# Patient Record
Sex: MALE | Race: WHITE | NOT HISPANIC OR LATINO | Employment: OTHER | URBAN - METROPOLITAN AREA
[De-identification: names, ages, dates, MRNs, and addresses within clinical notes are randomized per-mention and may not be internally consistent; named-entity substitution may affect disease eponyms.]

---

## 2018-10-03 ENCOUNTER — APPOINTMENT (OUTPATIENT)
Dept: GENERAL RADIOLOGY | Facility: HOSPITAL | Age: 73
End: 2018-10-03

## 2018-10-03 ENCOUNTER — HOSPITAL ENCOUNTER (OUTPATIENT)
Facility: HOSPITAL | Age: 73
Discharge: HOME | End: 2018-10-05
Attending: INTERNAL MEDICINE | Admitting: INTERNAL MEDICINE

## 2018-10-03 DIAGNOSIS — I35.1 AORTIC VALVE INSUFFICIENCY, ETIOLOGY OF CARDIAC VALVE DISEASE UNSPECIFIED: Primary | ICD-10-CM

## 2018-10-03 LAB
ANION GAP SERPL CALCULATED.3IONS-SCNC: 8 MMOL/L (ref 3–11)
ARTICHOKE IGE QN: 120 MG/DL (ref 0–130)
BUN BLD-MCNC: 12 MG/DL (ref 9–23)
BUN/CREAT SERPL: 14.3 (ref 7–25)
CALCIUM SPEC-SCNC: 8.9 MG/DL (ref 8.7–10.4)
CHLORIDE SERPL-SCNC: 105 MMOL/L (ref 99–109)
CHOLEST SERPL-MCNC: 172 MG/DL (ref 0–200)
CO2 SERPL-SCNC: 26 MMOL/L (ref 20–31)
CREAT BLD-MCNC: 0.84 MG/DL (ref 0.6–1.3)
DEPRECATED RDW RBC AUTO: 43.5 FL (ref 37–54)
ERYTHROCYTE [DISTWIDTH] IN BLOOD BY AUTOMATED COUNT: 13.1 % (ref 11.3–14.5)
GFR SERPL CREATININE-BSD FRML MDRD: 89 ML/MIN/1.73
GLUCOSE BLD-MCNC: 100 MG/DL (ref 70–100)
HBA1C MFR BLD: 5.9 % (ref 4.8–5.6)
HCT VFR BLD AUTO: 43.5 % (ref 38.9–50.9)
HDLC SERPL-MCNC: 47 MG/DL (ref 40–60)
HGB BLD-MCNC: 15.3 G/DL (ref 13.1–17.5)
MCH RBC QN AUTO: 32.2 PG (ref 27–31)
MCHC RBC AUTO-ENTMCNC: 35.2 G/DL (ref 32–36)
MCV RBC AUTO: 91.6 FL (ref 80–99)
PLATELET # BLD AUTO: 169 10*3/MM3 (ref 150–450)
PMV BLD AUTO: 10.5 FL (ref 6–12)
POTASSIUM BLD-SCNC: 3.5 MMOL/L (ref 3.5–5.5)
RBC # BLD AUTO: 4.75 10*6/MM3 (ref 4.2–5.76)
SODIUM BLD-SCNC: 139 MMOL/L (ref 132–146)
TRIGL SERPL-MCNC: 99 MG/DL (ref 0–150)
WBC NRBC COR # BLD: 8.24 10*3/MM3 (ref 3.5–10.8)

## 2018-10-03 PROCEDURE — G0378 HOSPITAL OBSERVATION PER HR: HCPCS

## 2018-10-03 PROCEDURE — 85027 COMPLETE CBC AUTOMATED: CPT | Performed by: INTERNAL MEDICINE

## 2018-10-03 PROCEDURE — C1769 GUIDE WIRE: HCPCS | Performed by: INTERNAL MEDICINE

## 2018-10-03 PROCEDURE — 63710000001 ASPIRIN 325 MG TABLET: Performed by: PHYSICIAN ASSISTANT

## 2018-10-03 PROCEDURE — 63710000001 TRAZODONE 50 MG TABLET: Performed by: PHYSICIAN ASSISTANT

## 2018-10-03 PROCEDURE — 0 IOPAMIDOL PER 1 ML: Performed by: INTERNAL MEDICINE

## 2018-10-03 PROCEDURE — 63710000001 ATORVASTATIN 20 MG TABLET: Performed by: PHYSICIAN ASSISTANT

## 2018-10-03 PROCEDURE — 63710000001 CLOPIDOGREL 75 MG TABLET: Performed by: PHYSICIAN ASSISTANT

## 2018-10-03 PROCEDURE — A9270 NON-COVERED ITEM OR SERVICE: HCPCS | Performed by: PHYSICIAN ASSISTANT

## 2018-10-03 PROCEDURE — 93567 NJX CAR CTH SPRVLV AORTGRPHY: CPT | Performed by: INTERNAL MEDICINE

## 2018-10-03 PROCEDURE — 71046 X-RAY EXAM CHEST 2 VIEWS: CPT

## 2018-10-03 PROCEDURE — 25010000002 HEPARIN (PORCINE) PER 1000 UNITS: Performed by: INTERNAL MEDICINE

## 2018-10-03 PROCEDURE — C1894 INTRO/SHEATH, NON-LASER: HCPCS | Performed by: INTERNAL MEDICINE

## 2018-10-03 PROCEDURE — 93010 ELECTROCARDIOGRAM REPORT: CPT | Performed by: INTERNAL MEDICINE

## 2018-10-03 PROCEDURE — 83036 HEMOGLOBIN GLYCOSYLATED A1C: CPT | Performed by: INTERNAL MEDICINE

## 2018-10-03 PROCEDURE — 36415 COLL VENOUS BLD VENIPUNCTURE: CPT

## 2018-10-03 PROCEDURE — 25010000002 FENTANYL CITRATE (PF) 100 MCG/2ML SOLUTION: Performed by: INTERNAL MEDICINE

## 2018-10-03 PROCEDURE — 93458 L HRT ARTERY/VENTRICLE ANGIO: CPT | Performed by: INTERNAL MEDICINE

## 2018-10-03 PROCEDURE — 25010000002 MIDAZOLAM PER 1 MG: Performed by: INTERNAL MEDICINE

## 2018-10-03 PROCEDURE — 80061 LIPID PANEL: CPT | Performed by: INTERNAL MEDICINE

## 2018-10-03 PROCEDURE — 93005 ELECTROCARDIOGRAM TRACING: CPT | Performed by: PHYSICIAN ASSISTANT

## 2018-10-03 PROCEDURE — S0260 H&P FOR SURGERY: HCPCS | Performed by: INTERNAL MEDICINE

## 2018-10-03 PROCEDURE — 63710000001 BUDESONIDE-FORMOTEROL 160-4.5 MCG/ACT AEROSOL 6 G INHALER: Performed by: PHYSICIAN ASSISTANT

## 2018-10-03 PROCEDURE — 80048 BASIC METABOLIC PNL TOTAL CA: CPT | Performed by: INTERNAL MEDICINE

## 2018-10-03 RX ORDER — ALLOPURINOL 300 MG/1
300 TABLET ORAL DAILY
Status: DISCONTINUED | OUTPATIENT
Start: 2018-10-04 | End: 2018-10-05 | Stop reason: HOSPADM

## 2018-10-03 RX ORDER — TRAZODONE HYDROCHLORIDE 50 MG/1
50 TABLET ORAL NIGHTLY PRN
COMMUNITY
End: 2019-06-28 | Stop reason: HOSPADM

## 2018-10-03 RX ORDER — TRAZODONE HYDROCHLORIDE 50 MG/1
50 TABLET ORAL NIGHTLY
Status: DISCONTINUED | OUTPATIENT
Start: 2018-10-03 | End: 2018-10-05 | Stop reason: HOSPADM

## 2018-10-03 RX ORDER — AMLODIPINE BESYLATE 10 MG/1
10 TABLET ORAL
Status: DISCONTINUED | OUTPATIENT
Start: 2018-10-04 | End: 2018-10-05 | Stop reason: HOSPADM

## 2018-10-03 RX ORDER — AMLODIPINE BESYLATE 10 MG/1
10 TABLET ORAL DAILY
COMMUNITY
End: 2019-06-19

## 2018-10-03 RX ORDER — ATORVASTATIN CALCIUM 20 MG/1
20 TABLET, FILM COATED ORAL NIGHTLY
Status: DISCONTINUED | OUTPATIENT
Start: 2018-10-03 | End: 2018-10-05 | Stop reason: HOSPADM

## 2018-10-03 RX ORDER — LIDOCAINE HYDROCHLORIDE 10 MG/ML
INJECTION, SOLUTION EPIDURAL; INFILTRATION; INTRACAUDAL; PERINEURAL AS NEEDED
Status: DISCONTINUED | OUTPATIENT
Start: 2018-10-03 | End: 2018-10-03 | Stop reason: HOSPADM

## 2018-10-03 RX ORDER — CLOPIDOGREL BISULFATE 75 MG/1
600 TABLET ORAL ONCE
Status: COMPLETED | OUTPATIENT
Start: 2018-10-03 | End: 2018-10-03

## 2018-10-03 RX ORDER — FENTANYL CITRATE 50 UG/ML
INJECTION, SOLUTION INTRAMUSCULAR; INTRAVENOUS AS NEEDED
Status: DISCONTINUED | OUTPATIENT
Start: 2018-10-03 | End: 2018-10-03 | Stop reason: HOSPADM

## 2018-10-03 RX ORDER — ALLOPURINOL 300 MG/1
300 TABLET ORAL DAILY
COMMUNITY

## 2018-10-03 RX ORDER — BUDESONIDE AND FORMOTEROL FUMARATE DIHYDRATE 160; 4.5 UG/1; UG/1
2 AEROSOL RESPIRATORY (INHALATION)
Status: DISCONTINUED | OUTPATIENT
Start: 2018-10-03 | End: 2018-10-05 | Stop reason: HOSPADM

## 2018-10-03 RX ORDER — ALBUTEROL SULFATE 90 UG/1
2 AEROSOL, METERED RESPIRATORY (INHALATION) EVERY 4 HOURS PRN
COMMUNITY
End: 2021-03-30

## 2018-10-03 RX ORDER — ASPIRIN 325 MG
325 TABLET ORAL ONCE
Status: COMPLETED | OUTPATIENT
Start: 2018-10-03 | End: 2018-10-03

## 2018-10-03 RX ORDER — MIDAZOLAM HYDROCHLORIDE 1 MG/ML
INJECTION INTRAMUSCULAR; INTRAVENOUS AS NEEDED
Status: DISCONTINUED | OUTPATIENT
Start: 2018-10-03 | End: 2018-10-03 | Stop reason: HOSPADM

## 2018-10-03 RX ORDER — FAMOTIDINE 20 MG/1
20 TABLET, FILM COATED ORAL DAILY
Status: DISCONTINUED | OUTPATIENT
Start: 2018-10-04 | End: 2018-10-05 | Stop reason: HOSPADM

## 2018-10-03 RX ADMIN — ATORVASTATIN CALCIUM 20 MG: 20 TABLET, FILM COATED ORAL at 21:34

## 2018-10-03 RX ADMIN — ASPIRIN 325 MG ORAL TABLET 325 MG: 325 PILL ORAL at 15:36

## 2018-10-03 RX ADMIN — TRAZODONE HYDROCHLORIDE 50 MG: 50 TABLET ORAL at 21:35

## 2018-10-03 RX ADMIN — BUDESONIDE AND FORMOTEROL FUMARATE DIHYDRATE 2 PUFF: 160; 4.5 AEROSOL RESPIRATORY (INHALATION) at 21:37

## 2018-10-03 RX ADMIN — CLOPIDOGREL BISULFATE 600 MG: 75 TABLET ORAL at 15:36

## 2018-10-03 NOTE — H&P
"  Pre-cardiac Catheterization History and Physical  [facility] at [facility]      Patient:  Alok Tobias  :  1945  MRN: [id number]    PCP:  Ivet Chua MD  PHONE:  [phone]    DATE: 10/3/2018  ID: Alok Tobias is a 73 y.o. male resident of [place], [place]     CC: Arm pain     PROBLEM LIST:   1. Arm pain and chest pain  A. History of abnormal stress test 2016, Dr. Shaver: images revealed moderate fixed defect in the inferior territory consistent with attenuation artifact, with EF 60% per Dr. Shaver (records were lost with transition to EPIC)  B. Single episode of severe bilateral arm pain at rest 2018  2. Hypertension  3. COPD  4. Asthma  5. Morbid obesity, BMI 46    BRIEF HPI: Mr. Tobias is a pleasant 74 y/o obese WM with history of HTN, asthma and COPD who is referred in consultation from Dr. Chau's office. He reports he was asleep last night when he awoke with severe bilateral arm tightness and squeezing sensation that lasted about 1 minute. He denies associated symptoms. He denies chest pain suggesting angina. He does report a \"vision\" telling him to see his doctor soon after this occurred and presented to Dr. Chau's office today for further evaluation. He was felt to have symptoms concerning for [place] and was sent directly from Dr. Chau's office for catheterization. He denies prior history of cardiovascular, cerebrovascular or peripheral vascular disease. He did undergo a stress test a few years ago that was interpreted by Dr. Shaver as abnormal due to attenuation artifact. He had normal EF at the time. These records were lost per Dr. Shaver's note with transition to [facility]. He denies current tobacco use, however does drink 4-5 beers per day.     Cardiac Risk Factors: advanced age (older than 55 for men, 65 for women), hypertension, male gender, obesity (BMI >= 30 kg/m2) and sedentary lifestyle    Allergies:      Allergies   Allergen Reactions   • " "Dye Fdc Red [Red Dye] Nausea And Vomiting     All dyes, IV dye, hair dyes   • Penicillins Other (See Comments)       MEDICATIONS:  · Albuterol inhaler   · Allopurinol 300mg daily  · Amlodipine 10mg daily  · Symbicort inhaler  · Famotidine 20mg as needed  · Theophylline 100mg daily    Past medical & surgical history, social and family history reviewed in the electronic medical record.    ROS: Pertinent positives listed in the HPI and problem list above. All others reviewed and negative.     Physical Exam:   /85 (BP Location: Right arm, Patient Position: Lying) Comment: 135/94 L ARM  Pulse 80   Temp 98 °F (36.7 °C) (Temporal Artery )   Resp 18   Ht 170.2 cm (67\")   Wt 136 kg (298 lb 15.1 oz)   SpO2 96%   BMI 46.82 kg/m²     Constitutional:    Obese, cooperative, in no acute distress   Neck:     No Jugular venous distention, obese neck     Heart:    Regular rhythm and normal rate, normal S1 and S2, no murmurs,gallops, rubs, or clicks. No distinct PMI noted.    Lungs:     Clear to auscultation bilaterally, respirations regular, even     and unlabored    Abdomen:     Obese, non-tender, non-distended, normal bowel sounds, no masses or organomegaly   Extremities:   No gross deformities, trace edema, clubbing, or cyanosis.    Pulses:   Peripheral pulses palpable and equal bilaterally.     Barbaeu Test:  Left: Normal  (oxymetric Allens) Right: Not Assessed     Labs and Diagnostic Data:    Results from last 7 days  Lab Units 10/03/18  1358   SODIUM mmol/L 139   POTASSIUM mmol/L 3.5   CHLORIDE mmol/L 105   CO2 mmol/L 26.0   BUN mg/dL 12   CREATININE mg/dL 0.84   GLUCOSE mg/dL 100   CALCIUM mg/dL 8.9       Results from last 7 days  Lab Units 10/03/18  1358   WBC 10*3/mm3 8.24   HEMOGLOBIN g/dL 15.3   HEMATOCRIT % 43.5   PLATELETS 10*3/mm3 169     Lab Results   Component Value Date    CHOL 172 10/03/2018    TRIG 99 10/03/2018    HDL 47 10/03/2018     10/03/2018     EKG: pending     IMPRESSION:  · 72 y/o WM " with history of HTN and obesity, with recent episode of severe bilateral arm squeezing/tightness sensation at rest concerning for unstable angina. He was referred for catheterization studies from Dr. Chau's office.     PLAN:  · Procedure to perform: LHC +/- CBI. Risks, benefits and alternatives to the procedure explained to the patient and he understands and wishes to proceed.  I, Twan Harrison MD, personally performed the services described as documented by the above named individual. I have made any necessary edits and it is both accurate and complete 10/3/2018  5:36 PM     Scribed for Twan Harrison MD by Argentina Lepe PA-C. 10/3/2018  3:41 PM

## 2018-10-04 ENCOUNTER — APPOINTMENT (OUTPATIENT)
Dept: CT IMAGING | Facility: HOSPITAL | Age: 73
End: 2018-10-04

## 2018-10-04 ENCOUNTER — APPOINTMENT (OUTPATIENT)
Dept: CARDIOLOGY | Facility: HOSPITAL | Age: 73
End: 2018-10-04

## 2018-10-04 LAB
ANION GAP SERPL CALCULATED.3IONS-SCNC: 7 MMOL/L (ref 3–11)
BH CV ECHO MEAS - AI DEC SLOPE: 171.3 CM/SEC^2
BH CV ECHO MEAS - AI MAX PG: 35.7 MMHG
BH CV ECHO MEAS - AI MAX VEL: 298.6 CM/SEC
BH CV ECHO MEAS - AI P1/2T: 510.5 MSEC
BH CV ECHO MEAS - AO ROOT AREA (BSA CORRECTED): 1.6
BH CV ECHO MEAS - AO ROOT AREA: 11.5 CM^2
BH CV ECHO MEAS - AO ROOT DIAM: 3.8 CM
BH CV ECHO MEAS - BSA(HAYCOCK): 2.6 M^2
BH CV ECHO MEAS - BSA: 2.4 M^2
BH CV ECHO MEAS - BZI_BMI: 46.7 KILOGRAMS/M^2
BH CV ECHO MEAS - BZI_METRIC_HEIGHT: 170.2 CM
BH CV ECHO MEAS - BZI_METRIC_WEIGHT: 135.2 KG
BH CV ECHO MEAS - EDV(CUBED): 177.3 ML
BH CV ECHO MEAS - EDV(MOD-SP2): 111 ML
BH CV ECHO MEAS - EDV(MOD-SP4): 123 ML
BH CV ECHO MEAS - EDV(TEICH): 154.8 ML
BH CV ECHO MEAS - EF(CUBED): 63.1 %
BH CV ECHO MEAS - EF(MOD-SP2): 40.5 %
BH CV ECHO MEAS - EF(MOD-SP4): 50.4 %
BH CV ECHO MEAS - EF(TEICH): 54 %
BH CV ECHO MEAS - ESV(CUBED): 65.5 ML
BH CV ECHO MEAS - ESV(MOD-SP2): 66 ML
BH CV ECHO MEAS - ESV(MOD-SP4): 61 ML
BH CV ECHO MEAS - ESV(TEICH): 71.3 ML
BH CV ECHO MEAS - FS: 28.3 %
BH CV ECHO MEAS - IVS/LVPW: 0.94
BH CV ECHO MEAS - IVSD: 1.2 CM
BH CV ECHO MEAS - LA DIMENSION: 3.8 CM
BH CV ECHO MEAS - LA/AO: 0.98
BH CV ECHO MEAS - LAD MAJOR: 5.9 CM
BH CV ECHO MEAS - LAT PEAK E' VEL: 8.6 CM/SEC
BH CV ECHO MEAS - LATERAL E/E' RATIO: 6
BH CV ECHO MEAS - LV DIASTOLIC VOL/BSA (35-75): 51.3 ML/M^2
BH CV ECHO MEAS - LV MASS(C)D: 278.6 GRAMS
BH CV ECHO MEAS - LV MASS(C)DI: 116.3 GRAMS/M^2
BH CV ECHO MEAS - LV MAX PG: 4.3 MMHG
BH CV ECHO MEAS - LV MEAN PG: 2.3 MMHG
BH CV ECHO MEAS - LV SYSTOLIC VOL/BSA (12-30): 25.5 ML/M^2
BH CV ECHO MEAS - LV V1 MAX: 103.7 CM/SEC
BH CV ECHO MEAS - LV V1 MEAN: 69.3 CM/SEC
BH CV ECHO MEAS - LV V1 VTI: 21.8 CM
BH CV ECHO MEAS - LVIDD: 5.6 CM
BH CV ECHO MEAS - LVIDS: 4 CM
BH CV ECHO MEAS - LVLD AP2: 7.9 CM
BH CV ECHO MEAS - LVLD AP4: 8.6 CM
BH CV ECHO MEAS - LVLS AP2: 6.9 CM
BH CV ECHO MEAS - LVLS AP4: 7 CM
BH CV ECHO MEAS - LVOT AREA (M): 4.5 CM^2
BH CV ECHO MEAS - LVOT AREA: 4.6 CM^2
BH CV ECHO MEAS - LVOT DIAM: 2.4 CM
BH CV ECHO MEAS - LVPWD: 1.2 CM
BH CV ECHO MEAS - MED PEAK E' VEL: 5.6 CM/SEC
BH CV ECHO MEAS - MEDIAL E/E' RATIO: 9.2
BH CV ECHO MEAS - MV A MAX VEL: 75 CM/SEC
BH CV ECHO MEAS - MV E MAX VEL: 52.8 CM/SEC
BH CV ECHO MEAS - MV E/A: 0.7
BH CV ECHO MEAS - PA ACC SLOPE: 567.8 CM/SEC^2
BH CV ECHO MEAS - PA ACC TIME: 0.14 SEC
BH CV ECHO MEAS - PA PR(ACCEL): 16 MMHG
BH CV ECHO MEAS - RVDD: 2.9 CM
BH CV ECHO MEAS - SI(CUBED): 46.7 ML/M^2
BH CV ECHO MEAS - SI(LVOT): 41.5 ML/M^2
BH CV ECHO MEAS - SI(MOD-SP2): 18.8 ML/M^2
BH CV ECHO MEAS - SI(MOD-SP4): 25.9 ML/M^2
BH CV ECHO MEAS - SI(TEICH): 34.9 ML/M^2
BH CV ECHO MEAS - SV(CUBED): 111.8 ML
BH CV ECHO MEAS - SV(LVOT): 99.3 ML
BH CV ECHO MEAS - SV(MOD-SP2): 45 ML
BH CV ECHO MEAS - SV(MOD-SP4): 62 ML
BH CV ECHO MEAS - SV(TEICH): 83.5 ML
BH CV ECHO MEAS - TAPSE (>1.6): 1.7 CM2
BH CV ECHO MEASUREMENTS AVERAGE E/E' RATIO: 7.44
BH CV VAS BP LEFT ARM: NORMAL MMHG
BH CV XLRA - RV BASE: 4.3 CM
BH CV XLRA - RV LENGTH: 7.1 CM
BH CV XLRA - RV MID: 3.5 CM
BH CV XLRA - TDI S': 10.9 CM/SEC
BUN BLD-MCNC: 13 MG/DL (ref 9–23)
BUN/CREAT SERPL: 13.4 (ref 7–25)
CALCIUM SPEC-SCNC: 8.6 MG/DL (ref 8.7–10.4)
CHLORIDE SERPL-SCNC: 103 MMOL/L (ref 99–109)
CO2 SERPL-SCNC: 25 MMOL/L (ref 20–31)
CREAT BLD-MCNC: 0.97 MG/DL (ref 0.6–1.3)
GFR SERPL CREATININE-BSD FRML MDRD: 75 ML/MIN/1.73
GLUCOSE BLD-MCNC: 164 MG/DL (ref 70–100)
LEFT ATRIUM VOLUME INDEX: 20.5 ML/M^2
MAXIMAL PREDICTED HEART RATE: 145 BPM
POTASSIUM BLD-SCNC: 3.3 MMOL/L (ref 3.5–5.5)
SODIUM BLD-SCNC: 135 MMOL/L (ref 132–146)
STRESS TARGET HR: 123 BPM

## 2018-10-04 PROCEDURE — 63710000001 ATORVASTATIN 20 MG TABLET: Performed by: PHYSICIAN ASSISTANT

## 2018-10-04 PROCEDURE — 63710000001 POTASSIUM CHLORIDE 10 MEQ CAPSULE CONTROLLED-RELEASE: Performed by: INTERNAL MEDICINE

## 2018-10-04 PROCEDURE — 63710000001 FAMOTIDINE 20 MG TABLET: Performed by: PHYSICIAN ASSISTANT

## 2018-10-04 PROCEDURE — 99225 PR SBSQ OBSERVATION CARE/DAY 25 MINUTES: CPT | Performed by: INTERNAL MEDICINE

## 2018-10-04 PROCEDURE — A9270 NON-COVERED ITEM OR SERVICE: HCPCS | Performed by: PHYSICIAN ASSISTANT

## 2018-10-04 PROCEDURE — 93306 TTE W/DOPPLER COMPLETE: CPT | Performed by: INTERNAL MEDICINE

## 2018-10-04 PROCEDURE — 0 IOPAMIDOL PER 1 ML: Performed by: INTERNAL MEDICINE

## 2018-10-04 PROCEDURE — 71275 CT ANGIOGRAPHY CHEST: CPT

## 2018-10-04 PROCEDURE — G0378 HOSPITAL OBSERVATION PER HR: HCPCS

## 2018-10-04 PROCEDURE — 63710000001 AMLODIPINE 5 MG TABLET: Performed by: PHYSICIAN ASSISTANT

## 2018-10-04 PROCEDURE — 63710000001 ALLOPURINOL 300 MG TABLET: Performed by: PHYSICIAN ASSISTANT

## 2018-10-04 PROCEDURE — 93306 TTE W/DOPPLER COMPLETE: CPT

## 2018-10-04 PROCEDURE — 94799 UNLISTED PULMONARY SVC/PX: CPT

## 2018-10-04 PROCEDURE — 63710000001 GUAIFENESIN 600 MG TABLET SUSTAINED-RELEASE 12 HOUR: Performed by: INTERNAL MEDICINE

## 2018-10-04 PROCEDURE — 25010000002 SULFUR HEXAFLUORIDE MICROSPH 60.7-25 MG RECONSTITUTED SUSPENSION: Performed by: INTERNAL MEDICINE

## 2018-10-04 PROCEDURE — 80048 BASIC METABOLIC PNL TOTAL CA: CPT | Performed by: PHYSICIAN ASSISTANT

## 2018-10-04 PROCEDURE — A9270 NON-COVERED ITEM OR SERVICE: HCPCS | Performed by: INTERNAL MEDICINE

## 2018-10-04 RX ORDER — THEOPHYLLINE ANHYDROUS 200 MG
200 TABLET, EXTENDED RELEASE 12 HR ORAL EVERY 12 HOURS SCHEDULED
Status: DISCONTINUED | OUTPATIENT
Start: 2018-10-04 | End: 2018-10-05 | Stop reason: HOSPADM

## 2018-10-04 RX ORDER — POTASSIUM CHLORIDE 750 MG/1
40 CAPSULE, EXTENDED RELEASE ORAL AS NEEDED
Status: DISCONTINUED | OUTPATIENT
Start: 2018-10-04 | End: 2018-10-05 | Stop reason: HOSPADM

## 2018-10-04 RX ORDER — GUAIFENESIN 600 MG/1
600 TABLET, EXTENDED RELEASE ORAL EVERY 12 HOURS SCHEDULED
Status: DISCONTINUED | OUTPATIENT
Start: 2018-10-04 | End: 2018-10-05 | Stop reason: HOSPADM

## 2018-10-04 RX ORDER — SODIUM CHLORIDE 9 MG/ML
200 INJECTION, SOLUTION INTRAVENOUS CONTINUOUS
Status: DISCONTINUED | OUTPATIENT
Start: 2018-10-04 | End: 2018-10-05 | Stop reason: HOSPADM

## 2018-10-04 RX ORDER — ASPIRIN 81 MG/1
81 TABLET ORAL DAILY
Status: DISCONTINUED | OUTPATIENT
Start: 2018-10-04 | End: 2018-10-05 | Stop reason: HOSPADM

## 2018-10-04 RX ADMIN — AMLODIPINE BESYLATE 10 MG: 10 TABLET ORAL at 08:50

## 2018-10-04 RX ADMIN — BUDESONIDE AND FORMOTEROL FUMARATE DIHYDRATE 2 PUFF: 160; 4.5 AEROSOL RESPIRATORY (INHALATION) at 21:01

## 2018-10-04 RX ADMIN — SODIUM CHLORIDE 200 ML/HR: 9 INJECTION, SOLUTION INTRAVENOUS at 10:49

## 2018-10-04 RX ADMIN — BUDESONIDE AND FORMOTEROL FUMARATE DIHYDRATE 2 PUFF: 160; 4.5 AEROSOL RESPIRATORY (INHALATION) at 08:50

## 2018-10-04 RX ADMIN — GUAIFENESIN 600 MG: 600 TABLET, EXTENDED RELEASE ORAL at 19:52

## 2018-10-04 RX ADMIN — POTASSIUM CHLORIDE 40 MEQ: 750 CAPSULE, EXTENDED RELEASE ORAL at 19:52

## 2018-10-04 RX ADMIN — SULFUR HEXAFLUORIDE 3 ML: KIT at 11:40

## 2018-10-04 RX ADMIN — IOPAMIDOL 75 ML: 755 INJECTION, SOLUTION INTRAVENOUS at 14:40

## 2018-10-04 RX ADMIN — POTASSIUM CHLORIDE 40 MEQ: 750 CAPSULE, EXTENDED RELEASE ORAL at 15:14

## 2018-10-04 RX ADMIN — FAMOTIDINE 20 MG: 20 TABLET, FILM COATED ORAL at 08:50

## 2018-10-04 RX ADMIN — ATORVASTATIN CALCIUM 20 MG: 20 TABLET, FILM COATED ORAL at 19:52

## 2018-10-04 RX ADMIN — ALLOPURINOL 300 MG: 300 TABLET ORAL at 08:50

## 2018-10-04 NOTE — PLAN OF CARE
Problem: Patient Care Overview  Goal: Plan of Care Review  Outcome: Ongoing (interventions implemented as appropriate)   10/03/18 2253   Coping/Psychosocial   Plan of Care Reviewed With patient;daughter   Plan of Care Review   Progress no change   OTHER   Outcome Summary V/S stable. Procedural site intact with gauze & tegaderm covering. Site is soft & pt has no c/o discomfort. CXR done this evening. ECHO to be done 10/04/18. Teaching done with pt r/o purpose of ECHO.       10/03/18 2253   Coping/Psychosocial   Plan of Care Reviewed With patient;daughter   Plan of Care Review   Progress no change   OTHER   Outcome Summary V/S stable. Procedural site intact with gauze & tegaderm covering. Site is soft & pt has no c/o discomfort. CXR done this evening. ECHO to be done 10/04/18. Teaching done with pt r/o purpose of ECHO.        Problem: Cardiac Catheterization (Diagnostic/Interventional) (Adult)  Goal: Signs and Symptoms of Listed Potential Problems Will be Absent, Minimized or Managed (Cardiac Catheterization)  Outcome: Ongoing (interventions implemented as appropriate)   10/03/18 2253   Goal/Outcome Evaluation   Problems Present (Cardiac Cath) none     Goal: Anesthesia/Sedation Recovery  Outcome: Outcome(s) achieved Date Met: 10/03/18   10/03/18 2253   Goal/Outcome Evaluation   Anesthesia/Sedation Recovery recovered to baseline

## 2018-10-04 NOTE — PROGRESS NOTES
"  [facility] at [facility]  PROGRESS NOTE    Date of Admission: 10/3/2018  Length of Stay: 0  Primary Care Physician: Ivet Chau MD    Chief Complaint: f/u arm pain, aortic insufficiency   Problem List:   1. Arm pain and chest pain  A. History of abnormal stress test 07/2016, Dr. Shaver: images revealed moderate fixed defect in the inferior territory consistent with attenuation artifact, with EF 60% per Dr. Shaver (records were lost with transition to EPIC)  B. Single episode of severe bilateral arm pain at rest October 2018  2. Hypertension  3. COPD  4. Asthma  5. Morbid obesity, BMI 46    Subjective      Patient has not had any recurrent arm or chest pain. Some wheezing this AM    Objective   Vitals: /67   Pulse 66   Temp 98 °F (36.7 °C)   Resp 18   Ht 170.2 cm (67\")   Wt 136 kg (298 lb 15.1 oz)   SpO2 90%   BMI 46.82 kg/m²     Physical Exam:  GENERAL: Alert, cooperative, in no acute distress.   HEENT: Normocephalic, no jugular venous distention  HEART: No discrete PMI is noted. Regular rhythm, normal rate, and no murmurs, gallops, or rubs.   LUNGS: Mild expiratory wheezing. No rales or rhonchi.  ABDOMEN: Soft, bowel sounds present, non-tender   NEUROLOGIC: No focal abnormalities involving strength or sensation are noted.   EXTREMITIES: No clubbing, cyanosis, or edema noted. Radial site stable with no bleeding, bruising or hematoma     Results:    Results from last 7 days  Lab Units 10/03/18  1358   WBC 10*3/mm3 8.24   HEMOGLOBIN g/dL 15.3   HEMATOCRIT % 43.5   PLATELETS 10*3/mm3 169       Results from last 7 days  Lab Units 10/04/18  0330 10/03/18  1358   SODIUM mmol/L 135 139   POTASSIUM mmol/L 3.3* 3.5   CHLORIDE mmol/L 103 105   CO2 mmol/L 25.0 26.0   BUN mg/dL 13 12   CREATININE mg/dL 0.97 0.84   GLUCOSE mg/dL 164* 100      Lab Results   Component Value Date    CHOL 172 10/03/2018    TRIG 99 10/03/2018    HDL 47 10/03/2018     10/03/2018       Results from " last 7 days  Lab Units 10/03/18  1358   HEMOGLOBIN A1C % 5.90*       Results from last 7 days  Lab Units 10/03/18  1358   CHOLESTEROL mg/dL 172   TRIGLYCERIDES mg/dL 99   HDL CHOL mg/dL 47   LDL CHOL mg/dL 120     No intake or output data in the 24 hours ending 10/04/18 0834    I personally reviewed the patient's EKG/Telemetry data    Radiology Data:   CXR images reviewed, official report pending     Current Medications:    allopurinol 300 mg Oral Daily   amLODIPine 10 mg Oral Q24H   atorvastatin 20 mg Oral Nightly   budesonide-formoterol 2 puff Inhalation BID - RT   famotidine 20 mg Oral Daily   traZODone 50 mg Oral Nightly          Assessment and Plan:   1. Arm pain  - LHC demonstrated nonobstructive CAD and some AI  - echo to be done today as well as CTA to rule out aortic dissection  - stable overnight without symptoms     2. HTN  - controlled    I, Twan Harrison MD, personally performed the services described as documented by the above named individual. I have made any necessary edits and it is both accurate and complete 10/6/2018  1:30 PM        Scribed for Twan Harrison MD by Argentina Lepe PA-C.

## 2018-10-05 VITALS
HEIGHT: 67 IN | DIASTOLIC BLOOD PRESSURE: 74 MMHG | HEART RATE: 76 BPM | WEIGHT: 298 LBS | TEMPERATURE: 98.2 F | OXYGEN SATURATION: 96 % | RESPIRATION RATE: 18 BRPM | BODY MASS INDEX: 46.77 KG/M2 | SYSTOLIC BLOOD PRESSURE: 126 MMHG

## 2018-10-05 PROCEDURE — A9270 NON-COVERED ITEM OR SERVICE: HCPCS | Performed by: PHYSICIAN ASSISTANT

## 2018-10-05 PROCEDURE — 63710000001 GUAIFENESIN 600 MG TABLET SUSTAINED-RELEASE 12 HOUR: Performed by: INTERNAL MEDICINE

## 2018-10-05 PROCEDURE — A9270 NON-COVERED ITEM OR SERVICE: HCPCS | Performed by: INTERNAL MEDICINE

## 2018-10-05 PROCEDURE — 63710000001 AMLODIPINE 10 MG TABLET: Performed by: PHYSICIAN ASSISTANT

## 2018-10-05 PROCEDURE — 63710000001 FAMOTIDINE 20 MG TABLET: Performed by: PHYSICIAN ASSISTANT

## 2018-10-05 PROCEDURE — 63710000001 ASPIRIN 81 MG TABLET DELAYED-RELEASE: Performed by: PHYSICIAN ASSISTANT

## 2018-10-05 PROCEDURE — 63710000001 ALLOPURINOL 300 MG TABLET: Performed by: PHYSICIAN ASSISTANT

## 2018-10-05 PROCEDURE — G0378 HOSPITAL OBSERVATION PER HR: HCPCS

## 2018-10-05 PROCEDURE — 99217 PR OBSERVATION CARE DISCHARGE MANAGEMENT: CPT | Performed by: INTERNAL MEDICINE

## 2018-10-05 RX ORDER — ASPIRIN 81 MG/1
81 TABLET ORAL DAILY
Qty: 100 TABLET | Refills: 4 | Status: SHIPPED | OUTPATIENT
Start: 2018-10-06 | End: 2019-06-19

## 2018-10-05 RX ORDER — ATORVASTATIN CALCIUM 20 MG/1
20 TABLET, FILM COATED ORAL NIGHTLY
Qty: 30 TABLET | Refills: 11 | Status: ON HOLD | OUTPATIENT
Start: 2018-10-05 | End: 2019-05-28

## 2018-10-05 RX ADMIN — AMLODIPINE BESYLATE 10 MG: 10 TABLET ORAL at 08:59

## 2018-10-05 RX ADMIN — GUAIFENESIN 600 MG: 600 TABLET, EXTENDED RELEASE ORAL at 08:59

## 2018-10-05 RX ADMIN — BUDESONIDE AND FORMOTEROL FUMARATE DIHYDRATE 2 PUFF: 160; 4.5 AEROSOL RESPIRATORY (INHALATION) at 09:29

## 2018-10-05 RX ADMIN — FAMOTIDINE 20 MG: 20 TABLET, FILM COATED ORAL at 08:59

## 2018-10-05 RX ADMIN — ASPIRIN 81 MG: 81 TABLET, COATED ORAL at 08:59

## 2018-10-05 RX ADMIN — ALLOPURINOL 300 MG: 300 TABLET ORAL at 08:59

## 2018-10-05 NOTE — PLAN OF CARE
Problem: Patient Care Overview  Goal: Plan of Care Review  Outcome: Ongoing (interventions implemented as appropriate)   10/05/18 0601   Coping/Psychosocial   Plan of Care Reviewed With patient   Plan of Care Review   Progress improving   OTHER   Outcome Summary sinus sinus annmarie with 1st degree block room lair while awake 2L while sleeping for sats 86-89% no complaints hoping to go home after seeing dr patterson cath site dsg off per pt request site clean and dry

## 2018-10-05 NOTE — PROGRESS NOTES
Discharge Planning Assessment  [facility]     Patient Name: Alok Tobias  MRN: [id number]  Today's Date: 10/5/2018    Admit Date: 10/3/2018          Discharge Needs Assessment     Row Name 10/05/18 1108       Living Environment    Lives With alone    Current Living Arrangements home/apartment/condo    Primary Care Provided by self    Provides Primary Care For no one    Family Caregiver if Needed child(yani), adult    Quality of Family Relationships unable to assess    Able to Return to Prior Arrangements yes       Resource/Environmental Concerns    Resource/Environmental Concerns none    Transportation Concerns car, none       Transition Planning    Patient/Family Anticipates Transition to home    Patient/Family Anticipated Services at Transition none    Transportation Anticipated car, drives self       Discharge Needs Assessment    Readmission Within the Last 30 Days no previous admission in last 30 days    Concerns to be Addressed no discharge needs identified;denies needs/concerns at this time    Equipment Currently Used at Home none    Anticipated Changes Related to Illness none    Equipment Needed After Discharge none    Current Discharge Risk lives alone            Discharge Plan     Row Name 10/05/18 1109       Plan    Plan Home    Patient/Family in Agreement with Plan yes    Plan Comments Met with patient at bedside to initiate discharge planning. He lives alone in a home in [facility]. He is independent with all ADLs and does not use any DME. He has never had home health or been to an inpatient rehab facility. He plans to return home at discharge and will drive himself home as he has his car here. Denies discharge planning needs or concerns. Hopeful for discharge home today. Martha Campbell RN [phone]    Final Discharge Disposition Code 01 - home or self-care        Destination     No service coordination in this encounter.      Durable Medical Equipment     No service coordination in this encounter.       Dialysis/Infusion     No service coordination in this encounter.      Home Medical Care     No service coordination in this encounter.      Social Care     No service coordination in this encounter.        Expected Discharge Date and Time     Expected Discharge Date Expected Discharge Time    Oct 5, 2018               Demographic Summary     Row Name 10/05/18 1107       General Information    Arrived From physician office    Referral Source admission list    Reason for Consult discharge planning    Preferred Language English            Functional Status     Row Name 10/05/18 1107       Functional Status    Usual Activity Tolerance moderate    Current Activity Tolerance moderate       Functional Status, IADL    Medications independent    Meal Preparation independent    Housekeeping independent    Laundry independent    Shopping independent       Employment/    Employment/ Comments Medicare [employer] and [employer] supplement with Rx medication coverage through [employer] (card scanned in EPIC)            Psychosocial    No documentation.           Abuse/Neglect    No documentation.           Legal    No documentation.           Substance Abuse    No documentation.           Patient Forms    No documentation.         Martha Campbell RN

## 2018-10-05 NOTE — PROGRESS NOTES
"  [facility] at [facility]  PROGRESS NOTE    Date of Admission: 10/3/2018  Length of Stay: 0  Primary Care Physician: Ivet Chau MD    Chief Complaint: f/u arm pain, AI  Problem List:   1. Aortic insufficiency   A. History of abnormal stress test 07/2016, Dr. Shaver: images revealed moderate fixed defect in the inferior territory consistent with attenuation artifact, with EF 60% per Dr. Shaver (records were lost with transition to EPIC)  B. Single episode of severe bilateral arm pain at rest October 2018  C. [facility] with nonobstructive CAD, AI noted  D. Echo 10/4/2018: LVSF is normal, mild aortic root enlargement, mild AI  E. CTA Chest 10/4/18 negative for aortic dissection, ascending aorta is mildly enlarged   2. Hypertension  3. COPD  4. Asthma  5. Morbid obesity, BMI 46  6. ANDREA intolerant to CPAP    Subjective      Patient remains asymptomatic without chest, arm pain or dyspnea.     Objective   Vitals: /74 (BP Location: Left arm, Patient Position: Lying)   Pulse 72   Temp 98.5 °F (36.9 °C) (Oral)   Resp 18   Ht 170.2 cm (67\")   Wt 135 kg (298 lb)   SpO2 92%   BMI 46.67 kg/m²     Physical Exam:  GENERAL: Alert, cooperative, in no acute distress.   HEENT: Normocephalic, no jugular venous distention  HEART: No discrete PMI is noted. Regular rhythm, normal rate, and no murmurs, gallops, or rubs.   LUNGS: Clear to auscultation bilaterally. No wheezing, rales or rhonchi.  ABDOMEN: Soft, bowel sounds present, non-tender   NEUROLOGIC: No focal abnormalities involving strength or sensation are noted.   EXTREMITIES: No clubbing, cyanosis, or edema noted.     Results:    Results from last 7 days  Lab Units 10/03/18  1358   WBC 10*3/mm3 8.24   HEMOGLOBIN g/dL 15.3   HEMATOCRIT % 43.5   PLATELETS 10*3/mm3 169       Results from last 7 days  Lab Units 10/04/18  0330 10/03/18  1358   SODIUM mmol/L 135 139   POTASSIUM mmol/L 3.3* 3.5   CHLORIDE mmol/L 103 105   CO2 mmol/L 25.0 26.0   BUN " mg/dL 13 12   CREATININE mg/dL 0.97 0.84   GLUCOSE mg/dL 164* 100      Lab Results   Component Value Date    CHOL 172 10/03/2018    TRIG 99 10/03/2018    HDL 47 10/03/2018     10/03/2018       Results from last 7 days  Lab Units 10/03/18  1358   HEMOGLOBIN A1C % 5.90*       Results from last 7 days  Lab Units 10/03/18  1358   CHOLESTEROL mg/dL 172   TRIGLYCERIDES mg/dL 99   HDL CHOL mg/dL 47   LDL CHOL mg/dL 120       Intake/Output Summary (Last 24 hours) at 10/05/18 0821  Last data filed at 10/05/18 0451   Gross per 24 hour   Intake                0 ml   Output             1125 ml   Net            -1125 ml     I personally reviewed the patient's EKG/Telemetry data    Radiology Data:   CTA Chest 10/4/2018:  FINDINGS:       Vascular:  Thoracic aorta is normal in caliber without evidence of  dissection. Classic arch branching pattern. Minimal atherosclerosis.  Heart size is within normal limits.     Nonvascular:  In the lungs, there is scattered evidence of healed  granulomatous disease. No pleural effusion or pneumothorax. No  concerning noncalcified lung nodule or mass. Incidental right pectoralis  minor intramuscular lipoma.     IMPRESSION:  No evidence of aortic dissection.    Echo 10/4/2018:  Interpretation Summary     · Chamber sizes and wall thicknesses are normal.  · Global and segmental LV wall motion is normal.  · Mild concentric LVH is seen.  · There is mild aortic root enlargement.  · There is mild aortic insufficiency. The aortic valve is not well seen.  · PA hypertension is not suggested.     Current Medications:    allopurinol 300 mg Oral Daily   amLODIPine 10 mg Oral Q24H   aspirin 81 mg Oral Daily   atorvastatin 20 mg Oral Nightly   budesonide-formoterol 2 puff Inhalation BID - RT   famotidine 20 mg Oral Daily   guaiFENesin 600 mg Oral Q12H   theophylline 200 mg Oral Q12H   traZODone 50 mg Oral Nightly       sodium chloride 200 mL/hr Last Rate: Stopped (10/04/18 1600)       Assessment and Plan:      1. Arm pain  - [facility] demonstrated nonobstructive CAD, normal LVEF  - echo with mild AI, mild aortic root insufficiency  - CTA reviewed personally by Dr. Harrison with Dr. Pascal and negative for aortic dissection, mild enlargement of ascending aorta    2. Mild AI  - he will return in 1 year with echo at that time      3. HTN  -well  controlled    Disposition: Patient stable and ready for discharge home on current medicines. He will return for follow up in 1 year with echo at that time to re-look aortic insufficiency. He knows should he have any symptoms in the interim to call our office.     I, Twan Harrison MD, personally performed the services described as documented by the above named individual. I have made any necessary edits and it is both accurate and complete 10/6/2018  1:31 PM            Scribed for Twan Harrison MD by Argentina Lepe PA-C.

## 2019-05-26 ENCOUNTER — APPOINTMENT (OUTPATIENT)
Dept: GENERAL RADIOLOGY | Facility: HOSPITAL | Age: 74
End: 2019-05-26

## 2019-05-26 ENCOUNTER — HOSPITAL ENCOUNTER (INPATIENT)
Facility: HOSPITAL | Age: 74
LOS: 3 days | Discharge: HOME | End: 2019-05-29
Attending: EMERGENCY MEDICINE | Admitting: INTERNAL MEDICINE

## 2019-05-26 DIAGNOSIS — R09.02 HYPOXIA: ICD-10-CM

## 2019-05-26 DIAGNOSIS — J96.01 ACUTE RESPIRATORY FAILURE WITH HYPOXIA (HCC): ICD-10-CM

## 2019-05-26 DIAGNOSIS — R06.02 SHORTNESS OF BREATH: ICD-10-CM

## 2019-05-26 DIAGNOSIS — J44.1 CHRONIC OBSTRUCTIVE PULMONARY DISEASE WITH ACUTE EXACERBATION (HCC): Chronic | ICD-10-CM

## 2019-05-26 DIAGNOSIS — J18.9 PNEUMONIA OF BOTH LOWER LOBES DUE TO INFECTIOUS ORGANISM: Primary | ICD-10-CM

## 2019-05-26 PROBLEM — G47.33 OSA (OBSTRUCTIVE SLEEP APNEA): Chronic | Status: ACTIVE | Noted: 2019-05-26

## 2019-05-26 PROBLEM — J44.9 COPD (CHRONIC OBSTRUCTIVE PULMONARY DISEASE): Chronic | Status: ACTIVE | Noted: 2019-05-26

## 2019-05-26 LAB
ALBUMIN SERPL-MCNC: 3.6 G/DL (ref 3.5–5.2)
ALBUMIN/GLOB SERPL: 1 G/DL
ALP SERPL-CCNC: 77 U/L (ref 39–117)
ALT SERPL W P-5'-P-CCNC: 17 U/L (ref 1–41)
ANION GAP SERPL CALCULATED.3IONS-SCNC: 12 MMOL/L
AST SERPL-CCNC: 22 U/L (ref 1–40)
BASOPHILS # BLD AUTO: 0.01 10*3/MM3 (ref 0–0.2)
BASOPHILS NFR BLD AUTO: 0.1 % (ref 0–1.5)
BILIRUB SERPL-MCNC: 1.2 MG/DL (ref 0.2–1.2)
BUN BLD-MCNC: 12 MG/DL (ref 8–23)
BUN/CREAT SERPL: 11.7 (ref 7–25)
CALCIUM SPEC-SCNC: 8.7 MG/DL (ref 8.6–10.5)
CHLORIDE SERPL-SCNC: 104 MMOL/L (ref 98–107)
CO2 SERPL-SCNC: 22 MMOL/L (ref 22–29)
CREAT BLD-MCNC: 1.03 MG/DL (ref 0.76–1.27)
D-LACTATE SERPL-SCNC: 1.7 MMOL/L (ref 0.5–2)
DEPRECATED RDW RBC AUTO: 51 FL (ref 37–54)
EOSINOPHIL # BLD AUTO: 0 10*3/MM3 (ref 0–0.4)
EOSINOPHIL NFR BLD AUTO: 0 % (ref 0.3–6.2)
ERYTHROCYTE [DISTWIDTH] IN BLOOD BY AUTOMATED COUNT: 14.5 % (ref 12.3–15.4)
GFR SERPL CREATININE-BSD FRML MDRD: 70 ML/MIN/1.73
GLOBULIN UR ELPH-MCNC: 3.5 GM/DL
GLUCOSE BLD-MCNC: 132 MG/DL (ref 65–99)
HCT VFR BLD AUTO: 44.2 % (ref 37.5–51)
HGB BLD-MCNC: 14.7 G/DL (ref 13–17.7)
HOLD SPECIMEN: NORMAL
HOLD SPECIMEN: NORMAL
IMM GRANULOCYTES # BLD AUTO: 0.05 10*3/MM3 (ref 0–0.05)
IMM GRANULOCYTES NFR BLD AUTO: 0.5 % (ref 0–0.5)
LYMPHOCYTES # BLD AUTO: 0.81 10*3/MM3 (ref 0.7–3.1)
LYMPHOCYTES NFR BLD AUTO: 7.4 % (ref 19.6–45.3)
MCH RBC QN AUTO: 32.1 PG (ref 26.6–33)
MCHC RBC AUTO-ENTMCNC: 33.3 G/DL (ref 31.5–35.7)
MCV RBC AUTO: 96.5 FL (ref 79–97)
MONOCYTES # BLD AUTO: 1.11 10*3/MM3 (ref 0.1–0.9)
MONOCYTES NFR BLD AUTO: 10.1 % (ref 5–12)
NEUTROPHILS # BLD AUTO: 9.02 10*3/MM3 (ref 1.7–7)
NEUTROPHILS NFR BLD AUTO: 81.9 % (ref 42.7–76)
NT-PROBNP SERPL-MCNC: 2320 PG/ML (ref 5–1800)
PLATELET # BLD AUTO: 172 10*3/MM3 (ref 140–450)
PMV BLD AUTO: 11.2 FL (ref 6–12)
POTASSIUM BLD-SCNC: 4.3 MMOL/L (ref 3.5–5.2)
PROT SERPL-MCNC: 7.1 G/DL (ref 6–8.5)
RBC # BLD AUTO: 4.58 10*6/MM3 (ref 4.14–5.8)
SODIUM BLD-SCNC: 138 MMOL/L (ref 136–145)
TROPONIN T SERPL-MCNC: <0.01 NG/ML (ref 0–0.03)
WBC NRBC COR # BLD: 11 10*3/MM3 (ref 3.4–10.8)
WHOLE BLOOD HOLD SPECIMEN: NORMAL
WHOLE BLOOD HOLD SPECIMEN: NORMAL

## 2019-05-26 PROCEDURE — 94799 UNLISTED PULMONARY SVC/PX: CPT

## 2019-05-26 PROCEDURE — 83605 ASSAY OF LACTIC ACID: CPT | Performed by: EMERGENCY MEDICINE

## 2019-05-26 PROCEDURE — 87040 BLOOD CULTURE FOR BACTERIA: CPT | Performed by: EMERGENCY MEDICINE

## 2019-05-26 PROCEDURE — 93005 ELECTROCARDIOGRAM TRACING: CPT | Performed by: EMERGENCY MEDICINE

## 2019-05-26 PROCEDURE — 83880 ASSAY OF NATRIURETIC PEPTIDE: CPT | Performed by: EMERGENCY MEDICINE

## 2019-05-26 PROCEDURE — 71045 X-RAY EXAM CHEST 1 VIEW: CPT

## 2019-05-26 PROCEDURE — 99222 1ST HOSP IP/OBS MODERATE 55: CPT | Performed by: FAMILY MEDICINE

## 2019-05-26 PROCEDURE — 84484 ASSAY OF TROPONIN QUANT: CPT | Performed by: EMERGENCY MEDICINE

## 2019-05-26 PROCEDURE — 85025 COMPLETE CBC W/AUTO DIFF WBC: CPT | Performed by: EMERGENCY MEDICINE

## 2019-05-26 PROCEDURE — 25010000002 METHYLPREDNISOLONE PER 40 MG: Performed by: FAMILY MEDICINE

## 2019-05-26 PROCEDURE — 25010000002 LEVOFLOXACIN PER 250 MG: Performed by: EMERGENCY MEDICINE

## 2019-05-26 PROCEDURE — 99285 EMERGENCY DEPT VISIT HI MDM: CPT

## 2019-05-26 PROCEDURE — 80053 COMPREHEN METABOLIC PANEL: CPT | Performed by: EMERGENCY MEDICINE

## 2019-05-26 RX ORDER — LEVOFLOXACIN 5 MG/ML
750 INJECTION, SOLUTION INTRAVENOUS DAILY
Status: DISCONTINUED | OUTPATIENT
Start: 2019-05-27 | End: 2019-05-28

## 2019-05-26 RX ORDER — BISACODYL 10 MG
10 SUPPOSITORY, RECTAL RECTAL DAILY PRN
Status: DISCONTINUED | OUTPATIENT
Start: 2019-05-26 | End: 2019-05-29 | Stop reason: HOSPADM

## 2019-05-26 RX ORDER — POTASSIUM CHLORIDE 1.5 G/1.77G
40 POWDER, FOR SOLUTION ORAL AS NEEDED
Status: DISCONTINUED | OUTPATIENT
Start: 2019-05-26 | End: 2019-05-29 | Stop reason: HOSPADM

## 2019-05-26 RX ORDER — IPRATROPIUM BROMIDE AND ALBUTEROL SULFATE 2.5; .5 MG/3ML; MG/3ML
3 SOLUTION RESPIRATORY (INHALATION) EVERY 4 HOURS PRN
Status: DISCONTINUED | OUTPATIENT
Start: 2019-05-26 | End: 2019-05-29 | Stop reason: HOSPADM

## 2019-05-26 RX ORDER — SODIUM CHLORIDE 0.9 % (FLUSH) 0.9 %
3 SYRINGE (ML) INJECTION EVERY 12 HOURS SCHEDULED
Status: DISCONTINUED | OUTPATIENT
Start: 2019-05-26 | End: 2019-05-29 | Stop reason: HOSPADM

## 2019-05-26 RX ORDER — ONDANSETRON 4 MG/1
4 TABLET, FILM COATED ORAL EVERY 6 HOURS PRN
Status: DISCONTINUED | OUTPATIENT
Start: 2019-05-26 | End: 2019-05-29 | Stop reason: HOSPADM

## 2019-05-26 RX ORDER — IPRATROPIUM BROMIDE AND ALBUTEROL SULFATE 2.5; .5 MG/3ML; MG/3ML
3 SOLUTION RESPIRATORY (INHALATION)
Status: DISCONTINUED | OUTPATIENT
Start: 2019-05-26 | End: 2019-05-29 | Stop reason: HOSPADM

## 2019-05-26 RX ORDER — ALLOPURINOL 300 MG/1
300 TABLET ORAL DAILY
Status: DISCONTINUED | OUTPATIENT
Start: 2019-05-27 | End: 2019-05-29 | Stop reason: HOSPADM

## 2019-05-26 RX ORDER — IPRATROPIUM BROMIDE AND ALBUTEROL SULFATE 2.5; .5 MG/3ML; MG/3ML
3 SOLUTION RESPIRATORY (INHALATION) ONCE
Status: COMPLETED | OUTPATIENT
Start: 2019-05-26 | End: 2019-05-26

## 2019-05-26 RX ORDER — L.ACID,PARA/B.BIFIDUM/S.THERM 8B CELL
1 CAPSULE ORAL DAILY
Status: DISCONTINUED | OUTPATIENT
Start: 2019-05-27 | End: 2019-05-29 | Stop reason: HOSPADM

## 2019-05-26 RX ORDER — CHOLECALCIFEROL (VITAMIN D3) 125 MCG
5 CAPSULE ORAL NIGHTLY PRN
Status: DISCONTINUED | OUTPATIENT
Start: 2019-05-26 | End: 2019-05-29 | Stop reason: HOSPADM

## 2019-05-26 RX ORDER — METHYLPREDNISOLONE SODIUM SUCCINATE 40 MG/ML
30 INJECTION, POWDER, LYOPHILIZED, FOR SOLUTION INTRAMUSCULAR; INTRAVENOUS EVERY 12 HOURS SCHEDULED
Status: DISCONTINUED | OUTPATIENT
Start: 2019-05-26 | End: 2019-05-28

## 2019-05-26 RX ORDER — ACETAMINOPHEN 325 MG/1
650 TABLET ORAL EVERY 4 HOURS PRN
Status: DISCONTINUED | OUTPATIENT
Start: 2019-05-26 | End: 2019-05-29 | Stop reason: HOSPADM

## 2019-05-26 RX ORDER — SODIUM CHLORIDE 0.9 % (FLUSH) 0.9 %
3-10 SYRINGE (ML) INJECTION AS NEEDED
Status: DISCONTINUED | OUTPATIENT
Start: 2019-05-26 | End: 2019-05-29 | Stop reason: HOSPADM

## 2019-05-26 RX ORDER — LEVOFLOXACIN 5 MG/ML
750 INJECTION, SOLUTION INTRAVENOUS ONCE
Status: COMPLETED | OUTPATIENT
Start: 2019-05-26 | End: 2019-05-26

## 2019-05-26 RX ORDER — POTASSIUM CHLORIDE 750 MG/1
40 CAPSULE, EXTENDED RELEASE ORAL AS NEEDED
Status: DISCONTINUED | OUTPATIENT
Start: 2019-05-26 | End: 2019-05-29 | Stop reason: HOSPADM

## 2019-05-26 RX ORDER — GUAIFENESIN 600 MG/1
1200 TABLET, EXTENDED RELEASE ORAL EVERY 12 HOURS SCHEDULED
Status: DISCONTINUED | OUTPATIENT
Start: 2019-05-26 | End: 2019-05-29 | Stop reason: HOSPADM

## 2019-05-26 RX ORDER — LISINOPRIL 10 MG/1
10 TABLET ORAL DAILY
Status: ON HOLD | COMMUNITY
End: 2019-05-28

## 2019-05-26 RX ORDER — BISACODYL 5 MG/1
5 TABLET, DELAYED RELEASE ORAL DAILY PRN
Status: DISCONTINUED | OUTPATIENT
Start: 2019-05-26 | End: 2019-05-29 | Stop reason: HOSPADM

## 2019-05-26 RX ORDER — GUAIFENESIN 1200 MG/1
1200 TABLET, EXTENDED RELEASE ORAL EVERY MORNING
COMMUNITY
End: 2019-08-15

## 2019-05-26 RX ORDER — POTASSIUM CHLORIDE 7.45 MG/ML
10 INJECTION INTRAVENOUS
Status: DISCONTINUED | OUTPATIENT
Start: 2019-05-26 | End: 2019-05-29 | Stop reason: HOSPADM

## 2019-05-26 RX ORDER — ONDANSETRON 2 MG/ML
4 INJECTION INTRAMUSCULAR; INTRAVENOUS EVERY 6 HOURS PRN
Status: DISCONTINUED | OUTPATIENT
Start: 2019-05-26 | End: 2019-05-29 | Stop reason: HOSPADM

## 2019-05-26 RX ORDER — DOCUSATE SODIUM 100 MG/1
100 CAPSULE, LIQUID FILLED ORAL 2 TIMES DAILY PRN
Status: DISCONTINUED | OUTPATIENT
Start: 2019-05-26 | End: 2019-05-29 | Stop reason: HOSPADM

## 2019-05-26 RX ORDER — ALBUTEROL SULFATE 2.5 MG/3ML
2.5 SOLUTION RESPIRATORY (INHALATION) ONCE AS NEEDED
Status: DISCONTINUED | OUTPATIENT
Start: 2019-05-26 | End: 2019-05-29 | Stop reason: HOSPADM

## 2019-05-26 RX ORDER — LISINOPRIL 10 MG/1
10 TABLET ORAL DAILY
Status: DISCONTINUED | OUTPATIENT
Start: 2019-05-27 | End: 2019-05-27

## 2019-05-26 RX ORDER — NAPROXEN SODIUM 220 MG
220 TABLET ORAL EVERY MORNING
COMMUNITY
End: 2019-06-28 | Stop reason: HOSPADM

## 2019-05-26 RX ORDER — BUDESONIDE AND FORMOTEROL FUMARATE DIHYDRATE 160; 4.5 UG/1; UG/1
2 AEROSOL RESPIRATORY (INHALATION)
Status: DISCONTINUED | OUTPATIENT
Start: 2019-05-26 | End: 2019-05-29 | Stop reason: HOSPADM

## 2019-05-26 RX ORDER — THEOPHYLLINE 300 MG/1
300 TABLET, EXTENDED RELEASE ORAL EVERY 12 HOURS
Status: DISCONTINUED | OUTPATIENT
Start: 2019-05-27 | End: 2019-05-29 | Stop reason: HOSPADM

## 2019-05-26 RX ORDER — SODIUM CHLORIDE 0.9 % (FLUSH) 0.9 %
10 SYRINGE (ML) INJECTION AS NEEDED
Status: DISCONTINUED | OUTPATIENT
Start: 2019-05-26 | End: 2019-05-29 | Stop reason: HOSPADM

## 2019-05-26 RX ADMIN — MELATONIN TAB 5 MG 5 MG: 5 TAB at 22:13

## 2019-05-26 RX ADMIN — IPRATROPIUM BROMIDE AND ALBUTEROL SULFATE 3 ML: 2.5; .5 SOLUTION RESPIRATORY (INHALATION) at 19:39

## 2019-05-26 RX ADMIN — METHYLPREDNISOLONE SODIUM SUCCINATE 30 MG: 40 INJECTION, POWDER, FOR SOLUTION INTRAMUSCULAR; INTRAVENOUS at 22:13

## 2019-05-26 RX ADMIN — IPRATROPIUM BROMIDE AND ALBUTEROL SULFATE 3 ML: 2.5; .5 SOLUTION RESPIRATORY (INHALATION) at 22:48

## 2019-05-26 RX ADMIN — BUDESONIDE AND FORMOTEROL FUMARATE DIHYDRATE 2 PUFF: 160; 4.5 AEROSOL RESPIRATORY (INHALATION) at 22:48

## 2019-05-26 RX ADMIN — GUAIFENESIN 1200 MG: 600 TABLET, EXTENDED RELEASE ORAL at 22:38

## 2019-05-26 RX ADMIN — LEVOFLOXACIN 750 MG: 5 INJECTION, SOLUTION INTRAVENOUS at 20:04

## 2019-05-26 RX ADMIN — SODIUM CHLORIDE, PRESERVATIVE FREE 3 ML: 5 INJECTION INTRAVENOUS at 22:15

## 2019-05-27 PROBLEM — N28.9 RENAL INSUFFICIENCY: Status: ACTIVE | Noted: 2019-05-27

## 2019-05-27 PROBLEM — E87.5 HYPERKALEMIA: Status: ACTIVE | Noted: 2019-05-27

## 2019-05-27 LAB
ANION GAP SERPL CALCULATED.3IONS-SCNC: 11 MMOL/L
BUN BLD-MCNC: 16 MG/DL (ref 8–23)
BUN/CREAT SERPL: 13.9 (ref 7–25)
CALCIUM SPEC-SCNC: 8.3 MG/DL (ref 8.6–10.5)
CHLORIDE SERPL-SCNC: 103 MMOL/L (ref 98–107)
CO2 SERPL-SCNC: 20 MMOL/L (ref 22–29)
CREAT BLD-MCNC: 1.15 MG/DL (ref 0.76–1.27)
DEPRECATED RDW RBC AUTO: 50.7 FL (ref 37–54)
ERYTHROCYTE [DISTWIDTH] IN BLOOD BY AUTOMATED COUNT: 14.2 % (ref 12.3–15.4)
GFR SERPL CREATININE-BSD FRML MDRD: 62 ML/MIN/1.73
GLUCOSE BLD-MCNC: 170 MG/DL (ref 65–99)
GLUCOSE BLDC GLUCOMTR-MCNC: 157 MG/DL (ref 70–130)
GLUCOSE BLDC GLUCOMTR-MCNC: 157 MG/DL (ref 70–130)
HCT VFR BLD AUTO: 39.8 % (ref 37.5–51)
HGB BLD-MCNC: 13.1 G/DL (ref 13–17.7)
MCH RBC QN AUTO: 31.9 PG (ref 26.6–33)
MCHC RBC AUTO-ENTMCNC: 32.9 G/DL (ref 31.5–35.7)
MCV RBC AUTO: 96.8 FL (ref 79–97)
PLATELET # BLD AUTO: 132 10*3/MM3 (ref 140–450)
PMV BLD AUTO: 11 FL (ref 6–12)
POTASSIUM BLD-SCNC: 5 MMOL/L (ref 3.5–5.2)
RBC # BLD AUTO: 4.11 10*6/MM3 (ref 4.14–5.8)
SODIUM BLD-SCNC: 134 MMOL/L (ref 136–145)
THEOPHYLLINE SERPL-MCNC: 7.6 MCG/ML (ref 10–20)
WBC NRBC COR # BLD: 6.73 10*3/MM3 (ref 3.4–10.8)

## 2019-05-27 PROCEDURE — 82962 GLUCOSE BLOOD TEST: CPT

## 2019-05-27 PROCEDURE — 87070 CULTURE OTHR SPECIMN AEROBIC: CPT | Performed by: FAMILY MEDICINE

## 2019-05-27 PROCEDURE — 94799 UNLISTED PULMONARY SVC/PX: CPT

## 2019-05-27 PROCEDURE — 99233 SBSQ HOSP IP/OBS HIGH 50: CPT | Performed by: INTERNAL MEDICINE

## 2019-05-27 PROCEDURE — 80198 ASSAY OF THEOPHYLLINE: CPT | Performed by: FAMILY MEDICINE

## 2019-05-27 PROCEDURE — 25010000002 LEVOFLOXACIN PER 250 MG: Performed by: FAMILY MEDICINE

## 2019-05-27 PROCEDURE — 25010000002 METHYLPREDNISOLONE PER 40 MG: Performed by: FAMILY MEDICINE

## 2019-05-27 PROCEDURE — 87205 SMEAR GRAM STAIN: CPT | Performed by: FAMILY MEDICINE

## 2019-05-27 PROCEDURE — 80048 BASIC METABOLIC PNL TOTAL CA: CPT | Performed by: FAMILY MEDICINE

## 2019-05-27 PROCEDURE — 85027 COMPLETE CBC AUTOMATED: CPT | Performed by: FAMILY MEDICINE

## 2019-05-27 PROCEDURE — 25010000002 ENOXAPARIN PER 10 MG: Performed by: FAMILY MEDICINE

## 2019-05-27 RX ORDER — MONTELUKAST SODIUM 10 MG/1
10 TABLET ORAL NIGHTLY
Status: DISCONTINUED | OUTPATIENT
Start: 2019-05-27 | End: 2019-05-29 | Stop reason: HOSPADM

## 2019-05-27 RX ORDER — FAMOTIDINE 20 MG/1
20 TABLET, FILM COATED ORAL 2 TIMES DAILY
Status: DISCONTINUED | OUTPATIENT
Start: 2019-05-28 | End: 2019-05-29 | Stop reason: HOSPADM

## 2019-05-27 RX ORDER — SODIUM CHLORIDE 9 MG/ML
100 INJECTION, SOLUTION INTRAVENOUS CONTINUOUS
Status: ACTIVE | OUTPATIENT
Start: 2019-05-27 | End: 2019-05-28

## 2019-05-27 RX ADMIN — SODIUM CHLORIDE, PRESERVATIVE FREE 3 ML: 5 INJECTION INTRAVENOUS at 20:55

## 2019-05-27 RX ADMIN — GUAIFENESIN 1200 MG: 600 TABLET, EXTENDED RELEASE ORAL at 20:55

## 2019-05-27 RX ADMIN — IPRATROPIUM BROMIDE AND ALBUTEROL SULFATE 3 ML: 2.5; .5 SOLUTION RESPIRATORY (INHALATION) at 16:12

## 2019-05-27 RX ADMIN — IPRATROPIUM BROMIDE AND ALBUTEROL SULFATE 3 ML: 2.5; .5 SOLUTION RESPIRATORY (INHALATION) at 19:41

## 2019-05-27 RX ADMIN — ALLOPURINOL 300 MG: 300 TABLET ORAL at 09:11

## 2019-05-27 RX ADMIN — SODIUM CHLORIDE 100 ML/HR: 9 INJECTION, SOLUTION INTRAVENOUS at 12:52

## 2019-05-27 RX ADMIN — MELATONIN TAB 5 MG 5 MG: 5 TAB at 20:55

## 2019-05-27 RX ADMIN — Medication 1 CAPSULE: at 09:11

## 2019-05-27 RX ADMIN — BUDESONIDE AND FORMOTEROL FUMARATE DIHYDRATE 2 PUFF: 160; 4.5 AEROSOL RESPIRATORY (INHALATION) at 08:14

## 2019-05-27 RX ADMIN — IPRATROPIUM BROMIDE AND ALBUTEROL SULFATE 3 ML: 2.5; .5 SOLUTION RESPIRATORY (INHALATION) at 12:23

## 2019-05-27 RX ADMIN — BUDESONIDE AND FORMOTEROL FUMARATE DIHYDRATE 2 PUFF: 160; 4.5 AEROSOL RESPIRATORY (INHALATION) at 19:42

## 2019-05-27 RX ADMIN — GUAIFENESIN 1200 MG: 600 TABLET, EXTENDED RELEASE ORAL at 09:11

## 2019-05-27 RX ADMIN — METHYLPREDNISOLONE SODIUM SUCCINATE 30 MG: 40 INJECTION, POWDER, FOR SOLUTION INTRAMUSCULAR; INTRAVENOUS at 09:11

## 2019-05-27 RX ADMIN — THEOPHYLLINE 300 MG: 300 TABLET, EXTENDED RELEASE ORAL at 09:11

## 2019-05-27 RX ADMIN — MONTELUKAST SODIUM 10 MG: 10 TABLET, COATED ORAL at 20:55

## 2019-05-27 RX ADMIN — SODIUM CHLORIDE, PRESERVATIVE FREE 3 ML: 5 INJECTION INTRAVENOUS at 09:12

## 2019-05-27 RX ADMIN — LEVOFLOXACIN 750 MG: 5 INJECTION, SOLUTION INTRAVENOUS at 20:54

## 2019-05-27 RX ADMIN — METHYLPREDNISOLONE SODIUM SUCCINATE 30 MG: 40 INJECTION, POWDER, FOR SOLUTION INTRAMUSCULAR; INTRAVENOUS at 20:54

## 2019-05-27 RX ADMIN — THEOPHYLLINE 300 MG: 300 TABLET, EXTENDED RELEASE ORAL at 20:54

## 2019-05-27 RX ADMIN — IPRATROPIUM BROMIDE AND ALBUTEROL SULFATE 3 ML: 2.5; .5 SOLUTION RESPIRATORY (INHALATION) at 08:14

## 2019-05-27 RX ADMIN — ENOXAPARIN SODIUM 40 MG: 40 INJECTION SUBCUTANEOUS at 06:28

## 2019-05-28 LAB
ANION GAP SERPL CALCULATED.3IONS-SCNC: 10 MMOL/L
BUN BLD-MCNC: 27 MG/DL (ref 8–23)
BUN/CREAT SERPL: 22.5 (ref 7–25)
CALCIUM SPEC-SCNC: 8.5 MG/DL (ref 8.6–10.5)
CHLORIDE SERPL-SCNC: 104 MMOL/L (ref 98–107)
CO2 SERPL-SCNC: 20 MMOL/L (ref 22–29)
CREAT BLD-MCNC: 1.2 MG/DL (ref 0.76–1.27)
GFR SERPL CREATININE-BSD FRML MDRD: 59 ML/MIN/1.73
GLUCOSE BLD-MCNC: 159 MG/DL (ref 65–99)
POTASSIUM BLD-SCNC: 4.9 MMOL/L (ref 3.5–5.2)
SODIUM BLD-SCNC: 134 MMOL/L (ref 136–145)

## 2019-05-28 PROCEDURE — 94799 UNLISTED PULMONARY SVC/PX: CPT

## 2019-05-28 PROCEDURE — 80048 BASIC METABOLIC PNL TOTAL CA: CPT | Performed by: INTERNAL MEDICINE

## 2019-05-28 PROCEDURE — 25010000002 ENOXAPARIN PER 10 MG: Performed by: FAMILY MEDICINE

## 2019-05-28 PROCEDURE — 25010000002 METHYLPREDNISOLONE PER 40 MG: Performed by: FAMILY MEDICINE

## 2019-05-28 PROCEDURE — 25010000002 ONDANSETRON PER 1 MG: Performed by: FAMILY MEDICINE

## 2019-05-28 PROCEDURE — 99232 SBSQ HOSP IP/OBS MODERATE 35: CPT | Performed by: INTERNAL MEDICINE

## 2019-05-28 RX ORDER — CALCIUM CARBONATE 200(500)MG
2 TABLET,CHEWABLE ORAL 3 TIMES DAILY PRN
Status: DISCONTINUED | OUTPATIENT
Start: 2019-05-28 | End: 2019-05-29 | Stop reason: HOSPADM

## 2019-05-28 RX ORDER — LEVOFLOXACIN 500 MG/1
750 TABLET, FILM COATED ORAL EVERY 24 HOURS
Status: DISCONTINUED | OUTPATIENT
Start: 2019-05-28 | End: 2019-05-29 | Stop reason: HOSPADM

## 2019-05-28 RX ORDER — PREDNISONE 20 MG/1
40 TABLET ORAL
Status: DISCONTINUED | OUTPATIENT
Start: 2019-05-29 | End: 2019-05-29 | Stop reason: HOSPADM

## 2019-05-28 RX ORDER — THEOPHYLLINE 300 MG/1
300 TABLET, EXTENDED RELEASE ORAL 2 TIMES DAILY
COMMUNITY
End: 2021-03-30

## 2019-05-28 RX ORDER — TRAZODONE HYDROCHLORIDE 50 MG/1
50 TABLET ORAL NIGHTLY
Status: DISCONTINUED | OUTPATIENT
Start: 2019-05-28 | End: 2019-05-29 | Stop reason: HOSPADM

## 2019-05-28 RX ADMIN — IPRATROPIUM BROMIDE AND ALBUTEROL SULFATE 3 ML: 2.5; .5 SOLUTION RESPIRATORY (INHALATION) at 12:28

## 2019-05-28 RX ADMIN — GUAIFENESIN 1200 MG: 600 TABLET, EXTENDED RELEASE ORAL at 20:32

## 2019-05-28 RX ADMIN — FAMOTIDINE 20 MG: 20 TABLET ORAL at 00:13

## 2019-05-28 RX ADMIN — IPRATROPIUM BROMIDE AND ALBUTEROL SULFATE 3 ML: 2.5; .5 SOLUTION RESPIRATORY (INHALATION) at 16:09

## 2019-05-28 RX ADMIN — MELATONIN TAB 5 MG 5 MG: 5 TAB at 20:32

## 2019-05-28 RX ADMIN — ALLOPURINOL 300 MG: 300 TABLET ORAL at 08:12

## 2019-05-28 RX ADMIN — BUDESONIDE AND FORMOTEROL FUMARATE DIHYDRATE 2 PUFF: 160; 4.5 AEROSOL RESPIRATORY (INHALATION) at 19:34

## 2019-05-28 RX ADMIN — LEVOFLOXACIN 750 MG: 500 TABLET, FILM COATED ORAL at 20:32

## 2019-05-28 RX ADMIN — BUDESONIDE AND FORMOTEROL FUMARATE DIHYDRATE 2 PUFF: 160; 4.5 AEROSOL RESPIRATORY (INHALATION) at 07:22

## 2019-05-28 RX ADMIN — Medication 1 CAPSULE: at 08:11

## 2019-05-28 RX ADMIN — MONTELUKAST SODIUM 10 MG: 10 TABLET, COATED ORAL at 20:32

## 2019-05-28 RX ADMIN — ONDANSETRON HYDROCHLORIDE 4 MG: 4 TABLET, FILM COATED ORAL at 20:32

## 2019-05-28 RX ADMIN — IPRATROPIUM BROMIDE AND ALBUTEROL SULFATE 3 ML: 2.5; .5 SOLUTION RESPIRATORY (INHALATION) at 07:21

## 2019-05-28 RX ADMIN — ENOXAPARIN SODIUM 40 MG: 40 INJECTION SUBCUTANEOUS at 06:03

## 2019-05-28 RX ADMIN — FAMOTIDINE 20 MG: 20 TABLET ORAL at 20:33

## 2019-05-28 RX ADMIN — FAMOTIDINE 20 MG: 20 TABLET ORAL at 08:12

## 2019-05-28 RX ADMIN — GUAIFENESIN 1200 MG: 600 TABLET, EXTENDED RELEASE ORAL at 08:11

## 2019-05-28 RX ADMIN — TRAZODONE HYDROCHLORIDE 50 MG: 50 TABLET ORAL at 20:33

## 2019-05-28 RX ADMIN — SODIUM CHLORIDE, PRESERVATIVE FREE 3 ML: 5 INJECTION INTRAVENOUS at 20:33

## 2019-05-28 RX ADMIN — ENOXAPARIN SODIUM 40 MG: 40 INJECTION SUBCUTANEOUS at 20:33

## 2019-05-28 RX ADMIN — IPRATROPIUM BROMIDE AND ALBUTEROL SULFATE 3 ML: 2.5; .5 SOLUTION RESPIRATORY (INHALATION) at 19:34

## 2019-05-28 RX ADMIN — THEOPHYLLINE 300 MG: 300 TABLET, EXTENDED RELEASE ORAL at 20:32

## 2019-05-28 RX ADMIN — ONDANSETRON 4 MG: 2 INJECTION INTRAMUSCULAR; INTRAVENOUS at 00:41

## 2019-05-28 RX ADMIN — THEOPHYLLINE 300 MG: 300 TABLET, EXTENDED RELEASE ORAL at 08:11

## 2019-05-28 RX ADMIN — METHYLPREDNISOLONE SODIUM SUCCINATE 30 MG: 40 INJECTION, POWDER, FOR SOLUTION INTRAMUSCULAR; INTRAVENOUS at 08:17

## 2019-05-29 VITALS
SYSTOLIC BLOOD PRESSURE: 105 MMHG | TEMPERATURE: 98 F | HEART RATE: 93 BPM | BODY MASS INDEX: 30.29 KG/M2 | WEIGHT: 193 LBS | RESPIRATION RATE: 20 BRPM | DIASTOLIC BLOOD PRESSURE: 61 MMHG | OXYGEN SATURATION: 89 % | HEIGHT: 67 IN

## 2019-05-29 PROBLEM — E87.5 HYPERKALEMIA: Status: RESOLVED | Noted: 2019-05-27 | Resolved: 2019-05-29

## 2019-05-29 PROBLEM — J96.01 ACUTE RESPIRATORY FAILURE WITH HYPOXIA (HCC): Status: RESOLVED | Noted: 2019-05-26 | Resolved: 2019-05-29

## 2019-05-29 LAB
ANION GAP SERPL CALCULATED.3IONS-SCNC: 11 MMOL/L
BACTERIA SPEC RESP CULT: NORMAL
BUN BLD-MCNC: 26 MG/DL (ref 8–23)
BUN/CREAT SERPL: 22.8 (ref 7–25)
CALCIUM SPEC-SCNC: 8.5 MG/DL (ref 8.6–10.5)
CHLORIDE SERPL-SCNC: 104 MMOL/L (ref 98–107)
CO2 SERPL-SCNC: 23 MMOL/L (ref 22–29)
CREAT BLD-MCNC: 1.14 MG/DL (ref 0.76–1.27)
GFR SERPL CREATININE-BSD FRML MDRD: 63 ML/MIN/1.73
GLUCOSE BLD-MCNC: 116 MG/DL (ref 65–99)
GRAM STN SPEC: NORMAL
POTASSIUM BLD-SCNC: 4.6 MMOL/L (ref 3.5–5.2)
SODIUM BLD-SCNC: 138 MMOL/L (ref 136–145)

## 2019-05-29 PROCEDURE — 25010000002 ENOXAPARIN PER 10 MG: Performed by: FAMILY MEDICINE

## 2019-05-29 PROCEDURE — 63710000001 PREDNISONE PER 1 MG: Performed by: INTERNAL MEDICINE

## 2019-05-29 PROCEDURE — 80048 BASIC METABOLIC PNL TOTAL CA: CPT | Performed by: INTERNAL MEDICINE

## 2019-05-29 PROCEDURE — 99239 HOSP IP/OBS DSCHRG MGMT >30: CPT | Performed by: INTERNAL MEDICINE

## 2019-05-29 PROCEDURE — 94799 UNLISTED PULMONARY SVC/PX: CPT

## 2019-05-29 RX ORDER — PREDNISONE 20 MG/1
40 TABLET ORAL
Qty: 10 TABLET | Refills: 0 | Status: SHIPPED | OUTPATIENT
Start: 2019-05-29 | End: 2019-06-03

## 2019-05-29 RX ORDER — L.ACID,PARA/B.BIFIDUM/S.THERM 8B CELL
1 CAPSULE ORAL DAILY
Qty: 4 CAPSULE | Refills: 0 | Status: SHIPPED | OUTPATIENT
Start: 2019-05-29 | End: 2019-06-02

## 2019-05-29 RX ORDER — LEVOFLOXACIN 750 MG/1
750 TABLET ORAL EVERY 24 HOURS
Qty: 4 TABLET | Refills: 0 | Status: SHIPPED | OUTPATIENT
Start: 2019-05-29 | End: 2019-06-02

## 2019-05-29 RX ADMIN — GUAIFENESIN 1200 MG: 600 TABLET, EXTENDED RELEASE ORAL at 10:20

## 2019-05-29 RX ADMIN — BUDESONIDE AND FORMOTEROL FUMARATE DIHYDRATE 2 PUFF: 160; 4.5 AEROSOL RESPIRATORY (INHALATION) at 07:08

## 2019-05-29 RX ADMIN — ALLOPURINOL 300 MG: 300 TABLET ORAL at 10:21

## 2019-05-29 RX ADMIN — Medication 1 CAPSULE: at 10:20

## 2019-05-29 RX ADMIN — THEOPHYLLINE 300 MG: 300 TABLET, EXTENDED RELEASE ORAL at 10:20

## 2019-05-29 RX ADMIN — IPRATROPIUM BROMIDE AND ALBUTEROL SULFATE 3 ML: 2.5; .5 SOLUTION RESPIRATORY (INHALATION) at 07:08

## 2019-05-29 RX ADMIN — PREDNISONE 40 MG: 20 TABLET ORAL at 10:19

## 2019-05-29 RX ADMIN — IPRATROPIUM BROMIDE AND ALBUTEROL SULFATE 3 ML: 2.5; .5 SOLUTION RESPIRATORY (INHALATION) at 12:10

## 2019-05-29 RX ADMIN — ENOXAPARIN SODIUM 40 MG: 40 INJECTION SUBCUTANEOUS at 05:39

## 2019-05-29 RX ADMIN — Medication 1 CAPSULE: at 10:18

## 2019-05-29 RX ADMIN — FAMOTIDINE 20 MG: 20 TABLET ORAL at 10:20

## 2019-05-29 RX ADMIN — SODIUM CHLORIDE, PRESERVATIVE FREE 3 ML: 5 INJECTION INTRAVENOUS at 10:21

## 2019-05-30 ENCOUNTER — READMISSION MANAGEMENT (OUTPATIENT)
Dept: CALL CENTER | Facility: HOSPITAL | Age: 74
End: 2019-05-30

## 2019-05-30 NOTE — OUTREACH NOTE
Prep Survey      Responses   Facility patient discharged from?  [place]   Is patient eligible?  Yes   Discharge diagnosis  Acute respiratory failure with hypoxia, Hyperkalemia, COPD   Does the patient have one of the following disease processes/diagnoses(primary or secondary)?  COPD/Pneumonia   Does the patient have Home health ordered?  No   Is there a DME ordered?  Yes   What DME was ordered?  [facility]    Prep survey completed?  Yes          Santa Siddiqi RN

## 2019-05-31 LAB
BACTERIA SPEC AEROBE CULT: NORMAL
BACTERIA SPEC AEROBE CULT: NORMAL

## 2019-06-02 ENCOUNTER — READMISSION MANAGEMENT (OUTPATIENT)
Dept: CALL CENTER | Facility: HOSPITAL | Age: 74
End: 2019-06-02

## 2019-06-02 NOTE — OUTREACH NOTE
COPD/PN Week 1 Survey      Responses   Facility patient discharged from?  [facility]   Does the patient have one of the following disease processes/diagnoses(primary or secondary)?  COPD/Pneumonia   Is there a successful TCM telephone encounter documented?  No   Was the primary reason for admission:  COPD exacerbation   Week 1 attempt successful?  Yes   Call start time  1226   Call end time  1234   Discharge diagnosis  Acute respiratory failure with hypoxia, Hyperkalemia, COPD   Is patient permission given to speak with other caregiver?  Yes   List who call center can speak with  Maria Dolores Bonilla reviewed with patient/caregiver?  Yes   Is the patient having any side effects they believe may be caused by any medication additions or changes?  No   Does the patient have all medications ordered at discharge?  Yes   Is the patient taking all medications as directed (includes completed medication regime)?  Yes   Does the patient have a primary care provider?   Yes   Does the patient have an appointment with their PCP or pulmonologist within 7 days of discharge?  Yes   Has the patient kept scheduled appointments due by today?  N/A   Has home health visited the patient within 72 hours of discharge?  N/A   What DME was ordered?  [facility]    Has all DME been delivered?  Yes   Psychosocial issues?  No   Did the patient receive a copy of their discharge instructions?  Yes   Nursing interventions  Reviewed instructions with patient   What is the patient's perception of their health status since discharge?  Improving   Are the patient's immunizations up to date?   Yes   Is the patient/caregiver able to teach back the hierarchy of who to call/visit for symptoms/problems? PCP, Specialist, Home health nurse, Urgent Care, ED, 911  Yes   Is the patient able to teach back COPD zones?  Yes   Nursing interventions  Education provided on various zones   Patient reports what zone on this call?  Green Zone   Green Zone   Reports doing well, Breathing without shortness of breath, Usual activity and exercise level, Usual amount of phlegm/mucus without difficulty coughing up, Sleeping well, Appetite is good   Green Zone interventions:  Take daily medications, Continue regular exercise/diet plan, Use oxygen as prescribed, Avoid indoor/outdoor triggers   Week 1 call completed?  Yes   Wrap up additional comments  BP 95/56 today. Daughter in APRN, he is going to call her. Is not dizzy. Reports he is in the green zone today.           Martha Grady RN

## 2019-06-11 ENCOUNTER — READMISSION MANAGEMENT (OUTPATIENT)
Dept: CALL CENTER | Facility: HOSPITAL | Age: 74
End: 2019-06-11

## 2019-06-11 NOTE — OUTREACH NOTE
COPD/PN Week 2 Survey      Responses   Facility patient discharged from?  [facility]   Does the patient have one of the following disease processes/diagnoses(primary or secondary)?  COPD/Pneumonia   Was the primary reason for admission:  Pneumonia   Week 2 attempt successful?  Yes   Call start time  1128   Call end time  1135   Discharge diagnosis  pneumonia, Acute respiratory failure with hypoxia, Hyperkalemia, COPD   Meds reviewed with patient/caregiver?  Yes   Is the patient taking all medications as directed (includes completed medication regime)?  Yes   Does the patient have a primary care provider?   Yes   Comments regarding PCP  saw PCP last Wednesday   Has the patient kept scheduled appointments due by today?  Yes   What is the patient's perception of their health status since discharge?  Same   Nursing Interventions  Nurse provided patient education   Is the patient/caregiver able to teach back the hierarchy of who to call/visit for symptoms/problems? PCP, Specialist, Home health nurse, Urgent Care, ED, 911  Yes   Additional teach back comments  Pt says he does not think he feels as good as he should, encouraged pt to call MD if it does not improve over the next couple of days.   Is the patient/caregiver able to teach back signs and symptoms of worsening condition:  Shortness of breath, Fever/chills, Chest pain   Is the patient/caregiver able to teach back importance of completing antibiotic course of treatment?  Yes   Week 2 call completed?  Yes          Sonia Mei RN

## 2019-06-18 ENCOUNTER — HOSPITAL ENCOUNTER (INPATIENT)
Facility: HOSPITAL | Age: 74
LOS: 9 days | Discharge: HOME | End: 2019-06-28
Attending: EMERGENCY MEDICINE | Admitting: HOSPITALIST

## 2019-06-18 ENCOUNTER — APPOINTMENT (OUTPATIENT)
Dept: GENERAL RADIOLOGY | Facility: HOSPITAL | Age: 74
End: 2019-06-18

## 2019-06-18 DIAGNOSIS — I48.92 ATRIAL FLUTTER, UNSPECIFIED TYPE (HCC): ICD-10-CM

## 2019-06-18 DIAGNOSIS — R06.09 DYSPNEA ON EXERTION: ICD-10-CM

## 2019-06-18 DIAGNOSIS — R91.8 MULTIPLE LUNG NODULES ON CT: ICD-10-CM

## 2019-06-18 DIAGNOSIS — Z74.09 IMPAIRED FUNCTIONAL MOBILITY, BALANCE, GAIT, AND ENDURANCE: ICD-10-CM

## 2019-06-18 DIAGNOSIS — J44.1 COPD EXACERBATION (HCC): ICD-10-CM

## 2019-06-18 DIAGNOSIS — R09.02 HYPOXIA: ICD-10-CM

## 2019-06-18 DIAGNOSIS — J18.9 HEALTHCARE-ASSOCIATED PNEUMONIA: Primary | ICD-10-CM

## 2019-06-18 LAB
ALBUMIN SERPL-MCNC: 3.3 G/DL (ref 3.5–5.2)
ALBUMIN/GLOB SERPL: 1.2 G/DL
ALP SERPL-CCNC: 68 U/L (ref 39–117)
ALT SERPL W P-5'-P-CCNC: 17 U/L (ref 1–41)
ANION GAP SERPL CALCULATED.3IONS-SCNC: 13 MMOL/L
AST SERPL-CCNC: 15 U/L (ref 1–40)
BASOPHILS # BLD AUTO: 0.01 10*3/MM3 (ref 0–0.2)
BASOPHILS NFR BLD AUTO: 0.1 % (ref 0–1.5)
BILIRUB SERPL-MCNC: 1.6 MG/DL (ref 0.2–1.2)
BUN BLD-MCNC: 10 MG/DL (ref 8–23)
BUN/CREAT SERPL: 10 (ref 7–25)
CALCIUM SPEC-SCNC: 8.7 MG/DL (ref 8.6–10.5)
CHLORIDE SERPL-SCNC: 100 MMOL/L (ref 98–107)
CO2 SERPL-SCNC: 25 MMOL/L (ref 22–29)
CREAT BLD-MCNC: 1 MG/DL (ref 0.76–1.27)
DEPRECATED RDW RBC AUTO: 47.7 FL (ref 37–54)
EOSINOPHIL # BLD AUTO: 0.07 10*3/MM3 (ref 0–0.4)
EOSINOPHIL NFR BLD AUTO: 0.7 % (ref 0.3–6.2)
ERYTHROCYTE [DISTWIDTH] IN BLOOD BY AUTOMATED COUNT: 13.5 % (ref 12.3–15.4)
GFR SERPL CREATININE-BSD FRML MDRD: 73 ML/MIN/1.73
GLOBULIN UR ELPH-MCNC: 2.7 GM/DL
GLUCOSE BLD-MCNC: 104 MG/DL (ref 65–99)
HCT VFR BLD AUTO: 43.4 % (ref 37.5–51)
HGB BLD-MCNC: 14.2 G/DL (ref 13–17.7)
IMM GRANULOCYTES # BLD AUTO: 0.05 10*3/MM3 (ref 0–0.05)
IMM GRANULOCYTES NFR BLD AUTO: 0.5 % (ref 0–0.5)
LYMPHOCYTES # BLD AUTO: 0.76 10*3/MM3 (ref 0.7–3.1)
LYMPHOCYTES NFR BLD AUTO: 8.1 % (ref 19.6–45.3)
MCH RBC QN AUTO: 31.3 PG (ref 26.6–33)
MCHC RBC AUTO-ENTMCNC: 32.7 G/DL (ref 31.5–35.7)
MCV RBC AUTO: 95.6 FL (ref 79–97)
MONOCYTES # BLD AUTO: 0.97 10*3/MM3 (ref 0.1–0.9)
MONOCYTES NFR BLD AUTO: 10.4 % (ref 5–12)
NEUTROPHILS # BLD AUTO: 7.51 10*3/MM3 (ref 1.7–7)
NEUTROPHILS NFR BLD AUTO: 80.2 % (ref 42.7–76)
NRBC BLD AUTO-RTO: 0 /100 WBC (ref 0–0.2)
NT-PROBNP SERPL-MCNC: 1672 PG/ML (ref 5–1800)
PLATELET # BLD AUTO: 145 10*3/MM3 (ref 140–450)
PMV BLD AUTO: 11.7 FL (ref 6–12)
POTASSIUM BLD-SCNC: 4.2 MMOL/L (ref 3.5–5.2)
PROT SERPL-MCNC: 6 G/DL (ref 6–8.5)
RBC # BLD AUTO: 4.54 10*6/MM3 (ref 4.14–5.8)
SODIUM BLD-SCNC: 138 MMOL/L (ref 136–145)
TROPONIN T SERPL-MCNC: <0.01 NG/ML (ref 0–0.03)
WBC NRBC COR # BLD: 9.37 10*3/MM3 (ref 3.4–10.8)

## 2019-06-18 PROCEDURE — 93005 ELECTROCARDIOGRAM TRACING: CPT | Performed by: EMERGENCY MEDICINE

## 2019-06-18 PROCEDURE — 84484 ASSAY OF TROPONIN QUANT: CPT | Performed by: EMERGENCY MEDICINE

## 2019-06-18 PROCEDURE — 71045 X-RAY EXAM CHEST 1 VIEW: CPT

## 2019-06-18 PROCEDURE — 80053 COMPREHEN METABOLIC PANEL: CPT | Performed by: EMERGENCY MEDICINE

## 2019-06-18 PROCEDURE — 99285 EMERGENCY DEPT VISIT HI MDM: CPT

## 2019-06-18 PROCEDURE — 25010000002 METHYLPREDNISOLONE PER 125 MG: Performed by: EMERGENCY MEDICINE

## 2019-06-18 PROCEDURE — 94640 AIRWAY INHALATION TREATMENT: CPT

## 2019-06-18 PROCEDURE — 94799 UNLISTED PULMONARY SVC/PX: CPT

## 2019-06-18 PROCEDURE — 83735 ASSAY OF MAGNESIUM: CPT | Performed by: PHYSICIAN ASSISTANT

## 2019-06-18 PROCEDURE — 83880 ASSAY OF NATRIURETIC PEPTIDE: CPT | Performed by: EMERGENCY MEDICINE

## 2019-06-18 PROCEDURE — 84484 ASSAY OF TROPONIN QUANT: CPT | Performed by: PHYSICIAN ASSISTANT

## 2019-06-18 PROCEDURE — 85025 COMPLETE CBC W/AUTO DIFF WBC: CPT | Performed by: EMERGENCY MEDICINE

## 2019-06-18 RX ORDER — IPRATROPIUM BROMIDE AND ALBUTEROL SULFATE 2.5; .5 MG/3ML; MG/3ML
3 SOLUTION RESPIRATORY (INHALATION) ONCE
Status: COMPLETED | OUTPATIENT
Start: 2019-06-18 | End: 2019-06-18

## 2019-06-18 RX ORDER — METHYLPREDNISOLONE SODIUM SUCCINATE 125 MG/2ML
125 INJECTION, POWDER, LYOPHILIZED, FOR SOLUTION INTRAMUSCULAR; INTRAVENOUS ONCE
Status: COMPLETED | OUTPATIENT
Start: 2019-06-18 | End: 2019-06-18

## 2019-06-18 RX ORDER — SODIUM CHLORIDE 0.9 % (FLUSH) 0.9 %
10 SYRINGE (ML) INJECTION AS NEEDED
Status: DISCONTINUED | OUTPATIENT
Start: 2019-06-18 | End: 2019-06-28 | Stop reason: HOSPADM

## 2019-06-18 RX ORDER — DIPHENHYDRAMINE HYDROCHLORIDE 50 MG/ML
25 INJECTION INTRAMUSCULAR; INTRAVENOUS ONCE
Status: COMPLETED | OUTPATIENT
Start: 2019-06-18 | End: 2019-06-19

## 2019-06-18 RX ADMIN — IPRATROPIUM BROMIDE AND ALBUTEROL SULFATE 3 ML: 2.5; .5 SOLUTION RESPIRATORY (INHALATION) at 22:40

## 2019-06-18 RX ADMIN — METHYLPREDNISOLONE SODIUM SUCCINATE 125 MG: 125 INJECTION, POWDER, FOR SOLUTION INTRAMUSCULAR; INTRAVENOUS at 22:54

## 2019-06-19 ENCOUNTER — APPOINTMENT (OUTPATIENT)
Dept: CARDIOLOGY | Facility: HOSPITAL | Age: 74
End: 2019-06-19

## 2019-06-19 ENCOUNTER — APPOINTMENT (OUTPATIENT)
Dept: CT IMAGING | Facility: HOSPITAL | Age: 74
End: 2019-06-19

## 2019-06-19 PROBLEM — F10.20 ALCOHOLISM (HCC): Status: ACTIVE | Noted: 2019-06-19

## 2019-06-19 PROBLEM — J90 BILATERAL PLEURAL EFFUSION: Status: ACTIVE | Noted: 2019-06-19

## 2019-06-19 PROBLEM — J18.9 HCAP (HEALTHCARE-ASSOCIATED PNEUMONIA): Status: ACTIVE | Noted: 2019-06-19

## 2019-06-19 PROBLEM — I48.91 ATRIAL FIBRILLATION WITH RVR: Status: ACTIVE | Noted: 2019-06-19

## 2019-06-19 LAB
ANION GAP SERPL CALCULATED.3IONS-SCNC: 15 MMOL/L
BILIRUB UR QL STRIP: NEGATIVE
BUN BLD-MCNC: 13 MG/DL (ref 8–23)
BUN/CREAT SERPL: 13.4 (ref 7–25)
CALCIUM SPEC-SCNC: 8.7 MG/DL (ref 8.6–10.5)
CHLORIDE SERPL-SCNC: 101 MMOL/L (ref 98–107)
CLARITY UR: CLEAR
CO2 SERPL-SCNC: 23 MMOL/L (ref 22–29)
COLOR UR: YELLOW
CREAT BLD-MCNC: 0.97 MG/DL (ref 0.76–1.27)
DEPRECATED RDW RBC AUTO: 47.9 FL (ref 37–54)
ERYTHROCYTE [DISTWIDTH] IN BLOOD BY AUTOMATED COUNT: 13.4 % (ref 12.3–15.4)
GFR SERPL CREATININE-BSD FRML MDRD: 75 ML/MIN/1.73
GLUCOSE BLD-MCNC: 153 MG/DL (ref 65–99)
GLUCOSE UR STRIP-MCNC: NEGATIVE MG/DL
HCT VFR BLD AUTO: 42.2 % (ref 37.5–51)
HGB BLD-MCNC: 13.7 G/DL (ref 13–17.7)
HGB UR QL STRIP.AUTO: NEGATIVE
HOLD SPECIMEN: NORMAL
HOLD SPECIMEN: NORMAL
KETONES UR QL STRIP: NEGATIVE
LEUKOCYTE ESTERASE UR QL STRIP.AUTO: NEGATIVE
MAGNESIUM SERPL-MCNC: 2 MG/DL (ref 1.6–2.4)
MCH RBC QN AUTO: 31.1 PG (ref 26.6–33)
MCHC RBC AUTO-ENTMCNC: 32.5 G/DL (ref 31.5–35.7)
MCV RBC AUTO: 95.7 FL (ref 79–97)
MRSA DNA SPEC QL NAA+PROBE: NEGATIVE
NITRITE UR QL STRIP: NEGATIVE
PH UR STRIP.AUTO: <=5 [PH] (ref 5–8)
PLATELET # BLD AUTO: 135 10*3/MM3 (ref 140–450)
PMV BLD AUTO: 11.6 FL (ref 6–12)
POTASSIUM BLD-SCNC: 4.4 MMOL/L (ref 3.5–5.2)
PROT UR QL STRIP: NEGATIVE
RBC # BLD AUTO: 4.41 10*6/MM3 (ref 4.14–5.8)
SODIUM BLD-SCNC: 139 MMOL/L (ref 136–145)
SP GR UR STRIP: 1.01 (ref 1–1.03)
TROPONIN T SERPL-MCNC: <0.01 NG/ML (ref 0–0.03)
TSH SERPL DL<=0.05 MIU/L-ACNC: 0.67 MIU/ML (ref 0.27–4.2)
UROBILINOGEN UR QL STRIP: NORMAL
WBC NRBC COR # BLD: 7.19 10*3/MM3 (ref 3.4–10.8)
WHOLE BLOOD HOLD SPECIMEN: NORMAL
WHOLE BLOOD HOLD SPECIMEN: NORMAL

## 2019-06-19 PROCEDURE — 71275 CT ANGIOGRAPHY CHEST: CPT

## 2019-06-19 PROCEDURE — 25010000002 ENOXAPARIN PER 10 MG: Performed by: PHYSICIAN ASSISTANT

## 2019-06-19 PROCEDURE — 81003 URINALYSIS AUTO W/O SCOPE: CPT | Performed by: EMERGENCY MEDICINE

## 2019-06-19 PROCEDURE — 25010000002 SULFUR HEXAFLUORIDE MICROSPH 60.7-25 MG RECONSTITUTED SUSPENSION: Performed by: PHYSICIAN ASSISTANT

## 2019-06-19 PROCEDURE — 94799 UNLISTED PULMONARY SVC/PX: CPT

## 2019-06-19 PROCEDURE — 93306 TTE W/DOPPLER COMPLETE: CPT

## 2019-06-19 PROCEDURE — 87040 BLOOD CULTURE FOR BACTERIA: CPT | Performed by: EMERGENCY MEDICINE

## 2019-06-19 PROCEDURE — 84443 ASSAY THYROID STIM HORMONE: CPT | Performed by: PHYSICIAN ASSISTANT

## 2019-06-19 PROCEDURE — 99223 1ST HOSP IP/OBS HIGH 75: CPT | Performed by: INTERNAL MEDICINE

## 2019-06-19 PROCEDURE — 93306 TTE W/DOPPLER COMPLETE: CPT | Performed by: INTERNAL MEDICINE

## 2019-06-19 PROCEDURE — 99222 1ST HOSP IP/OBS MODERATE 55: CPT | Performed by: INTERNAL MEDICINE

## 2019-06-19 PROCEDURE — 0 IOPAMIDOL PER 1 ML: Performed by: EMERGENCY MEDICINE

## 2019-06-19 PROCEDURE — 25010000002 CEFEPIME 2 G/NS 100 ML SOLUTION: Performed by: PHYSICIAN ASSISTANT

## 2019-06-19 PROCEDURE — 25010000002 VANCOMYCIN 10 G RECONSTITUTED SOLUTION

## 2019-06-19 PROCEDURE — 80048 BASIC METABOLIC PNL TOTAL CA: CPT | Performed by: PHYSICIAN ASSISTANT

## 2019-06-19 PROCEDURE — 25010000002 DIPHENHYDRAMINE PER 50 MG: Performed by: EMERGENCY MEDICINE

## 2019-06-19 PROCEDURE — 87641 MR-STAPH DNA AMP PROBE: CPT

## 2019-06-19 PROCEDURE — 25010000002 FUROSEMIDE PER 20 MG: Performed by: PHYSICIAN ASSISTANT

## 2019-06-19 PROCEDURE — 25010000002 METHYLPREDNISOLONE PER 40 MG: Performed by: PHYSICIAN ASSISTANT

## 2019-06-19 PROCEDURE — 85027 COMPLETE CBC AUTOMATED: CPT | Performed by: PHYSICIAN ASSISTANT

## 2019-06-19 PROCEDURE — 25010000002 CEFEPIME 2 G/NS 100 ML SOLUTION: Performed by: EMERGENCY MEDICINE

## 2019-06-19 RX ORDER — THEOPHYLLINE ANHYDROUS 200 MG
200 TABLET, EXTENDED RELEASE 12 HR ORAL EVERY 12 HOURS SCHEDULED
Status: DISCONTINUED | OUTPATIENT
Start: 2019-06-19 | End: 2019-06-19

## 2019-06-19 RX ORDER — AMLODIPINE BESYLATE 5 MG/1
10 TABLET ORAL DAILY
Status: DISCONTINUED | OUTPATIENT
Start: 2019-06-19 | End: 2019-06-28 | Stop reason: HOSPADM

## 2019-06-19 RX ORDER — GUAIFENESIN 600 MG/1
1200 TABLET, EXTENDED RELEASE ORAL EVERY 12 HOURS SCHEDULED
Status: DISCONTINUED | OUTPATIENT
Start: 2019-06-19 | End: 2019-06-28 | Stop reason: HOSPADM

## 2019-06-19 RX ORDER — ONDANSETRON 2 MG/ML
4 INJECTION INTRAMUSCULAR; INTRAVENOUS EVERY 6 HOURS PRN
Status: DISCONTINUED | OUTPATIENT
Start: 2019-06-19 | End: 2019-06-28 | Stop reason: HOSPADM

## 2019-06-19 RX ORDER — ACETAMINOPHEN 325 MG/1
650 TABLET ORAL EVERY 4 HOURS PRN
Status: DISCONTINUED | OUTPATIENT
Start: 2019-06-19 | End: 2019-06-28 | Stop reason: HOSPADM

## 2019-06-19 RX ORDER — BUDESONIDE AND FORMOTEROL FUMARATE DIHYDRATE 160; 4.5 UG/1; UG/1
2 AEROSOL RESPIRATORY (INHALATION)
Status: DISCONTINUED | OUTPATIENT
Start: 2019-06-19 | End: 2019-06-28 | Stop reason: HOSPADM

## 2019-06-19 RX ORDER — PROMETHAZINE HCL/CODEINE 6.25-10/5
5 SYRUP ORAL EVERY 4 HOURS PRN
COMMUNITY
End: 2019-06-28 | Stop reason: HOSPADM

## 2019-06-19 RX ORDER — CHOLECALCIFEROL (VITAMIN D3) 125 MCG
5 CAPSULE ORAL NIGHTLY PRN
Status: DISCONTINUED | OUTPATIENT
Start: 2019-06-19 | End: 2019-06-28 | Stop reason: HOSPADM

## 2019-06-19 RX ORDER — THEOPHYLLINE 300 MG/1
300 TABLET, EXTENDED RELEASE ORAL EVERY 12 HOURS SCHEDULED
Status: DISCONTINUED | OUTPATIENT
Start: 2019-06-19 | End: 2019-06-28 | Stop reason: HOSPADM

## 2019-06-19 RX ORDER — FUROSEMIDE 10 MG/ML
40 INJECTION INTRAMUSCULAR; INTRAVENOUS ONCE
Status: COMPLETED | OUTPATIENT
Start: 2019-06-19 | End: 2019-06-19

## 2019-06-19 RX ORDER — ASPIRIN 81 MG/1
81 TABLET ORAL DAILY
Status: DISCONTINUED | OUTPATIENT
Start: 2019-06-19 | End: 2019-06-19

## 2019-06-19 RX ORDER — SODIUM CHLORIDE 0.9 % (FLUSH) 0.9 %
3-10 SYRINGE (ML) INJECTION AS NEEDED
Status: DISCONTINUED | OUTPATIENT
Start: 2019-06-19 | End: 2019-06-28 | Stop reason: HOSPADM

## 2019-06-19 RX ORDER — L.ACID,PARA/B.BIFIDUM/S.THERM 8B CELL
1 CAPSULE ORAL DAILY
Status: DISCONTINUED | OUTPATIENT
Start: 2019-06-19 | End: 2019-06-28 | Stop reason: HOSPADM

## 2019-06-19 RX ORDER — METHYLPREDNISOLONE SODIUM SUCCINATE 40 MG/ML
40 INJECTION, POWDER, LYOPHILIZED, FOR SOLUTION INTRAMUSCULAR; INTRAVENOUS EVERY 12 HOURS
Status: DISCONTINUED | OUTPATIENT
Start: 2019-06-19 | End: 2019-06-20

## 2019-06-19 RX ORDER — FUROSEMIDE 10 MG/ML
20 INJECTION INTRAMUSCULAR; INTRAVENOUS ONCE
Status: COMPLETED | OUTPATIENT
Start: 2019-06-19 | End: 2019-06-19

## 2019-06-19 RX ORDER — SODIUM CHLORIDE 0.9 % (FLUSH) 0.9 %
3 SYRINGE (ML) INJECTION EVERY 12 HOURS SCHEDULED
Status: DISCONTINUED | OUTPATIENT
Start: 2019-06-19 | End: 2019-06-28 | Stop reason: HOSPADM

## 2019-06-19 RX ORDER — TRAZODONE HYDROCHLORIDE 50 MG/1
50 TABLET ORAL NIGHTLY PRN
Status: DISCONTINUED | OUTPATIENT
Start: 2019-06-19 | End: 2019-06-27

## 2019-06-19 RX ORDER — ALBUTEROL SULFATE 2.5 MG/3ML
2.5 SOLUTION RESPIRATORY (INHALATION) EVERY 4 HOURS PRN
Status: DISCONTINUED | OUTPATIENT
Start: 2019-06-19 | End: 2019-06-28 | Stop reason: HOSPADM

## 2019-06-19 RX ORDER — LISINOPRIL 20 MG/1
20 TABLET ORAL DAILY
COMMUNITY
End: 2019-06-28 | Stop reason: HOSPADM

## 2019-06-19 RX ORDER — ALLOPURINOL 300 MG/1
300 TABLET ORAL DAILY
Status: DISCONTINUED | OUTPATIENT
Start: 2019-06-19 | End: 2019-06-28 | Stop reason: HOSPADM

## 2019-06-19 RX ORDER — FAMOTIDINE 20 MG/1
20 TABLET, FILM COATED ORAL DAILY PRN
Status: DISCONTINUED | OUTPATIENT
Start: 2019-06-19 | End: 2019-06-28 | Stop reason: HOSPADM

## 2019-06-19 RX ORDER — IPRATROPIUM BROMIDE AND ALBUTEROL SULFATE 2.5; .5 MG/3ML; MG/3ML
3 SOLUTION RESPIRATORY (INHALATION)
Status: DISCONTINUED | OUTPATIENT
Start: 2019-06-19 | End: 2019-06-28 | Stop reason: HOSPADM

## 2019-06-19 RX ADMIN — SODIUM CHLORIDE, PRESERVATIVE FREE 3 ML: 5 INJECTION INTRAVENOUS at 20:29

## 2019-06-19 RX ADMIN — IPRATROPIUM BROMIDE AND ALBUTEROL SULFATE 3 ML: 2.5; .5 SOLUTION RESPIRATORY (INHALATION) at 20:35

## 2019-06-19 RX ADMIN — IPRATROPIUM BROMIDE AND ALBUTEROL SULFATE 3 ML: 2.5; .5 SOLUTION RESPIRATORY (INHALATION) at 12:33

## 2019-06-19 RX ADMIN — IPRATROPIUM BROMIDE AND ALBUTEROL SULFATE 3 ML: 2.5; .5 SOLUTION RESPIRATORY (INHALATION) at 08:02

## 2019-06-19 RX ADMIN — DIPHENHYDRAMINE HYDROCHLORIDE 25 MG: 50 INJECTION INTRAMUSCULAR; INTRAVENOUS at 00:12

## 2019-06-19 RX ADMIN — VANCOMYCIN HYDROCHLORIDE 1500 MG: 10 INJECTION, POWDER, LYOPHILIZED, FOR SOLUTION INTRAVENOUS at 18:06

## 2019-06-19 RX ADMIN — THEOPHYLLINE 300 MG: 300 TABLET, EXTENDED RELEASE ORAL at 12:07

## 2019-06-19 RX ADMIN — GUAIFENESIN 1200 MG: 600 TABLET, EXTENDED RELEASE ORAL at 09:17

## 2019-06-19 RX ADMIN — ASPIRIN 81 MG: 81 TABLET, COATED ORAL at 09:17

## 2019-06-19 RX ADMIN — IPRATROPIUM BROMIDE AND ALBUTEROL SULFATE 3 ML: 2.5; .5 SOLUTION RESPIRATORY (INHALATION) at 23:48

## 2019-06-19 RX ADMIN — SULFUR HEXAFLUORIDE 3 ML: KIT at 16:00

## 2019-06-19 RX ADMIN — METHYLPREDNISOLONE SODIUM SUCCINATE 40 MG: 40 INJECTION, POWDER, FOR SOLUTION INTRAMUSCULAR; INTRAVENOUS at 20:29

## 2019-06-19 RX ADMIN — ENOXAPARIN SODIUM 130 MG: 80 INJECTION SUBCUTANEOUS at 01:46

## 2019-06-19 RX ADMIN — BUDESONIDE AND FORMOTEROL FUMARATE DIHYDRATE 2 PUFF: 160; 4.5 AEROSOL RESPIRATORY (INHALATION) at 20:36

## 2019-06-19 RX ADMIN — FUROSEMIDE 40 MG: 10 INJECTION, SOLUTION INTRAMUSCULAR; INTRAVENOUS at 12:10

## 2019-06-19 RX ADMIN — METHYLPREDNISOLONE SODIUM SUCCINATE 40 MG: 40 INJECTION, POWDER, FOR SOLUTION INTRAMUSCULAR; INTRAVENOUS at 09:19

## 2019-06-19 RX ADMIN — Medication 1 CAPSULE: at 09:17

## 2019-06-19 RX ADMIN — CEFEPIME 2 G: 2 INJECTION, POWDER, FOR SOLUTION INTRAVENOUS at 01:50

## 2019-06-19 RX ADMIN — IOPAMIDOL 100 ML: 755 INJECTION, SOLUTION INTRAVENOUS at 00:52

## 2019-06-19 RX ADMIN — GUAIFENESIN 1200 MG: 600 TABLET, EXTENDED RELEASE ORAL at 20:29

## 2019-06-19 RX ADMIN — APIXABAN 5 MG: 5 TABLET, FILM COATED ORAL at 17:56

## 2019-06-19 RX ADMIN — VANCOMYCIN HYDROCHLORIDE 2500 MG: 1 INJECTION, POWDER, LYOPHILIZED, FOR SOLUTION INTRAVENOUS at 02:25

## 2019-06-19 RX ADMIN — THEOPHYLLINE 300 MG: 300 TABLET, EXTENDED RELEASE ORAL at 20:29

## 2019-06-19 RX ADMIN — IPRATROPIUM BROMIDE AND ALBUTEROL SULFATE 3 ML: 2.5; .5 SOLUTION RESPIRATORY (INHALATION) at 16:10

## 2019-06-19 RX ADMIN — AMLODIPINE BESYLATE 10 MG: 10 TABLET ORAL at 09:17

## 2019-06-19 RX ADMIN — CEFEPIME 2 G: 2 INJECTION, POWDER, FOR SOLUTION INTRAVENOUS at 18:06

## 2019-06-19 RX ADMIN — SODIUM CHLORIDE, PRESERVATIVE FREE 3 ML: 5 INJECTION INTRAVENOUS at 09:19

## 2019-06-19 RX ADMIN — BUDESONIDE AND FORMOTEROL FUMARATE DIHYDRATE 2 PUFF: 160; 4.5 AEROSOL RESPIRATORY (INHALATION) at 08:07

## 2019-06-19 RX ADMIN — ALLOPURINOL 300 MG: 300 TABLET ORAL at 09:16

## 2019-06-19 RX ADMIN — FUROSEMIDE 20 MG: 10 INJECTION, SOLUTION INTRAMUSCULAR; INTRAVENOUS at 06:28

## 2019-06-19 RX ADMIN — CEFEPIME 2 G: 2 INJECTION, POWDER, FOR SOLUTION INTRAVENOUS at 10:30

## 2019-06-20 ENCOUNTER — READMISSION MANAGEMENT (OUTPATIENT)
Dept: CALL CENTER | Facility: HOSPITAL | Age: 74
End: 2019-06-20

## 2019-06-20 LAB
ANION GAP SERPL CALCULATED.3IONS-SCNC: 11 MMOL/L
BH CV ECHO MEAS - AO ROOT AREA (BSA CORRECTED): 1.8
BH CV ECHO MEAS - AO ROOT AREA: 14.4 CM^2
BH CV ECHO MEAS - AO ROOT DIAM: 4.3 CM
BH CV ECHO MEAS - BSA(HAYCOCK): 2.5 M^2
BH CV ECHO MEAS - BSA: 2.4 M^2
BH CV ECHO MEAS - BZI_BMI: 45 KILOGRAMS/M^2
BH CV ECHO MEAS - BZI_METRIC_HEIGHT: 170.2 CM
BH CV ECHO MEAS - BZI_METRIC_WEIGHT: 130.2 KG
BH CV ECHO MEAS - EDV(CUBED): 113.8 ML
BH CV ECHO MEAS - EDV(MOD-SP2): 139 ML
BH CV ECHO MEAS - EDV(MOD-SP4): 149 ML
BH CV ECHO MEAS - EDV(TEICH): 110 ML
BH CV ECHO MEAS - EF(CUBED): 50.3 %
BH CV ECHO MEAS - EF(MOD-SP2): 73.4 %
BH CV ECHO MEAS - EF(MOD-SP4): 67.8 %
BH CV ECHO MEAS - EF(TEICH): 42.3 %
BH CV ECHO MEAS - ESV(CUBED): 56.6 ML
BH CV ECHO MEAS - ESV(MOD-SP2): 37 ML
BH CV ECHO MEAS - ESV(MOD-SP4): 48 ML
BH CV ECHO MEAS - ESV(TEICH): 63.5 ML
BH CV ECHO MEAS - FS: 20.8 %
BH CV ECHO MEAS - IVS/LVPW: 1
BH CV ECHO MEAS - IVSD: 1.4 CM
BH CV ECHO MEAS - LA DIMENSION: 4.2 CM
BH CV ECHO MEAS - LA/AO: 0.98
BH CV ECHO MEAS - LAT PEAK E' VEL: 11.4 CM/SEC
BH CV ECHO MEAS - LATERAL E/E' RATIO: 14.1
BH CV ECHO MEAS - LV DIASTOLIC VOL/BSA (35-75): 63.2 ML/M^2
BH CV ECHO MEAS - LV MASS(C)D: 260.7 GRAMS
BH CV ECHO MEAS - LV MASS(C)DI: 110.6 GRAMS/M^2
BH CV ECHO MEAS - LV MAX PG: 5.5 MMHG
BH CV ECHO MEAS - LV MEAN PG: 2.4 MMHG
BH CV ECHO MEAS - LV SYSTOLIC VOL/BSA (12-30): 20.4 ML/M^2
BH CV ECHO MEAS - LV V1 MAX: 117.2 CM/SEC
BH CV ECHO MEAS - LV V1 MEAN: 69.8 CM/SEC
BH CV ECHO MEAS - LV V1 VTI: 21 CM
BH CV ECHO MEAS - LVIDD: 4.8 CM
BH CV ECHO MEAS - LVIDS: 3.8 CM
BH CV ECHO MEAS - LVLD AP2: 9.4 CM
BH CV ECHO MEAS - LVLD AP4: 7.4 CM
BH CV ECHO MEAS - LVLS AP2: 6.9 CM
BH CV ECHO MEAS - LVLS AP4: 5.9 CM
BH CV ECHO MEAS - LVOT AREA (M): 5.3 CM^2
BH CV ECHO MEAS - LVOT AREA: 5.3 CM^2
BH CV ECHO MEAS - LVOT DIAM: 2.6 CM
BH CV ECHO MEAS - LVPWD: 1.3 CM
BH CV ECHO MEAS - MED PEAK E' VEL: 9.2 CM/SEC
BH CV ECHO MEAS - MEDIAL E/E' RATIO: 17.4
BH CV ECHO MEAS - MV DEC TIME: 0.25 SEC
BH CV ECHO MEAS - MV E MAX VEL: 162.9 CM/SEC
BH CV ECHO MEAS - RVDD: 3.4 CM
BH CV ECHO MEAS - SI(CUBED): 24.3 ML/M^2
BH CV ECHO MEAS - SI(LVOT): 47.2 ML/M^2
BH CV ECHO MEAS - SI(MOD-SP2): 43.3 ML/M^2
BH CV ECHO MEAS - SI(MOD-SP4): 42.8 ML/M^2
BH CV ECHO MEAS - SI(TEICH): 19.7 ML/M^2
BH CV ECHO MEAS - SV(CUBED): 57.2 ML
BH CV ECHO MEAS - SV(LVOT): 111.2 ML
BH CV ECHO MEAS - SV(MOD-SP2): 102 ML
BH CV ECHO MEAS - SV(MOD-SP4): 101 ML
BH CV ECHO MEAS - SV(TEICH): 46.5 ML
BH CV ECHO MEAS - TAPSE (>1.6): 2.8 CM2
BH CV ECHO MEASUREMENTS AVERAGE E/E' RATIO: 15.82
BH CV VAS BP LEFT ARM: NORMAL MMHG
BH CV XLRA - RV BASE: 4.8 CM
BH CV XLRA - RV LENGTH: 6.8 CM
BH CV XLRA - RV MID: 3.7 CM
BH CV XLRA - TDI S': 13.2 CM/SEC
BUN BLD-MCNC: 19 MG/DL (ref 8–23)
BUN/CREAT SERPL: 19.6 (ref 7–25)
CALCIUM SPEC-SCNC: 8.6 MG/DL (ref 8.6–10.5)
CHLORIDE SERPL-SCNC: 100 MMOL/L (ref 98–107)
CO2 SERPL-SCNC: 25 MMOL/L (ref 22–29)
CREAT BLD-MCNC: 0.97 MG/DL (ref 0.76–1.27)
GFR SERPL CREATININE-BSD FRML MDRD: 75 ML/MIN/1.73
GLUCOSE BLD-MCNC: 184 MG/DL (ref 65–99)
LEFT ATRIUM VOLUME INDEX: 29.2 ML/M2
MAXIMAL PREDICTED HEART RATE: 145 BPM
POTASSIUM BLD-SCNC: 3.6 MMOL/L (ref 3.5–5.2)
PROCALCITONIN SERPL-MCNC: 0.06 NG/ML (ref 0.1–0.25)
SODIUM BLD-SCNC: 136 MMOL/L (ref 136–145)
STRESS TARGET HR: 123 BPM

## 2019-06-20 PROCEDURE — 94799 UNLISTED PULMONARY SVC/PX: CPT

## 2019-06-20 PROCEDURE — 99232 SBSQ HOSP IP/OBS MODERATE 35: CPT | Performed by: INTERNAL MEDICINE

## 2019-06-20 PROCEDURE — 25010000002 METHYLPREDNISOLONE PER 40 MG: Performed by: PHYSICIAN ASSISTANT

## 2019-06-20 PROCEDURE — 25010000002 VANCOMYCIN 10 G RECONSTITUTED SOLUTION

## 2019-06-20 PROCEDURE — 99233 SBSQ HOSP IP/OBS HIGH 50: CPT | Performed by: INTERNAL MEDICINE

## 2019-06-20 PROCEDURE — 25010000002 FUROSEMIDE PER 20 MG: Performed by: INTERNAL MEDICINE

## 2019-06-20 PROCEDURE — 93010 ELECTROCARDIOGRAM REPORT: CPT | Performed by: INTERNAL MEDICINE

## 2019-06-20 PROCEDURE — 84145 PROCALCITONIN (PCT): CPT | Performed by: INTERNAL MEDICINE

## 2019-06-20 PROCEDURE — 80048 BASIC METABOLIC PNL TOTAL CA: CPT | Performed by: PHYSICIAN ASSISTANT

## 2019-06-20 PROCEDURE — 93005 ELECTROCARDIOGRAM TRACING: CPT | Performed by: PHYSICIAN ASSISTANT

## 2019-06-20 PROCEDURE — 25010000002 CEFEPIME 2 G/NS 100 ML SOLUTION: Performed by: PHYSICIAN ASSISTANT

## 2019-06-20 RX ORDER — PREDNISONE 20 MG/1
40 TABLET ORAL
Status: COMPLETED | OUTPATIENT
Start: 2019-06-21 | End: 2019-06-23

## 2019-06-20 RX ORDER — DIGOXIN 0.25 MG/ML
500 INJECTION INTRAMUSCULAR; INTRAVENOUS ONCE
Status: DISCONTINUED | OUTPATIENT
Start: 2019-06-20 | End: 2019-06-20

## 2019-06-20 RX ORDER — FUROSEMIDE 10 MG/ML
40 INJECTION INTRAMUSCULAR; INTRAVENOUS EVERY 12 HOURS
Status: DISCONTINUED | OUTPATIENT
Start: 2019-06-20 | End: 2019-06-23

## 2019-06-20 RX ORDER — DOXYCYCLINE 100 MG/1
100 CAPSULE ORAL EVERY 12 HOURS SCHEDULED
Status: COMPLETED | OUTPATIENT
Start: 2019-06-20 | End: 2019-06-26

## 2019-06-20 RX ORDER — DIGOXIN 0.25 MG/ML
250 INJECTION INTRAMUSCULAR; INTRAVENOUS ONCE
Status: DISCONTINUED | OUTPATIENT
Start: 2019-06-20 | End: 2019-06-20

## 2019-06-20 RX ORDER — POTASSIUM CHLORIDE 750 MG/1
20 CAPSULE, EXTENDED RELEASE ORAL DAILY
Status: DISCONTINUED | OUTPATIENT
Start: 2019-06-20 | End: 2019-06-28 | Stop reason: HOSPADM

## 2019-06-20 RX ORDER — FUROSEMIDE 40 MG/1
40 TABLET ORAL DAILY
Status: DISCONTINUED | OUTPATIENT
Start: 2019-06-20 | End: 2019-06-20

## 2019-06-20 RX ADMIN — IPRATROPIUM BROMIDE AND ALBUTEROL SULFATE 3 ML: 2.5; .5 SOLUTION RESPIRATORY (INHALATION) at 19:44

## 2019-06-20 RX ADMIN — THEOPHYLLINE 300 MG: 300 TABLET, EXTENDED RELEASE ORAL at 20:27

## 2019-06-20 RX ADMIN — BUDESONIDE AND FORMOTEROL FUMARATE DIHYDRATE 2 PUFF: 160; 4.5 AEROSOL RESPIRATORY (INHALATION) at 19:44

## 2019-06-20 RX ADMIN — VANCOMYCIN HYDROCHLORIDE 1500 MG: 10 INJECTION, POWDER, LYOPHILIZED, FOR SOLUTION INTRAVENOUS at 06:00

## 2019-06-20 RX ADMIN — BUDESONIDE AND FORMOTEROL FUMARATE DIHYDRATE 2 PUFF: 160; 4.5 AEROSOL RESPIRATORY (INHALATION) at 07:42

## 2019-06-20 RX ADMIN — POTASSIUM CHLORIDE 20 MEQ: 750 CAPSULE, EXTENDED RELEASE ORAL at 11:54

## 2019-06-20 RX ADMIN — FUROSEMIDE 40 MG: 10 INJECTION, SOLUTION INTRAMUSCULAR; INTRAVENOUS at 11:54

## 2019-06-20 RX ADMIN — IPRATROPIUM BROMIDE AND ALBUTEROL SULFATE 3 ML: 2.5; .5 SOLUTION RESPIRATORY (INHALATION) at 03:00

## 2019-06-20 RX ADMIN — GUAIFENESIN 1200 MG: 600 TABLET, EXTENDED RELEASE ORAL at 20:27

## 2019-06-20 RX ADMIN — APIXABAN 5 MG: 5 TABLET, FILM COATED ORAL at 20:27

## 2019-06-20 RX ADMIN — CEFEPIME 2 G: 2 INJECTION, POWDER, FOR SOLUTION INTRAVENOUS at 02:55

## 2019-06-20 RX ADMIN — Medication 1 CAPSULE: at 08:21

## 2019-06-20 RX ADMIN — AMLODIPINE BESYLATE 10 MG: 10 TABLET ORAL at 08:22

## 2019-06-20 RX ADMIN — THEOPHYLLINE 300 MG: 300 TABLET, EXTENDED RELEASE ORAL at 08:22

## 2019-06-20 RX ADMIN — SODIUM CHLORIDE, PRESERVATIVE FREE 3 ML: 5 INJECTION INTRAVENOUS at 20:27

## 2019-06-20 RX ADMIN — DOXYCYCLINE 100 MG: 100 CAPSULE ORAL at 20:27

## 2019-06-20 RX ADMIN — CEFEPIME 2 G: 2 INJECTION, POWDER, FOR SOLUTION INTRAVENOUS at 08:22

## 2019-06-20 RX ADMIN — METHYLPREDNISOLONE SODIUM SUCCINATE 40 MG: 40 INJECTION, POWDER, FOR SOLUTION INTRAMUSCULAR; INTRAVENOUS at 08:22

## 2019-06-20 RX ADMIN — DOXYCYCLINE 100 MG: 100 CAPSULE ORAL at 11:54

## 2019-06-20 RX ADMIN — IPRATROPIUM BROMIDE AND ALBUTEROL SULFATE 3 ML: 2.5; .5 SOLUTION RESPIRATORY (INHALATION) at 15:05

## 2019-06-20 RX ADMIN — APIXABAN 5 MG: 5 TABLET, FILM COATED ORAL at 08:22

## 2019-06-20 RX ADMIN — GUAIFENESIN 1200 MG: 600 TABLET, EXTENDED RELEASE ORAL at 08:21

## 2019-06-20 RX ADMIN — ALLOPURINOL 300 MG: 300 TABLET ORAL at 08:22

## 2019-06-20 RX ADMIN — IPRATROPIUM BROMIDE AND ALBUTEROL SULFATE 3 ML: 2.5; .5 SOLUTION RESPIRATORY (INHALATION) at 12:02

## 2019-06-20 RX ADMIN — IPRATROPIUM BROMIDE AND ALBUTEROL SULFATE 3 ML: 2.5; .5 SOLUTION RESPIRATORY (INHALATION) at 07:42

## 2019-06-20 NOTE — OUTREACH NOTE
COPD/PN Week 3 Survey      Responses   Facility patient discharged from?  [facility]   Does the patient have one of the following disease processes/diagnoses(primary or secondary)?  COPD/Pneumonia   Was the primary reason for admission:  Pneumonia   Week 3 attempt successful?  No   Revoke  Readmitted          Camila Chaidez RN

## 2019-06-21 ENCOUNTER — APPOINTMENT (OUTPATIENT)
Dept: CARDIOLOGY | Facility: HOSPITAL | Age: 74
End: 2019-06-21

## 2019-06-21 PROBLEM — I50.31 ACUTE DIASTOLIC HEART FAILURE: Chronic | Status: ACTIVE | Noted: 2019-06-21

## 2019-06-21 LAB
ANION GAP SERPL CALCULATED.3IONS-SCNC: 14 MMOL/L
BH CV VAS BP LEFT ARM: NORMAL MMHG
BUN BLD-MCNC: 18 MG/DL (ref 8–23)
BUN/CREAT SERPL: 19.8 (ref 7–25)
CALCIUM SPEC-SCNC: 9.1 MG/DL (ref 8.6–10.5)
CHLORIDE SERPL-SCNC: 96 MMOL/L (ref 98–107)
CO2 SERPL-SCNC: 28 MMOL/L (ref 22–29)
CREAT BLD-MCNC: 0.91 MG/DL (ref 0.76–1.27)
GFR SERPL CREATININE-BSD FRML MDRD: 81 ML/MIN/1.73
GLUCOSE BLD-MCNC: 126 MG/DL (ref 65–99)
POTASSIUM BLD-SCNC: 3.5 MMOL/L (ref 3.5–5.2)
SODIUM BLD-SCNC: 138 MMOL/L (ref 136–145)

## 2019-06-21 PROCEDURE — 99233 SBSQ HOSP IP/OBS HIGH 50: CPT | Performed by: FAMILY MEDICINE

## 2019-06-21 PROCEDURE — 94799 UNLISTED PULMONARY SVC/PX: CPT

## 2019-06-21 PROCEDURE — 80048 BASIC METABOLIC PNL TOTAL CA: CPT | Performed by: PHYSICIAN ASSISTANT

## 2019-06-21 PROCEDURE — 99152 MOD SED SAME PHYS/QHP 5/>YRS: CPT | Performed by: INTERNAL MEDICINE

## 2019-06-21 PROCEDURE — 93321 DOPPLER ECHO F-UP/LMTD STD: CPT

## 2019-06-21 PROCEDURE — 92960 CARDIOVERSION ELECTRIC EXT: CPT | Performed by: INTERNAL MEDICINE

## 2019-06-21 PROCEDURE — 99153 MOD SED SAME PHYS/QHP EA: CPT

## 2019-06-21 PROCEDURE — 63710000001 PREDNISONE PER 1 MG: Performed by: INTERNAL MEDICINE

## 2019-06-21 PROCEDURE — 25010000002 FUROSEMIDE PER 20 MG: Performed by: INTERNAL MEDICINE

## 2019-06-21 PROCEDURE — 99233 SBSQ HOSP IP/OBS HIGH 50: CPT | Performed by: INTERNAL MEDICINE

## 2019-06-21 PROCEDURE — 93325 DOPPLER ECHO COLOR FLOW MAPG: CPT | Performed by: INTERNAL MEDICINE

## 2019-06-21 PROCEDURE — 93312 ECHO TRANSESOPHAGEAL: CPT | Performed by: INTERNAL MEDICINE

## 2019-06-21 PROCEDURE — 93005 ELECTROCARDIOGRAM TRACING: CPT | Performed by: PHYSICIAN ASSISTANT

## 2019-06-21 PROCEDURE — 25010000002 MIDAZOLAM PER 1 MG: Performed by: INTERNAL MEDICINE

## 2019-06-21 PROCEDURE — 93325 DOPPLER ECHO COLOR FLOW MAPG: CPT

## 2019-06-21 PROCEDURE — 93320 DOPPLER ECHO COMPLETE: CPT | Performed by: INTERNAL MEDICINE

## 2019-06-21 PROCEDURE — 93005 ELECTROCARDIOGRAM TRACING: CPT | Performed by: INTERNAL MEDICINE

## 2019-06-21 PROCEDURE — 99152 MOD SED SAME PHYS/QHP 5/>YRS: CPT

## 2019-06-21 PROCEDURE — 92960 CARDIOVERSION ELECTRIC EXT: CPT

## 2019-06-21 PROCEDURE — 25010000002 FENTANYL CITRATE (PF) 100 MCG/2ML SOLUTION: Performed by: INTERNAL MEDICINE

## 2019-06-21 PROCEDURE — 93010 ELECTROCARDIOGRAM REPORT: CPT | Performed by: INTERNAL MEDICINE

## 2019-06-21 PROCEDURE — 97161 PT EVAL LOW COMPLEX 20 MIN: CPT

## 2019-06-21 PROCEDURE — 93312 ECHO TRANSESOPHAGEAL: CPT

## 2019-06-21 RX ORDER — FLUMAZENIL 0.1 MG/ML
INJECTION INTRAVENOUS
Status: DISCONTINUED
Start: 2019-06-21 | End: 2019-06-21 | Stop reason: WASHOUT

## 2019-06-21 RX ORDER — FENTANYL CITRATE 50 UG/ML
INJECTION, SOLUTION INTRAMUSCULAR; INTRAVENOUS
Status: DISCONTINUED
Start: 2019-06-21 | End: 2019-06-28 | Stop reason: HOSPADM

## 2019-06-21 RX ORDER — MIDAZOLAM HYDROCHLORIDE 1 MG/ML
INJECTION INTRAMUSCULAR; INTRAVENOUS
Status: DISCONTINUED
Start: 2019-06-21 | End: 2019-06-28 | Stop reason: HOSPADM

## 2019-06-21 RX ORDER — NALOXONE HYDROCHLORIDE 0.4 MG/ML
INJECTION, SOLUTION INTRAMUSCULAR; INTRAVENOUS; SUBCUTANEOUS
Status: DISCONTINUED
Start: 2019-06-21 | End: 2019-06-21 | Stop reason: WASHOUT

## 2019-06-21 RX ORDER — FENTANYL CITRATE 50 UG/ML
INJECTION, SOLUTION INTRAMUSCULAR; INTRAVENOUS
Status: COMPLETED | OUTPATIENT
Start: 2019-06-21 | End: 2019-06-21

## 2019-06-21 RX ORDER — MIDAZOLAM HYDROCHLORIDE 1 MG/ML
INJECTION INTRAMUSCULAR; INTRAVENOUS
Status: COMPLETED | OUTPATIENT
Start: 2019-06-21 | End: 2019-06-21

## 2019-06-21 RX ADMIN — APIXABAN 5 MG: 5 TABLET, FILM COATED ORAL at 21:22

## 2019-06-21 RX ADMIN — Medication 1 CAPSULE: at 11:57

## 2019-06-21 RX ADMIN — BUDESONIDE AND FORMOTEROL FUMARATE DIHYDRATE 2 PUFF: 160; 4.5 AEROSOL RESPIRATORY (INHALATION) at 07:06

## 2019-06-21 RX ADMIN — SODIUM CHLORIDE, PRESERVATIVE FREE 3 ML: 5 INJECTION INTRAVENOUS at 21:23

## 2019-06-21 RX ADMIN — FUROSEMIDE 40 MG: 10 INJECTION, SOLUTION INTRAMUSCULAR; INTRAVENOUS at 11:59

## 2019-06-21 RX ADMIN — MIDAZOLAM HYDROCHLORIDE 2 MG: 1 INJECTION, SOLUTION INTRAMUSCULAR; INTRAVENOUS at 09:34

## 2019-06-21 RX ADMIN — IPRATROPIUM BROMIDE AND ALBUTEROL SULFATE 3 ML: 2.5; .5 SOLUTION RESPIRATORY (INHALATION) at 23:17

## 2019-06-21 RX ADMIN — POTASSIUM CHLORIDE 20 MEQ: 750 CAPSULE, EXTENDED RELEASE ORAL at 11:58

## 2019-06-21 RX ADMIN — IPRATROPIUM BROMIDE AND ALBUTEROL SULFATE 3 ML: 2.5; .5 SOLUTION RESPIRATORY (INHALATION) at 11:42

## 2019-06-21 RX ADMIN — GUAIFENESIN 1200 MG: 600 TABLET, EXTENDED RELEASE ORAL at 11:58

## 2019-06-21 RX ADMIN — FENTANYL CITRATE 50 MCG: 50 INJECTION, SOLUTION INTRAMUSCULAR; INTRAVENOUS at 09:34

## 2019-06-21 RX ADMIN — IPRATROPIUM BROMIDE AND ALBUTEROL SULFATE 3 ML: 2.5; .5 SOLUTION RESPIRATORY (INHALATION) at 07:06

## 2019-06-21 RX ADMIN — SODIUM CHLORIDE, PRESERVATIVE FREE 3 ML: 5 INJECTION INTRAVENOUS at 11:59

## 2019-06-21 RX ADMIN — FENTANYL CITRATE 25 MCG: 50 INJECTION, SOLUTION INTRAMUSCULAR; INTRAVENOUS at 09:52

## 2019-06-21 RX ADMIN — APIXABAN 5 MG: 5 TABLET, FILM COATED ORAL at 11:59

## 2019-06-21 RX ADMIN — MIDAZOLAM HYDROCHLORIDE 2 MG: 1 INJECTION, SOLUTION INTRAMUSCULAR; INTRAVENOUS at 09:38

## 2019-06-21 RX ADMIN — DOXYCYCLINE 100 MG: 100 CAPSULE ORAL at 21:22

## 2019-06-21 RX ADMIN — GUAIFENESIN 1200 MG: 600 TABLET, EXTENDED RELEASE ORAL at 21:22

## 2019-06-21 RX ADMIN — THEOPHYLLINE 300 MG: 300 TABLET, EXTENDED RELEASE ORAL at 11:58

## 2019-06-21 RX ADMIN — PREDNISONE 40 MG: 20 TABLET ORAL at 11:59

## 2019-06-21 RX ADMIN — THEOPHYLLINE 300 MG: 300 TABLET, EXTENDED RELEASE ORAL at 21:22

## 2019-06-21 RX ADMIN — MIDAZOLAM HYDROCHLORIDE 2 MG: 1 INJECTION, SOLUTION INTRAMUSCULAR; INTRAVENOUS at 09:52

## 2019-06-21 RX ADMIN — BUDESONIDE AND FORMOTEROL FUMARATE DIHYDRATE 2 PUFF: 160; 4.5 AEROSOL RESPIRATORY (INHALATION) at 19:54

## 2019-06-21 RX ADMIN — IPRATROPIUM BROMIDE AND ALBUTEROL SULFATE 3 ML: 2.5; .5 SOLUTION RESPIRATORY (INHALATION) at 15:41

## 2019-06-21 RX ADMIN — FUROSEMIDE 40 MG: 10 INJECTION, SOLUTION INTRAMUSCULAR; INTRAVENOUS at 00:12

## 2019-06-21 RX ADMIN — ALLOPURINOL 300 MG: 300 TABLET ORAL at 11:58

## 2019-06-21 RX ADMIN — IPRATROPIUM BROMIDE AND ALBUTEROL SULFATE 3 ML: 2.5; .5 SOLUTION RESPIRATORY (INHALATION) at 19:54

## 2019-06-21 RX ADMIN — AMLODIPINE BESYLATE 10 MG: 10 TABLET ORAL at 11:58

## 2019-06-21 RX ADMIN — DOXYCYCLINE 100 MG: 100 CAPSULE ORAL at 12:36

## 2019-06-21 RX ADMIN — FENTANYL CITRATE 50 MCG: 50 INJECTION, SOLUTION INTRAMUSCULAR; INTRAVENOUS at 09:38

## 2019-06-21 RX ADMIN — IPRATROPIUM BROMIDE AND ALBUTEROL SULFATE 3 ML: 2.5; .5 SOLUTION RESPIRATORY (INHALATION) at 03:08

## 2019-06-22 ENCOUNTER — APPOINTMENT (OUTPATIENT)
Dept: GENERAL RADIOLOGY | Facility: HOSPITAL | Age: 74
End: 2019-06-22

## 2019-06-22 LAB
ALBUMIN SERPL-MCNC: 3.7 G/DL (ref 3.5–5.2)
ALBUMIN/GLOB SERPL: 1.4 G/DL
ALP SERPL-CCNC: 67 U/L (ref 39–117)
ALT SERPL W P-5'-P-CCNC: 17 U/L (ref 1–41)
ANION GAP SERPL CALCULATED.3IONS-SCNC: 15 MMOL/L
AST SERPL-CCNC: 16 U/L (ref 1–40)
BILIRUB SERPL-MCNC: 1.2 MG/DL (ref 0.2–1.2)
BUN BLD-MCNC: 18 MG/DL (ref 8–23)
BUN/CREAT SERPL: 19.4 (ref 7–25)
CALCIUM SPEC-SCNC: 9.2 MG/DL (ref 8.6–10.5)
CHLORIDE SERPL-SCNC: 91 MMOL/L (ref 98–107)
CO2 SERPL-SCNC: 30 MMOL/L (ref 22–29)
CREAT BLD-MCNC: 0.93 MG/DL (ref 0.76–1.27)
GFR SERPL CREATININE-BSD FRML MDRD: 79 ML/MIN/1.73
GLOBULIN UR ELPH-MCNC: 2.7 GM/DL
GLUCOSE BLD-MCNC: 175 MG/DL (ref 65–99)
POTASSIUM BLD-SCNC: 2.8 MMOL/L (ref 3.5–5.2)
PROT SERPL-MCNC: 6.4 G/DL (ref 6–8.5)
SODIUM BLD-SCNC: 136 MMOL/L (ref 136–145)

## 2019-06-22 PROCEDURE — 71046 X-RAY EXAM CHEST 2 VIEWS: CPT

## 2019-06-22 PROCEDURE — 63710000001 PREDNISONE PER 1 MG: Performed by: INTERNAL MEDICINE

## 2019-06-22 PROCEDURE — 25010000002 FUROSEMIDE PER 20 MG: Performed by: INTERNAL MEDICINE

## 2019-06-22 PROCEDURE — 99233 SBSQ HOSP IP/OBS HIGH 50: CPT | Performed by: FAMILY MEDICINE

## 2019-06-22 PROCEDURE — 87070 CULTURE OTHR SPECIMN AEROBIC: CPT | Performed by: FAMILY MEDICINE

## 2019-06-22 PROCEDURE — 80053 COMPREHEN METABOLIC PANEL: CPT | Performed by: INTERNAL MEDICINE

## 2019-06-22 PROCEDURE — 93010 ELECTROCARDIOGRAM REPORT: CPT | Performed by: INTERNAL MEDICINE

## 2019-06-22 PROCEDURE — 93005 ELECTROCARDIOGRAM TRACING: CPT | Performed by: INTERNAL MEDICINE

## 2019-06-22 PROCEDURE — 99232 SBSQ HOSP IP/OBS MODERATE 35: CPT | Performed by: INTERNAL MEDICINE

## 2019-06-22 PROCEDURE — 87205 SMEAR GRAM STAIN: CPT | Performed by: FAMILY MEDICINE

## 2019-06-22 PROCEDURE — 94799 UNLISTED PULMONARY SVC/PX: CPT

## 2019-06-22 RX ORDER — POTASSIUM CHLORIDE 750 MG/1
40 CAPSULE, EXTENDED RELEASE ORAL AS NEEDED
Status: DISCONTINUED | OUTPATIENT
Start: 2019-06-22 | End: 2019-06-28 | Stop reason: HOSPADM

## 2019-06-22 RX ORDER — METOPROLOL SUCCINATE 25 MG/1
25 TABLET, EXTENDED RELEASE ORAL
Status: DISCONTINUED | OUTPATIENT
Start: 2019-06-22 | End: 2019-06-28 | Stop reason: HOSPADM

## 2019-06-22 RX ORDER — POTASSIUM CHLORIDE 1.5 G/1.77G
40 POWDER, FOR SOLUTION ORAL AS NEEDED
Status: DISCONTINUED | OUTPATIENT
Start: 2019-06-22 | End: 2019-06-28 | Stop reason: HOSPADM

## 2019-06-22 RX ORDER — FLECAINIDE ACETATE 50 MG/1
50 TABLET ORAL EVERY 12 HOURS SCHEDULED
Status: DISCONTINUED | OUTPATIENT
Start: 2019-06-22 | End: 2019-06-24

## 2019-06-22 RX ORDER — POTASSIUM CHLORIDE 7.45 MG/ML
10 INJECTION INTRAVENOUS
Status: DISCONTINUED | OUTPATIENT
Start: 2019-06-22 | End: 2019-06-28 | Stop reason: HOSPADM

## 2019-06-22 RX ADMIN — FLECAINIDE ACETATE 50 MG: 50 TABLET ORAL at 21:15

## 2019-06-22 RX ADMIN — METOPROLOL SUCCINATE 25 MG: 25 TABLET, EXTENDED RELEASE ORAL at 14:35

## 2019-06-22 RX ADMIN — APIXABAN 5 MG: 5 TABLET, FILM COATED ORAL at 09:15

## 2019-06-22 RX ADMIN — POTASSIUM CHLORIDE 40 MEQ: 750 CAPSULE, EXTENDED RELEASE ORAL at 17:52

## 2019-06-22 RX ADMIN — BUDESONIDE AND FORMOTEROL FUMARATE DIHYDRATE 2 PUFF: 160; 4.5 AEROSOL RESPIRATORY (INHALATION) at 08:07

## 2019-06-22 RX ADMIN — FUROSEMIDE 40 MG: 10 INJECTION, SOLUTION INTRAMUSCULAR; INTRAVENOUS at 00:31

## 2019-06-22 RX ADMIN — IPRATROPIUM BROMIDE AND ALBUTEROL SULFATE 3 ML: 2.5; .5 SOLUTION RESPIRATORY (INHALATION) at 08:06

## 2019-06-22 RX ADMIN — BUDESONIDE AND FORMOTEROL FUMARATE DIHYDRATE 2 PUFF: 160; 4.5 AEROSOL RESPIRATORY (INHALATION) at 19:24

## 2019-06-22 RX ADMIN — DOXYCYCLINE 100 MG: 100 CAPSULE ORAL at 21:15

## 2019-06-22 RX ADMIN — APIXABAN 5 MG: 5 TABLET, FILM COATED ORAL at 21:15

## 2019-06-22 RX ADMIN — THEOPHYLLINE 300 MG: 300 TABLET, EXTENDED RELEASE ORAL at 09:15

## 2019-06-22 RX ADMIN — IPRATROPIUM BROMIDE AND ALBUTEROL SULFATE 3 ML: 2.5; .5 SOLUTION RESPIRATORY (INHALATION) at 15:23

## 2019-06-22 RX ADMIN — ALLOPURINOL 300 MG: 300 TABLET ORAL at 09:15

## 2019-06-22 RX ADMIN — IPRATROPIUM BROMIDE AND ALBUTEROL SULFATE 3 ML: 2.5; .5 SOLUTION RESPIRATORY (INHALATION) at 23:47

## 2019-06-22 RX ADMIN — IPRATROPIUM BROMIDE AND ALBUTEROL SULFATE 3 ML: 2.5; .5 SOLUTION RESPIRATORY (INHALATION) at 19:24

## 2019-06-22 RX ADMIN — DOXYCYCLINE 100 MG: 100 CAPSULE ORAL at 09:16

## 2019-06-22 RX ADMIN — SODIUM CHLORIDE, PRESERVATIVE FREE 3 ML: 5 INJECTION INTRAVENOUS at 09:16

## 2019-06-22 RX ADMIN — FLECAINIDE ACETATE 50 MG: 50 TABLET ORAL at 09:15

## 2019-06-22 RX ADMIN — GUAIFENESIN 1200 MG: 600 TABLET, EXTENDED RELEASE ORAL at 09:15

## 2019-06-22 RX ADMIN — IPRATROPIUM BROMIDE AND ALBUTEROL SULFATE 3 ML: 2.5; .5 SOLUTION RESPIRATORY (INHALATION) at 11:30

## 2019-06-22 RX ADMIN — FUROSEMIDE 40 MG: 10 INJECTION, SOLUTION INTRAMUSCULAR; INTRAVENOUS at 12:19

## 2019-06-22 RX ADMIN — POTASSIUM CHLORIDE 40 MEQ: 750 CAPSULE, EXTENDED RELEASE ORAL at 15:13

## 2019-06-22 RX ADMIN — GUAIFENESIN 1200 MG: 600 TABLET, EXTENDED RELEASE ORAL at 21:15

## 2019-06-22 RX ADMIN — THEOPHYLLINE 300 MG: 300 TABLET, EXTENDED RELEASE ORAL at 21:15

## 2019-06-22 RX ADMIN — AMLODIPINE BESYLATE 10 MG: 10 TABLET ORAL at 09:15

## 2019-06-22 RX ADMIN — PREDNISONE 40 MG: 20 TABLET ORAL at 09:15

## 2019-06-22 RX ADMIN — POTASSIUM CHLORIDE 20 MEQ: 750 CAPSULE, EXTENDED RELEASE ORAL at 09:15

## 2019-06-22 RX ADMIN — Medication 1 CAPSULE: at 09:15

## 2019-06-23 LAB
ANION GAP SERPL CALCULATED.3IONS-SCNC: 12 MMOL/L
BASOPHILS # BLD AUTO: 0.02 10*3/MM3 (ref 0–0.2)
BASOPHILS NFR BLD AUTO: 0.2 % (ref 0–1.5)
BUN BLD-MCNC: 16 MG/DL (ref 8–23)
BUN/CREAT SERPL: 17 (ref 7–25)
CALCIUM SPEC-SCNC: 8.9 MG/DL (ref 8.6–10.5)
CHLORIDE SERPL-SCNC: 95 MMOL/L (ref 98–107)
CO2 SERPL-SCNC: 31 MMOL/L (ref 22–29)
CREAT BLD-MCNC: 0.94 MG/DL (ref 0.76–1.27)
DEPRECATED RDW RBC AUTO: 46.1 FL (ref 37–54)
EOSINOPHIL # BLD AUTO: 0.02 10*3/MM3 (ref 0–0.4)
EOSINOPHIL NFR BLD AUTO: 0.2 % (ref 0.3–6.2)
ERYTHROCYTE [DISTWIDTH] IN BLOOD BY AUTOMATED COUNT: 13.1 % (ref 12.3–15.4)
GFR SERPL CREATININE-BSD FRML MDRD: 78 ML/MIN/1.73
GLUCOSE BLD-MCNC: 110 MG/DL (ref 65–99)
HCT VFR BLD AUTO: 41.7 % (ref 37.5–51)
HGB BLD-MCNC: 13.5 G/DL (ref 13–17.7)
IMM GRANULOCYTES # BLD AUTO: 0.12 10*3/MM3 (ref 0–0.05)
IMM GRANULOCYTES NFR BLD AUTO: 1.1 % (ref 0–0.5)
LYMPHOCYTES # BLD AUTO: 0.97 10*3/MM3 (ref 0.7–3.1)
LYMPHOCYTES NFR BLD AUTO: 9 % (ref 19.6–45.3)
MCH RBC QN AUTO: 30.8 PG (ref 26.6–33)
MCHC RBC AUTO-ENTMCNC: 32.4 G/DL (ref 31.5–35.7)
MCV RBC AUTO: 95.2 FL (ref 79–97)
MONOCYTES # BLD AUTO: 1.1 10*3/MM3 (ref 0.1–0.9)
MONOCYTES NFR BLD AUTO: 10.3 % (ref 5–12)
NEUTROPHILS # BLD AUTO: 8.49 10*3/MM3 (ref 1.7–7)
NEUTROPHILS NFR BLD AUTO: 79.2 % (ref 42.7–76)
NRBC BLD AUTO-RTO: 0 /100 WBC (ref 0–0.2)
PLATELET # BLD AUTO: 186 10*3/MM3 (ref 140–450)
PMV BLD AUTO: 11.5 FL (ref 6–12)
POTASSIUM BLD-SCNC: 3.7 MMOL/L (ref 3.5–5.2)
RBC # BLD AUTO: 4.38 10*6/MM3 (ref 4.14–5.8)
SODIUM BLD-SCNC: 138 MMOL/L (ref 136–145)
WBC NRBC COR # BLD: 10.72 10*3/MM3 (ref 3.4–10.8)

## 2019-06-23 PROCEDURE — 85025 COMPLETE CBC W/AUTO DIFF WBC: CPT | Performed by: FAMILY MEDICINE

## 2019-06-23 PROCEDURE — 99232 SBSQ HOSP IP/OBS MODERATE 35: CPT | Performed by: INTERNAL MEDICINE

## 2019-06-23 PROCEDURE — 99233 SBSQ HOSP IP/OBS HIGH 50: CPT | Performed by: FAMILY MEDICINE

## 2019-06-23 PROCEDURE — 94799 UNLISTED PULMONARY SVC/PX: CPT

## 2019-06-23 PROCEDURE — 80048 BASIC METABOLIC PNL TOTAL CA: CPT | Performed by: PHYSICIAN ASSISTANT

## 2019-06-23 PROCEDURE — 93005 ELECTROCARDIOGRAM TRACING: CPT | Performed by: FAMILY MEDICINE

## 2019-06-23 PROCEDURE — 25010000002 FUROSEMIDE PER 20 MG: Performed by: INTERNAL MEDICINE

## 2019-06-23 PROCEDURE — 93010 ELECTROCARDIOGRAM REPORT: CPT | Performed by: INTERNAL MEDICINE

## 2019-06-23 PROCEDURE — 63710000001 PREDNISONE PER 1 MG: Performed by: INTERNAL MEDICINE

## 2019-06-23 RX ORDER — BUMETANIDE 1 MG/1
1 TABLET ORAL DAILY
Status: DISCONTINUED | OUTPATIENT
Start: 2019-06-23 | End: 2019-06-24

## 2019-06-23 RX ADMIN — APIXABAN 5 MG: 5 TABLET, FILM COATED ORAL at 21:04

## 2019-06-23 RX ADMIN — SODIUM CHLORIDE, PRESERVATIVE FREE 3 ML: 5 INJECTION INTRAVENOUS at 21:04

## 2019-06-23 RX ADMIN — ALLOPURINOL 300 MG: 300 TABLET ORAL at 08:15

## 2019-06-23 RX ADMIN — IPRATROPIUM BROMIDE AND ALBUTEROL SULFATE 3 ML: 2.5; .5 SOLUTION RESPIRATORY (INHALATION) at 06:52

## 2019-06-23 RX ADMIN — AMLODIPINE BESYLATE 10 MG: 10 TABLET ORAL at 08:15

## 2019-06-23 RX ADMIN — DOXYCYCLINE 100 MG: 100 CAPSULE ORAL at 21:02

## 2019-06-23 RX ADMIN — GUAIFENESIN 1200 MG: 600 TABLET, EXTENDED RELEASE ORAL at 08:15

## 2019-06-23 RX ADMIN — GUAIFENESIN 1200 MG: 600 TABLET, EXTENDED RELEASE ORAL at 21:04

## 2019-06-23 RX ADMIN — BUDESONIDE AND FORMOTEROL FUMARATE DIHYDRATE 2 PUFF: 160; 4.5 AEROSOL RESPIRATORY (INHALATION) at 07:04

## 2019-06-23 RX ADMIN — FLECAINIDE ACETATE 50 MG: 50 TABLET ORAL at 21:02

## 2019-06-23 RX ADMIN — MELATONIN TAB 5 MG 5 MG: 5 TAB at 21:02

## 2019-06-23 RX ADMIN — BUDESONIDE AND FORMOTEROL FUMARATE DIHYDRATE 2 PUFF: 160; 4.5 AEROSOL RESPIRATORY (INHALATION) at 19:15

## 2019-06-23 RX ADMIN — IPRATROPIUM BROMIDE AND ALBUTEROL SULFATE 3 ML: 2.5; .5 SOLUTION RESPIRATORY (INHALATION) at 13:44

## 2019-06-23 RX ADMIN — IPRATROPIUM BROMIDE AND ALBUTEROL SULFATE 3 ML: 2.5; .5 SOLUTION RESPIRATORY (INHALATION) at 16:59

## 2019-06-23 RX ADMIN — IPRATROPIUM BROMIDE AND ALBUTEROL SULFATE 3 ML: 2.5; .5 SOLUTION RESPIRATORY (INHALATION) at 19:15

## 2019-06-23 RX ADMIN — TRAZODONE HYDROCHLORIDE 50 MG: 50 TABLET ORAL at 21:04

## 2019-06-23 RX ADMIN — APIXABAN 5 MG: 5 TABLET, FILM COATED ORAL at 08:15

## 2019-06-23 RX ADMIN — FUROSEMIDE 40 MG: 10 INJECTION, SOLUTION INTRAMUSCULAR; INTRAVENOUS at 08:15

## 2019-06-23 RX ADMIN — SODIUM CHLORIDE, PRESERVATIVE FREE 3 ML: 5 INJECTION INTRAVENOUS at 08:16

## 2019-06-23 RX ADMIN — THEOPHYLLINE 300 MG: 300 TABLET, EXTENDED RELEASE ORAL at 08:14

## 2019-06-23 RX ADMIN — FLECAINIDE ACETATE 50 MG: 50 TABLET ORAL at 08:14

## 2019-06-23 RX ADMIN — BUMETANIDE 1 MG: 1 TABLET ORAL at 14:19

## 2019-06-23 RX ADMIN — THEOPHYLLINE 300 MG: 300 TABLET, EXTENDED RELEASE ORAL at 21:04

## 2019-06-23 RX ADMIN — Medication 1 CAPSULE: at 08:15

## 2019-06-23 RX ADMIN — DOXYCYCLINE 100 MG: 100 CAPSULE ORAL at 08:15

## 2019-06-23 RX ADMIN — POTASSIUM CHLORIDE 20 MEQ: 750 CAPSULE, EXTENDED RELEASE ORAL at 08:14

## 2019-06-23 RX ADMIN — METOPROLOL SUCCINATE 25 MG: 25 TABLET, EXTENDED RELEASE ORAL at 08:14

## 2019-06-23 RX ADMIN — PREDNISONE 40 MG: 20 TABLET ORAL at 08:15

## 2019-06-24 ENCOUNTER — APPOINTMENT (OUTPATIENT)
Dept: GENERAL RADIOLOGY | Facility: HOSPITAL | Age: 74
End: 2019-06-24

## 2019-06-24 ENCOUNTER — APPOINTMENT (OUTPATIENT)
Dept: CARDIOLOGY | Facility: HOSPITAL | Age: 74
End: 2019-06-24

## 2019-06-24 ENCOUNTER — ANESTHESIA (OUTPATIENT)
Dept: CARDIOLOGY | Facility: HOSPITAL | Age: 74
End: 2019-06-24

## 2019-06-24 ENCOUNTER — ANESTHESIA EVENT (OUTPATIENT)
Dept: CARDIOLOGY | Facility: HOSPITAL | Age: 74
End: 2019-06-24

## 2019-06-24 LAB
ANION GAP SERPL CALCULATED.3IONS-SCNC: 11 MMOL/L
BACTERIA SPEC AEROBE CULT: NORMAL
BACTERIA SPEC AEROBE CULT: NORMAL
BACTERIA SPEC RESP CULT: NORMAL
BUN BLD-MCNC: 19 MG/DL (ref 8–23)
BUN/CREAT SERPL: 19.8 (ref 7–25)
CALCIUM SPEC-SCNC: 9 MG/DL (ref 8.6–10.5)
CHLORIDE SERPL-SCNC: 93 MMOL/L (ref 98–107)
CO2 SERPL-SCNC: 32 MMOL/L (ref 22–29)
CREAT BLD-MCNC: 0.96 MG/DL (ref 0.76–1.27)
GFR SERPL CREATININE-BSD FRML MDRD: 76 ML/MIN/1.73
GLUCOSE BLD-MCNC: 122 MG/DL (ref 65–99)
GRAM STN SPEC: NORMAL
POTASSIUM BLD-SCNC: 3.4 MMOL/L (ref 3.5–5.2)
POTASSIUM BLD-SCNC: 4.3 MMOL/L (ref 3.5–5.2)
SODIUM BLD-SCNC: 136 MMOL/L (ref 136–145)

## 2019-06-24 PROCEDURE — 80048 BASIC METABOLIC PNL TOTAL CA: CPT | Performed by: PHYSICIAN ASSISTANT

## 2019-06-24 PROCEDURE — 99232 SBSQ HOSP IP/OBS MODERATE 35: CPT | Performed by: PHYSICIAN ASSISTANT

## 2019-06-24 PROCEDURE — 92960 CARDIOVERSION ELECTRIC EXT: CPT | Performed by: INTERNAL MEDICINE

## 2019-06-24 PROCEDURE — 93010 ELECTROCARDIOGRAM REPORT: CPT | Performed by: INTERNAL MEDICINE

## 2019-06-24 PROCEDURE — 92960 CARDIOVERSION ELECTRIC EXT: CPT

## 2019-06-24 PROCEDURE — 71045 X-RAY EXAM CHEST 1 VIEW: CPT

## 2019-06-24 PROCEDURE — 94799 UNLISTED PULMONARY SVC/PX: CPT

## 2019-06-24 PROCEDURE — 93005 ELECTROCARDIOGRAM TRACING: CPT | Performed by: HOSPITALIST

## 2019-06-24 PROCEDURE — 97116 GAIT TRAINING THERAPY: CPT | Performed by: PHYSICAL THERAPIST

## 2019-06-24 PROCEDURE — 97530 THERAPEUTIC ACTIVITIES: CPT | Performed by: PHYSICAL THERAPIST

## 2019-06-24 PROCEDURE — 25010000002 FUROSEMIDE PER 20 MG: Performed by: INTERNAL MEDICINE

## 2019-06-24 PROCEDURE — 84132 ASSAY OF SERUM POTASSIUM: CPT | Performed by: HOSPITALIST

## 2019-06-24 RX ORDER — FLECAINIDE ACETATE 50 MG/1
50 TABLET ORAL ONCE
Status: COMPLETED | OUTPATIENT
Start: 2019-06-24 | End: 2019-06-24

## 2019-06-24 RX ORDER — FLECAINIDE ACETATE 50 MG/1
100 TABLET ORAL EVERY 12 HOURS SCHEDULED
Status: DISCONTINUED | OUTPATIENT
Start: 2019-06-24 | End: 2019-06-28 | Stop reason: HOSPADM

## 2019-06-24 RX ORDER — FUROSEMIDE 10 MG/ML
40 INJECTION INTRAMUSCULAR; INTRAVENOUS
Status: DISCONTINUED | OUTPATIENT
Start: 2019-06-24 | End: 2019-06-27

## 2019-06-24 RX ADMIN — ALLOPURINOL 300 MG: 300 TABLET ORAL at 10:00

## 2019-06-24 RX ADMIN — IPRATROPIUM BROMIDE AND ALBUTEROL SULFATE 3 ML: 2.5; .5 SOLUTION RESPIRATORY (INHALATION) at 07:32

## 2019-06-24 RX ADMIN — GUAIFENESIN 1200 MG: 600 TABLET, EXTENDED RELEASE ORAL at 21:08

## 2019-06-24 RX ADMIN — IPRATROPIUM BROMIDE AND ALBUTEROL SULFATE 3 ML: 2.5; .5 SOLUTION RESPIRATORY (INHALATION) at 19:22

## 2019-06-24 RX ADMIN — FUROSEMIDE 40 MG: 10 INJECTION, SOLUTION INTRAMUSCULAR; INTRAVENOUS at 21:10

## 2019-06-24 RX ADMIN — GUAIFENESIN 1200 MG: 600 TABLET, EXTENDED RELEASE ORAL at 10:00

## 2019-06-24 RX ADMIN — BUMETANIDE 1 MG: 1 TABLET ORAL at 10:00

## 2019-06-24 RX ADMIN — METOPROLOL SUCCINATE 25 MG: 25 TABLET, EXTENDED RELEASE ORAL at 10:00

## 2019-06-24 RX ADMIN — AMLODIPINE BESYLATE 10 MG: 10 TABLET ORAL at 10:00

## 2019-06-24 RX ADMIN — FLECAINIDE ACETATE 100 MG: 50 TABLET ORAL at 21:09

## 2019-06-24 RX ADMIN — POTASSIUM CHLORIDE 40 MEQ: 750 CAPSULE, EXTENDED RELEASE ORAL at 10:01

## 2019-06-24 RX ADMIN — APIXABAN 5 MG: 5 TABLET, FILM COATED ORAL at 21:08

## 2019-06-24 RX ADMIN — THEOPHYLLINE 300 MG: 300 TABLET, EXTENDED RELEASE ORAL at 10:00

## 2019-06-24 RX ADMIN — APIXABAN 5 MG: 5 TABLET, FILM COATED ORAL at 10:00

## 2019-06-24 RX ADMIN — MELATONIN TAB 5 MG 5 MG: 5 TAB at 21:08

## 2019-06-24 RX ADMIN — SODIUM CHLORIDE, PRESERVATIVE FREE 3 ML: 5 INJECTION INTRAVENOUS at 09:03

## 2019-06-24 RX ADMIN — TRAZODONE HYDROCHLORIDE 50 MG: 50 TABLET ORAL at 21:08

## 2019-06-24 RX ADMIN — FLECAINIDE ACETATE 50 MG: 50 TABLET ORAL at 10:00

## 2019-06-24 RX ADMIN — POTASSIUM CHLORIDE 20 MEQ: 750 CAPSULE, EXTENDED RELEASE ORAL at 10:01

## 2019-06-24 RX ADMIN — FLECAINIDE ACETATE 50 MG: 50 TABLET ORAL at 14:47

## 2019-06-24 RX ADMIN — BUDESONIDE AND FORMOTEROL FUMARATE DIHYDRATE 2 PUFF: 160; 4.5 AEROSOL RESPIRATORY (INHALATION) at 19:22

## 2019-06-24 RX ADMIN — BUDESONIDE AND FORMOTEROL FUMARATE DIHYDRATE 2 PUFF: 160; 4.5 AEROSOL RESPIRATORY (INHALATION) at 07:32

## 2019-06-24 RX ADMIN — IPRATROPIUM BROMIDE AND ALBUTEROL SULFATE 3 ML: 2.5; .5 SOLUTION RESPIRATORY (INHALATION) at 23:06

## 2019-06-24 RX ADMIN — DOXYCYCLINE 100 MG: 100 CAPSULE ORAL at 21:08

## 2019-06-24 RX ADMIN — IPRATROPIUM BROMIDE AND ALBUTEROL SULFATE 3 ML: 2.5; .5 SOLUTION RESPIRATORY (INHALATION) at 15:31

## 2019-06-24 RX ADMIN — Medication 1 CAPSULE: at 10:00

## 2019-06-24 RX ADMIN — THEOPHYLLINE 300 MG: 300 TABLET, EXTENDED RELEASE ORAL at 21:08

## 2019-06-24 RX ADMIN — DOXYCYCLINE 100 MG: 100 CAPSULE ORAL at 10:00

## 2019-06-24 RX ADMIN — SODIUM CHLORIDE, PRESERVATIVE FREE 3 ML: 5 INJECTION INTRAVENOUS at 21:10

## 2019-06-24 RX ADMIN — POTASSIUM CHLORIDE 40 MEQ: 750 CAPSULE, EXTENDED RELEASE ORAL at 16:55

## 2019-06-24 RX ADMIN — IPRATROPIUM BROMIDE AND ALBUTEROL SULFATE 3 ML: 2.5; .5 SOLUTION RESPIRATORY (INHALATION) at 00:09

## 2019-06-25 LAB
ANION GAP SERPL CALCULATED.3IONS-SCNC: 13 MMOL/L
BUN BLD-MCNC: 17 MG/DL (ref 8–23)
BUN/CREAT SERPL: 17.5 (ref 7–25)
CALCIUM SPEC-SCNC: 8.8 MG/DL (ref 8.6–10.5)
CHLORIDE SERPL-SCNC: 92 MMOL/L (ref 98–107)
CO2 SERPL-SCNC: 32 MMOL/L (ref 22–29)
CREAT BLD-MCNC: 0.97 MG/DL (ref 0.76–1.27)
GFR SERPL CREATININE-BSD FRML MDRD: 75 ML/MIN/1.73
GLUCOSE BLD-MCNC: 125 MG/DL (ref 65–99)
POTASSIUM BLD-SCNC: 3.6 MMOL/L (ref 3.5–5.2)
POTASSIUM BLD-SCNC: 3.9 MMOL/L (ref 3.5–5.2)
SODIUM BLD-SCNC: 137 MMOL/L (ref 136–145)

## 2019-06-25 PROCEDURE — 99222 1ST HOSP IP/OBS MODERATE 55: CPT | Performed by: INTERNAL MEDICINE

## 2019-06-25 PROCEDURE — 93010 ELECTROCARDIOGRAM REPORT: CPT | Performed by: INTERNAL MEDICINE

## 2019-06-25 PROCEDURE — 80048 BASIC METABOLIC PNL TOTAL CA: CPT | Performed by: INTERNAL MEDICINE

## 2019-06-25 PROCEDURE — 94799 UNLISTED PULMONARY SVC/PX: CPT

## 2019-06-25 PROCEDURE — 25010000002 FUROSEMIDE PER 20 MG: Performed by: INTERNAL MEDICINE

## 2019-06-25 PROCEDURE — 84132 ASSAY OF SERUM POTASSIUM: CPT | Performed by: HOSPITALIST

## 2019-06-25 PROCEDURE — 93005 ELECTROCARDIOGRAM TRACING: CPT | Performed by: INTERNAL MEDICINE

## 2019-06-25 PROCEDURE — 99232 SBSQ HOSP IP/OBS MODERATE 35: CPT | Performed by: PHYSICIAN ASSISTANT

## 2019-06-25 RX ORDER — FAMOTIDINE 20 MG/1
20 TABLET, FILM COATED ORAL
Status: DISCONTINUED | OUTPATIENT
Start: 2019-06-25 | End: 2019-06-28 | Stop reason: HOSPADM

## 2019-06-25 RX ORDER — FLUTICASONE PROPIONATE 50 MCG
2 SPRAY, SUSPENSION (ML) NASAL DAILY
Status: DISCONTINUED | OUTPATIENT
Start: 2019-06-25 | End: 2019-06-28 | Stop reason: HOSPADM

## 2019-06-25 RX ADMIN — IPRATROPIUM BROMIDE AND ALBUTEROL SULFATE 3 ML: 2.5; .5 SOLUTION RESPIRATORY (INHALATION) at 07:23

## 2019-06-25 RX ADMIN — THEOPHYLLINE 300 MG: 300 TABLET, EXTENDED RELEASE ORAL at 09:05

## 2019-06-25 RX ADMIN — BUDESONIDE AND FORMOTEROL FUMARATE DIHYDRATE 2 PUFF: 160; 4.5 AEROSOL RESPIRATORY (INHALATION) at 07:23

## 2019-06-25 RX ADMIN — APIXABAN 5 MG: 5 TABLET, FILM COATED ORAL at 20:35

## 2019-06-25 RX ADMIN — SODIUM CHLORIDE, PRESERVATIVE FREE 3 ML: 5 INJECTION INTRAVENOUS at 09:06

## 2019-06-25 RX ADMIN — GUAIFENESIN 1200 MG: 600 TABLET, EXTENDED RELEASE ORAL at 09:05

## 2019-06-25 RX ADMIN — METOPROLOL SUCCINATE 25 MG: 25 TABLET, EXTENDED RELEASE ORAL at 09:05

## 2019-06-25 RX ADMIN — FAMOTIDINE 20 MG: 20 TABLET, FILM COATED ORAL at 05:58

## 2019-06-25 RX ADMIN — POTASSIUM CHLORIDE 20 MEQ: 750 CAPSULE, EXTENDED RELEASE ORAL at 09:06

## 2019-06-25 RX ADMIN — DOXYCYCLINE 100 MG: 100 CAPSULE ORAL at 09:05

## 2019-06-25 RX ADMIN — FUROSEMIDE 40 MG: 10 INJECTION, SOLUTION INTRAMUSCULAR; INTRAVENOUS at 09:07

## 2019-06-25 RX ADMIN — TRAZODONE HYDROCHLORIDE 50 MG: 50 TABLET ORAL at 20:35

## 2019-06-25 RX ADMIN — THEOPHYLLINE 300 MG: 300 TABLET, EXTENDED RELEASE ORAL at 20:35

## 2019-06-25 RX ADMIN — FLECAINIDE ACETATE 100 MG: 50 TABLET ORAL at 20:35

## 2019-06-25 RX ADMIN — GUAIFENESIN 1200 MG: 600 TABLET, EXTENDED RELEASE ORAL at 20:35

## 2019-06-25 RX ADMIN — APIXABAN 5 MG: 5 TABLET, FILM COATED ORAL at 09:05

## 2019-06-25 RX ADMIN — MELATONIN TAB 5 MG 5 MG: 5 TAB at 20:35

## 2019-06-25 RX ADMIN — FUROSEMIDE 40 MG: 10 INJECTION, SOLUTION INTRAMUSCULAR; INTRAVENOUS at 18:12

## 2019-06-25 RX ADMIN — POTASSIUM CHLORIDE 40 MEQ: 750 CAPSULE, EXTENDED RELEASE ORAL at 05:58

## 2019-06-25 RX ADMIN — IPRATROPIUM BROMIDE AND ALBUTEROL SULFATE 3 ML: 2.5; .5 SOLUTION RESPIRATORY (INHALATION) at 16:11

## 2019-06-25 RX ADMIN — ALLOPURINOL 300 MG: 300 TABLET ORAL at 09:05

## 2019-06-25 RX ADMIN — POTASSIUM CHLORIDE 40 MEQ: 750 CAPSULE, EXTENDED RELEASE ORAL at 09:04

## 2019-06-25 RX ADMIN — AMLODIPINE BESYLATE 10 MG: 10 TABLET ORAL at 09:05

## 2019-06-25 RX ADMIN — DOXYCYCLINE 100 MG: 100 CAPSULE ORAL at 20:35

## 2019-06-25 RX ADMIN — IPRATROPIUM BROMIDE AND ALBUTEROL SULFATE 3 ML: 2.5; .5 SOLUTION RESPIRATORY (INHALATION) at 18:56

## 2019-06-25 RX ADMIN — SODIUM CHLORIDE, PRESERVATIVE FREE 3 ML: 5 INJECTION INTRAVENOUS at 20:36

## 2019-06-25 RX ADMIN — BUDESONIDE AND FORMOTEROL FUMARATE DIHYDRATE 2 PUFF: 160; 4.5 AEROSOL RESPIRATORY (INHALATION) at 18:56

## 2019-06-25 RX ADMIN — FLECAINIDE ACETATE 100 MG: 50 TABLET ORAL at 09:05

## 2019-06-25 RX ADMIN — IPRATROPIUM BROMIDE AND ALBUTEROL SULFATE 3 ML: 2.5; .5 SOLUTION RESPIRATORY (INHALATION) at 23:16

## 2019-06-25 RX ADMIN — IPRATROPIUM BROMIDE AND ALBUTEROL SULFATE 3 ML: 2.5; .5 SOLUTION RESPIRATORY (INHALATION) at 12:58

## 2019-06-25 RX ADMIN — Medication 1 CAPSULE: at 09:04

## 2019-06-26 ENCOUNTER — APPOINTMENT (OUTPATIENT)
Dept: PULMONOLOGY | Facility: HOSPITAL | Age: 74
End: 2019-06-26

## 2019-06-26 PROBLEM — R91.8 MULTIPLE LUNG NODULES ON CT: Status: ACTIVE | Noted: 2019-06-26

## 2019-06-26 LAB
ANION GAP SERPL CALCULATED.3IONS-SCNC: 14 MMOL/L (ref 5–15)
BUN BLD-MCNC: 16 MG/DL (ref 8–23)
BUN/CREAT SERPL: 15.2 (ref 7–25)
CALCIUM SPEC-SCNC: 9 MG/DL (ref 8.6–10.5)
CHLORIDE SERPL-SCNC: 88 MMOL/L (ref 98–107)
CO2 SERPL-SCNC: 31 MMOL/L (ref 22–29)
CREAT BLD-MCNC: 1.05 MG/DL (ref 0.76–1.27)
GFR SERPL CREATININE-BSD FRML MDRD: 69 ML/MIN/1.73
GLUCOSE BLD-MCNC: 222 MG/DL (ref 65–99)
POTASSIUM BLD-SCNC: 3.1 MMOL/L (ref 3.5–5.2)
SODIUM BLD-SCNC: 133 MMOL/L (ref 136–145)

## 2019-06-26 PROCEDURE — 94799 UNLISTED PULMONARY SVC/PX: CPT

## 2019-06-26 PROCEDURE — 99233 SBSQ HOSP IP/OBS HIGH 50: CPT | Performed by: HOSPITALIST

## 2019-06-26 PROCEDURE — 25010000002 FUROSEMIDE PER 20 MG: Performed by: INTERNAL MEDICINE

## 2019-06-26 PROCEDURE — 97116 GAIT TRAINING THERAPY: CPT | Performed by: PHYSICAL THERAPIST

## 2019-06-26 PROCEDURE — 94727 GAS DIL/WSHOT DETER LNG VOL: CPT

## 2019-06-26 PROCEDURE — 63710000001 PREDNISONE PER 1 MG: Performed by: INTERNAL MEDICINE

## 2019-06-26 PROCEDURE — 80048 BASIC METABOLIC PNL TOTAL CA: CPT | Performed by: PHYSICIAN ASSISTANT

## 2019-06-26 PROCEDURE — 99232 SBSQ HOSP IP/OBS MODERATE 35: CPT | Performed by: PHYSICIAN ASSISTANT

## 2019-06-26 PROCEDURE — 94729 DIFFUSING CAPACITY: CPT | Performed by: INTERNAL MEDICINE

## 2019-06-26 PROCEDURE — 99232 SBSQ HOSP IP/OBS MODERATE 35: CPT | Performed by: INTERNAL MEDICINE

## 2019-06-26 PROCEDURE — 94060 EVALUATION OF WHEEZING: CPT

## 2019-06-26 PROCEDURE — 94727 GAS DIL/WSHOT DETER LNG VOL: CPT | Performed by: INTERNAL MEDICINE

## 2019-06-26 PROCEDURE — 94060 EVALUATION OF WHEEZING: CPT | Performed by: INTERNAL MEDICINE

## 2019-06-26 PROCEDURE — 94729 DIFFUSING CAPACITY: CPT

## 2019-06-26 PROCEDURE — 93010 ELECTROCARDIOGRAM REPORT: CPT | Performed by: INTERNAL MEDICINE

## 2019-06-26 PROCEDURE — 93005 ELECTROCARDIOGRAM TRACING: CPT | Performed by: INTERNAL MEDICINE

## 2019-06-26 RX ORDER — PREDNISONE 20 MG/1
20 TABLET ORAL
Status: DISCONTINUED | OUTPATIENT
Start: 2019-06-26 | End: 2019-06-28 | Stop reason: HOSPADM

## 2019-06-26 RX ADMIN — FAMOTIDINE 20 MG: 20 TABLET, FILM COATED ORAL at 16:49

## 2019-06-26 RX ADMIN — BUDESONIDE AND FORMOTEROL FUMARATE DIHYDRATE 2 PUFF: 160; 4.5 AEROSOL RESPIRATORY (INHALATION) at 07:23

## 2019-06-26 RX ADMIN — Medication 1 CAPSULE: at 08:23

## 2019-06-26 RX ADMIN — FAMOTIDINE 20 MG: 20 TABLET, FILM COATED ORAL at 08:24

## 2019-06-26 RX ADMIN — FUROSEMIDE 40 MG: 10 INJECTION, SOLUTION INTRAMUSCULAR; INTRAVENOUS at 08:24

## 2019-06-26 RX ADMIN — POTASSIUM CHLORIDE 40 MEQ: 750 CAPSULE, EXTENDED RELEASE ORAL at 21:22

## 2019-06-26 RX ADMIN — POTASSIUM CHLORIDE 40 MEQ: 750 CAPSULE, EXTENDED RELEASE ORAL at 12:31

## 2019-06-26 RX ADMIN — THEOPHYLLINE 300 MG: 300 TABLET, EXTENDED RELEASE ORAL at 08:24

## 2019-06-26 RX ADMIN — APIXABAN 5 MG: 5 TABLET, FILM COATED ORAL at 08:24

## 2019-06-26 RX ADMIN — MELATONIN TAB 5 MG 5 MG: 5 TAB at 21:27

## 2019-06-26 RX ADMIN — FLECAINIDE ACETATE 100 MG: 50 TABLET ORAL at 21:24

## 2019-06-26 RX ADMIN — IPRATROPIUM BROMIDE AND ALBUTEROL SULFATE 3 ML: 2.5; .5 SOLUTION RESPIRATORY (INHALATION) at 15:17

## 2019-06-26 RX ADMIN — POTASSIUM CHLORIDE 40 MEQ: 750 CAPSULE, EXTENDED RELEASE ORAL at 16:40

## 2019-06-26 RX ADMIN — POTASSIUM CHLORIDE 20 MEQ: 750 CAPSULE, EXTENDED RELEASE ORAL at 08:23

## 2019-06-26 RX ADMIN — PREDNISONE 20 MG: 20 TABLET ORAL at 13:58

## 2019-06-26 RX ADMIN — APIXABAN 5 MG: 5 TABLET, FILM COATED ORAL at 21:23

## 2019-06-26 RX ADMIN — DOXYCYCLINE 100 MG: 100 CAPSULE ORAL at 08:24

## 2019-06-26 RX ADMIN — SODIUM CHLORIDE, PRESERVATIVE FREE 3 ML: 5 INJECTION INTRAVENOUS at 21:23

## 2019-06-26 RX ADMIN — FUROSEMIDE 40 MG: 10 INJECTION, SOLUTION INTRAMUSCULAR; INTRAVENOUS at 16:40

## 2019-06-26 RX ADMIN — IPRATROPIUM BROMIDE AND ALBUTEROL SULFATE 3 ML: 2.5; .5 SOLUTION RESPIRATORY (INHALATION) at 18:32

## 2019-06-26 RX ADMIN — IPRATROPIUM BROMIDE AND ALBUTEROL SULFATE 3 ML: 2.5; .5 SOLUTION RESPIRATORY (INHALATION) at 07:23

## 2019-06-26 RX ADMIN — SODIUM CHLORIDE, PRESERVATIVE FREE 3 ML: 5 INJECTION INTRAVENOUS at 08:25

## 2019-06-26 RX ADMIN — GUAIFENESIN 1200 MG: 600 TABLET, EXTENDED RELEASE ORAL at 08:23

## 2019-06-26 RX ADMIN — GUAIFENESIN 1200 MG: 600 TABLET, EXTENDED RELEASE ORAL at 21:23

## 2019-06-26 RX ADMIN — BUDESONIDE AND FORMOTEROL FUMARATE DIHYDRATE 2 PUFF: 160; 4.5 AEROSOL RESPIRATORY (INHALATION) at 18:32

## 2019-06-26 RX ADMIN — AMLODIPINE BESYLATE 10 MG: 10 TABLET ORAL at 08:24

## 2019-06-26 RX ADMIN — ALLOPURINOL 300 MG: 300 TABLET ORAL at 08:24

## 2019-06-26 RX ADMIN — THEOPHYLLINE 300 MG: 300 TABLET, EXTENDED RELEASE ORAL at 21:23

## 2019-06-26 RX ADMIN — IPRATROPIUM BROMIDE AND ALBUTEROL SULFATE 3 ML: 2.5; .5 SOLUTION RESPIRATORY (INHALATION) at 23:43

## 2019-06-26 RX ADMIN — IPRATROPIUM BROMIDE AND ALBUTEROL SULFATE 3 ML: 2.5; .5 SOLUTION RESPIRATORY (INHALATION) at 10:56

## 2019-06-26 RX ADMIN — METOPROLOL SUCCINATE 25 MG: 25 TABLET, EXTENDED RELEASE ORAL at 08:24

## 2019-06-26 RX ADMIN — FLECAINIDE ACETATE 100 MG: 50 TABLET ORAL at 08:24

## 2019-06-26 RX ADMIN — DOXYCYCLINE 100 MG: 100 CAPSULE ORAL at 21:23

## 2019-06-27 LAB
ANION GAP SERPL CALCULATED.3IONS-SCNC: 10 MMOL/L (ref 5–15)
BUN BLD-MCNC: 18 MG/DL (ref 8–23)
BUN/CREAT SERPL: 16.7 (ref 7–25)
CALCIUM SPEC-SCNC: 9 MG/DL (ref 8.6–10.5)
CHLORIDE SERPL-SCNC: 95 MMOL/L (ref 98–107)
CO2 SERPL-SCNC: 32 MMOL/L (ref 22–29)
CREAT BLD-MCNC: 1.08 MG/DL (ref 0.76–1.27)
GFR SERPL CREATININE-BSD FRML MDRD: 67 ML/MIN/1.73
GLUCOSE BLD-MCNC: 118 MG/DL (ref 65–99)
POTASSIUM BLD-SCNC: 4.3 MMOL/L (ref 3.5–5.2)
SODIUM BLD-SCNC: 137 MMOL/L (ref 136–145)

## 2019-06-27 PROCEDURE — 25010000002 FUROSEMIDE PER 20 MG: Performed by: INTERNAL MEDICINE

## 2019-06-27 PROCEDURE — 93010 ELECTROCARDIOGRAM REPORT: CPT | Performed by: INTERNAL MEDICINE

## 2019-06-27 PROCEDURE — 97164 PT RE-EVAL EST PLAN CARE: CPT

## 2019-06-27 PROCEDURE — 93005 ELECTROCARDIOGRAM TRACING: CPT | Performed by: INTERNAL MEDICINE

## 2019-06-27 PROCEDURE — 63710000001 PREDNISONE PER 1 MG: Performed by: INTERNAL MEDICINE

## 2019-06-27 PROCEDURE — 99231 SBSQ HOSP IP/OBS SF/LOW 25: CPT | Performed by: INTERNAL MEDICINE

## 2019-06-27 PROCEDURE — 99233 SBSQ HOSP IP/OBS HIGH 50: CPT | Performed by: HOSPITALIST

## 2019-06-27 PROCEDURE — 94799 UNLISTED PULMONARY SVC/PX: CPT

## 2019-06-27 PROCEDURE — 29581 APPL MULTLAYER CMPRN SYS LEG: CPT

## 2019-06-27 PROCEDURE — 99232 SBSQ HOSP IP/OBS MODERATE 35: CPT | Performed by: INTERNAL MEDICINE

## 2019-06-27 PROCEDURE — 80048 BASIC METABOLIC PNL TOTAL CA: CPT | Performed by: PHYSICIAN ASSISTANT

## 2019-06-27 RX ORDER — FUROSEMIDE 40 MG/1
40 TABLET ORAL
Status: DISCONTINUED | OUTPATIENT
Start: 2019-06-27 | End: 2019-06-28 | Stop reason: HOSPADM

## 2019-06-27 RX ORDER — TRAZODONE HYDROCHLORIDE 50 MG/1
25 TABLET ORAL NIGHTLY PRN
Status: DISCONTINUED | OUTPATIENT
Start: 2019-06-27 | End: 2019-06-28 | Stop reason: HOSPADM

## 2019-06-27 RX ADMIN — SODIUM CHLORIDE, PRESERVATIVE FREE 3 ML: 5 INJECTION INTRAVENOUS at 20:16

## 2019-06-27 RX ADMIN — THEOPHYLLINE 300 MG: 300 TABLET, EXTENDED RELEASE ORAL at 20:16

## 2019-06-27 RX ADMIN — IPRATROPIUM BROMIDE AND ALBUTEROL SULFATE 3 ML: 2.5; .5 SOLUTION RESPIRATORY (INHALATION) at 19:41

## 2019-06-27 RX ADMIN — FLECAINIDE ACETATE 100 MG: 50 TABLET ORAL at 08:18

## 2019-06-27 RX ADMIN — APIXABAN 5 MG: 5 TABLET, FILM COATED ORAL at 08:07

## 2019-06-27 RX ADMIN — Medication 1 CAPSULE: at 08:06

## 2019-06-27 RX ADMIN — FUROSEMIDE 40 MG: 10 INJECTION, SOLUTION INTRAMUSCULAR; INTRAVENOUS at 08:07

## 2019-06-27 RX ADMIN — BUDESONIDE AND FORMOTEROL FUMARATE DIHYDRATE 2 PUFF: 160; 4.5 AEROSOL RESPIRATORY (INHALATION) at 07:04

## 2019-06-27 RX ADMIN — GUAIFENESIN 1200 MG: 600 TABLET, EXTENDED RELEASE ORAL at 08:06

## 2019-06-27 RX ADMIN — BUDESONIDE AND FORMOTEROL FUMARATE DIHYDRATE 2 PUFF: 160; 4.5 AEROSOL RESPIRATORY (INHALATION) at 19:41

## 2019-06-27 RX ADMIN — AMLODIPINE BESYLATE 10 MG: 10 TABLET ORAL at 08:06

## 2019-06-27 RX ADMIN — PREDNISONE 20 MG: 20 TABLET ORAL at 08:06

## 2019-06-27 RX ADMIN — POTASSIUM CHLORIDE 20 MEQ: 750 CAPSULE, EXTENDED RELEASE ORAL at 08:18

## 2019-06-27 RX ADMIN — METOPROLOL SUCCINATE 25 MG: 25 TABLET, EXTENDED RELEASE ORAL at 08:07

## 2019-06-27 RX ADMIN — FLECAINIDE ACETATE 100 MG: 50 TABLET ORAL at 20:16

## 2019-06-27 RX ADMIN — SODIUM CHLORIDE, PRESERVATIVE FREE 3 ML: 5 INJECTION INTRAVENOUS at 08:07

## 2019-06-27 RX ADMIN — IPRATROPIUM BROMIDE AND ALBUTEROL SULFATE 3 ML: 2.5; .5 SOLUTION RESPIRATORY (INHALATION) at 16:32

## 2019-06-27 RX ADMIN — FAMOTIDINE 20 MG: 20 TABLET, FILM COATED ORAL at 17:02

## 2019-06-27 RX ADMIN — ALLOPURINOL 300 MG: 300 TABLET ORAL at 08:07

## 2019-06-27 RX ADMIN — GUAIFENESIN 1200 MG: 600 TABLET, EXTENDED RELEASE ORAL at 20:16

## 2019-06-27 RX ADMIN — FAMOTIDINE 20 MG: 20 TABLET, FILM COATED ORAL at 08:06

## 2019-06-27 RX ADMIN — APIXABAN 5 MG: 5 TABLET, FILM COATED ORAL at 20:16

## 2019-06-27 RX ADMIN — IPRATROPIUM BROMIDE AND ALBUTEROL SULFATE 3 ML: 2.5; .5 SOLUTION RESPIRATORY (INHALATION) at 23:11

## 2019-06-27 RX ADMIN — FUROSEMIDE 40 MG: 40 TABLET ORAL at 17:03

## 2019-06-27 RX ADMIN — THEOPHYLLINE 300 MG: 300 TABLET, EXTENDED RELEASE ORAL at 08:18

## 2019-06-27 RX ADMIN — IPRATROPIUM BROMIDE AND ALBUTEROL SULFATE 3 ML: 2.5; .5 SOLUTION RESPIRATORY (INHALATION) at 12:22

## 2019-06-27 RX ADMIN — IPRATROPIUM BROMIDE AND ALBUTEROL SULFATE 3 ML: 2.5; .5 SOLUTION RESPIRATORY (INHALATION) at 07:04

## 2019-06-28 VITALS
DIASTOLIC BLOOD PRESSURE: 65 MMHG | HEIGHT: 67 IN | BODY MASS INDEX: 42.72 KG/M2 | OXYGEN SATURATION: 95 % | RESPIRATION RATE: 20 BRPM | TEMPERATURE: 99 F | HEART RATE: 80 BPM | WEIGHT: 272.2 LBS | SYSTOLIC BLOOD PRESSURE: 123 MMHG

## 2019-06-28 LAB
ANION GAP SERPL CALCULATED.3IONS-SCNC: 12 MMOL/L (ref 5–15)
BUN BLD-MCNC: 17 MG/DL (ref 8–23)
BUN/CREAT SERPL: 17.9 (ref 7–25)
CALCIUM SPEC-SCNC: 8.9 MG/DL (ref 8.6–10.5)
CHLORIDE SERPL-SCNC: 93 MMOL/L (ref 98–107)
CO2 SERPL-SCNC: 31 MMOL/L (ref 22–29)
CREAT BLD-MCNC: 0.95 MG/DL (ref 0.76–1.27)
GFR SERPL CREATININE-BSD FRML MDRD: 77 ML/MIN/1.73
GLUCOSE BLD-MCNC: 104 MG/DL (ref 65–99)
POTASSIUM BLD-SCNC: 3.6 MMOL/L (ref 3.5–5.2)
SODIUM BLD-SCNC: 136 MMOL/L (ref 136–145)

## 2019-06-28 PROCEDURE — 63710000001 PREDNISONE PER 1 MG: Performed by: INTERNAL MEDICINE

## 2019-06-28 PROCEDURE — 94799 UNLISTED PULMONARY SVC/PX: CPT

## 2019-06-28 PROCEDURE — 99239 HOSP IP/OBS DSCHRG MGMT >30: CPT | Performed by: HOSPITALIST

## 2019-06-28 PROCEDURE — 97110 THERAPEUTIC EXERCISES: CPT

## 2019-06-28 PROCEDURE — 99232 SBSQ HOSP IP/OBS MODERATE 35: CPT | Performed by: PHYSICIAN ASSISTANT

## 2019-06-28 PROCEDURE — 80048 BASIC METABOLIC PNL TOTAL CA: CPT | Performed by: PHYSICIAN ASSISTANT

## 2019-06-28 RX ORDER — TRAZODONE HYDROCHLORIDE 50 MG/1
25 TABLET ORAL NIGHTLY PRN
Qty: 15 TABLET | Refills: 0 | Status: SHIPPED | OUTPATIENT
Start: 2019-06-28 | End: 2019-07-28

## 2019-06-28 RX ORDER — METOPROLOL SUCCINATE 25 MG/1
25 TABLET, EXTENDED RELEASE ORAL
Qty: 30 TABLET | Refills: 11 | Status: SHIPPED | OUTPATIENT
Start: 2019-06-29 | End: 2021-03-30 | Stop reason: SDUPTHER

## 2019-06-28 RX ORDER — PREDNISONE 20 MG/1
20 TABLET ORAL
Qty: 7 TABLET | Refills: 0 | Status: SHIPPED | OUTPATIENT
Start: 2019-06-29 | End: 2019-07-06

## 2019-06-28 RX ORDER — FLECAINIDE ACETATE 100 MG/1
100 TABLET ORAL EVERY 12 HOURS SCHEDULED
Qty: 60 TABLET | Refills: 11 | Status: SHIPPED | OUTPATIENT
Start: 2019-06-28 | End: 2019-08-15

## 2019-06-28 RX ORDER — AMLODIPINE BESYLATE 10 MG/1
10 TABLET ORAL DAILY
Qty: 30 TABLET | Refills: 11 | Status: SHIPPED | OUTPATIENT
Start: 2019-06-28 | End: 2019-08-15

## 2019-06-28 RX ORDER — FAMOTIDINE 20 MG/1
20 TABLET, FILM COATED ORAL
Qty: 60 TABLET | Refills: 0 | Status: SHIPPED | OUTPATIENT
Start: 2019-06-28 | End: 2019-07-28

## 2019-06-28 RX ORDER — POTASSIUM CHLORIDE 20 MEQ/1
20 TABLET, EXTENDED RELEASE ORAL DAILY
Qty: 30 TABLET | Refills: 3 | Status: SHIPPED | OUTPATIENT
Start: 2019-06-28 | End: 2019-11-21

## 2019-06-28 RX ORDER — FUROSEMIDE 40 MG/1
40 TABLET ORAL 2 TIMES DAILY
Qty: 60 TABLET | Refills: 3 | Status: ON HOLD | OUTPATIENT
Start: 2019-06-28 | End: 2019-10-16 | Stop reason: SDUPTHER

## 2019-06-28 RX ADMIN — Medication 1 CAPSULE: at 08:58

## 2019-06-28 RX ADMIN — FLECAINIDE ACETATE 100 MG: 50 TABLET ORAL at 08:58

## 2019-06-28 RX ADMIN — FUROSEMIDE 40 MG: 40 TABLET ORAL at 08:58

## 2019-06-28 RX ADMIN — IPRATROPIUM BROMIDE AND ALBUTEROL SULFATE 3 ML: 2.5; .5 SOLUTION RESPIRATORY (INHALATION) at 12:04

## 2019-06-28 RX ADMIN — GUAIFENESIN 1200 MG: 600 TABLET, EXTENDED RELEASE ORAL at 08:58

## 2019-06-28 RX ADMIN — BUDESONIDE AND FORMOTEROL FUMARATE DIHYDRATE 2 PUFF: 160; 4.5 AEROSOL RESPIRATORY (INHALATION) at 09:01

## 2019-06-28 RX ADMIN — PREDNISONE 20 MG: 20 TABLET ORAL at 08:59

## 2019-06-28 RX ADMIN — SODIUM CHLORIDE, PRESERVATIVE FREE 3 ML: 5 INJECTION INTRAVENOUS at 09:00

## 2019-06-28 RX ADMIN — IPRATROPIUM BROMIDE AND ALBUTEROL SULFATE 3 ML: 2.5; .5 SOLUTION RESPIRATORY (INHALATION) at 15:45

## 2019-06-28 RX ADMIN — POTASSIUM CHLORIDE 20 MEQ: 750 CAPSULE, EXTENDED RELEASE ORAL at 08:58

## 2019-06-28 RX ADMIN — FAMOTIDINE 20 MG: 20 TABLET, FILM COATED ORAL at 06:31

## 2019-06-28 RX ADMIN — THEOPHYLLINE 300 MG: 300 TABLET, EXTENDED RELEASE ORAL at 08:58

## 2019-06-28 RX ADMIN — APIXABAN 5 MG: 5 TABLET, FILM COATED ORAL at 08:58

## 2019-06-28 RX ADMIN — AMLODIPINE BESYLATE 10 MG: 10 TABLET ORAL at 08:58

## 2019-06-28 RX ADMIN — POTASSIUM CHLORIDE 40 MEQ: 750 CAPSULE, EXTENDED RELEASE ORAL at 12:29

## 2019-06-28 RX ADMIN — ALLOPURINOL 300 MG: 300 TABLET ORAL at 08:58

## 2019-06-28 RX ADMIN — IPRATROPIUM BROMIDE AND ALBUTEROL SULFATE 3 ML: 2.5; .5 SOLUTION RESPIRATORY (INHALATION) at 09:00

## 2019-06-28 RX ADMIN — METOPROLOL SUCCINATE 25 MG: 25 TABLET, EXTENDED RELEASE ORAL at 08:58

## 2019-06-29 ENCOUNTER — READMISSION MANAGEMENT (OUTPATIENT)
Dept: CALL CENTER | Facility: HOSPITAL | Age: 74
End: 2019-06-29

## 2019-06-29 NOTE — OUTREACH NOTE
Prep Survey      Responses   Facility patient discharged from?  [place]   Is patient eligible?  Yes   Discharge diagnosis  Shortness of breath, multiple lung nodules on CT, Acute diastolic HF, alcohoism, AFib with RVR, HCAP, bilateral pleural effusion, ANDREA, COPD, HTN, obesity   Does the patient have one of the following disease processes/diagnoses(primary or secondary)?  CHF [and COPD]   Does the patient have Home health ordered?  No   Is there a DME ordered?  No   Comments regarding appointments  Pt to schedule follow up with PCP   Prep survey completed?  Yes          Regi العلي RN

## 2019-07-02 ENCOUNTER — READMISSION MANAGEMENT (OUTPATIENT)
Dept: CALL CENTER | Facility: HOSPITAL | Age: 74
End: 2019-07-02

## 2019-07-02 NOTE — OUTREACH NOTE
CHF Week 1 Survey      Responses   Facility patient discharged from?  [facility]   Does the patient have one of the following disease processes/diagnoses(primary or secondary)?  CHF   Is there a successful TCM telephone encounter documented?  No   CHF Week 1 attempt successful?  Yes   Call start time  1209   Call end time  1223   Discharge diagnosis  Shortness of breath, multiple lung nodules on CT, Acute diastolic HF, alcohoism, AFib with RVR, HCAP, bilateral pleural effusion, ANDREA, COPD, HTN, obesity   Is patient permission given to speak with other caregiver?  Yes   List who call center can speak with  Maria Dolores Bonilla reviewed with patient/caregiver?  Yes   Is the patient having any side effects they believe may be caused by any medication additions or changes?  No   Is the patient taking all medications as directed (includes completed medication regime)?  Yes   Does the patient have a primary care provider?   Yes   Does the patient have an appointment with their PCP within 7 days of discharge?  No   What is preventing the patient from scheduling follow up appointments within 7 days of discharge?  Waiting on return call   Nursing Interventions  Advised patient to make appointment   Has the patient kept scheduled appointments due by today?  N/A   Comments  Pt waiting on report from [facility] about whether he can have Aortic valve repair.   What DME was ordered?  [facility] [employer]    Has all DME been delivered?  Yes   Psychosocial issues?  No   Did the patient receive a copy of their discharge instructions?  Yes   Nursing interventions  Reviewed instructions with patient   What is the patient's perception of their health status since discharge?  Improving   Nursing interventions  Nurse provided patient education   Is the patient weighing daily?  Yes   Does the patient have scales?  Yes   Daily weight interventions  Education provided on importance of daily weight   Is the patient able to teach back  Heart Failure diet management?  Yes   Is the patient able to teach back Heart Failure Zones?  Yes   Is the patient able to teach back signs and symptoms of worsening condition? (i.e. weight gain, shortness of air, etc.)  Yes   Is the patient/caregiver able to teach back the hierarchy of who to call/visit for symptoms/problems? PCP, Specialist, Home health nurse, Urgent Care, ED, 911  Yes   Additional teach back comments  .    CHF Week 1 call completed?  Yes          Hillary Solis RN

## 2019-07-03 ENCOUNTER — TELEPHONE (OUTPATIENT)
Dept: CARDIOLOGY | Facility: CLINIC | Age: 74
End: 2019-07-03

## 2019-07-03 ENCOUNTER — DOCUMENTATION (OUTPATIENT)
Dept: CARDIOLOGY | Facility: CLINIC | Age: 74
End: 2019-07-03

## 2019-07-03 NOTE — PROGRESS NOTES
Alok Tobias   1945 07/03/2019    Updated Problem List:  Problem List:   1.  Coronary Artery Disease, minor and non-obstructive  A. History of abnormal stress test 07/2016, Dr. Shaver  B. Single episode of severe bilateral arm pain at rest October 2018  C.  LHC 10/3/2018:  Normal EF, Minor non obstructive CAD. Mild dilation of aortic root              D.  CT Angio Chest 10/4/2018: No important abnormalities  2.  Severe MR  A. PENNY June 2019 with MVP, severe MR (at time onset AF)  3.  Essential Hypertension  4.  COPD  5.  Asthma  6.  Morbid obesity, BMI 43.85  7.  Atrial Fibrillation with RVR              A. New diagnosis, 6/19/19              B. CHADS2 Vasc = 3              C. Presents with SOA and abnormal CXR 6/18/19.  D. ECV x2 June 2019, successful  E. On Flecainide and Eliquis   8.   Obstructive Sleep Apnea  9.   Pneumonia (?)              A.  Diagnosed May 2019 [facility] PNA, was hospitalized [facility] 5/26-5/29/19.              B.  Treated with IV Levaquin, IV Steroids, duonebs.  DC'd home on oral Levaquin 750mg  10.  Gout  11.  SOA and new diagnosis AF, 6/19/19         A.  Echo 6/19/2019 with diastolic dysfunction, normal EF.         B. Severe MR with MVP, moderate AI by PENNY 6/21/19.         Twan Harrison MD

## 2019-07-03 NOTE — TELEPHONE ENCOUNTER
"MRJ spoke with Dr. Alf Warner MD at  in [place]. PENNY reviewed and determined that he could be considered for mitraclip. Would require 2 separate visits to . First clinic evaluation by team of cardioloigists and CT surgeon then separate occasion for procedure if approved. Pt is aware and agreeable. He is anxious to \"get this done\". He has transportation and capabilities to go to [place]. He states he is pain free but feels incapacitated by need for continuous oxygen and cannot leave his house or get out to do any activity. Will send records and demographic information to  ASAP.   "

## 2019-07-09 ENCOUNTER — READMISSION MANAGEMENT (OUTPATIENT)
Dept: CALL CENTER | Facility: HOSPITAL | Age: 74
End: 2019-07-09

## 2019-07-17 ENCOUNTER — READMISSION MANAGEMENT (OUTPATIENT)
Dept: CALL CENTER | Facility: HOSPITAL | Age: 74
End: 2019-07-17

## 2019-07-17 NOTE — OUTREACH NOTE
CHF Week 3 Survey      Responses   Facility patient discharged from?  [facility]   Does the patient have one of the following disease processes/diagnoses(primary or secondary)?  CHF   Week 3 attempt successful?  No   Unsuccessful attempts  Attempt 1          Vanesa Degroot RN

## 2019-07-18 ENCOUNTER — READMISSION MANAGEMENT (OUTPATIENT)
Dept: CALL CENTER | Facility: HOSPITAL | Age: 74
End: 2019-07-18

## 2019-07-18 NOTE — OUTREACH NOTE
CHF Week 3 Survey      Responses   Facility patient discharged from?  [facility]   Does the patient have one of the following disease processes/diagnoses(primary or secondary)?  CHF   Week 3 attempt successful?  No   Unsuccessful attempts  Attempt 2          Vanesa Degroot RN

## 2019-08-03 ENCOUNTER — LAB REQUISITION (OUTPATIENT)
Dept: LAB | Facility: HOSPITAL | Age: 74
End: 2019-08-03

## 2019-08-03 DIAGNOSIS — D64.9 ANEMIA: ICD-10-CM

## 2019-08-03 DIAGNOSIS — E83.42 HYPOMAGNESEMIA: ICD-10-CM

## 2019-08-03 DIAGNOSIS — I10 ESSENTIAL (PRIMARY) HYPERTENSION: ICD-10-CM

## 2019-08-03 DIAGNOSIS — Z79.01 LONG TERM CURRENT USE OF ANTICOAGULANT: ICD-10-CM

## 2019-08-03 LAB
DEPRECATED RDW RBC AUTO: 47.7 FL (ref 37–54)
ERYTHROCYTE [DISTWIDTH] IN BLOOD BY AUTOMATED COUNT: 13.9 % (ref 12.3–15.4)
HCT VFR BLD AUTO: 24.9 % (ref 37.5–51)
HGB BLD-MCNC: 8.1 G/DL (ref 13–17.7)
HYPOCHROMIA BLD QL: ABNORMAL
INR PPP: 1.87 (ref 0.9–1.1)
LARGE PLATELETS: ABNORMAL
LYMPHOCYTES # BLD MANUAL: 0.82 10*3/MM3 (ref 0.7–3.1)
LYMPHOCYTES NFR BLD MANUAL: 10 % (ref 5–12)
LYMPHOCYTES NFR BLD MANUAL: 8 % (ref 19.6–45.3)
MCH RBC QN AUTO: 30.6 PG (ref 26.6–33)
MCHC RBC AUTO-ENTMCNC: 32.5 G/DL (ref 31.5–35.7)
MCV RBC AUTO: 94 FL (ref 79–97)
MONOCYTES # BLD AUTO: 1.03 10*3/MM3 (ref 0.1–0.9)
NEUTROPHILS # BLD AUTO: 8.44 10*3/MM3 (ref 1.7–7)
NEUTROPHILS NFR BLD MANUAL: 80 % (ref 42.7–76)
NEUTS BAND NFR BLD MANUAL: 2 % (ref 0–5)
NRBC SPEC MANUAL: 1 /100 WBC (ref 0–0.2)
PLATELET # BLD AUTO: 299 10*3/MM3 (ref 140–450)
PMV BLD AUTO: 10.4 FL (ref 6–12)
POLYCHROMASIA BLD QL SMEAR: ABNORMAL
PROTHROMBIN TIME: 22.4 SECONDS (ref 11–15.4)
RBC # BLD AUTO: 2.65 10*6/MM3 (ref 4.14–5.8)
SCAN SLIDE: NORMAL
SMALL PLATELETS BLD QL SMEAR: ADEQUATE
WBC NRBC COR # BLD: 10.29 10*3/MM3 (ref 3.4–10.8)

## 2019-08-03 PROCEDURE — 85610 PROTHROMBIN TIME: CPT

## 2019-08-03 PROCEDURE — 85025 COMPLETE CBC W/AUTO DIFF WBC: CPT

## 2019-08-05 ENCOUNTER — LAB REQUISITION (OUTPATIENT)
Dept: LAB | Facility: HOSPITAL | Age: 74
End: 2019-08-05

## 2019-08-05 DIAGNOSIS — I10 ESSENTIAL (PRIMARY) HYPERTENSION: ICD-10-CM

## 2019-08-05 LAB
ANION GAP SERPL CALCULATED.3IONS-SCNC: 12.1 MMOL/L (ref 5–15)
BUN BLD-MCNC: 15 MG/DL (ref 8–23)
BUN/CREAT SERPL: 19.2 (ref 7–25)
CALCIUM SPEC-SCNC: 8.7 MG/DL (ref 8.6–10.5)
CHLORIDE SERPL-SCNC: 94 MMOL/L (ref 98–107)
CO2 SERPL-SCNC: 29.9 MMOL/L (ref 22–29)
CREAT BLD-MCNC: 0.78 MG/DL (ref 0.76–1.27)
GFR SERPL CREATININE-BSD FRML MDRD: 97 ML/MIN/1.73
GLUCOSE BLD-MCNC: 67 MG/DL (ref 65–99)
POTASSIUM BLD-SCNC: 4.6 MMOL/L (ref 3.5–5.2)
SODIUM BLD-SCNC: 136 MMOL/L (ref 136–145)

## 2019-08-05 PROCEDURE — 80048 BASIC METABOLIC PNL TOTAL CA: CPT | Performed by: INTERNAL MEDICINE

## 2019-08-07 ENCOUNTER — LAB REQUISITION (OUTPATIENT)
Dept: LAB | Facility: HOSPITAL | Age: 74
End: 2019-08-07

## 2019-08-07 DIAGNOSIS — Z79.01 LONG TERM CURRENT USE OF ANTICOAGULANT: ICD-10-CM

## 2019-08-07 LAB
INR PPP: 4.56 (ref 0.9–1.1)
MAGNESIUM SERPL-MCNC: 1.8 MG/DL (ref 1.6–2.4)
PROTHROMBIN TIME: 45.3 SECONDS (ref 11–15.4)

## 2019-08-07 PROCEDURE — 85610 PROTHROMBIN TIME: CPT | Performed by: INTERNAL MEDICINE

## 2019-08-07 PROCEDURE — 83735 ASSAY OF MAGNESIUM: CPT | Performed by: INTERNAL MEDICINE

## 2019-08-08 ENCOUNTER — LAB REQUISITION (OUTPATIENT)
Dept: LAB | Facility: HOSPITAL | Age: 74
End: 2019-08-08

## 2019-08-08 DIAGNOSIS — Z79.01 LONG TERM CURRENT USE OF ANTICOAGULANT: ICD-10-CM

## 2019-08-08 LAB
INR PPP: 3.29 (ref 0.9–1.1)
PROTHROMBIN TIME: 35 SECONDS (ref 11–15.4)

## 2019-08-08 PROCEDURE — 85610 PROTHROMBIN TIME: CPT | Performed by: INTERNAL MEDICINE

## 2019-08-09 ENCOUNTER — LAB REQUISITION (OUTPATIENT)
Dept: LAB | Facility: HOSPITAL | Age: 74
End: 2019-08-09

## 2019-08-09 DIAGNOSIS — I10 ESSENTIAL (PRIMARY) HYPERTENSION: ICD-10-CM

## 2019-08-09 DIAGNOSIS — Z79.01 LONG TERM CURRENT USE OF ANTICOAGULANT: ICD-10-CM

## 2019-08-09 DIAGNOSIS — Z79.899 OTHER LONG TERM (CURRENT) DRUG THERAPY: ICD-10-CM

## 2019-08-09 LAB
ANION GAP SERPL CALCULATED.3IONS-SCNC: 11.7 MMOL/L (ref 5–15)
BASOPHILS # BLD AUTO: 0.01 10*3/MM3 (ref 0–0.2)
BASOPHILS NFR BLD AUTO: 0.1 % (ref 0–1.5)
BUN BLD-MCNC: 14 MG/DL (ref 8–23)
BUN/CREAT SERPL: 17.5 (ref 7–25)
CALCIUM SPEC-SCNC: 8.8 MG/DL (ref 8.6–10.5)
CHLORIDE SERPL-SCNC: 96 MMOL/L (ref 98–107)
CO2 SERPL-SCNC: 29.3 MMOL/L (ref 22–29)
CREAT BLD-MCNC: 0.8 MG/DL (ref 0.76–1.27)
DEPRECATED RDW RBC AUTO: 46.3 FL (ref 37–54)
EOSINOPHIL # BLD AUTO: 0.09 10*3/MM3 (ref 0–0.4)
EOSINOPHIL NFR BLD AUTO: 1 % (ref 0.3–6.2)
ERYTHROCYTE [DISTWIDTH] IN BLOOD BY AUTOMATED COUNT: 14.3 % (ref 12.3–15.4)
GFR SERPL CREATININE-BSD FRML MDRD: 94 ML/MIN/1.73
GLUCOSE BLD-MCNC: 117 MG/DL (ref 65–99)
HCT VFR BLD AUTO: 28.7 % (ref 37.5–51)
HGB BLD-MCNC: 8.8 G/DL (ref 13–17.7)
IMM GRANULOCYTES # BLD AUTO: 0.06 10*3/MM3 (ref 0–0.05)
IMM GRANULOCYTES NFR BLD AUTO: 0.7 % (ref 0–0.5)
INR PPP: 2.66 (ref 0.9–1.1)
LYMPHOCYTES # BLD AUTO: 0.98 10*3/MM3 (ref 0.7–3.1)
LYMPHOCYTES NFR BLD AUTO: 10.6 % (ref 19.6–45.3)
MAGNESIUM SERPL-MCNC: 1.8 MG/DL (ref 1.6–2.4)
MCH RBC QN AUTO: 29.5 PG (ref 26.6–33)
MCHC RBC AUTO-ENTMCNC: 30.7 G/DL (ref 31.5–35.7)
MCV RBC AUTO: 96.3 FL (ref 79–97)
MONOCYTES # BLD AUTO: 1.03 10*3/MM3 (ref 0.1–0.9)
MONOCYTES NFR BLD AUTO: 11.2 % (ref 5–12)
NEUTROPHILS # BLD AUTO: 7.06 10*3/MM3 (ref 1.7–7)
NEUTROPHILS NFR BLD AUTO: 76.4 % (ref 42.7–76)
PLATELET # BLD AUTO: 328 10*3/MM3 (ref 140–450)
PMV BLD AUTO: 9.7 FL (ref 6–12)
POTASSIUM BLD-SCNC: 3.1 MMOL/L (ref 3.5–5.2)
PROTHROMBIN TIME: 29.6 SECONDS (ref 11–15.4)
RBC # BLD AUTO: 2.98 10*6/MM3 (ref 4.14–5.8)
SODIUM BLD-SCNC: 137 MMOL/L (ref 136–145)
THEOPHYLLINE SERPL-MCNC: 10.5 MCG/ML (ref 10–20)
WBC NRBC COR # BLD: 9.23 10*3/MM3 (ref 3.4–10.8)

## 2019-08-09 PROCEDURE — 80198 ASSAY OF THEOPHYLLINE: CPT | Performed by: INTERNAL MEDICINE

## 2019-08-09 PROCEDURE — 85610 PROTHROMBIN TIME: CPT | Performed by: INTERNAL MEDICINE

## 2019-08-09 PROCEDURE — 80048 BASIC METABOLIC PNL TOTAL CA: CPT | Performed by: INTERNAL MEDICINE

## 2019-08-09 PROCEDURE — 83735 ASSAY OF MAGNESIUM: CPT | Performed by: INTERNAL MEDICINE

## 2019-08-09 PROCEDURE — 85025 COMPLETE CBC W/AUTO DIFF WBC: CPT | Performed by: INTERNAL MEDICINE

## 2019-08-12 ENCOUNTER — LAB REQUISITION (OUTPATIENT)
Dept: LAB | Facility: HOSPITAL | Age: 74
End: 2019-08-12

## 2019-08-12 DIAGNOSIS — I10 ESSENTIAL (PRIMARY) HYPERTENSION: ICD-10-CM

## 2019-08-12 DIAGNOSIS — Z79.01 LONG TERM CURRENT USE OF ANTICOAGULANT: ICD-10-CM

## 2019-08-12 LAB
INR PPP: 2.67 (ref 0.9–1.1)
PROTHROMBIN TIME: 29.7 SECONDS (ref 11–15.4)

## 2019-08-12 PROCEDURE — 85610 PROTHROMBIN TIME: CPT | Performed by: INTERNAL MEDICINE

## 2019-08-13 ENCOUNTER — LAB REQUISITION (OUTPATIENT)
Dept: LAB | Facility: HOSPITAL | Age: 74
End: 2019-08-13

## 2019-08-13 DIAGNOSIS — D64.9 ANEMIA: ICD-10-CM

## 2019-08-13 LAB
ANION GAP SERPL CALCULATED.3IONS-SCNC: 11.9 MMOL/L (ref 5–15)
BASOPHILS # BLD AUTO: 0.01 10*3/MM3 (ref 0–0.2)
BASOPHILS NFR BLD AUTO: 0.2 % (ref 0–1.5)
BUN BLD-MCNC: 17 MG/DL (ref 8–23)
BUN/CREAT SERPL: 23.3 (ref 7–25)
CALCIUM SPEC-SCNC: 8.6 MG/DL (ref 8.6–10.5)
CHLORIDE SERPL-SCNC: 101 MMOL/L (ref 98–107)
CO2 SERPL-SCNC: 28.1 MMOL/L (ref 22–29)
CREAT BLD-MCNC: 0.73 MG/DL (ref 0.76–1.27)
DEPRECATED RDW RBC AUTO: 48 FL (ref 37–54)
EOSINOPHIL # BLD AUTO: 0.32 10*3/MM3 (ref 0–0.4)
EOSINOPHIL NFR BLD AUTO: 5.2 % (ref 0.3–6.2)
ERYTHROCYTE [DISTWIDTH] IN BLOOD BY AUTOMATED COUNT: 14.4 % (ref 12.3–15.4)
GFR SERPL CREATININE-BSD FRML MDRD: 104 ML/MIN/1.73
GLUCOSE BLD-MCNC: 108 MG/DL (ref 65–99)
HCT VFR BLD AUTO: 27.2 % (ref 37.5–51)
HGB BLD-MCNC: 8 G/DL (ref 13–17.7)
IMM GRANULOCYTES # BLD AUTO: 0.05 10*3/MM3 (ref 0–0.05)
IMM GRANULOCYTES NFR BLD AUTO: 0.8 % (ref 0–0.5)
LYMPHOCYTES # BLD AUTO: 0.95 10*3/MM3 (ref 0.7–3.1)
LYMPHOCYTES NFR BLD AUTO: 15.5 % (ref 19.6–45.3)
MCH RBC QN AUTO: 28.8 PG (ref 26.6–33)
MCHC RBC AUTO-ENTMCNC: 29.4 G/DL (ref 31.5–35.7)
MCV RBC AUTO: 97.8 FL (ref 79–97)
MONOCYTES # BLD AUTO: 0.74 10*3/MM3 (ref 0.1–0.9)
MONOCYTES NFR BLD AUTO: 12.1 % (ref 5–12)
NEUTROPHILS # BLD AUTO: 4.06 10*3/MM3 (ref 1.7–7)
NEUTROPHILS NFR BLD AUTO: 66.2 % (ref 42.7–76)
PLATELET # BLD AUTO: 279 10*3/MM3 (ref 140–450)
PMV BLD AUTO: 9.6 FL (ref 6–12)
POTASSIUM BLD-SCNC: 3 MMOL/L (ref 3.5–5.2)
RBC # BLD AUTO: 2.78 10*6/MM3 (ref 4.14–5.8)
SODIUM BLD-SCNC: 141 MMOL/L (ref 136–145)
WBC NRBC COR # BLD: 6.13 10*3/MM3 (ref 3.4–10.8)

## 2019-08-13 PROCEDURE — 80048 BASIC METABOLIC PNL TOTAL CA: CPT | Performed by: NURSE PRACTITIONER

## 2019-08-13 PROCEDURE — 85025 COMPLETE CBC W/AUTO DIFF WBC: CPT | Performed by: NURSE PRACTITIONER

## 2019-08-14 ENCOUNTER — TELEPHONE (OUTPATIENT)
Dept: CARDIOLOGY | Facility: CLINIC | Age: 74
End: 2019-08-14

## 2019-08-14 NOTE — TELEPHONE ENCOUNTER
He has HFU tomorrow with clinic and I have no records.   Called  medical records department at  [phone] and had to leave message.     Need discharge summary, op report and labs.

## 2019-08-15 ENCOUNTER — TELEPHONE (OUTPATIENT)
Dept: PHARMACY | Facility: HOSPITAL | Age: 74
End: 2019-08-15

## 2019-08-15 ENCOUNTER — OFFICE VISIT (OUTPATIENT)
Dept: CARDIOLOGY | Facility: CLINIC | Age: 74
End: 2019-08-15

## 2019-08-15 ENCOUNTER — LAB REQUISITION (OUTPATIENT)
Dept: LAB | Facility: HOSPITAL | Age: 74
End: 2019-08-15

## 2019-08-15 VITALS
BODY MASS INDEX: 41.44 KG/M2 | SYSTOLIC BLOOD PRESSURE: 118 MMHG | HEART RATE: 105 BPM | DIASTOLIC BLOOD PRESSURE: 77 MMHG | HEIGHT: 67 IN | WEIGHT: 264 LBS

## 2019-08-15 DIAGNOSIS — Z98.890 S/P MVR (MITRAL VALVE REPAIR): ICD-10-CM

## 2019-08-15 DIAGNOSIS — G47.33 OSA (OBSTRUCTIVE SLEEP APNEA): Chronic | ICD-10-CM

## 2019-08-15 DIAGNOSIS — I10 ESSENTIAL HYPERTENSION: ICD-10-CM

## 2019-08-15 DIAGNOSIS — Z79.01 LONG TERM CURRENT USE OF ANTICOAGULANT: ICD-10-CM

## 2019-08-15 DIAGNOSIS — I48.0 PAF (PAROXYSMAL ATRIAL FIBRILLATION) (HCC): Primary | ICD-10-CM

## 2019-08-15 LAB
INR PPP: 2.81 (ref 0.9–1.1)
PROTHROMBIN TIME: 30.9 SECONDS (ref 11–15.4)

## 2019-08-15 PROCEDURE — 93005 ELECTROCARDIOGRAM TRACING: CPT | Performed by: PHYSICIAN ASSISTANT

## 2019-08-15 PROCEDURE — 93010 ELECTROCARDIOGRAM REPORT: CPT | Performed by: PHYSICIAN ASSISTANT

## 2019-08-15 PROCEDURE — 99213 OFFICE O/P EST LOW 20 MIN: CPT | Performed by: PHYSICIAN ASSISTANT

## 2019-08-15 PROCEDURE — 85610 PROTHROMBIN TIME: CPT | Performed by: INTERNAL MEDICINE

## 2019-08-15 RX ORDER — ATORVASTATIN CALCIUM 10 MG/1
10 TABLET, FILM COATED ORAL NIGHTLY
COMMUNITY
End: 2020-02-27 | Stop reason: SINTOL

## 2019-08-15 RX ORDER — DOCUSATE SODIUM 100 MG/1
100 CAPSULE, LIQUID FILLED ORAL 2 TIMES DAILY
COMMUNITY
End: 2020-08-27

## 2019-08-15 RX ORDER — ASPIRIN 81 MG/1
81 TABLET ORAL DAILY
COMMUNITY
End: 2019-10-17 | Stop reason: HOSPADM

## 2019-08-15 RX ORDER — FAMOTIDINE 20 MG/1
20 TABLET, FILM COATED ORAL 2 TIMES DAILY PRN
COMMUNITY
End: 2021-03-30

## 2019-08-15 RX ORDER — WARFARIN SODIUM 3 MG/1
3 TABLET ORAL
COMMUNITY
End: 2019-10-17 | Stop reason: HOSPADM

## 2019-08-15 NOTE — PROGRESS NOTES
OFFICE FOLLOW UP     Date of Encounter:08/15/2019     Name: Alok Tobias  : 1945  Address: [address] [place] [address]    PCP: Ivet Chau MD  [address]  [place] [address]    Alok Tobias is a 74 y.o. male.    Chief Complaint: 2 week hospital follow up of MVR at [facility]    Problem List:   1.  Coronary Artery Disease, minor and non-obstructive  A. History of abnormal stress test 2016, Dr. Shaver  B. Single episode of severe bilateral arm pain at rest 2018  C. LHC 10/3/2018:  Normal EF, Minor non obstructive CAD. Mild dilation of aortic root  D. CT Angio Chest 10/4/2018: No important abnormalities  2.  Severe MR  A. PENNY 2019 with MVP, severe MR (at time onset AF)  B. Mitral valve repair 2019 [facility] with [employer] 29mm annuloplasty band    i. Temporary epicardial pacing wires cut and retained   C. Echo 2019: LVSF is normal, mild AI, s/p mitral annular ring repair with no evidence of MR  3.  Essential Hypertension  4.  COPD  5.  Asthma  6.  Morbid obesity, BMI 43.85  7.  Atrial Fibrillation with RVR              A. New diagnosis, 19, CHADS2 Vasc = 3  B. ECV x2 2019, successful  C. On Flecainide and Eliquis   D. Post-op MVR, Flecainide discontinued, and Eliquis changed to Warfarin   8.   Obstructive Sleep Apnea  9.   Pneumonia (?)              A.  Diagnosed May 2019 [facility] PNA, was hospitalized [facility] -19.              B.  Treated with IV Levaquin, IV Steroids, duonebs.  DC'd home on oral Levaquin 750mg  10.  Gout    Allergies:  Allergies   Allergen Reactions   • Dye Fdc Red [Red Dye] Nausea And Vomiting     All dyes, IV dye, hair dyes   • Penicillins Other (See Comments)     Current Medications:  •  albuterol (PROVENTIL HFA;VENTOLIN HFA) 108 (90 Base) MCG/ACT inhaler, Inhale 2 puffs Every 4 (Four) Hours As Needed for Wheezing  •  allopurinol (ZYLOPRIM) 300 MG tablet, Take 300 mg by mouth Daily  •  aspirin 81 MG EC  tablet, Take 81 mg by mouth Daily.  •  atorvastatin (LIPITOR) 10 MG tablet, Take 10 mg by mouth Every Night.  •  budesonide-formoterol (SYMBICORT) 160-4.5 MCG/ACT inhaler, Inhale 2 puffs 2 (two) times a day.  •  famotidine (PEPCID) 20 MG tablet, Take 20 mg by mouth 2 (Two) Times a Day  •  furosemide (LASIX) 40 MG tablet, Daily  •  iron polysacch complex-B12-FA (FERREX 150 FORTE) 150-0.025-1 MG capsule capsule, Take 1 capsule by mouth Daily.  •  metoprolol succinate XL (TOPROL-XL) 25 MG 24 hr tablet, Take 1 tablet by mouth Daily  •  potassium chloride (K-DUR,KLOR-CON) 20 MEQ CR tablet, Take 1 tablet by mouth Daily.  •  theophylline (THEODUR) 300 MG 12 hr tablet, Take 300 mg by mouth 2 (Two) Times a Day.  •  warfarin (COUMADIN) 3 MG tablet, Take 3 mg by mouth Daily.    History of Present Illness:           Mr. Tobias presents for 2 week hospital follow-up post MV repair at [facility]. He reports he is doing much better since his surgery and his breathing has improved tremendously. He is still at the Skilled Nursing Facility for inpatient rehab, and reports he will be able to go home next week. He is participating in his exercise regimen and denies significant dyspnea, chest discomfort and no orthopnea or PND. He has weaned off supplemental O2 since last week. He denies any palpitations, syncope or pre-syncope. He does have some lower extremity edema still, however this is improved and he reports some of his edema is chronic. He is now anticoagulated with Warfarin post-op, and reports INRs are at target.     The following portions of the patient's history were reviewed and updated as appropriate: allergies, current medications and problem list.    ROS: Pertinent positives as listed in the HPI.  All other systems reviewed and negative.    Objective:  Vitals:    08/15/19 1144 08/15/19 1146   BP: 121/81 118/77   BP Location: Left arm Left arm   Patient Position: Sitting Standing   Pulse: 96 105   Weight: 120 kg (264 lb)   "  Height: 170.2 cm (67\")      Physical Exam:  GENERAL: Alert, cooperative, in no acute distress.   HEENT: Normocephalic, no adenopathy, no jugular venous distention  HEART: No discrete PMI is noted. Regular rhythm, normal rate, and no murmurs, gallops, or rubs. Sternum stable, sternal incision clean, dry and intact, with no drainage  LUNGS: Clear to auscultation bilaterally. No wheezing, rales or ronchi.  ABDOMEN: Soft, bowel sounds present, non-tender   NEUROLOGIC: No focal abnormalities involving strength or sensation are noted.   EXTREMITIES: No clubbing, cyanosis, 2+ BLE edema noted.     Diagnostic Data:    Labs 19:   CBC: WBC 10.1, RBC 2.53, Hgb 7.6, Hct 25.3, Plt 234  M.17  BMP: Glucose 94, Na 132, K 4.4, Cl 94, CO2 30, BUN 20, Cr 0.85, eGFR>60  INR: 2.0      ECG 12 Lead  Date/Time: 8/15/2019 12:40 PM  Performed by: Argentina Jacobs PA-C  Authorized by: Argentina Jacobs PA-C   Comparison: compared with previous ECG from 2019  Similar to previous ECG  Rhythm: sinus rhythm  Rate: normal  BPM: 98  Conduction: 1st degree AV block and non-specific intraventricular conduction delay  QRS axis: left            Assessment and Plan:     1. Severe MR, s/p mitral valve repair at [facility]: He is much improved and does not have any signs or symptoms of heart failure presently. SBE precautions were discussed with the patient. He has a follow-up appointment scheduled in [place] on  and he is aware and will keep this appointment.   2. PAF: he is in SR today. Continue Metoprolol and Warfarin for anticoagulation. His INR will be followed by our anticoagulation clinic once he goes home and the referral has been made. Per [place], Warfarin can be changed back to Eliquis after about 3 months.  3. HTN: well controlled.  4. Obesity/ANDREA: continued weight loss encouraged. He was also instructed to begin wearing his CPAP as soon as possible.   5. Follow up with Dr. Harrison in 3 months, " sooner if needed.     STEFFANY Ashley/Transcription Disclaimer:  Much of this encounter note is an electronic transcription/translation of spoken language to printed text.  The electronic translation of spoken language may permit erroneous, or at times, nonsensical words or phrases to be inadvertently transcribed.  Although I have reviewed the note for such errors, some may still exist.

## 2019-08-16 ENCOUNTER — LAB REQUISITION (OUTPATIENT)
Dept: LAB | Facility: HOSPITAL | Age: 74
End: 2019-08-16

## 2019-08-16 DIAGNOSIS — D64.9 ANEMIA: ICD-10-CM

## 2019-08-16 DIAGNOSIS — E78.5 HYPERLIPIDEMIA: ICD-10-CM

## 2019-08-16 DIAGNOSIS — I10 ESSENTIAL (PRIMARY) HYPERTENSION: ICD-10-CM

## 2019-08-16 LAB
ANION GAP SERPL CALCULATED.3IONS-SCNC: 12.3 MMOL/L (ref 5–15)
BASOPHILS # BLD AUTO: 0.01 10*3/MM3 (ref 0–0.2)
BASOPHILS NFR BLD AUTO: 0.1 % (ref 0–1.5)
BUN BLD-MCNC: 15 MG/DL (ref 8–23)
BUN/CREAT SERPL: 20.3 (ref 7–25)
CALCIUM SPEC-SCNC: 8.9 MG/DL (ref 8.6–10.5)
CHLORIDE SERPL-SCNC: 103 MMOL/L (ref 98–107)
CO2 SERPL-SCNC: 26.7 MMOL/L (ref 22–29)
CREAT BLD-MCNC: 0.74 MG/DL (ref 0.76–1.27)
DEPRECATED RDW RBC AUTO: 48.9 FL (ref 37–54)
EOSINOPHIL # BLD AUTO: 0.38 10*3/MM3 (ref 0–0.4)
EOSINOPHIL NFR BLD AUTO: 5.5 % (ref 0.3–6.2)
ERYTHROCYTE [DISTWIDTH] IN BLOOD BY AUTOMATED COUNT: 14.7 % (ref 12.3–15.4)
FERRITIN SERPL-MCNC: 135.2 NG/ML (ref 30–400)
FOLATE SERPL-MCNC: 19.2 NG/ML (ref 4.78–24.2)
GFR SERPL CREATININE-BSD FRML MDRD: 103 ML/MIN/1.73
GLUCOSE BLD-MCNC: 94 MG/DL (ref 65–99)
HCT VFR BLD AUTO: 31.3 % (ref 37.5–51)
HGB BLD-MCNC: 9.1 G/DL (ref 13–17.7)
IMM GRANULOCYTES # BLD AUTO: 0.04 10*3/MM3 (ref 0–0.05)
IMM GRANULOCYTES NFR BLD AUTO: 0.6 % (ref 0–0.5)
IRON 24H UR-MRATE: 26 MCG/DL (ref 59–158)
IRON SATN MFR SERPL: 9 % (ref 20–50)
LYMPHOCYTES # BLD AUTO: 1.5 10*3/MM3 (ref 0.7–3.1)
LYMPHOCYTES NFR BLD AUTO: 21.7 % (ref 19.6–45.3)
MAGNESIUM SERPL-MCNC: 1.9 MG/DL (ref 1.6–2.4)
MCH RBC QN AUTO: 28.3 PG (ref 26.6–33)
MCHC RBC AUTO-ENTMCNC: 29.1 G/DL (ref 31.5–35.7)
MCV RBC AUTO: 97.2 FL (ref 79–97)
MONOCYTES # BLD AUTO: 0.9 10*3/MM3 (ref 0.1–0.9)
MONOCYTES NFR BLD AUTO: 13 % (ref 5–12)
NEUTROPHILS # BLD AUTO: 4.08 10*3/MM3 (ref 1.7–7)
NEUTROPHILS NFR BLD AUTO: 59.1 % (ref 42.7–76)
PLATELET # BLD AUTO: 313 10*3/MM3 (ref 140–450)
PMV BLD AUTO: 9.4 FL (ref 6–12)
POTASSIUM BLD-SCNC: 3.8 MMOL/L (ref 3.5–5.2)
RBC # BLD AUTO: 3.22 10*6/MM3 (ref 4.14–5.8)
SODIUM BLD-SCNC: 142 MMOL/L (ref 136–145)
TIBC SERPL-MCNC: 302 MCG/DL (ref 298–536)
TRANSFERRIN SERPL-MCNC: 203 MG/DL (ref 200–360)
WBC NRBC COR # BLD: 6.91 10*3/MM3 (ref 3.4–10.8)

## 2019-08-16 PROCEDURE — 85025 COMPLETE CBC W/AUTO DIFF WBC: CPT | Performed by: INTERNAL MEDICINE

## 2019-08-16 PROCEDURE — 82728 ASSAY OF FERRITIN: CPT | Performed by: INTERNAL MEDICINE

## 2019-08-16 PROCEDURE — 80048 BASIC METABOLIC PNL TOTAL CA: CPT | Performed by: INTERNAL MEDICINE

## 2019-08-16 PROCEDURE — 83540 ASSAY OF IRON: CPT | Performed by: INTERNAL MEDICINE

## 2019-08-16 PROCEDURE — 82746 ASSAY OF FOLIC ACID SERUM: CPT | Performed by: INTERNAL MEDICINE

## 2019-08-16 PROCEDURE — 83735 ASSAY OF MAGNESIUM: CPT | Performed by: INTERNAL MEDICINE

## 2019-08-16 PROCEDURE — 84466 ASSAY OF TRANSFERRIN: CPT | Performed by: INTERNAL MEDICINE

## 2019-08-19 ENCOUNTER — LAB REQUISITION (OUTPATIENT)
Dept: LAB | Facility: HOSPITAL | Age: 74
End: 2019-08-19

## 2019-08-19 ENCOUNTER — ANTICOAGULATION VISIT (OUTPATIENT)
Dept: PHARMACY | Facility: HOSPITAL | Age: 74
End: 2019-08-19

## 2019-08-19 DIAGNOSIS — Z79.01 LONG TERM CURRENT USE OF ANTICOAGULANT: ICD-10-CM

## 2019-08-19 DIAGNOSIS — I48.0 PAF (PAROXYSMAL ATRIAL FIBRILLATION) (HCC): ICD-10-CM

## 2019-08-19 DIAGNOSIS — Z98.890 S/P MVR (MITRAL VALVE REPAIR): ICD-10-CM

## 2019-08-19 LAB
INR PPP: 2.51 (ref 0.9–1.1)
PROTHROMBIN TIME: 28.3 SECONDS (ref 11–15.4)

## 2019-08-19 PROCEDURE — 85610 PROTHROMBIN TIME: CPT | Performed by: NURSE PRACTITIONER

## 2019-08-19 NOTE — PROGRESS NOTES
Anticoagulation Clinic Progress Note  Indication: afib, MVrepair (7/25/19)  Referring Provider: Hunter  Initial Warfarin Start Date:   Planned Duration of Therapy: 3 months, then transition back to Eliqu  Goal INR: 2-3  Current Drug Interactions:   Other: [PREVIOUS ANTICOAGULATION, ETC]  CHADS-VASc: 4 (age, HTN, CHF)  Bleed Risk: [HASBLED, if appropriate; HIGH/MODERATE/LOW + REASON, H/O BLEED]    Diet: [GLV INTAKE]  Alcohol:   Tobacco:   OTC Pain Medication:    Anticoagulation Clinic INR History:  Date           Total Weekly Dose           INR           Notes             Clinic Interview:  Tablet Strength:   Verbal Release:   Patient Contact Info: [phone]; [facility] in [place], [phone]; daughter, Maria Dolores [phone]    Plan:  1. INR is therapeutic. Mr. Tobias is still at the SNF for inpatient rehab, with plans for discharge home on Thursday.   Mr. Tobias is currently on warfarin 3mg daily -- his INR will be retested on Thursday.  Spoke with Heidi at [facility] -- she will speak with Mr. Tobias re: HH and call our clinic back for orders.    Update: Mr. Tobias has agreed to HH with [facility]  in [place], [phone] (fax) [phone] (). Will call / fax an order for next INR Monday 8/26.  2. Verbal information provided over the phone. Alok Tobias expresses understanding by teach back and has no further questions at this time.    Kathrine Toure ASHLEY  8/19/2019   9:40 AM     Argentina at [facility]  has verified receipt of INR order.  LVM for Heidi at [facility] requesting recent warfarin dosing records (INRs have interfaced into Epic).     Kathrine Toure, PharmNIYA  8/20/2019  10:20 AM     Anticoag Tracker has been updated to reflect Mr. Tobias's recent warfarin dosing -- he has been (mostly) therapeutic on warfarin 3mg daily / supratherapeutic on warfarin 4mg daily.     Kathrine Toure, PharmNIYA  8/20/2019  1:34 PM

## 2019-08-20 ENCOUNTER — LAB REQUISITION (OUTPATIENT)
Dept: LAB | Facility: HOSPITAL | Age: 74
End: 2019-08-20

## 2019-08-20 DIAGNOSIS — Z79.01 LONG TERM CURRENT USE OF ANTICOAGULANT: ICD-10-CM

## 2019-08-20 DIAGNOSIS — D64.9 ANEMIA: ICD-10-CM

## 2019-08-20 LAB
ANION GAP SERPL CALCULATED.3IONS-SCNC: 10.9 MMOL/L (ref 5–15)
BASOPHILS # BLD AUTO: 0.01 10*3/MM3 (ref 0–0.2)
BASOPHILS NFR BLD AUTO: 0.2 % (ref 0–1.5)
BUN BLD-MCNC: 14 MG/DL (ref 8–23)
BUN/CREAT SERPL: 19.2 (ref 7–25)
CALCIUM SPEC-SCNC: 9 MG/DL (ref 8.6–10.5)
CHLORIDE SERPL-SCNC: 104 MMOL/L (ref 98–107)
CO2 SERPL-SCNC: 26.1 MMOL/L (ref 22–29)
CREAT BLD-MCNC: 0.73 MG/DL (ref 0.76–1.27)
DEPRECATED RDW RBC AUTO: 48.9 FL (ref 37–54)
EOSINOPHIL # BLD AUTO: 0.32 10*3/MM3 (ref 0–0.4)
EOSINOPHIL NFR BLD AUTO: 5.5 % (ref 0.3–6.2)
ERYTHROCYTE [DISTWIDTH] IN BLOOD BY AUTOMATED COUNT: 14.8 % (ref 12.3–15.4)
GFR SERPL CREATININE-BSD FRML MDRD: 104 ML/MIN/1.73
GLUCOSE BLD-MCNC: 89 MG/DL (ref 65–99)
HCT VFR BLD AUTO: 30.6 % (ref 37.5–51)
HGB BLD-MCNC: 9 G/DL (ref 13–17.7)
IMM GRANULOCYTES # BLD AUTO: 0.05 10*3/MM3 (ref 0–0.05)
IMM GRANULOCYTES NFR BLD AUTO: 0.9 % (ref 0–0.5)
INR PPP: 2.59 (ref 0.9–1.1)
LYMPHOCYTES # BLD AUTO: 1.12 10*3/MM3 (ref 0.7–3.1)
LYMPHOCYTES NFR BLD AUTO: 19.4 % (ref 19.6–45.3)
MCH RBC QN AUTO: 28.3 PG (ref 26.6–33)
MCHC RBC AUTO-ENTMCNC: 29.4 G/DL (ref 31.5–35.7)
MCV RBC AUTO: 96.2 FL (ref 79–97)
MONOCYTES # BLD AUTO: 0.87 10*3/MM3 (ref 0.1–0.9)
MONOCYTES NFR BLD AUTO: 15.1 % (ref 5–12)
NEUTROPHILS # BLD AUTO: 3.4 10*3/MM3 (ref 1.7–7)
NEUTROPHILS NFR BLD AUTO: 58.9 % (ref 42.7–76)
PLATELET # BLD AUTO: 297 10*3/MM3 (ref 140–450)
PMV BLD AUTO: 9.9 FL (ref 6–12)
POTASSIUM BLD-SCNC: 3.6 MMOL/L (ref 3.5–5.2)
PROTHROMBIN TIME: 28.9 SECONDS (ref 11–15.4)
RBC # BLD AUTO: 3.18 10*6/MM3 (ref 4.14–5.8)
SODIUM BLD-SCNC: 141 MMOL/L (ref 136–145)
WBC NRBC COR # BLD: 5.77 10*3/MM3 (ref 3.4–10.8)

## 2019-08-20 PROCEDURE — 85610 PROTHROMBIN TIME: CPT | Performed by: INTERNAL MEDICINE

## 2019-08-20 PROCEDURE — 80048 BASIC METABOLIC PNL TOTAL CA: CPT | Performed by: INTERNAL MEDICINE

## 2019-08-20 PROCEDURE — 85025 COMPLETE CBC W/AUTO DIFF WBC: CPT | Performed by: INTERNAL MEDICINE

## 2019-08-22 ENCOUNTER — LAB REQUISITION (OUTPATIENT)
Dept: LAB | Facility: HOSPITAL | Age: 74
End: 2019-08-22

## 2019-08-22 ENCOUNTER — ANTICOAGULATION VISIT (OUTPATIENT)
Dept: PHARMACY | Facility: HOSPITAL | Age: 74
End: 2019-08-22

## 2019-08-22 DIAGNOSIS — Z79.01 LONG TERM CURRENT USE OF ANTICOAGULANT: ICD-10-CM

## 2019-08-22 DIAGNOSIS — I48.0 PAF (PAROXYSMAL ATRIAL FIBRILLATION) (HCC): ICD-10-CM

## 2019-08-22 DIAGNOSIS — Z98.890 S/P MVR (MITRAL VALVE REPAIR): ICD-10-CM

## 2019-08-22 LAB
INR PPP: 2.93 (ref 0.9–1.1)
PROTHROMBIN TIME: 32 SECONDS (ref 11–15.4)

## 2019-08-22 PROCEDURE — 85610 PROTHROMBIN TIME: CPT | Performed by: NURSE PRACTITIONER

## 2019-08-22 NOTE — PROGRESS NOTES
Anticoagulation Clinic Progress Note  Indication: afib, MVrepair (7/25/19)  Referring Provider:  Dr. Twan Harrison  Initial Warfarin Start Date:   Planned Duration of Therapy: 3 months, then transition back to [facility]  Goal INR: 2-3  Current Drug Interactions:   Other: [PREVIOUS ANTICOAGULATION, ETC]  CHADS-VASc: 4 (age, HTN, CHF)  Bleed Risk: [HASBLED, if appropriate; HIGH/MODERATE/LOW + REASON, H/O BLEED]    Diet: [GLV INTAKE]  Alcohol:   Tobacco:   OTC Pain Medication:    Anticoagulation Clinic INR History:  Date           Total Weekly Dose           INR           Notes             INRs at SNF:  Results for PEDRO DENTON (MRN [id number]) as of 8/22/2019 09:53   Ref. Range 8/3/2019 12:00 8/7/2019 09:54 8/8/2019 09:14 8/9/2019 09:38 8/12/2019 09:15 8/15/2019 09:22 8/19/2019 08:51 8/20/2019 09:28 8/22/2019 08:43   INR Latest Ref Range: 0.90 - 1.10  1.87 (H) 4.56 (C) 3.29 (H) 2.66 (H) 2.67 (H) 2.81 (H) 2.51 (H) 2.59 (H) 2.93 (H)     Clinic Interview:  Tablet Strength:   Verbal Release:   Patient Contact Info: [phone]; [facility] in [place], [phone]; daughter, Maria Dolores [phone]    Opened in ERROR as patient in SNF currently, INR results received in Epic.  Will monitor for results 8/26    Plan:  1. INR is therapeutic today at 2.93. Mr. Denton to be discharged home from SNF today.  Mr. Denton is currently on warfarin 3mg daily -- his INR will be retested on Thursday.       Heidi at [facility] verified receipt of INR orders.    2. Update: Mr. Denton has agreed to HH with [facility] in [place], [phone] (fax) [phone] (). Will call / fax an order for next INR Monday 8/26.       Sonia Lutz RPH  8/22/2019   9:51 AM      Anticoag Tracker had been updated thru 8/19 to reflect Mr. Denton's recent warfarin dosing -- he has been (mostly) therapeutic on warfarin 3mg daily / supratherapeutic on warfarin 4mg daily.   Will verify further doses on 8/26.

## 2019-08-26 ENCOUNTER — TELEPHONE (OUTPATIENT)
Dept: PHARMACY | Facility: HOSPITAL | Age: 74
End: 2019-08-26

## 2019-08-26 ENCOUNTER — ANTICOAGULATION VISIT (OUTPATIENT)
Dept: PHARMACY | Facility: HOSPITAL | Age: 74
End: 2019-08-26

## 2019-08-26 ENCOUNTER — LAB REQUISITION (OUTPATIENT)
Dept: LAB | Facility: HOSPITAL | Age: 74
End: 2019-08-26

## 2019-08-26 DIAGNOSIS — Z79.01 LONG TERM CURRENT USE OF ANTICOAGULANT: ICD-10-CM

## 2019-08-26 DIAGNOSIS — I48.0 PAF (PAROXYSMAL ATRIAL FIBRILLATION) (HCC): ICD-10-CM

## 2019-08-26 DIAGNOSIS — Z98.890 S/P MVR (MITRAL VALVE REPAIR): ICD-10-CM

## 2019-08-26 DIAGNOSIS — I10 ESSENTIAL (PRIMARY) HYPERTENSION: ICD-10-CM

## 2019-08-26 LAB
INR PPP: 3.86 (ref 0.9–1.1)
PROTHROMBIN TIME: 39.7 SECONDS (ref 11–15.4)

## 2019-08-26 PROCEDURE — 85610 PROTHROMBIN TIME: CPT | Performed by: FAMILY MEDICINE

## 2019-08-26 NOTE — PROGRESS NOTES
Anticoagulation Clinic Progress Note  Indication: afib, MVrepair (7/25/19)  Referring Provider:  Dr. Twan Harrison  Initial Warfarin Start Date: 08/01/2019  Planned Duration of Therapy: 3 months, then transition back to Eliquis  Goal INR: 2-3  Current Drug Interactions:   Other: then transition back to Eliquis  CHADS-VASc: 4 (age, HTN, CHF)  Bleed Risk: No falls    Diet: cauliflower, iceberg, cucumbers  Alcohol: 5-6 cans of beer  Tobacco:   OTC Pain Medication:    Anticoagulation Clinic INR History:  Date 8/26 8/27         Total Weekly Dose 21mg 18mg         INR 3.86 3.72         Notes  Hold x1           INRs at SNF:  Results for PEDRO DENTON (MRN [id number]) as of 8/22/2019 09:53   Ref. Range 8/3/2019 12:00 8/7/2019 09:54 8/8/2019 09:14 8/9/2019 09:38 8/12/2019 09:15 8/15/2019 09:22 8/19/2019 08:51 8/20/2019 09:28 8/22/2019 08:43   INR Latest Ref Range: 0.90 - 1.10  1.87 (H) 4.56 (C) 3.29 (H) 2.66 (H) 2.67 (H) 2.81 (H) 2.51 (H) 2.59 (H) 2.93 (H)     Clinic Interview:  Tablet Strength: 3mg daily  Verbal Release:   Patient Contact Info: [phone]; daughter, Maria Dolores [phone]; HH with [facility] in [place], [phone] (ph); [phone] (fax)     Patient Findings   Positives:  Missed doses, Change in diet/appetite, Other complaints   Negatives:  Signs/symptoms of thrombosis, Signs/symptoms of bleeding, Laboratory test error suspected, Change in health, Change in alcohol use, Change in activity, Upcoming invasive procedure, Emergency department visit, Upcoming dental procedure, Extra doses, Change in medications, Hospital admission, Bruising   Comments:  8/26: I was unable to get in touch with Ana who will be Mr. Denton's HH RN. I spoke with the  and asked them to have him hold his warfarin tonight and call the clinic tomorrow.   8/27: I called home health and they reported that they sent Gema to Mr. Denton's home for a repeat INR today. I spoke with Mr. Denton and provided initial warfarin  counseling and sent an email with information. Mr. Tobias reports that he was drinking 1-1.5 bottles of boost while he was at the nursing home. He reports that he has several bottles at his home, and will plan to resume drinking 1/day. He has been nauseous and was unable to eat yesterday. He did find Zofran and the nausea subsided last night. He has been eating this morning (cheese and crackers and milk). Of note on 08/20/19: Hct:9.0 Hgb:30.6 He confirmed that he held his warfarin dose last night.      Welcomed Alok Tobias to the [facility]. I introduced myself and discussed that we will assist with his warfarin monitoring and work with his cardiologist or other physicians to provide patient care. Encouraged patient to call the clinic with requests for warfarin refills, changes in medications / diet, change in health, or upcoming procedures / surgeries. Discussed signs and symptoms of bleeding, signs and symptoms of TIA / CVA, use of OTC pain medications, and alcohol / tobacco / dietary / other drug interactions in relation to warfarin. Furthermore, we discussed available dates to come to the [facility] for face to face meetings. Patient was agreeable to meeting twice per year. At this time, Alok Tobias did not have further questions or concerns. Provided patient with the clinic's contact information for future reference.     Plan: this plan includes plan for results on both 8/26 and 8/27.  1. INR is SUPRAtherapeutic today per HH RN lab draw on 8/26. Mr. Tobias was discharged on Friday from [facility].  RN, Ana, saw Mr. Tobias on 8/26, however, unable to verify changes to medications/ appetite with her. I spoke with  who reported that Mr. Tobias should be taking warfarin 3mg daily. Given unable to determine possible cause of increase, instructed  to HOLD Mr. Tobias's warfarin dose today, and then have Ana call to discuss tomorrow.     - 8/27: I called  this morning to speak with Ana, however, Gema was going to see him. She had left by the time I called the patient's home at 945. I counseled Mr. Tobias on  warfarin. When I spoke with Gema, ([phone]), she confirmed that they discuss medication changes, dietary changes, missed doses etc with the patient. I asked that she  call the clinic while she is at the patient's home. Gema will plan to see him from here on out.  2. 8/27: INR was 3.72 after 1x warfarin dose hold yesterday. Likely have not seen full effects of dose hold. At this time, instructed Mr. Tobias to resume 1- 1.5 boost drinks and 3mg dose daily.   3. Repeat INR Friday 8/30 and ask to do a CBC as well. Gave verbal over phone.  4. Patient has been counseled on GLV / medications affects on INR. More placed.  5. Sent initial warfarin patient information to [email or web address]  6. Verbal information provided over the phone to Gema and Mr. Tobias. Alok Tobias expresses understanding by teach back and has no further questions at this time.       Argentina Bradley, PharmD  8/26/2019   3:44 PM

## 2019-08-27 ENCOUNTER — LAB REQUISITION (OUTPATIENT)
Dept: LAB | Facility: HOSPITAL | Age: 74
End: 2019-08-27

## 2019-08-27 DIAGNOSIS — Z79.01 LONG TERM CURRENT USE OF ANTICOAGULANT: ICD-10-CM

## 2019-08-27 LAB
INR PPP: 3.72 (ref 0.9–1.1)
PROTHROMBIN TIME: 38.6 SECONDS (ref 11–15.4)

## 2019-08-27 PROCEDURE — 85610 PROTHROMBIN TIME: CPT | Performed by: FAMILY MEDICINE

## 2019-08-30 ENCOUNTER — ANTICOAGULATION VISIT (OUTPATIENT)
Dept: PHARMACY | Facility: HOSPITAL | Age: 74
End: 2019-08-30

## 2019-08-30 DIAGNOSIS — I48.0 PAF (PAROXYSMAL ATRIAL FIBRILLATION) (HCC): ICD-10-CM

## 2019-08-30 DIAGNOSIS — Z98.890 S/P MVR (MITRAL VALVE REPAIR): ICD-10-CM

## 2019-08-30 LAB — INR PPP: 4.3

## 2019-08-30 NOTE — PROGRESS NOTES
Anticoagulation Clinic Progress Note  Indication: afib, MVrepair (7/25/19)  Referring Provider:  Dr. Twan Harrison  Initial Warfarin Start Date: 08/01/2019  Planned Duration of Therapy: 3 months, then transition back to Eliquis  Goal INR: 2-3  Current Drug Interactions:   Other: then transition back to Eliquis  CHADS-VASc: 4 (age, HTN, CHF)  Bleed Risk: No falls    Diet: cauliflower, iceberg, cucumbers  Alcohol: 5-6 cans of beer  Tobacco:   OTC Pain Medication:    Anticoagulation Clinic INR History:  Date 8/26 8/27 8/30        Total Weekly Dose 21mg 18mg 18mg        INR 3.86 3.72 4.3        Notes  Hold x1  Hold x 2         INRs at [facility]:  Results for PEDRO DENTON (MRN [id number]) as of 8/22/2019 09:53   Ref. Range 8/3/2019 12:00 8/7/2019 09:54 8/8/2019 09:14 8/9/2019 09:38 8/12/2019 09:15 8/15/2019 09:22 8/19/2019 08:51 8/20/2019 09:28 8/22/2019 08:43   INR Latest Ref Range: 0.90 - 1.10  1.87 (H) 4.56 (C) 3.29 (H) 2.66 (H) 2.67 (H) 2.81 (H) 2.51 (H) 2.59 (H) 2.93 (H)     Clinic Interview:  Tablet Strength: 3mg daily  Verbal Release:   Patient Contact Info: [phone]; daughter, Maria Dolores [phone];  with [facility]  in [place], [phone] (ph); [phone] (fax)   Lab contact:  RN:  Gema, ([phone])    Patient Findings   Positives:  Change in health, Change in diet/appetite   Negatives:  Signs/symptoms of thrombosis, Signs/symptoms of bleeding, Laboratory test error suspected, Change in alcohol use, Change in activity, Upcoming invasive procedure, Emergency department visit, Upcoming dental procedure, Missed doses, Extra doses, Change in medications, Hospital admission, Bruising, Other complaints   Comments:  Mr. Denton reports to  Gema QUINTANA, that he has been nauseated over the past few days, but has new Rx for Zofran. His appetite is low, however, he does have energy. He does report alittle SOB on exertion. They did draw a CBC today. He hasn't started back boost drinks due to nausea. Mr. Denton  reports that he was drinking 1-1.5 bottles of boost while at nursing facility. HH RN denies that he appears dehydrated.     Plan:   1. INR is SUPRAtherpeutic with one dose hold and resuming prior maintenance dose. Patient did not resume boost drinks and has not been eating due to nausea. He has since obtained Zofran tablets for today and is feeling better. He plans to begin to drink boost drinks again today. At this time, will HOLD warfarin today and tomorrow; 1.5mg on Sunday and 3mg Monday.  Although anticipate that his appetite will return- would prefer to ensure INR is reduced at next encounter given patient increased with one dose hold. Meals likely have changed since he was in the nursing facility and may need to find a new therapeutic maintenance dose for him.    2. Repeat INR on Tuesday 9/4.  3. Verbal information provided over the phone to  RN, Gema. Gema expresses understanding by teach back and has no further questions at this time.   4. Per  RN, awaiting hard copy of INR and CBC. She gave verbal of INR and said that CBC specifically Hgb and Hct were WNL. Still awaiting HC.      Argentina Bradley, PharmD  8/30/2019   11:10 AM

## 2019-09-03 ENCOUNTER — TELEPHONE (OUTPATIENT)
Dept: PULMONOLOGY | Facility: CLINIC | Age: 74
End: 2019-09-03

## 2019-09-03 ENCOUNTER — LAB REQUISITION (OUTPATIENT)
Dept: LAB | Facility: HOSPITAL | Age: 74
End: 2019-09-03

## 2019-09-03 ENCOUNTER — ANTICOAGULATION VISIT (OUTPATIENT)
Dept: PHARMACY | Facility: HOSPITAL | Age: 74
End: 2019-09-03

## 2019-09-03 DIAGNOSIS — I10 ESSENTIAL (PRIMARY) HYPERTENSION: ICD-10-CM

## 2019-09-03 DIAGNOSIS — R91.8 MULTIPLE LUNG NODULES ON CT: Primary | ICD-10-CM

## 2019-09-03 DIAGNOSIS — Z98.890 S/P MVR (MITRAL VALVE REPAIR): ICD-10-CM

## 2019-09-03 DIAGNOSIS — I48.0 PAF (PAROXYSMAL ATRIAL FIBRILLATION) (HCC): ICD-10-CM

## 2019-09-03 DIAGNOSIS — Z79.01 LONG TERM CURRENT USE OF ANTICOAGULANT: ICD-10-CM

## 2019-09-03 LAB
ALBUMIN SERPL-MCNC: 3.42 G/DL (ref 3.5–5.2)
ALBUMIN/GLOB SERPL: 1 G/DL
ALP SERPL-CCNC: 90 U/L (ref 39–117)
ALT SERPL W P-5'-P-CCNC: 16 U/L (ref 1–41)
ANION GAP SERPL CALCULATED.3IONS-SCNC: 17.1 MMOL/L (ref 5–15)
AST SERPL-CCNC: 25 U/L (ref 1–40)
BILIRUB SERPL-MCNC: 0.5 MG/DL (ref 0.2–1.2)
BUN BLD-MCNC: 10 MG/DL (ref 8–23)
BUN/CREAT SERPL: 12.7 (ref 7–25)
CALCIUM SPEC-SCNC: 9.1 MG/DL (ref 8.6–10.5)
CHLORIDE SERPL-SCNC: 100 MMOL/L (ref 98–107)
CO2 SERPL-SCNC: 23.9 MMOL/L (ref 22–29)
CREAT BLD-MCNC: 0.79 MG/DL (ref 0.76–1.27)
GFR SERPL CREATININE-BSD FRML MDRD: 95 ML/MIN/1.73
GLOBULIN UR ELPH-MCNC: 3.4 GM/DL
GLUCOSE BLD-MCNC: 58 MG/DL (ref 65–99)
INR PPP: 1.56 (ref 0.9–1.1)
POTASSIUM BLD-SCNC: 3.6 MMOL/L (ref 3.5–5.2)
PROT SERPL-MCNC: 6.8 G/DL (ref 6–8.5)
PROTHROMBIN TIME: 19.4 SECONDS (ref 11–15.4)
SODIUM BLD-SCNC: 141 MMOL/L (ref 136–145)

## 2019-09-03 PROCEDURE — 80053 COMPREHEN METABOLIC PANEL: CPT | Performed by: INTERNAL MEDICINE

## 2019-09-03 PROCEDURE — 85610 PROTHROMBIN TIME: CPT | Performed by: INTERNAL MEDICINE

## 2019-09-03 NOTE — PROGRESS NOTES
Anticoagulation Clinic Progress Note  Indication: afib, MVrepair (7/25/19)  Referring Provider:  Dr. Twan Harrison  Initial Warfarin Start Date: 08/01/2019  Planned Duration of Therapy: 3 months, then transition back to Eliquis  Goal INR: 2-3  Current Drug Interactions:   Other: then transition back to Eliquis  CHADS-VASc: 4 (age, HTN, CHF)  Bleed Risk: No falls    Diet: cauliflower, iceberg, cucumbers  Alcohol: 5-6 cans of beer  Tobacco:   OTC Pain Medication:    Anticoagulation Clinic INR History:  Date 8/26 8/27 8/30 9/3       Total Weekly Dose 21mg 18mg 18mg 13.5       INR 3.86 3.72 4.3 1.56       Notes  Hold x1 Dec appe Hold x 2         INRs at SNF:  Results for PEDRO DENTON (MRN [id number]) as of 8/22/2019 09:53   Ref. Range 8/3/2019 12:00 8/7/2019 09:54 8/8/2019 09:14 8/9/2019 09:38 8/12/2019 09:15 8/15/2019 09:22 8/19/2019 08:51 8/20/2019 09:28 8/22/2019 08:43   INR Latest Ref Range: 0.90 - 1.10  1.87 (H) 4.56 (C) 3.29 (H) 2.66 (H) 2.67 (H) 2.81 (H) 2.51 (H) 2.59 (H) 2.93 (H)     Clinic Interview:  Tablet Strength: 3mg daily  Verbal Release:   Patient Contact Info: [phone]; daughter, Maria Dolores [phone];  with [facility]  in [place], [phone] (ph); [phone] (fax)   Lab contact:  RN:  Gema, ([phone])    Patient Findings   Positives:  Change in diet/appetite   Negatives:  Signs/symptoms of thrombosis, Signs/symptoms of bleeding, Laboratory test error suspected, Change in health, Change in alcohol use, Change in activity, Upcoming invasive procedure, Emergency department visit, Upcoming dental procedure, Missed doses, Extra doses, Change in medications, Hospital admission, Bruising, Other complaints   Comments:  Mr. Denton reports to HH RN, Gema, that his appetite has returned but his BG today was 58. He had not eaten prior to this. He does report alittle SOB on exertion. He hasn't started back boost drinks, and HH RN isn't sure that he will begin them again.      Plan:   1. INR  is SUBtherpeutic today.  At this time, will resume 3mg daily until Friday. Will likely need to decrease his dose to 3mg and 1.5mg ~2x/week. Meals likely have changed since he was in the nursing facility and may need to find a new therapeutic maintenance dose for him.    2. Repeat INR on Friday.  3. Verbal information provided over the phone to  RNGema. Gema expresses understanding by teach back and has no further questions at this time.      Argentina Bradley, PharmD  9/3/2019   3:36 PM

## 2019-09-03 NOTE — TELEPHONE ENCOUNTER
Patient called our office to cancel his CT scan.  I called him by phone just to confirm that he understood why I had ordered the CT scan, to follow-up on areas of atelectasis and rule out underlying lung parenchymal abnormality.  The patient initially stated he did not remember seeing me in the hospital but then remembered.  He states he does want to have the follow-up CT scan and to follow-up after that is completed.

## 2019-09-09 ENCOUNTER — ANTICOAGULATION VISIT (OUTPATIENT)
Dept: PHARMACY | Facility: HOSPITAL | Age: 74
End: 2019-09-09

## 2019-09-09 DIAGNOSIS — Z98.890 S/P MVR (MITRAL VALVE REPAIR): ICD-10-CM

## 2019-09-09 DIAGNOSIS — I48.0 PAF (PAROXYSMAL ATRIAL FIBRILLATION) (HCC): ICD-10-CM

## 2019-09-09 LAB
INR PPP: 1.2
INR PPP: 1.5

## 2019-09-09 NOTE — PROGRESS NOTES
Anticoagulation Clinic Progress Note  Indication: afib, MVrepair (7/25/19)  Referring Provider:  Dr. Twan Harrison  Initial Warfarin Start Date: 08/01/2019  Planned Duration of Therapy: 3 months, then transition back to Eliquis  Goal INR: 2-3  Current Drug Interactions: allopurinol, ASA  Other: then transition back to Eliquis  CHADS-VASc: 4 (age, HTN, CHF)  Bleed Risk: No falls    Diet: cauliflower, iceberg, cucumbers  Alcohol: 5-6 cans of beer  Tobacco:   OTC Pain Medication:    Anticoagulation Clinic INR History:  Date 8/26 8/27 8/30 9/3 9/6 9/9     Total Weekly Dose 21mg 18mg 18mg 13.5 16.5mg  21mg     INR 3.86 3.72 4.3 1.56 1.2 1.5     Notes  Hold x1 Dec appe Hold x 2         INRs at SNF:  Results for PEDRO DENTON (MRN [id number]) as of 8/22/2019 09:53   Ref. Range 8/3/2019 12:00 8/7/2019 09:54 8/8/2019 09:14 8/9/2019 09:38 8/12/2019 09:15 8/15/2019 09:22 8/19/2019 08:51 8/20/2019 09:28 8/22/2019 08:43   INR Latest Ref Range: 0.90 - 1.10  1.87 (H) 4.56 (C) 3.29 (H) 2.66 (H) 2.67 (H) 2.81 (H) 2.51 (H) 2.59 (H) 2.93 (H)     Clinic Interview:  Tablet Strength: 3mg daily  Verbal Release:   Patient Contact Info: [phone]; daughter, Maria Dolores [phone];  with [facility] in [place], [phone] (ph); [phone] (fax)   Lab contact:  RN:  Gema, ([phone])    Patient Findings   Negatives:  Signs/symptoms of thrombosis, Signs/symptoms of bleeding, Laboratory test error suspected, Change in health, Change in alcohol use, Change in activity, Upcoming invasive procedure, Emergency department visit, Upcoming dental procedure, Missed doses, Extra doses, Change in medications, Change in diet/appetite, Hospital admission, Bruising, Other complaints   Comments:  Ana BOLES RN ([phone]) called Monday 9/9 to report INR from Friday 9/6. She reports he took 3mg through the weekend and will get a new INR drawn today. Waiting those results now for plan     Addendum 9/9: HH RN called with results today of 1.5.  She reports that Mr. Tobias had been out of town with his daughter last week and they may have eaten more GLV. She was unable to confirm this definitively.     Plan:   1. INR is SUBtherpeutic today. At this time, will BOOST dose to 4.5mg today and then continue 3mg daily.    2. Repeat INR on Friday 9/13.  3. Verbal information provided over the phone to  RN, Ana. Ana expresses understanding by teach back and has no further questions at this time.      Argentina Bradley, PharmD  09/09/2019  4:04 PM

## 2019-09-13 ENCOUNTER — ANTICOAGULATION VISIT (OUTPATIENT)
Dept: PHARMACY | Facility: HOSPITAL | Age: 74
End: 2019-09-13

## 2019-09-13 ENCOUNTER — LAB REQUISITION (OUTPATIENT)
Dept: LAB | Facility: HOSPITAL | Age: 74
End: 2019-09-13

## 2019-09-13 DIAGNOSIS — Z98.890 S/P MVR (MITRAL VALVE REPAIR): ICD-10-CM

## 2019-09-13 DIAGNOSIS — I48.0 PAF (PAROXYSMAL ATRIAL FIBRILLATION) (HCC): ICD-10-CM

## 2019-09-13 DIAGNOSIS — Z79.01 LONG TERM CURRENT USE OF ANTICOAGULANT: ICD-10-CM

## 2019-09-13 LAB
INR PPP: 1.94 (ref 0.9–1.1)
PROTHROMBIN TIME: 23.1 SECONDS (ref 11–15.4)

## 2019-09-13 PROCEDURE — 85610 PROTHROMBIN TIME: CPT | Performed by: NURSE PRACTITIONER

## 2019-09-13 PROCEDURE — 85610 PROTHROMBIN TIME: CPT | Performed by: INTERNAL MEDICINE

## 2019-09-13 NOTE — PROGRESS NOTES
Anticoagulation Clinic Progress Note  Indication: afib, MVrepair (7/25/19)  Referring Provider:  Dr. Twan Harrison  Initial Warfarin Start Date: 08/01/2019  Planned Duration of Therapy: 3 months, then transition back to Eliquis  Goal INR: 2-3  Current Drug Interactions: allopurinol, ASA  Other: then transition back to Eliquis  CHADS-VASc: 4 (age, HTN, CHF)  Bleed Risk: No falls    Diet: cauliflower, iceberg, cucumbers  Alcohol: 5-6 cans of beer  Tobacco:   OTC Pain Medication:    Anticoagulation Clinic INR History:  Date 8/26 8/27 8/30 9/3 9/6 9/9 9/13    Total Weekly Dose 21mg 18mg 18mg 13.5 16.5mg  21mg 22.5 mg    INR 3.86 3.72 4.3 1.56 1.2 1.5 1.9    Notes  Hold x1 Dec appe Hold x 2         INRs at SNF:  Results for PEDRO DENTON (MRN [id number]) as of 8/22/2019 09:53   Ref. Range 8/3/2019 12:00 8/7/2019 09:54 8/8/2019 09:14 8/9/2019 09:38 8/12/2019 09:15 8/15/2019 09:22 8/19/2019 08:51 8/20/2019 09:28 8/22/2019 08:43   INR Latest Ref Range: 0.90 - 1.10  1.87 (H) 4.56 (C) 3.29 (H) 2.66 (H) 2.67 (H) 2.81 (H) 2.51 (H) 2.59 (H) 2.93 (H)     Clinic Interview:  Tablet Strength: 3mg daily  Verbal Release:   Patient Contact Info: [phone]; daughter, Maria Dolores [phone];  with [facility] in [place], [phone] (ph); [phone] (fax)   Lab contact:  RN:  Gema, ([phone])    Patient Findings     Negatives:  Signs/symptoms of thrombosis, Signs/symptoms of bleeding, Laboratory test error suspected, Change in health, Change in alcohol use, Change in activity, Upcoming invasive procedure, Emergency department visit, Upcoming dental procedure, Missed doses, Extra doses, Change in medications, Change in diet/appetite, Hospital admission, Bruising, Other complaints     Plan:   1. INR is SUBtherpeutic today. At this time,  Instructed Maria Dolores to continue 3mg daily except 4.5mg Monday.    2. Repeat INR on Tuesday, called in order to HH  3. Verbal information provided over the phone to  RNAna. Ana expresses  understanding by teach back and has no further questions at this time.      Yoselin Altamirano, TriniD.  09/13/19   4:23 PM

## 2019-09-17 ENCOUNTER — ANTICOAGULATION VISIT (OUTPATIENT)
Dept: PHARMACY | Facility: HOSPITAL | Age: 74
End: 2019-09-17

## 2019-09-17 DIAGNOSIS — Z98.890 S/P MVR (MITRAL VALVE REPAIR): ICD-10-CM

## 2019-09-17 DIAGNOSIS — I48.0 PAF (PAROXYSMAL ATRIAL FIBRILLATION) (HCC): ICD-10-CM

## 2019-09-17 LAB — INR PPP: 2

## 2019-09-17 NOTE — PROGRESS NOTES
Anticoagulation Clinic Progress Note  Indication: afib, MVrepair (7/25/19)  Referring Provider:  Dr. Twan Harrison  Initial Warfarin Start Date: 08/01/2019  Planned Duration of Therapy: 3 months, then transition back to Eliquis  Goal INR: 2-3  Current Drug Interactions: allopurinol, ASA  Other: then transition back to Eliquis  CHADS-VASc: 4 (age, HTN, CHF)  Bleed Risk: No falls    Diet: cauliflower, iceberg, cucumbers  Alcohol: 5-6 cans of beer  Tobacco:   OTC Pain Medication:    Anticoagulation Clinic INR History:  Date 8/26 8/27 8/30 9/3 9/6 9/9 9/13 9/17   Total Weekly Dose 21mg 18mg 18mg 13.5 16.5mg  21mg 22.5 mg  22.5 mg   INR 3.86 3.72 4.3 1.56 1.2 1.5 1.9 2   Notes  Hold x1 Dec appe Hold x 2         INRs at SNF:  Results for PEDRO DENTON (MRN [id number]) as of 8/22/2019 09:53   Ref. Range 8/3/2019 12:00 8/7/2019 09:54 8/8/2019 09:14 8/9/2019 09:38 8/12/2019 09:15 8/15/2019 09:22 8/19/2019 08:51 8/20/2019 09:28 8/22/2019 08:43   INR Latest Ref Range: 0.90 - 1.10  1.87 (H) 4.56 (C) 3.29 (H) 2.66 (H) 2.67 (H) 2.81 (H) 2.51 (H) 2.59 (H) 2.93 (H)     Clinic Interview:  Tablet Strength: 3mg daily  Verbal Release:   Patient Contact Info: [phone]; daughter, Maria Dolores [phone];  with [facility] in [place], [phone] (ph); [phone] (fax)   Lab contact:  RN:  Gema, ([phone])    Patient Findings   Positives:  Missed doses   Negatives:  Signs/symptoms of thrombosis, Signs/symptoms of bleeding, Laboratory test error suspected, Change in health, Change in alcohol use, Change in activity, Upcoming invasive procedure, Emergency department visit, Upcoming dental procedure, Extra doses, Change in medications, Change in diet/appetite, Hospital admission, Bruising, Other complaints   Comments:  Patient was scheduled to take warfarin 4.5 mg dose yesterday; however, he took at 3 mg dose instead.   Otherwise, denies changes.     Plan:   1. INR is therapeutic today at 2 (see pt findings).  Instructed  Hillary, RAMANA RN, to have Mr. Tobias take warfarin 3mg oral daily except 4.5mg Tuesday (did not take yesterday as scheduled).    2. Repeat INR on Friday, 9/20.  3. Verbal information provided over the phone to  RN, Hillary. Hillary expresses understanding by teach back and has no further questions at this time.      Sonia Lutz, PharmD  09/17/19   3:44 PM

## 2019-09-20 ENCOUNTER — ANTICOAGULATION VISIT (OUTPATIENT)
Dept: PHARMACY | Facility: HOSPITAL | Age: 74
End: 2019-09-20

## 2019-09-20 DIAGNOSIS — Z98.890 S/P MVR (MITRAL VALVE REPAIR): ICD-10-CM

## 2019-09-20 DIAGNOSIS — I48.0 PAF (PAROXYSMAL ATRIAL FIBRILLATION) (HCC): ICD-10-CM

## 2019-09-20 LAB — INR PPP: 2.5

## 2019-09-20 NOTE — PROGRESS NOTES
Anticoagulation Clinic Progress Note  Indication: afib, MVrepair (7/25/19)  Referring Provider:  Dr. Twan Harrison  Initial Warfarin Start Date: 08/01/2019  Planned Duration of Therapy: 3 months, then transition back to Eliquis  Goal INR: 2-3  Current Drug Interactions: allopurinol, ASA  Other: then transition back to Eliquis  CHADS-VASc: 4 (age, HTN, CHF)  Bleed Risk: No falls    Diet: cauliflower, iceberg, cucumbers  Alcohol: 5-6 cans of beer  Tobacco:   OTC Pain Medication:    Anticoagulation Clinic INR History:  Date 8/26 8/27 8/30 9/3 9/6 9/9 9/13 9/17 9/20     Total Weekly Dose 21mg 18mg 18mg 13.5 16.5mg  21mg 22.5 mg 22.5mg 22.5mg 21    INR 3.86 3.72 4.3 1.56 1.2 1.5 1.9 2 2.5     Notes  Hold x1 Dec appe Hold x 2     allopurinol       Date           Total Weekly Dose           INR           Notes               INRs at SNF:  Results for PEDRO DENTON (MRN [id number]) as of 8/22/2019 09:53   Ref. Range 8/3/2019 12:00 8/7/2019 09:54 8/8/2019 09:14 8/9/2019 09:38 8/12/2019 09:15 8/15/2019 09:22 8/19/2019 08:51 8/20/2019 09:28 8/22/2019 08:43   INR Latest Ref Range: 0.90 - 1.10  1.87 (H) 4.56 (C) 3.29 (H) 2.66 (H) 2.67 (H) 2.81 (H) 2.51 (H) 2.59 (H) 2.93 (H)     Clinic Interview:  Tablet Strength: 3mg daily  Verbal Release:   Patient Contact Info: [phone]; daughter, Maria Dolores [phone];  with [facility] in [place], [phone] (ph); [phone] (fax)   Lab contact:  RN:  Gema, ([phone])    Patient Findings     Positives:  Change in health, Bruising   Negatives:  Signs/symptoms of thrombosis, Signs/symptoms of bleeding, Laboratory test error suspected, Change in alcohol use, Change in activity, Upcoming invasive procedure, Emergency department visit, Upcoming dental procedure, Missed doses, Extra doses, Change in medications, Change in diet/appetite, Hospital admission, Other complaints   Comments:  Patient is having a gout episode and taking Allopurinol 300mg BID and started taking on  Tuesday.  nurse educated patient that BID dosing is not how it is prescribed and he changed to once a day as prescribed.  He will be getting a steroid shot next week for flare up.    nurse also noticed more small bruising on patient but said it was  Nothing severe or evolving     Plan:   1. INR is therapeutic today at 2.5.  Instructed RAMANA Thornton RN, to have Mr. Tobias take warfarin 3mg oral daily.   2. Repeat INR on Friday, 9/27.  3. Verbal information provided over the phone to  Hillary QUINTANA. Hillary expresses understanding by teach back and has no further questions at this time.      Geovanna Arciniega, PharmD  Pharmacy Resident   9/20/2019  3:50 PM

## 2019-09-27 ENCOUNTER — ANTICOAGULATION VISIT (OUTPATIENT)
Dept: PHARMACY | Facility: HOSPITAL | Age: 74
End: 2019-09-27

## 2019-09-27 DIAGNOSIS — I48.0 PAF (PAROXYSMAL ATRIAL FIBRILLATION) (HCC): ICD-10-CM

## 2019-09-27 DIAGNOSIS — Z98.890 S/P MVR (MITRAL VALVE REPAIR): ICD-10-CM

## 2019-09-27 LAB — INR PPP: 2.9

## 2019-09-27 NOTE — PROGRESS NOTES
Anticoagulation Clinic Progress Note    Indication: afib, MVrepair (7/25/19)  Referring Provider:  Dr. Twan Harrison  Initial Warfarin Start Date: 08/01/2019  Planned Duration of Therapy: 3 months, then transition back to Eliquis  Goal INR: 2.0-3.0  Current Drug Interactions: allopurinol, ASA  Other: then transition back to Eliquis  CHADS-VASc: 4 (age, HTN, CHF)  Bleed Risk: No falls    Diet: cauliflower, iceberg, cucumbers  Alcohol: 5-6 cans of beer  Tobacco:   OTC Pain Medication:    Anticoagulation Clinic INR History:  Date 8/26 8/27 8/30 9/3 9/6 9/9 9/13 9/17 9/20 9/27    Total Weekly Dose 21mg 18mg 18mg 13.5 16.5mg  21mg 22.5 mg 22.5mg 22.5mg 21mg    INR 3.86 3.72 4.3 1.56 1.2 1.5 1.9 2.0 2.5 2.9    Notes  Hold x1 Dec appe Hold x 2     allo- purinol allo- purinol 1x decr dose     INRs at [facility]:  Results for PEDRO DENTON (MRN [id number]) as of 8/22/2019 09:53   Ref. Range 8/3/2019 12:00 8/7/2019 09:54 8/8/2019 09:14 8/9/2019 09:38 8/12/2019 09:15 8/15/2019 09:22 8/19/2019 08:51 8/20/2019 09:28 8/22/2019 08:43   INR Latest Ref Range: 0.90 - 1.10  1.87 (H) 4.56 (C) 3.29 (H) 2.66 (H) 2.67 (H) 2.81 (H) 2.51 (H) 2.59 (H) 2.93 (H)     Phone Interview:  Tablet Strength: 3mg daily  Verbal Release:   Patient Contact Info: [phone]; daughter, Maria Dolores [phone]; HH with [facility] in [place], [phone] (ph); [phone] (fax)   Lab contact: [facility] in [place] - LILIAN Ribeiro [phone]; LILIAN Perez [phone]    Patient Findings   Positives:  Change in medications   Negatives:  Signs/symptoms of thrombosis, Signs/symptoms of bleeding, Laboratory test error suspected, Change in health, Change in alcohol use, Change in activity, Upcoming invasive procedure, Emergency department visit, Upcoming dental procedure, Missed doses, Extra doses, Change in diet/appetite, Hospital admission, Bruising, Other complaints   Comments:  Patient continues to take Allopurinol 300mg BID for gout, appears to have been taking  since . Believes he will continue with dose.     Per [employer]:     Warning: Concurrent use of ALLOPURINOL and COUMARIN-DERIVATIVE ANTICOAGULANTS may result in increased INR.     Clinical Management: Exposure to warfarin (or another coumarin-derivative anticoagulant) might be increased, resulting in an increased INR, when coadministered with allopurinol, a CY inhibitor. Consider the inhibition potential when initiating, adjusting, or discontinuing concomitant use and monitor INR closely (Prod Info COUMADIN® oral tablets, 2017).     Plan:   1. INR is therapeutic today at 2.9. Instructed  Ana QUINTANA, to have Mr. Tobias DECREASE tonight's dose to 1.5mg and then warfarin 3mg oral daily.  (7.1% decrease)  2. Repeat INR on Tues, 10/1.  3. Verbal information provided over the phone to  Ana QUINTANA. Ana expresses understanding by teach back and has no further questions at this time.      Twan Velasco, Iftikhar  2019  2:53 PM    Repeating INR tomorrow.   IArgentina, PharmD, have reviewed the note in full and agree with the assessment and plan.  19  4:16 PM

## 2019-10-01 ENCOUNTER — LAB REQUISITION (OUTPATIENT)
Dept: LAB | Facility: HOSPITAL | Age: 74
End: 2019-10-01

## 2019-10-01 DIAGNOSIS — Z79.01 LONG TERM (CURRENT) USE OF ANTICOAGULANTS: ICD-10-CM

## 2019-10-01 LAB
INR PPP: 2.31 (ref 0.9–1.1)
PROTHROMBIN TIME: 26.5 SECONDS (ref 11–15.4)

## 2019-10-01 PROCEDURE — 85610 PROTHROMBIN TIME: CPT | Performed by: INTERNAL MEDICINE

## 2019-10-02 ENCOUNTER — ANTICOAGULATION VISIT (OUTPATIENT)
Dept: PHARMACY | Facility: HOSPITAL | Age: 74
End: 2019-10-02

## 2019-10-02 DIAGNOSIS — Z98.890 S/P MVR (MITRAL VALVE REPAIR): ICD-10-CM

## 2019-10-02 DIAGNOSIS — I48.0 PAF (PAROXYSMAL ATRIAL FIBRILLATION) (HCC): ICD-10-CM

## 2019-10-02 NOTE — PROGRESS NOTES
Anticoagulation Clinic - Remote Progress Note  HOME HEALTH    Indication: afib, MVrepair (7/25/19)  Referring Provider:  Dr. Twan Harrison  Initial Warfarin Start Date: 08/01/2019  Planned Duration of Therapy: 3 months, then transition back to Eliquis  Goal INR: 2.0-3.0  Current Drug Interactions: allopurinol, ASA  Other: then transition back to Eliquis  CHADS-VASc: 4 (age, HTN, CHF)  Bleed Risk: No falls    Diet: cauliflower, iceberg, cucumbers  Alcohol: 5-6 cans of beer  Tobacco:   OTC Pain Medication:    Anticoagulation Clinic INR History:  Date 8/26 8/27 8/30 9/3 9/6 9/9 9/13 9/17 9/20 9/27 10/1   Total Weekly Dose 21mg 18mg 18mg 13.5 mg 16.5 mg  21mg 22.5 mg 22.5mg 22.5mg 21mg 19.5 mg   INR 3.86 3.72 4.3 1.56 1.2 1.5 1.9 2.0 2.5 2.9 2.31   Notes  hold x1 dec appe hold x 2     allo- purinol start       Date              Total Weekly Dose 19.5 mg             INR              Notes                Phone Interview:  Tablet Strength: 3mg  Verbal Release:   Patient Contact Info: [phone]; daughter, Maria Dolores [phone]; HH with [facility] in [place], [phone] (ph); [phone] (fax)   Lab Contact: [facility] in [place] - LILIAN Ribeiro [phone]; LILIAN Perez [phone]    Patient Findings:  Negatives:  Signs/symptoms of thrombosis, Signs/symptoms of bleeding, Laboratory test error suspected, Change in health, Change in alcohol use, Change in activity, Upcoming invasive procedure, Emergency department visit, Upcoming dental procedure, Missed doses, Extra doses, Change in medications, Change in diet/appetite, Hospital admission, Bruising, Other complaints     Plan:   1. INR was therapeutic last evening at 2.31. Instructed Brigitte ( ) to have Mr. Tobias repeat his dosing from this past week and take warfarin 3mg daily except 1.5mg on Friday.   2. Repeat INR in about one week, Monday 10/7.  3. Verbal information provided over the phone. Brigitte expresses understanding by teach back and has no  further questions at this time.      Kathrine Toure RPH  10/2/2019  8:30 AM

## 2019-10-07 ENCOUNTER — ANTICOAGULATION VISIT (OUTPATIENT)
Dept: PHARMACY | Facility: HOSPITAL | Age: 74
End: 2019-10-07

## 2019-10-07 DIAGNOSIS — Z98.890 S/P MVR (MITRAL VALVE REPAIR): ICD-10-CM

## 2019-10-07 DIAGNOSIS — I48.0 PAF (PAROXYSMAL ATRIAL FIBRILLATION) (HCC): ICD-10-CM

## 2019-10-07 LAB — INR PPP: 2

## 2019-10-07 NOTE — PROGRESS NOTES
Anticoagulation Clinic - Remote Progress Note  HOME HEALTH    Indication: afib, MVrepair (7/25/19)  Referring Provider:  Dr. Twan Harrison  Initial Warfarin Start Date: 08/01/2019  Planned Duration of Therapy: 3 months, then transition back to Eliquis  Goal INR: 2.0-3.0  Current Drug Interactions: allopurinol, ASA  Other: then transition back to Eliquis  CHADS-VASc: 4 (age, HTN, CHF)  Bleed Risk: No falls    Diet: cauliflower, iceberg, cucumbers  Alcohol: 5-6 cans of beer  Tobacco:   OTC Pain Medication:    Anticoagulation Clinic INR History:  Date 8/26 8/27 8/30 9/3 9/6 9/9 9/13 9/17 9/20 9/27 10/1   Total Weekly Dose 21mg 18mg 18mg 13.5 mg 16.5 mg  21mg 22.5 mg 22.5mg 22.5mg 21mg 19.5 mg   INR 3.86 3.72 4.3 1.56 1.2 1.5 1.9 2.0 2.5 2.9 2.31   Notes  hold x1 dec appe hold x 2     allo- purinol start       Date 10/7             Total Weekly Dose 19.5 mg             INR 2.0             Notes                Phone Interview:  Tablet Strength: 3mg  Verbal Release:   Patient Contact Info: [phone]; daughter, Maria Dolores [phone]; HH with [facility] in [place], [phone] (ph); [phone] (fax)   Lab Contact: [facility] in [place] - LILIAN Ribeiro [phone]; LILIAN Perez [phone]        Plan:   1. INR was therapeutic last evening at 2.31. Instructed Brigitte ( ) to have Mr. Tobias repeat his dosing from this past week and take warfarin 3mg daily except 1.5mg on Friday.   2. Repeat INR in about one week, Monday 10/7.  3. Verbal information provided over the phone. Brigitte expresses understanding by teach back and has no further questions at this time.      Rach Jamil, Pharmacy Technician  10/7/2019  3:23 PM

## 2019-10-07 NOTE — PROGRESS NOTES
Anticoagulation Clinic - Remote Progress Note  HOME HEALTH    Indication: afib, MVrepair (7/25/19)  Referring Provider:  Dr. Twan Harrison  Initial Warfarin Start Date: 08/01/2019  Planned Duration of Therapy: 3 months, then transition back to Eliquis  Goal INR: 2.0-3.0  Current Drug Interactions: allopurinol, ASA  Other: then transition back to Eliquis  CHADS-VASc: 4 (age, HTN, CHF)  Bleed Risk: No falls    Diet: cauliflower, iceberg, cucumbers  Alcohol: 5-6 cans of beer  Tobacco:   OTC Pain Medication:    Anticoagulation Clinic INR History:  Date 8/26 8/27 8/30 9/3 9/6 9/9 9/13 9/17 9/20 9/27 10/1   Total Weekly Dose 21mg 18mg 18mg 13.5 mg 16.5 mg  21mg 22.5 mg 22.5mg 22.5mg 21mg 19.5 mg   INR 3.86 3.72 4.3 1.56 1.2 1.5 1.9 2.0 2.5 2.9 2.31   Notes  hold x1 dec appe hold x 2     allo- purinol start       Date 10/7             Total Weekly Dose 19.5 mg 21mg            INR 2.0             Notes                Phone Interview:  Tablet Strength: 3mg  Verbal Release:   Patient Contact Info: [phone]; daughter, Maria Dolores [phone]; HH with [facility] in [place], [phone] (ph); [phone] (fax)   Lab Contact: [facility] in [place] - LILIAN Ribeiro [phone]; LILIAN Perez [phone]    Patient Findings:  Negatives:  Signs/symptoms of thrombosis, Signs/symptoms of bleeding, Laboratory test error suspected, Change in health, Change in alcohol use, Change in activity, Upcoming invasive procedure, Emergency department visit, Upcoming dental procedure, Missed doses, Extra doses, Change in medications, Change in diet/appetite, Hospital admission, Bruising, Other complaints   Comments:  RAMANA Perez RN, was not present with the patient at time of the interview but denies any changes since last encounter.      Plan:   1. INR is therapeutic today at the [facility], so instructed HH to have Mr. Tobias increase his dose to warfarin 3mg daily this week, to target an INR closer to mid-range.  2. Repeat INR in one week.  3. Verbal  information provided over the phone. Brigitte expresses understanding by teach back and has no further questions at this time.      Rach Jamil, Pharmacy Technician  10/7/2019  3:27 PM     [facility], have reviewed the note in full and agree with the assessment and plan.  10/07/19  3:57 PM

## 2019-10-08 ENCOUNTER — HOSPITAL ENCOUNTER (OUTPATIENT)
Dept: CT IMAGING | Facility: HOSPITAL | Age: 74
Discharge: HOME | End: 2019-10-08
Admitting: INTERNAL MEDICINE

## 2019-10-08 ENCOUNTER — OFFICE VISIT (OUTPATIENT)
Dept: PULMONOLOGY | Facility: CLINIC | Age: 74
End: 2019-10-08

## 2019-10-08 VITALS
OXYGEN SATURATION: 97 % | WEIGHT: 254 LBS | SYSTOLIC BLOOD PRESSURE: 122 MMHG | BODY MASS INDEX: 39.87 KG/M2 | HEART RATE: 100 BPM | HEIGHT: 67 IN | DIASTOLIC BLOOD PRESSURE: 74 MMHG | TEMPERATURE: 97.9 F

## 2019-10-08 DIAGNOSIS — Z98.890 S/P MVR (MITRAL VALVE REPAIR): ICD-10-CM

## 2019-10-08 DIAGNOSIS — E66.01 OBESITY, CLASS III, BMI 40-49.9 (MORBID OBESITY) (HCC): ICD-10-CM

## 2019-10-08 DIAGNOSIS — R91.8 MULTIPLE LUNG NODULES ON CT: ICD-10-CM

## 2019-10-08 DIAGNOSIS — J45.30 MILD PERSISTENT ASTHMA WITHOUT COMPLICATION: ICD-10-CM

## 2019-10-08 DIAGNOSIS — R91.8 MULTIPLE LUNG NODULES ON CT: Primary | ICD-10-CM

## 2019-10-08 PROCEDURE — 99213 OFFICE O/P EST LOW 20 MIN: CPT | Performed by: INTERNAL MEDICINE

## 2019-10-08 PROCEDURE — 71250 CT THORAX DX C-: CPT

## 2019-10-08 NOTE — PROGRESS NOTES
Pulmonary Follow-up      Patient Care Team:  Ivet Chau MD as PCP - General (Family Medicine)  Ivet Chau MD as PCP - Family Medicine    Chief Complaint / Reason: Follow-up of lung nodules    Subjective    74 y.o. former smoker who quit in 1986 with a history of asthma on Symbicort chronically who was admitted for a congestive heart failure exacerbation versus pneumonia on 6/18/2019 to the hospitalist service.  The patient was seen by Dr. Harrison who felt the patient had a congestive heart failure exacerbation and transthoracic echocardiogram done prior to successful cardioversion on 6/21/2019 showed severe prolapse of the posterior mitral leaflet with severe mitral regurgitation.  Left ventricular ejection fraction was normal.     CT angiogram of the chest from 6/19/2019 showed no evidence of pulmonary embolism but did show a left upper lobe infiltrate as well as some spiculated appearing symmetric densities in the mid lower lobes bilaterally associated with moderate pleural effusions.  The patient had been treated with antibiotics including doxycycline, vancomycin and cefepime.     The patient reports a history of allergy symptoms and yearly episodes of bronchitis beginning around November.  He does report some gastroesophageal reflux symptoms treated with Pepcid AC as well as allergic rhinitis type symptoms.  He uses Symbicort, but not always 2 puffs twice a day, stating he sometimes only takes 2 puffs once a day.    The patient was treated during the hospitalization with diuresis with improvement of symptoms.    Interval History:   Since I last saw him, the patient was seen at the [facility] and had an open mitral valve repair.  He reports he is doing much better.  He has had decreased lower extremity edema and much less dyspnea.  He is concerned that he will get his usual episode of bronchitis this November.  No fevers or chills.  No hemoptysis.  The patient had his follow-up CT scan done this  morning which was done due to concern of lung nodules on prior scan.    History taken from: patient    PMH/FH/Social History were reviewed and updated appropriately in the electronic medical record.     Review of Systems:   Review of Systems   Constitutional: Negative for activity change, appetite change, chills, diaphoresis, fatigue, fever and unexpected weight change.   HENT: Positive for congestion, postnasal drip and rhinorrhea. Negative for dental problem, ear pain, hearing loss, nosebleeds and sinus pressure.    Eyes: Negative for pain, discharge, redness and visual disturbance.   Respiratory: Positive for cough. Negative for apnea, choking, chest tightness, shortness of breath, wheezing and stridor.    Cardiovascular: Positive for leg swelling. Negative for chest pain and palpitations.   Gastrointestinal: Negative for abdominal distention, abdominal pain, blood in stool, constipation, diarrhea, nausea and vomiting.   Endocrine: Negative for cold intolerance, heat intolerance, polydipsia and polyuria.   Genitourinary: Negative for dysuria, frequency, hematuria and urgency.   Musculoskeletal: Negative for arthralgias, joint swelling and myalgias.   Skin: Negative for color change, rash and wound.   Allergic/Immunologic: Negative for environmental allergies and immunocompromised state.   Neurological: Negative for dizziness, syncope, weakness, light-headedness, numbness and headaches.   Hematological: Negative for adenopathy. Does not bruise/bleed easily.   Psychiatric/Behavioral: Negative for dysphoric mood, sleep disturbance and suicidal ideas. The patient is not nervous/anxious.    All other systems reviewed and are negative.    All other systems were reviewed and are negative.  Exceptions are noted in the subjective or above.      Objective     Vital Signs  Temp:  [97.9 °F (36.6 °C)] 97.9 °F (36.6 °C)  Heart Rate:  [100] 100  BP: (122)/(74) 122/74  No intake/output data recorded.  Body mass index is 39.78  kg/m².    Physical Exam:     Constitutional:    Alert, cooperative, in no acute distress   Head:    Normocephalic, without obvious abnormality, atraumatic   Eyes:            Lids and lashes normal, conjunctivae and sclerae normal, no   icterus, no pallor, corneas clear, PER   ENMT:   Ears appear intact with no abnormalities noted      No oral lesions, no thrush, oral mucosa moist   Neck:   No adenopathy, supple, trachea midline, no thyromegaly, no JVD       Lungs/Resp:     Normal effort, symmetric chest rise, no crepitus, clear to      auscultation bilaterally, no chest wall tenderness                 Heart/CV:    Regular rhythm and normal rate, normal S1 and S2, no            murmur   Abdomen/GI:     Soft, non-tender, non-distended, normal bowel sounds   :     Deferred   Extremities/MSK:   No clubbing or cyanosis.  Trace bilateral LE edema.  Normal tone.  No deformities.    Pulses:   Pulses palpable and equal bilaterally   Skin:   No bleeding, bruising or rash   Heme/Lymph:   No cervical or supraclavicular adenopathy.    Neurologic:    Psychiatric:       Moves all extremities with no obvious focal motor deficit.  Cranial nerves 2 - 12 grossly intact   Oriented x 3, normal affect.             Results Review:     I reviewed the patient's new clinical results.       Imaging:  I reviewed the patient's new imaging including reviewing the images and radiologist's report.     CT chest from October 8, 2019 was reviewed.  I reviewed the images and compared them to the prior CT scan from 6/19/2019.  There has been resolution of the patient's bilateral nodular infiltrates as well as bilateral small pleural effusions.  There are some areas of residual scarring in the lingula and right middle lobe.  Currently, I am awaiting the official radiologist's reading.      PFTs: None available    Medication Review:     Current Outpatient Medications   Medication Sig Dispense Refill   • albuterol (PROVENTIL HFA;VENTOLIN HFA) 108 (90  Base) MCG/ACT inhaler Inhale 2 puffs Every 4 (Four) Hours As Needed for Wheezing.     • allopurinol (ZYLOPRIM) 300 MG tablet Take 300 mg by mouth Daily.     • budesonide-formoterol (SYMBICORT) 160-4.5 MCG/ACT inhaler Inhale 2 puffs 2 (two) times a day.     • docusate sodium (COLACE) 100 MG capsule Take 100 mg by mouth 2 (Two) Times a Day.     • famotidine (PEPCID) 20 MG tablet Take 20 mg by mouth 2 (Two) Times a Day.     • furosemide (LASIX) 40 MG tablet Take 1 tablet by mouth 2 (Two) Times a Day. (Patient taking differently: Take 40 mg by mouth Daily.) 60 tablet 3   • metoprolol succinate XL (TOPROL-XL) 25 MG 24 hr tablet Take 1 tablet by mouth Daily. 30 tablet 11   • potassium chloride (K-DUR,KLOR-CON) 20 MEQ CR tablet Take 1 tablet by mouth Daily. 30 tablet 3   • theophylline (THEODUR) 300 MG 12 hr tablet Take 300 mg by mouth 2 (Two) Times a Day.     • warfarin (COUMADIN) 3 MG tablet Take 3 mg by mouth Daily.     • aspirin 81 MG EC tablet Take 81 mg by mouth Daily.     • atorvastatin (LIPITOR) 10 MG tablet Take 10 mg by mouth Every Night.     • iron polysacch complex-B12-FA (FERREX 150 FORTE) 150-0.025-1 MG capsule capsule Take 1 capsule by mouth Daily.       No current facility-administered medications for this visit.        Assessment/Plan   Problems Addressed this Visit        Respiratory    Multiple lung nodules on CT - Primary       Digestive    Obesity, Class III, BMI 40-49.9 (morbid obesity) (CMS/LTAC, located within [facility])       Other    S/P MVR (mitral valve repair)        74-year-old male who I saw in the hospital previously when he was admitted for congestive heart failure symptoms from mitral valve regurgitation.  At that time, he had a CT scan showing nodular appearing infiltrates as well as bilateral pleural effusions.  My plan was to see him back in 3 months with a repeat CT scan to ensure that all of the nodular areas had resolved.    Since I saw him last, the patient was seen at the [facility] and underwent a  surgical mitral valve repair.  He apparently is done well with the surgery and currently feels much better than he did previously.  He has no current evidence of decompensated heart failure.    The patient's repeat CT scan, done today, shows resolution of the previous findings of pleural effusion and nodular infiltrates.  There are a few areas of residual scarring as well as evidence of prior granulomatous disease.    Patient has a history of asthma on maintenance Symbicort and has been doing well with this.    I think all of the patient's prior symptoms may be attributable to his severe mitral regurgitation in light of his blown posterior leaflet and his symptoms seem to be completely resolved post surgical repair.    At this point the patient can follow-up with us on an as-needed basis.    Electronically signed by:  Daniel Powell MD  10/08/19  4:19 PM    I personally spent over half of a total 20 minutes face to face with the patient in counseling and discussion and/or coordination of care as described above.  This included review of the patient's current and prior imaging with him and discussion of differential diagnosis of his prior findings and plan for follow-up.    *. Please note that portions of this note were completed with a voice recognition program. Efforts were made to edit the dictations, but occasionally words are mistranscribed.

## 2019-10-11 ENCOUNTER — APPOINTMENT (OUTPATIENT)
Dept: GENERAL RADIOLOGY | Facility: HOSPITAL | Age: 74
End: 2019-10-11

## 2019-10-11 ENCOUNTER — ANESTHESIA (OUTPATIENT)
Dept: PERIOP | Facility: HOSPITAL | Age: 74
End: 2019-10-11

## 2019-10-11 ENCOUNTER — HOSPITAL ENCOUNTER (INPATIENT)
Facility: HOSPITAL | Age: 74
LOS: 6 days | Discharge: NURSING, REHAB OR LONG-TERM CARE | End: 2019-10-17
Attending: EMERGENCY MEDICINE | Admitting: NEUROLOGICAL SURGERY

## 2019-10-11 ENCOUNTER — ANESTHESIA EVENT (OUTPATIENT)
Dept: PERIOP | Facility: HOSPITAL | Age: 74
End: 2019-10-11

## 2019-10-11 ENCOUNTER — APPOINTMENT (OUTPATIENT)
Dept: CT IMAGING | Facility: HOSPITAL | Age: 74
End: 2019-10-11

## 2019-10-11 DIAGNOSIS — S06.5XAA SUBDURAL HEMATOMA (HCC): ICD-10-CM

## 2019-10-11 DIAGNOSIS — R41.82 ALTERED MENTAL STATUS, UNSPECIFIED ALTERED MENTAL STATUS TYPE: ICD-10-CM

## 2019-10-11 DIAGNOSIS — R41.841 COGNITIVE COMMUNICATION DEFICIT: ICD-10-CM

## 2019-10-11 DIAGNOSIS — Z74.09 IMPAIRED MOBILITY AND ADLS: ICD-10-CM

## 2019-10-11 DIAGNOSIS — Z78.9 IMPAIRED MOBILITY AND ADLS: ICD-10-CM

## 2019-10-11 DIAGNOSIS — Z74.09 IMPAIRED FUNCTIONAL MOBILITY, BALANCE, GAIT, AND ENDURANCE: Primary | ICD-10-CM

## 2019-10-11 LAB
ABO GROUP BLD: NORMAL
APTT PPP: 36.6 SECONDS (ref 24–37)
ARTERIAL PATENCY WRIST A: ABNORMAL
ATMOSPHERIC PRESS: ABNORMAL MM[HG]
BASE EXCESS BLDA CALC-SCNC: -3.7 MMOL/L (ref 0–2)
BASOPHILS # BLD AUTO: 0.02 10*3/MM3 (ref 0–0.2)
BASOPHILS NFR BLD AUTO: 0.2 % (ref 0–1.5)
BDY SITE: ABNORMAL
BLD GP AB SCN SERPL QL: NEGATIVE
BODY TEMPERATURE: 37 C
BUN BLDA-MCNC: 18 MG/DL (ref 8–26)
CA-I BLDA-SCNC: 1.25 MMOL/L (ref 1.2–1.32)
CHLORIDE BLDA-SCNC: 109 MMOL/L (ref 98–109)
CO2 BLDA-SCNC: 25 MMOL/L (ref 24–29)
COHGB MFR BLD: 0.8 % (ref 0–2)
CREAT BLDA-MCNC: 0.8 MG/DL (ref 0.6–1.3)
DEPRECATED RDW RBC AUTO: 49.1 FL (ref 37–54)
EOSINOPHIL # BLD AUTO: 0 10*3/MM3 (ref 0–0.4)
EOSINOPHIL NFR BLD AUTO: 0 % (ref 0.3–6.2)
EPAP: 0
ERYTHROCYTE [DISTWIDTH] IN BLOOD BY AUTOMATED COUNT: 15.9 % (ref 12.3–15.4)
GLUCOSE BLDC GLUCOMTR-MCNC: 113 MG/DL (ref 70–130)
HCO3 BLDA-SCNC: 21 MMOL/L (ref 20–26)
HCT VFR BLD AUTO: 47.5 % (ref 37.5–51)
HCT VFR BLD CALC: 39.8 %
HCT VFR BLDA CALC: 44 % (ref 38–51)
HGB BLD-MCNC: 14.3 G/DL (ref 13–17.7)
HGB BLDA-MCNC: 13 G/DL (ref 13.5–17.5)
HGB BLDA-MCNC: 15 G/DL (ref 12–17)
HOLD SPECIMEN: NORMAL
HOLD SPECIMEN: NORMAL
HOROWITZ INDEX BLD+IHG-RTO: 100 %
IMM GRANULOCYTES # BLD AUTO: 0.06 10*3/MM3 (ref 0–0.05)
IMM GRANULOCYTES NFR BLD AUTO: 0.5 % (ref 0–0.5)
INR PPP: 1.26 (ref 0.85–1.16)
INR PPP: 2.42 (ref 0.85–1.16)
INR PPP: 2.6 (ref 0.8–1.2)
IPAP: 0
LYMPHOCYTES # BLD AUTO: 0.69 10*3/MM3 (ref 0.7–3.1)
LYMPHOCYTES NFR BLD AUTO: 5.2 % (ref 19.6–45.3)
MCH RBC QN AUTO: 25.6 PG (ref 26.6–33)
MCHC RBC AUTO-ENTMCNC: 30.1 G/DL (ref 31.5–35.7)
MCV RBC AUTO: 85.1 FL (ref 79–97)
METHGB BLD QL: 0.9 % (ref 0–1.5)
MODALITY: ABNORMAL
MONOCYTES # BLD AUTO: 1.19 10*3/MM3 (ref 0.1–0.9)
MONOCYTES NFR BLD AUTO: 9 % (ref 5–12)
NEUTROPHILS # BLD AUTO: 11.2 10*3/MM3 (ref 1.7–7)
NEUTROPHILS NFR BLD AUTO: 85.1 % (ref 42.7–76)
NOTE: ABNORMAL
NRBC BLD AUTO-RTO: 0 /100 WBC (ref 0–0.2)
OXYHGB MFR BLDV: 98.3 % (ref 94–99)
PAW @ PEAK INSP FLOW SETTING VENT: 0 CMH2O
PCO2 BLDA: 36 MM HG
PCO2 TEMP ADJ BLD: 36 MM HG (ref 35–48)
PH BLDA: 7.37 PH UNITS (ref 7.35–7.45)
PH, TEMP CORRECTED: 7.37 PH UNITS
PLATELET # BLD AUTO: 304 10*3/MM3 (ref 140–450)
PMV BLD AUTO: 10.2 FL (ref 6–12)
PO2 BLDA: 351 MM HG (ref 83–108)
PO2 TEMP ADJ BLD: 351 MM HG (ref 83–108)
POTASSIUM BLDA-SCNC: 3.9 MMOL/L (ref 3.5–4.9)
PROTHROMBIN TIME: 15.2 SECONDS (ref 11.2–14.3)
PROTHROMBIN TIME: 25.3 SECONDS (ref 11.2–14.3)
PROTHROMBIN TIME: 29.4 SECONDS (ref 12.8–15.2)
RBC # BLD AUTO: 5.58 10*6/MM3 (ref 4.14–5.8)
RH BLD: POSITIVE
SODIUM BLDA-SCNC: 146 MMOL/L (ref 138–146)
T&S EXPIRATION DATE: NORMAL
TOTAL RATE: 0 BREATHS/MINUTE
WBC NRBC COR # BLD: 13.16 10*3/MM3 (ref 3.4–10.8)
WHOLE BLOOD HOLD SPECIMEN: NORMAL
WHOLE BLOOD HOLD SPECIMEN: NORMAL

## 2019-10-11 PROCEDURE — 99222 1ST HOSP IP/OBS MODERATE 55: CPT | Performed by: PHYSICIAN ASSISTANT

## 2019-10-11 PROCEDURE — 85610 PROTHROMBIN TIME: CPT

## 2019-10-11 PROCEDURE — 85610 PROTHROMBIN TIME: CPT | Performed by: EMERGENCY MEDICINE

## 2019-10-11 PROCEDURE — 86900 BLOOD TYPING SEROLOGIC ABO: CPT | Performed by: EMERGENCY MEDICINE

## 2019-10-11 PROCEDURE — 70450 CT HEAD/BRAIN W/O DYE: CPT

## 2019-10-11 PROCEDURE — 31500 INSERT EMERGENCY AIRWAY: CPT

## 2019-10-11 PROCEDURE — 85730 THROMBOPLASTIN TIME PARTIAL: CPT | Performed by: NEUROLOGICAL SURGERY

## 2019-10-11 PROCEDURE — 94799 UNLISTED PULMONARY SVC/PX: CPT

## 2019-10-11 PROCEDURE — 25010000002 CEFEPIME PER 500 MG: Performed by: ANESTHESIOLOGY

## 2019-10-11 PROCEDURE — 94002 VENT MGMT INPAT INIT DAY: CPT

## 2019-10-11 PROCEDURE — 71045 X-RAY EXAM CHEST 1 VIEW: CPT

## 2019-10-11 PROCEDURE — 61312 CRNEC/CRNOT STTL XDRL/SDRL: CPT | Performed by: NEUROLOGICAL SURGERY

## 2019-10-11 PROCEDURE — 99285 EMERGENCY DEPT VISIT HI MDM: CPT

## 2019-10-11 PROCEDURE — 80047 BASIC METABLC PNL IONIZED CA: CPT

## 2019-10-11 PROCEDURE — 86901 BLOOD TYPING SEROLOGIC RH(D): CPT

## 2019-10-11 PROCEDURE — 25010000002 FENTANYL CITRATE (PF) 100 MCG/2ML SOLUTION: Performed by: ANESTHESIOLOGY

## 2019-10-11 PROCEDURE — 85025 COMPLETE CBC W/AUTO DIFF WBC: CPT | Performed by: EMERGENCY MEDICINE

## 2019-10-11 PROCEDURE — 99291 CRITICAL CARE FIRST HOUR: CPT

## 2019-10-11 PROCEDURE — C1713 ANCHOR/SCREW BN/BN,TIS/BN: HCPCS | Performed by: NEUROLOGICAL SURGERY

## 2019-10-11 PROCEDURE — 85610 PROTHROMBIN TIME: CPT | Performed by: NEUROLOGICAL SURGERY

## 2019-10-11 PROCEDURE — 009400Z DRAINAGE OF INTRACRANIAL SUBDURAL SPACE WITH DRAINAGE DEVICE, OPEN APPROACH: ICD-10-PCS | Performed by: NEUROLOGICAL SURGERY

## 2019-10-11 PROCEDURE — 86850 RBC ANTIBODY SCREEN: CPT | Performed by: EMERGENCY MEDICINE

## 2019-10-11 PROCEDURE — 25010000002 SUCCINYLCHOLINE PER 20 MG: Performed by: EMERGENCY MEDICINE

## 2019-10-11 PROCEDURE — 85014 HEMATOCRIT: CPT

## 2019-10-11 PROCEDURE — 86900 BLOOD TYPING SEROLOGIC ABO: CPT

## 2019-10-11 PROCEDURE — 86901 BLOOD TYPING SEROLOGIC RH(D): CPT | Performed by: EMERGENCY MEDICINE

## 2019-10-11 PROCEDURE — 25010000002 PROPOFOL 10 MG/ML EMULSION: Performed by: ANESTHESIOLOGY

## 2019-10-11 PROCEDURE — 99233 SBSQ HOSP IP/OBS HIGH 50: CPT | Performed by: INTERNAL MEDICINE

## 2019-10-11 PROCEDURE — 25010000002 MIDAZOLAM PER 1 MG: Performed by: EMERGENCY MEDICINE

## 2019-10-11 PROCEDURE — S0260 H&P FOR SURGERY: HCPCS | Performed by: PHYSICIAN ASSISTANT

## 2019-10-11 PROCEDURE — 82805 BLOOD GASES W/O2 SATURATION: CPT

## 2019-10-11 DEVICE — SCRW MATRIXNEURO SD TI 4MM: Type: IMPLANTABLE DEVICE | Status: FUNCTIONAL

## 2019-10-11 DEVICE — PLT CVR BURHL MATRIXNEURO TI 17MM: Type: IMPLANTABLE DEVICE | Status: FUNCTIONAL

## 2019-10-11 DEVICE — WAX BONE HEMO AESCULAP 2.5GM: Type: IMPLANTABLE DEVICE | Status: FUNCTIONAL

## 2019-10-11 DEVICE — PLT CVR BURHL MATRIXNEURO 12MM: Type: IMPLANTABLE DEVICE | Status: FUNCTIONAL

## 2019-10-11 DEVICE — PLT MATRIXNEURO STR TI 2HL 12MM: Type: IMPLANTABLE DEVICE | Status: FUNCTIONAL

## 2019-10-11 RX ORDER — POTASSIUM CHLORIDE 750 MG/1
20 CAPSULE, EXTENDED RELEASE ORAL
Status: DISCONTINUED | OUTPATIENT
Start: 2019-10-12 | End: 2019-10-17 | Stop reason: HOSPADM

## 2019-10-11 RX ORDER — ETOMIDATE 2 MG/ML
INJECTION INTRAVENOUS
Status: COMPLETED | OUTPATIENT
Start: 2019-10-11 | End: 2019-10-11

## 2019-10-11 RX ORDER — ESMOLOL HYDROCHLORIDE 10 MG/ML
INJECTION INTRAVENOUS AS NEEDED
Status: DISCONTINUED | OUTPATIENT
Start: 2019-10-11 | End: 2019-10-11 | Stop reason: SURG

## 2019-10-11 RX ORDER — FENTANYL CITRATE 50 UG/ML
INJECTION, SOLUTION INTRAMUSCULAR; INTRAVENOUS AS NEEDED
Status: DISCONTINUED | OUTPATIENT
Start: 2019-10-11 | End: 2019-10-11 | Stop reason: SURG

## 2019-10-11 RX ORDER — SODIUM CHLORIDE 0.9 % (FLUSH) 0.9 %
10 SYRINGE (ML) INJECTION AS NEEDED
Status: DISCONTINUED | OUTPATIENT
Start: 2019-10-11 | End: 2019-10-11

## 2019-10-11 RX ORDER — BUPIVACAINE HYDROCHLORIDE AND EPINEPHRINE 2.5; 5 MG/ML; UG/ML
INJECTION, SOLUTION EPIDURAL; INFILTRATION; INTRACAUDAL; PERINEURAL AS NEEDED
Status: DISCONTINUED | OUTPATIENT
Start: 2019-10-11 | End: 2019-10-11 | Stop reason: HOSPADM

## 2019-10-11 RX ORDER — ATORVASTATIN CALCIUM 10 MG/1
10 TABLET, FILM COATED ORAL NIGHTLY
Status: DISCONTINUED | OUTPATIENT
Start: 2019-10-11 | End: 2019-10-17 | Stop reason: HOSPADM

## 2019-10-11 RX ORDER — BISACODYL 10 MG
10 SUPPOSITORY, RECTAL RECTAL DAILY PRN
Status: DISCONTINUED | OUTPATIENT
Start: 2019-10-11 | End: 2019-10-17 | Stop reason: HOSPADM

## 2019-10-11 RX ORDER — FUROSEMIDE 40 MG/1
40 TABLET ORAL
Status: DISCONTINUED | OUTPATIENT
Start: 2019-10-11 | End: 2019-10-17 | Stop reason: HOSPADM

## 2019-10-11 RX ORDER — SODIUM CHLORIDE 9 MG/ML
INJECTION, SOLUTION INTRAVENOUS CONTINUOUS PRN
Status: DISCONTINUED | OUTPATIENT
Start: 2019-10-11 | End: 2019-10-11 | Stop reason: SURG

## 2019-10-11 RX ORDER — PROMETHAZINE HYDROCHLORIDE 25 MG/ML
12.5 INJECTION, SOLUTION INTRAMUSCULAR; INTRAVENOUS EVERY 6 HOURS PRN
Status: DISCONTINUED | OUTPATIENT
Start: 2019-10-11 | End: 2019-10-17 | Stop reason: HOSPADM

## 2019-10-11 RX ORDER — ROCURONIUM BROMIDE 10 MG/ML
INJECTION, SOLUTION INTRAVENOUS AS NEEDED
Status: DISCONTINUED | OUTPATIENT
Start: 2019-10-11 | End: 2019-10-11 | Stop reason: SURG

## 2019-10-11 RX ORDER — FAMOTIDINE 20 MG/1
20 TABLET, FILM COATED ORAL 2 TIMES DAILY
Status: DISCONTINUED | OUTPATIENT
Start: 2019-10-11 | End: 2019-10-17 | Stop reason: HOSPADM

## 2019-10-11 RX ORDER — METOPROLOL SUCCINATE 25 MG/1
25 TABLET, EXTENDED RELEASE ORAL
Status: DISCONTINUED | OUTPATIENT
Start: 2019-10-12 | End: 2019-10-17 | Stop reason: HOSPADM

## 2019-10-11 RX ORDER — KETOROLAC TROMETHAMINE 15 MG/ML
15 INJECTION, SOLUTION INTRAMUSCULAR; INTRAVENOUS EVERY 6 HOURS PRN
Status: DISPENSED | OUTPATIENT
Start: 2019-10-11 | End: 2019-10-16

## 2019-10-11 RX ORDER — ETOMIDATE 2 MG/ML
20 INJECTION INTRAVENOUS ONCE
Status: DISCONTINUED | OUTPATIENT
Start: 2019-10-11 | End: 2019-10-11

## 2019-10-11 RX ORDER — ONDANSETRON 2 MG/ML
4 INJECTION INTRAMUSCULAR; INTRAVENOUS EVERY 6 HOURS PRN
Status: DISCONTINUED | OUTPATIENT
Start: 2019-10-11 | End: 2019-10-17 | Stop reason: HOSPADM

## 2019-10-11 RX ORDER — MIDAZOLAM HYDROCHLORIDE 1 MG/ML
3 INJECTION INTRAMUSCULAR; INTRAVENOUS ONCE
Status: COMPLETED | OUTPATIENT
Start: 2019-10-11 | End: 2019-10-11

## 2019-10-11 RX ORDER — PROPOFOL 10 MG/ML
VIAL (ML) INTRAVENOUS
Status: COMPLETED
Start: 2019-10-11 | End: 2019-10-12

## 2019-10-11 RX ORDER — SUCCINYLCHOLINE CHLORIDE 20 MG/ML
INJECTION INTRAMUSCULAR; INTRAVENOUS
Status: COMPLETED | OUTPATIENT
Start: 2019-10-11 | End: 2019-10-11

## 2019-10-11 RX ORDER — MAGNESIUM HYDROXIDE 1200 MG/15ML
LIQUID ORAL AS NEEDED
Status: DISCONTINUED | OUTPATIENT
Start: 2019-10-11 | End: 2019-10-11 | Stop reason: HOSPADM

## 2019-10-11 RX ADMIN — ROCURONIUM BROMIDE 20 MG: 10 SOLUTION INTRAVENOUS at 22:20

## 2019-10-11 RX ADMIN — CEFEPIME HYDROCHLORIDE 2 G: 2 INJECTION, POWDER, FOR SOLUTION INTRAVENOUS at 21:41

## 2019-10-11 RX ADMIN — FENTANYL CITRATE 100 MCG: 50 INJECTION, SOLUTION INTRAMUSCULAR; INTRAVENOUS at 21:04

## 2019-10-11 RX ADMIN — ROCURONIUM BROMIDE 30 MG: 10 SOLUTION INTRAVENOUS at 21:04

## 2019-10-11 RX ADMIN — METOPROLOL TARTRATE 2 MG: 5 INJECTION, SOLUTION INTRAVENOUS at 22:00

## 2019-10-11 RX ADMIN — FENTANYL CITRATE 150 MCG: 50 INJECTION, SOLUTION INTRAMUSCULAR; INTRAVENOUS at 21:50

## 2019-10-11 RX ADMIN — PROPOFOL 25 MCG/KG/MIN: 10 INJECTION, EMULSION INTRAVENOUS at 22:21

## 2019-10-11 RX ADMIN — SODIUM CHLORIDE: 9 INJECTION, SOLUTION INTRAVENOUS at 20:58

## 2019-10-11 RX ADMIN — SUCCINYLCHOLINE CHLORIDE 120 MG: 20 INJECTION, SOLUTION INTRAMUSCULAR; INTRAVENOUS at 20:26

## 2019-10-11 RX ADMIN — MIDAZOLAM 3 MG: 1 INJECTION INTRAMUSCULAR; INTRAVENOUS at 20:46

## 2019-10-11 RX ADMIN — ETOMIDATE 25 MG: 2 INJECTION, SOLUTION INTRAVENOUS at 20:23

## 2019-10-11 RX ADMIN — ESMOLOL HYDROCHLORIDE 60 MG: 10 INJECTION, SOLUTION INTRAVENOUS at 21:50

## 2019-10-11 RX ADMIN — METOPROLOL TARTRATE 2 MG: 5 INJECTION, SOLUTION INTRAVENOUS at 22:04

## 2019-10-12 ENCOUNTER — APPOINTMENT (OUTPATIENT)
Dept: GENERAL RADIOLOGY | Facility: HOSPITAL | Age: 74
End: 2019-10-12

## 2019-10-12 PROBLEM — J96.00 ACUTE RESPIRATORY FAILURE (HCC): Status: ACTIVE | Noted: 2019-10-12

## 2019-10-12 PROBLEM — Z98.890 S/P MVR (MITRAL VALVE REPAIR): Status: ACTIVE | Noted: 2019-08-15

## 2019-10-12 LAB
ABO + RH BLD: NORMAL
ABO + RH BLD: NORMAL
ABO GROUP BLD: NORMAL
BH BB BLOOD EXPIRATION DATE: NORMAL
BH BB BLOOD EXPIRATION DATE: NORMAL
BH BB BLOOD TYPE BARCODE: 8400
BH BB BLOOD TYPE BARCODE: 8400
BH BB DISPENSE STATUS: NORMAL
BH BB DISPENSE STATUS: NORMAL
BH BB PRODUCT CODE: NORMAL
BH BB PRODUCT CODE: NORMAL
BH BB UNIT NUMBER: NORMAL
BH BB UNIT NUMBER: NORMAL
GLUCOSE BLDC GLUCOMTR-MCNC: 117 MG/DL (ref 70–130)
GLUCOSE BLDC GLUCOMTR-MCNC: 122 MG/DL (ref 70–130)
GLUCOSE BLDC GLUCOMTR-MCNC: 126 MG/DL (ref 70–130)
GLUCOSE BLDC GLUCOMTR-MCNC: 127 MG/DL (ref 70–130)
GLUCOSE BLDC GLUCOMTR-MCNC: 141 MG/DL (ref 70–130)
RH BLD: POSITIVE
UNIT  ABO: NORMAL
UNIT  ABO: NORMAL
UNIT  RH: NORMAL
UNIT  RH: NORMAL

## 2019-10-12 PROCEDURE — 25010000002 PROPOFOL 10 MG/ML EMULSION: Performed by: NURSE PRACTITIONER

## 2019-10-12 PROCEDURE — 82962 GLUCOSE BLOOD TEST: CPT

## 2019-10-12 PROCEDURE — 94799 UNLISTED PULMONARY SVC/PX: CPT

## 2019-10-12 PROCEDURE — 94003 VENT MGMT INPAT SUBQ DAY: CPT

## 2019-10-12 PROCEDURE — 74018 RADEX ABDOMEN 1 VIEW: CPT

## 2019-10-12 PROCEDURE — 25010000002 PROPOFOL 10 MG/ML EMULSION

## 2019-10-12 PROCEDURE — 25010000002 KETOROLAC TROMETHAMINE PER 15 MG: Performed by: PHYSICIAN ASSISTANT

## 2019-10-12 PROCEDURE — 99291 CRITICAL CARE FIRST HOUR: CPT | Performed by: INTERNAL MEDICINE

## 2019-10-12 PROCEDURE — 99231 SBSQ HOSP IP/OBS SF/LOW 25: CPT | Performed by: INTERNAL MEDICINE

## 2019-10-12 PROCEDURE — 25010000002 CEFEPIME PER 500 MG: Performed by: PHYSICIAN ASSISTANT

## 2019-10-12 RX ORDER — CHLORHEXIDINE GLUCONATE 0.12 MG/ML
15 RINSE ORAL EVERY 12 HOURS SCHEDULED
Status: DISCONTINUED | OUTPATIENT
Start: 2019-10-12 | End: 2019-10-13

## 2019-10-12 RX ADMIN — FAMOTIDINE 20 MG: 20 TABLET ORAL at 02:53

## 2019-10-12 RX ADMIN — POTASSIUM CHLORIDE 20 MEQ: 750 CAPSULE, EXTENDED RELEASE ORAL at 09:09

## 2019-10-12 RX ADMIN — ATORVASTATIN CALCIUM 10 MG: 10 TABLET, FILM COATED ORAL at 20:46

## 2019-10-12 RX ADMIN — PROPOFOL 5 MCG/KG/MIN: 10 INJECTION, EMULSION INTRAVENOUS at 03:14

## 2019-10-12 RX ADMIN — CHLORHEXIDINE GLUCONATE 0.12% ORAL RINSE 15 ML: 1.2 LIQUID ORAL at 21:15

## 2019-10-12 RX ADMIN — FUROSEMIDE 40 MG: 40 TABLET ORAL at 02:54

## 2019-10-12 RX ADMIN — METOPROLOL SUCCINATE 25 MG: 25 TABLET, EXTENDED RELEASE ORAL at 09:09

## 2019-10-12 RX ADMIN — KETOROLAC TROMETHAMINE 15 MG: 15 INJECTION, SOLUTION INTRAMUSCULAR; INTRAVENOUS at 21:48

## 2019-10-12 RX ADMIN — PROPOFOL 10 MCG/KG/MIN: 10 INJECTION, EMULSION INTRAVENOUS at 21:36

## 2019-10-12 RX ADMIN — FUROSEMIDE 40 MG: 40 TABLET ORAL at 17:15

## 2019-10-12 RX ADMIN — FUROSEMIDE 40 MG: 40 TABLET ORAL at 09:09

## 2019-10-12 RX ADMIN — FAMOTIDINE 20 MG: 20 TABLET ORAL at 09:09

## 2019-10-12 RX ADMIN — CEFEPIME HYDROCHLORIDE 2 G: 2 INJECTION, POWDER, FOR SOLUTION INTRAVENOUS at 12:32

## 2019-10-12 RX ADMIN — CEFEPIME HYDROCHLORIDE 2 G: 2 INJECTION, POWDER, FOR SOLUTION INTRAVENOUS at 04:43

## 2019-10-12 RX ADMIN — ATORVASTATIN CALCIUM 10 MG: 10 TABLET, FILM COATED ORAL at 02:53

## 2019-10-12 RX ADMIN — FAMOTIDINE 20 MG: 20 TABLET ORAL at 20:46

## 2019-10-12 RX ADMIN — CHLORHEXIDINE GLUCONATE 0.12% ORAL RINSE 15 ML: 1.2 LIQUID ORAL at 09:09

## 2019-10-12 NOTE — PROGRESS NOTES
[facility] at [facility]  PROGRESS NOTE      Date of Admission: 10/11/2019  Length of Stay: 1  Primary Care Physician: Ivet Chau MD    Chief Complaint: Follow-up SDH in patient with AF on Warfarin.  Problem List:   1.  Coronary Artery Disease, minor and non-obstructive  A. History of abnormal stress test 07/2016, Dr. Shaver  B. Single episode of severe bilateral arm pain at rest October 2018  C. LHC 10/3/2018:  Normal EF, Minor non obstructive CAD. Mild dilation of aortic root  D. CT Angio Chest 10/4/2018: No important abnormalities  2.  Severe MR  A. PENNY June 2019 with MVP, severe MR (at time onset AF)  B. Mitral valve repair 7/25/2019 [facility] with Medtronic 29mm annuloplasty band               i. Temporary epicardial pacing wires cut and retained   C. Echo 7/29/2019: LVSF is normal, mild AI, s/p mitral annular ring repair with no evidence of MR  3.  Essential Hypertension  4.  COPD  5.  Asthma  6.  Morbid obesity, BMI 43.85  7.  Atrial Fibrillation with RVR              A. New diagnosis, 6/19/19, CHADS2 Vasc = 3  B. ECV x2 June 2019, successful  C. On Flecainide and Eliquis   D. Post-op MVR, Flecainide discontinued, and Eliquis changed to Warfarin   8.   Obstructive Sleep Apnea  9.   Pneumonia (?)              A.  Diagnosed May 2019 [facility] PNA, was hospitalized [facility] 5/26-5/29/19.              B.  Treated with IV Levaquin, IV Steroids, duonebs.  DC'd home on oral Levaquin 750mg  10.  Gout     Subjective      HPI: This patient is well-known to me.  I initially evaluated him in June of this year following presentation with new onset rapid atrial fibrillation and congestive heart failure related to severe MR that was associated with mitral valve prolapse syndrome.  Following initial medical stabilization and [facility], the patient was evaluated by Daniel Hunter MD ([facility]) and underwent mitral valve repair using Medtronic 29 mm annuloplasty band on  "7/25/2019.  The patient's postoperative course was unremarkable.  He was seen in our offices on 8/15/2019.  He was doing very well at that time and an EKG demonstrated sinus rhythm.  It is noteworthy that at the time of his surgical discharge, he was placed on warfarin and it was recommended that warfarin be continued for at least 3 months prior to the use of a NOAC.  I do not have information as to whether or not the left atrial appendage was excluded.    The patient was admitted to [facility] last night after being \"found down\".  An evaluation revealed an INR of 2.6, a Myrtle Coma Scale of 7, and a large left sided \"acute on chronic\" subdural hematoma with midline shift.  Charles Patten MD drain the subdural hematoma with the subsequent transfer of the patient to the 2N intensive care unit.  Just prior to his surgery, the patient received Kcentra.       Objective   Vital Signs:  Temp:  [97.5 °F (36.4 °C)-99.7 °F (37.6 °C)] 98.9 °F (37.2 °C)  Heart Rate:  [] 90  Resp:  [14-20] 15  BP: ()/() 122/69  Arterial Line BP: ()/(48-69) 131/58  FiO2 (%):  [45 %-50 %] 45 %    Intake/Output Summary (Last 24 hours) at 10/12/2019 1747  Last data filed at 10/12/2019 1400  Gross per 24 hour   Intake 1155.3 ml   Output 1575 ml   Net -419.7 ml     Flowsheet Rows      First Filed Value   Admission Height  185.4 cm (73\") Documented at 10/11/2019 1938   Admission Weight  115 kg (254 lb) Documented at 10/11/2019 1938          Physical Exam:   HEENT: Fundoscopic deferred, otherwise unremarkable. Head wrapped. Intubated.  NECK: No Jugular venous distention, adenopathy, or thyromegaly noted.   HEART: No discrete PMI is noted. The 1st and 2nd heart sounds are normal, and no murmurs, gallops, rubs, clicks, or other sounds are noted.  LUNGS: Clear to auscultation.  ABDOMEN: Normal exam.  NEUROLOGIC: Per NS. At this time, the patient is aroused without issue.  He follows my command to squeeze my fingers " with both hands and to move both feet appropriately.  He has no obvious large focal neurologic issue at the current time.  EXTREMITIES: No clubbing, cyanosis, or edema noted.         Results Review:   Results from last 7 days   Lab Units 10/11/19  1941   CREATININE mg/dL 0.80         Results from last 7 days   Lab Units 10/11/19  2009 10/11/19  1941   WBC 10*3/mm3 13.16*  --    HEMOGLOBIN g/dL 14.3  --    HEMOGLOBIN, POC g/dL  --  15.0   HEMATOCRIT % 47.5  --    HEMATOCRIT POC %  --  44   PLATELETS 10*3/mm3 304  --      Results from last 7 days   Lab Units 10/11/19  2128 10/11/19  2128 10/11/19  2009 10/11/19  1939 10/07/19   INR  1.26*  --  2.42* 2.6* 2.00   APTT seconds  --  36.6  --   --   --                            Current Medications:    atorvastatin 10 mg Oral Nightly   chlorhexidine 15 mL Mouth/Throat Q12H   famotidine 20 mg Oral BID   furosemide 40 mg Oral BID   metoprolol succinate XL 25 mg Oral Q24H   potassium chloride 20 mEq Oral Daily With Breakfast       niCARdipine 5-15 mg/hr Last Rate: Stopped (10/12/19 0321)   propofol 5-50 mcg/kg/min Last Rate: Stopped (10/12/19 1200)         I reviewed the patient's new clinical results.  I personally viewed and interpreted the patient's EKG/Telemetry data    Assessment and Plan:     Thank you for notifying us of the patient's admission.  He seems to be improving after rapid an expert treatment of a life-threatening situation.  From a cardiac standpoint, we will follow his blood pressure and other cardiovascular related matters which will not be a burdensome as just prior to his operation this summer, he had essentially normal coronary arteries and normal left ventricular systolic function.  Future anticoagulation will be dependent on the patient's clinical outcome from his subdural hematoma and whether or not he underwent exclusion of the left atrial appendage at the time of his surgery.      Twan Harrison MD  10/12/19  5:47 PM

## 2019-10-12 NOTE — ANESTHESIA POSTPROCEDURE EVALUATION
Patient: Alok Tobias    Procedure Summary     Date:  10/11/19 Room / Location:   [facility] /  [facility]    Anesthesia Start:  2058 Anesthesia Stop:  2321    Procedure:  CRANIOTOMY FOR SUBDURAL HEMATOMA (Left Head) Diagnosis:      Surgeon:  Reagan Patten MD Provider:  Paul Norman MD    Anesthesia Type:  general ASA Status:  5 - Emergent          Anesthesia Type: general  Last vitals  BP   (!) 181/106 (10/11/19 2042)   Temp   97.5 °F (36.4 °C) (10/11/19 1938)   Pulse   103 (10/11/19 2049)   Resp   16 (10/11/19 2031)     SpO2   100 % (10/11/19 2049)     Post Anesthesia Care and Evaluation    Patient location during evaluation: ICU  Patient participation: complete - patient cannot participate  Level of consciousness: obtunded/minimal responses  Airway patency: patent  Anesthetic complications: No anesthetic complications    Cardiovascular status: acceptable  Respiratory status: acceptable, intubated and ETT

## 2019-10-12 NOTE — PLAN OF CARE
Problem: Patient Care Overview  Goal: Plan of Care Review  Outcome: Ongoing (interventions implemented as appropriate)   10/12/19 0599   Coping/Psychosocial   Plan of Care Reviewed With patient   Plan of Care Review   Progress no change   OTHER   Outcome Summary Pt arrived after craniotomy last night. VSS. Pt is now following commands. Will continue to monitor.

## 2019-10-12 NOTE — ED PROVIDER NOTES
"Subjective   Mr. Alok Tobias is a 74 y.o. male who presents to the ED with c/o neurologic problem. He was flown in. Flight crew report his last known normal was 72 hours ago. He ws found lying on the floor, unresponsive. They report he was able to  strongly with the left hand but had difficulty following commands. His heart rate was in the 140s and his blood pressure was 158/102. His oxygen saturations were in the 80s per EMS. He has had aortic valve replacement and is on Plavix but no other anticoagulants. He was administered 1 L of fluids en route. There are no other known symptoms at this time.        History provided by:  Patient  History limited by:  Mental status change  Neurologic Problem   The patient's primary symptoms include an altered mental status. This is a new problem. The current episode started in the past 7 days. The last time the patient was known to be well was 10/8/2019 10:00 PM.  The problem is unchanged. There was no focality noted. Past treatments include nothing.       Review of Systems   Unable to perform ROS: Mental status change       Past Medical History:   Diagnosis Date   • Asthma    • CHF (congestive heart failure) (CMS/[facility])    • COPD (chronic obstructive pulmonary disease) (CMS/[facility])    • Elevated PSA    • Hypertension        Allergies   Allergen Reactions   • Dye Fdc Red [Red Dye] Nausea And Vomiting     All dyes, IV dye, hair dyes   • Penicillins Other (See Comments)     \"CAUSES CHLAMYDIA\"       Past Surgical History:   Procedure Laterality Date   • APPENDECTOMY     • CARDIAC CATHETERIZATION N/A 10/3/2018    Procedure: Left Heart Cath;  Surgeon: Twan Harrison MD;  Location: [facility] CATH INVASIVE LOCATION;  Service: Cardiology   • MITRAL VALVE REPLACEMENT     • TONSILLECTOMY         Family History   Problem Relation Age of Onset   • Multiple sclerosis Mother        Social History     Socioeconomic History   • Marital status:      Spouse name: Not on file   • Number " of children: Not on file   • Years of education: Not on file   • Highest education level: Not on file   Tobacco Use   • Smoking status: Former Smoker     Packs/day: 1.50     Years: 20.00     Pack years: 30.00     Types: Cigarettes     Last attempt to quit: 1986     Years since quittin.4   • Smokeless tobacco: Never Used   Substance and Sexual Activity   • Alcohol use: Yes     Alcohol/week: 1.8 - 2.4 oz     Types: 3 - 4 Cans of beer per week     Comment: 3-4 beers every evening   • Drug use: No   • Sexual activity: Defer         Objective   Physical Exam   Constitutional: He appears well-developed and well-nourished. He appears distressed.   HENT:   Head: Normocephalic and atraumatic.   Nose: Nose normal.   Eyes: Conjunctivae are normal. No scleral icterus.   Neck: Normal range of motion. Neck supple.   Cardiovascular: Regular rhythm and normal heart sounds.   Pulmonary/Chest: Effort normal and breath sounds normal. No respiratory distress.   Abdominal: Soft. There is no tenderness.   Musculoskeletal: Normal range of motion. He exhibits no edema.   Neurological: GCS eye subscore is 1. GCS verbal subscore is 1. GCS motor subscore is 5.   Very faint grasp on the right hand, flaccid on the left. He wiggles both feet but no effort against gravity. He doesn't answer questions. GCS 7.   Skin: Skin is warm and dry.   Nursing note and vitals reviewed.      Critical Care  Performed by: Leland Van MD  Authorized by: Leland Van MD     Critical care provider statement:     Critical care time (minutes):  45    Critical care time was exclusive of:  Separately billable procedures and treating other patients    Critical care was necessary to treat or prevent imminent or life-threatening deterioration of the following conditions:  CNS failure or compromise    Critical care was time spent personally by me on the following activities:  Discussions with consultants, development of treatment plan with patient or  surrogate, examination of patient, review of old charts, ordering and review of laboratory studies, ordering and review of radiographic studies, ordering and performing treatments and interventions, re-evaluation of patient's condition, obtaining history from patient or surrogate, pulse oximetry and evaluation of patient's response to treatment  Intubation  Date/Time: 10/11/2019 10:24 PM  Performed by: Leland Van MD  Authorized by: Leland Van MD     Consent:     Consent obtained:  Emergent situation  Pre-procedure details:     Patient status:  Altered mental status    Pretreatment meds: Etomidate.    Paralytics:  Succinylcholine  Procedure details:     CPR in progress: no      Intubation method:  Oral    Oral intubation technique:  Video-assisted    Laryngoscope blade:  Mac 4    Tube size (mm):  8.0    Tube type:  Cuffed    Number of attempts:  1    Tube visualized through cords: yes    Placement assessment:     ETT to lip:  23    Tube secured with:  ETT emery    Breath sounds:  Equal    Placement verification: CXR verification      CXR findings:  ETT in proper place  Post-procedure details:     Patient tolerance of procedure:  Tolerated well, no immediate complications             ED Course     Recent Results (from the past 24 hour(s))   POC Protime / INR    Collection Time: 10/11/19  7:39 PM   Result Value Ref Range    Protime 29.4 (H) 12.8 - 15.2 seconds    INR 2.6 (H) 0.8 - 1.2   POC CHEM 8    Collection Time: 10/11/19  7:41 PM   Result Value Ref Range    Glucose 113 70 - 130 mg/dL    BUN 18 8 - 26 mg/dL    Creatinine 0.80 0.60 - 1.30 mg/dL    Sodium 146 138 - 146 mmol/L    Potassium 3.9 3.5 - 4.9 mmol/L    Chloride 109 98 - 109 mmol/L    Total CO2 25 24 - 29 mmol/L    Hemoglobin 15.0 12.0 - 17.0 g/dL    Hematocrit 44 38 - 51 %    Ionized Calcium 1.25 1.20 - 1.32 mmol/L   Light Blue Top    Collection Time: 10/11/19  8:09 PM   Result Value Ref Range    Extra Tube hold for add-on    Green Top (Gel)     Collection Time: 10/11/19  8:09 PM   Result Value Ref Range    Extra Tube Hold for add-ons.    Lavender Top    Collection Time: 10/11/19  8:09 PM   Result Value Ref Range    Extra Tube hold for add-on    Gold Top - SST    Collection Time: 10/11/19  8:09 PM   Result Value Ref Range    Extra Tube Hold for add-ons.    CBC Auto Differential    Collection Time: 10/11/19  8:09 PM   Result Value Ref Range    WBC 13.16 (H) 3.40 - 10.80 10*3/mm3    RBC 5.58 4.14 - 5.80 10*6/mm3    Hemoglobin 14.3 13.0 - 17.7 g/dL    Hematocrit 47.5 37.5 - 51.0 %    MCV 85.1 79.0 - 97.0 fL    MCH 25.6 (L) 26.6 - 33.0 pg    MCHC 30.1 (L) 31.5 - 35.7 g/dL    RDW 15.9 (H) 12.3 - 15.4 %    RDW-SD 49.1 37.0 - 54.0 fl    MPV 10.2 6.0 - 12.0 fL    Platelets 304 140 - 450 10*3/mm3    Neutrophil % 85.1 (H) 42.7 - 76.0 %    Lymphocyte % 5.2 (L) 19.6 - 45.3 %    Monocyte % 9.0 5.0 - 12.0 %    Eosinophil % 0.0 (L) 0.3 - 6.2 %    Basophil % 0.2 0.0 - 1.5 %    Immature Grans % 0.5 0.0 - 0.5 %    Neutrophils, Absolute 11.20 (H) 1.70 - 7.00 10*3/mm3    Lymphocytes, Absolute 0.69 (L) 0.70 - 3.10 10*3/mm3    Monocytes, Absolute 1.19 (H) 0.10 - 0.90 10*3/mm3    Eosinophils, Absolute 0.00 0.00 - 0.40 10*3/mm3    Basophils, Absolute 0.02 0.00 - 0.20 10*3/mm3    Immature Grans, Absolute 0.06 (H) 0.00 - 0.05 10*3/mm3    nRBC 0.0 0.0 - 0.2 /100 WBC   Protime-INR    Collection Time: 10/11/19  8:09 PM   Result Value Ref Range    Protime 25.3 (H) 11.2 - 14.3 Seconds    INR 2.42 (H) 0.85 - 1.16   Type & Screen    Collection Time: 10/11/19  8:09 PM   Result Value Ref Range    ABO Type O     RH type Positive     Antibody Screen Negative     T&S Expiration Date 10/14/2019 11:59:59 PM    aPTT    Collection Time: 10/11/19  9:28 PM   Result Value Ref Range    PTT 36.6 24.0 - 37.0 seconds   Protime-INR    Collection Time: 10/11/19  9:28 PM   Result Value Ref Range    Protime 15.2 (H) 11.2 - 14.3 Seconds    INR 1.26 (H) 0.85 - 1.16   Prepare Fresh Frozen Plasma, 2 Units     Collection Time: 10/11/19  9:42 PM   Result Value Ref Range    Product Code K5134C93     Unit Number [id number]     UNIT  ABO AB     UNIT  RH POS     Dispense Status XM     Blood Type ABPOS     Blood Expiration Date 201910122140     Blood Type Barcode 8400     Product Code D8672Y50     Unit Number [id number]     UNIT  ABO AB     UNIT  RH POS     Dispense Status XM     Blood Type ABPOS     Blood Expiration Date 201910122140     Blood Type Barcode 8400    Blood Gas, Arterial With Co-Ox    Collection Time: 10/11/19  9:44 PM   Result Value Ref Range    Site Arterial Line     Donnell's Test N/A     pH, Arterial 7.374 7.350 - 7.450 pH units    pCO2, Arterial 36.0 mm Hg    pO2, Arterial 351.0 (H) 83.0 - 108.0 mm Hg    HCO3, Arterial 21.0 20.0 - 26.0 mmol/L    Base Excess, Arterial -3.7 (L) 0.0 - 2.0 mmol/L    Hemoglobin, Blood Gas 13.0 (L) 13.5 - 17.5 g/dL    Hematocrit, Blood Gas 39.8 %    Oxyhemoglobin 98.3 94 - 99 %    Methemoglobin 0.90 0.00 - 1.50 %    Carboxyhemoglobin 0.8 0 - 2 %    Temperature 37.0 C    Barometric Pressure for Blood Gas      Modality N/A     FIO2 100 %    Rate 0 Breaths/minute    PIP 0 cmH2O    IPAP 0     EPAP 0     Note      pH, Temp Corrected 7.374 pH Units    pCO2, Temperature Corrected 36.0 35 - 48 mm Hg    pO2, Temperature Corrected 351 (H) 83 - 108 mm Hg     Note: In addition to lab results from this visit, the labs listed above may include labs taken at another facility or during a different encounter within the last 24 hours. Please correlate lab times with ED admission and discharge times for further clarification of the services performed during this visit.    CT Head Without Contrast Stroke Protocol   Final Result   Large acute left subdural hemorrhage and marked   left-to-right midline shift, effacement of the left lateral ventricle,   and enlargement of the right lateral ventricle.       NOTE: Exam time is shown as 719. Study was reviewed on the CT scan   monitor and discussed  with Dr. Van at 720.       DICTATED:   10/11/2019   EDITED/ls :   10/11/2019            This report was finalized on 10/11/2019 10:47 PM by DR. Roque Pascal MD.          XR Chest 1 View   Final Result   The endotracheal tube is in good position. There are no infiltrates.      Signer Name: Darrel Fernandez MD    Signed: 10/11/2019 8:55 PM    Workstation Name: [facility]      XR Chest 1 View   Final Result   No acute cardiopulmonary findings.      Signer Name: Darrel Fernandez MD    Signed: 10/11/2019 7:49 PM    Workstation Name: [facility]        Vitals:    10/11/19 2037 10/11/19 2042 10/11/19 2047 10/11/19 2049   BP:  (!) 181/106     Patient Position:       Pulse: 111 84 84 103   Resp:       Temp:       SpO2: 100% 100% 100% 100%   Weight:       Height:         Medications   sodium chloride 0.9 % flush 10 mL ( Intravenous MAR Hold 10/11/19 2104)   etomidate (AMIDATE) injection 20 mg ( Intravenous MAR Hold 10/11/19 2104)   thrombin topical (5,000 Units Topical Given 10/11/19 2200)   prothrombin complex conc human (KCentra) IV solution 2,500 Units (not administered)   niCARdipine (CARDENE) 25 mg/250 mL (0.1 mg/mL) 0.9% NS infusion (not administered)   cefepime (MAXIPIME) 2 g/100 mL 0.9% NS (mbp) (not administered)   sodium chloride (NS) irrigation solution (1,000 mL Irrigation Given 10/11/19 2200)   bupivacaine-EPINEPHrine PF (MARCAINE w/EPI) 0.25% -1:[id number] injection (20 mL  Given 10/11/19 2201)   floseal injection (10 mL Injection Given 10/11/19 2215)   etomidate (AMIDATE) injection (25 mg Intravenous Given 10/11/19 2023)   succinylcholine (ANECTINE) injection (120 mg Intravenous Given 10/11/19 2026)   midazolam (VERSED) injection 3 mg (3 mg Intravenous Given 10/11/19 2046)     ECG/EMG Results (last 24 hours)     ** No results found for the last 24 hours. **        ECG 12 Lead    (Results Pending)                     [facility]    Final diagnoses:    Subdural hematoma (CMS/HCC)       Documentation assistance provided by artur Diaz.  Information recorded by the scribe was done at my direction and has been verified and validated by me.     Eleuterio Diaz  10/11/19 7758       Leland Van MD  10/13/19 9693

## 2019-10-12 NOTE — PROGRESS NOTES
"Adult Nutrition  Assessment/PES    Patient Name:  Alok Tobias  YOB: 1945  MRN: [id number]  Admit Date:  10/11/2019    Assessment Date:  10/12/2019    Comments:      Reason for Assessment     Row Name 10/12/19 1000          Reason for Assessment    Reason For Assessment  identified at risk by screening criteria 30\". PER NUTRITION ADMISSION SCREENING, PT WITH AN  UNINTENTIONAL WEIGHT LOSS OF 34# OR MORE BUT A GOOD APPETITE. DAUGHTER IN ROOM & CLARIFIES THAT WEIGHT LOSS WAS \"ALL FLUID\". SHE REPORTS PT'S APPETITE & INTAKE OF FOOD WAS GOOD PTA; THEREFORE, PT DOES       Diagnosis  -- NOT MEET CRITERIA FOR FURTHER SCREENING & WILL SEE PER PROTOCOL.     Identified At Risk by Screening Criteria  MST SCORE 2+           Anthropometrics     Row Name 10/12/19 0600          Anthropometrics    Weight  104 kg (229 lb 0.9 oz)                         Problem/Interventions:                      Electronically signed by:  Melisa Cardenas RD  10/12/19 10:03 AM  "

## 2019-10-12 NOTE — PROGRESS NOTES
Pulmonary/Critical Care History and Physical Exam     LOS: 1 day   Patient Care Team:  Ivet Chau MD as PCP - General (Family Medicine)  Ivet Chau MD as PCP - Family Medicine    Chief Complaint:    Left-sided subarachnoid hemorrhage status post craniotomy followed in the ICU for altered mental status and acute respiratory failure  Subjective     HPI:   Mr. Alok Tobias, is a 74 y.o. male with PMH of asthma, CHF, COPD and hypertension who presents as a transfer from the OR s/p craniotomy for subdural hematoma with Dr. Patten this evening.  According to documentation patient was last known normal several days ago.  He was found down by his family earlier tonight obtunded with a reported GCS of 7 on arrival. CT head showed large acute on chronic subdural hematoma with left to right midline shift and effacement of the mesencephalic cisterns and impending herniation syndrome.  Dr. Patten took the patient to the OR and performed a craniotomy and he was transferred back to the ICU for management.    Apparently the patient has been anticoagulated with warfarin and his INR had been elevated.  He received FFP in the emergency department.    The patient arrives to the intensive care unit nonresponsive on the ventilator.      Past Medical History:   Diagnosis Date   • Asthma    • CHF (congestive heart failure) (CMS/HCC)    • COPD (chronic obstructive pulmonary disease) (CMS/HCC)    • Elevated PSA    • Hypertension        Past Surgical History:   Procedure Laterality Date   • APPENDECTOMY     • CARDIAC CATHETERIZATION N/A 10/3/2018    Procedure: Left Heart Cath;  Surgeon: Twan Harrison MD;  Location: [facility] CATH INVASIVE LOCATION;  Service: Cardiology   • MITRAL VALVE REPLACEMENT     • TONSILLECTOMY         Family History   Problem Relation Age of Onset   • Multiple sclerosis Mother        Social History     Socioeconomic History   • Marital status:      Spouse name: Not on file   • Number of  "children: Not on file   • Years of education: Not on file   • Highest education level: Not on file   Tobacco Use   • Smoking status: Former Smoker     Packs/day: 1.50     Years: 20.00     Pack years: 30.00     Types: Cigarettes     Last attempt to quit: 1986     Years since quittin.4   • Smokeless tobacco: Never Used   Substance and Sexual Activity   • Alcohol use: Yes     Alcohol/week: 1.8 - 2.4 oz     Types: 3 - 4 Cans of beer per week     Comment: 3-4 beers every evening   • Drug use: No   • Sexual activity: Defer       Allergies   Allergen Reactions   • Dye Fdc Red [Red Dye] Nausea And Vomiting     All dyes, IV dye, hair dyes   • Penicillins Other (See Comments)     \"CAUSES CHLAMYDIA\"         Review of Systems:   Unable to obtain review of systems secondary to patient's altered mental status/unresponsiveness    Objective     Vital Signs  Temp:  [97.5 °F (36.4 °C)-97.7 °F (36.5 °C)] 97.7 °F (36.5 °C)  Heart Rate:  [] 105  Resp:  [16-20] 16  BP: (131-194)/() 134/84  FiO2 (%):  [50 %] 50 %  In general this is an intubated adult male who is well-developed.  He is nonresponsive to voice.  He does not withdrawal.  HEENT shows pupils that are equal round and reactive to light.  There is a rightward gaze.  Endotracheal tube in place  Neck is supple.  There are no carotid bruits.  There is no JVD.  Lungs are clear to auscultation bilaterally with no wheezes or rales.  Heart examination shows a regular rhythm.  Heart rate is elevated at 103.  No murmurs heard.  Abdomen soft, nondistended.  Bowel sounds are decreased.  Extremities show no cyanosis, clubbing, or edema.    Results Review:     I reviewed the patient's new clinical results.   Results from last 7 days   Lab Units 10/11/19  1941   CREATININE mg/dL 0.80     Results from last 7 days   Lab Units 10/11/19  2009 10/11/19  1941   WBC 10*3/mm3 13.16*  --    HEMOGLOBIN g/dL 14.3  --    HEMOGLOBIN, POC g/dL  --  15.0   HEMATOCRIT % 47.5  --  "   HEMATOCRIT POC %  --  44   PLATELETS 10*3/mm3 304  --      Results from last 7 days   Lab Units 10/11/19  2144   PH, ARTERIAL pH units 7.374   PO2 ART mm Hg 351.0*   PCO2, ARTERIAL mm Hg 36.0   HCO3 ART mmol/L 21.0       I reviewed the patient's new imaging including images and reports.    Medication Review:     atorvastatin 10 mg Oral Nightly   cefepime 2 g Intravenous Q8H   chlorhexidine 15 mL Mouth/Throat Q12H   famotidine 20 mg Oral BID   furosemide 40 mg Oral BID   metoprolol succinate XL 25 mg Oral Q24H   potassium chloride 20 mEq Oral Daily With Breakfast   Propofol          niCARdipine 5-15 mg/hr Last Rate: 5 mg/hr (10/12/19 0036)       Assessment/Plan       Subdural hematoma, s/p left craniotomy     Asthma    ANDREA (obstructive sleep apnea)    Alcoholism (CMS/HCC)    Atrial fibrillation with RVR (CMS/HCC)    S/P MVR (mitral valve repair)    Acute respiratory failure (CMS/HCC)      We will monitor the patient in the intensive care unit and continue ventriculostomy drain per neurosurgery.  Continue ventilator support.  Prophylaxis has been initiated.  We will continue to follow his neurological status and wean towards extubation when/if possible.  Blood pressure control with Cardene for now.  This patient is at high risk for worsening secondary to respiratory compromise due to altered mental status secondary to his intracerebral hemorrhage.    Jefferson Trejo MD  10/12/19  2:32 AM

## 2019-10-12 NOTE — CONSULTS
Consults    Patient Care Team:  Ivet Chau MD as PCP - General (Family Medicine)  Ivet Chau MD as PCP - Family Medicine    Chief Complaint: Altered mental status    Subjective .     History of present illness:       This is a 74-year-old with a past medical history significant for COPD, HTN, CHF, mitral valve repair in July 2019 on Coumadin therapy.  Patient presented to [facility] ED via flight EMS after being found down by his daughter tonight at 1800.  Patient is currently unarousable.  I was able to get a hold of the patient's daughter via telephone and discuss limited history with her.  She states the patient drove to [place] 3 days ago for a pulmonology appointment and was doing fine at that time.  She talked to him on the phone Tuesday night (10/8/2019) and he was complaining of a mild headache but otherwise sounded normal to her.  She attempted to call him yesterday and he did not answer.  This prompted her to go to his house.  Upon entering the patient's home, she found her father lying on the floor and called EMS.  It is unknown if the patient had a fall.  Last known well is unknown.  Patient lives alone and has not been in contact with anyone since Tuesday night as far as the daughter knows.  Upon arrival to ED, patient's NIH is 30 and GCS 7.    Review of Systems   Unable to perform ROS: Mental status change       History  Past Medical History:   Diagnosis Date   • Asthma    • CHF (congestive heart failure) (CMS/HCC)    • COPD (chronic obstructive pulmonary disease) (CMS/[facility])    • Elevated PSA    • Hypertension    ,   Past Surgical History:   Procedure Laterality Date   • APPENDECTOMY     • CARDIAC CATHETERIZATION N/A 10/3/2018    Procedure: Left Heart Cath;  Surgeon: Twan Harrison MD;  Location:  [facility] CATH INVASIVE LOCATION;  Service: Cardiology   • MITRAL VALVE REPLACEMENT     • TONSILLECTOMY     ,   Family History   Problem Relation Age of Onset   • Multiple sclerosis Mother    ,   Social  History     Tobacco Use   • Smoking status: Former Smoker     Packs/day: 1.50     Years: 20.00     Pack years: 30.00     Types: Cigarettes     Last attempt to quit: 1986     Years since quittin.4   • Smokeless tobacco: Never Used   Substance Use Topics   • Alcohol use: Yes     Alcohol/week: 1.8 - 2.4 oz     Types: 3 - 4 Cans of beer per week     Comment: 3-4 beers every evening   • Drug use: No   ,   (Not in a hospital admission), Scheduled Meds:    etomidate 20 mg Intravenous Once   , Continuous Infusions:   , PRN Meds:  etomidate  •  sodium chloride and Allergies:  Dye fdc red [red dye] and Penicillins   SMOKING STATUS: Former smoker  Objective     Vital Signs   Temp:  [97.5 °F (36.4 °C)] 97.5 °F (36.4 °C)  Heart Rate:  [84-99] 90  Resp:  [18-20] 20  BP: (131-154)/(92-98) 154/94  Body mass index is 33.51 kg/m².    Physical Exam:  Patient is obtunded and unarousable.  He withdraws to pain in his right upper extremity, and bilateral lower extremities.  Left upper extremity appears to be flaccid.  He is not following commands.  He does get a slight  when stimulated in right upper extremity.  Patient does not open his eyes to voice.  He is currently being intubated by ED staff    GCS upon my exam is a 5  NIH 30 per nursing report    Results Review:   I reviewed the patient's new imaging results and agree with the interpretation.  Discussed with Dr. Patten       Assessment/Plan       * No active hospital problems. *  Acute Subdural Hemorrhage     74-year-old gentleman found to have a large left subdural hemorrhage resulting in left to right midline shift with mass-effect and effacement of left lateral ventricles.  Patient will be taken to the OR for an emergent craniotomy for evacuation of subdural hematoma.  I discussed the surgery with the patient's daughter who is his POA.  Risks and benefits were discussed with the patient, no guarantees were given, she accepts the risks and wishes to proceed with  surgery.    I discussed the patients findings and my recommendations with family, nursing staff and consulting provider    Josseline Blount PA-C  10/11/19  8:21 PM    =

## 2019-10-12 NOTE — PLAN OF CARE
Problem: Patient Care Overview  Goal: Plan of Care Review  Outcome: Ongoing (interventions implemented as appropriate)   10/12/19 1539   Coping/Psychosocial   Plan of Care Reviewed With patient;daughter   Plan of Care Review   Progress improving   OTHER   Outcome Summary pt. responds to commands attempts to get off vent unsucessful vss elevated HR will continue to monitor.        Problem: Skin Injury Risk (Adult)  Goal: Identify Related Risk Factors and Signs and Symptoms  Outcome: Ongoing (interventions implemented as appropriate)   10/12/19 1454   Skin Injury Risk (Adult)   Related Risk Factors (Skin Injury Risk) body weight extremes;advanced age;critical care admission;medical devices;medication;mobility impaired;moisture;tissue perfusion altered     Goal: Skin Health and Integrity  Outcome: Ongoing (interventions implemented as appropriate)   10/12/19 5933   Skin Injury Risk (Adult)   Skin Health and Integrity making progress toward outcome

## 2019-10-12 NOTE — PROGRESS NOTES
Subjective: Postoperative day 1 status post left frontotemporal craniotomy for acute on chronic subdural hematoma    Objective:    Vitals:    10/12/19 0845   BP:    Pulse: (!) 129   Resp:    Temp:    SpO2: 96%     Pulse  Av.2  Min: 67  Max: 131  Systolic (24hrs), Av , Min:86 , Max:194     Diastolic (24hrs), Av, Min:57, Max:114    Temp (24hrs), Av.9 °F (36.6 °C), Min:97.5 °F (36.4 °C), Max:98.4 °F (36.9 °C)      He is intubated and sedated.  Off sedation, he follows commands in all 4 extremities.  Head CT reviewed.  Satisfactory evacuation of extra-axial fluid collection.    There is the expected amount of postoperative pneumocephalus with some left to right midline shift.  Overall, this is improved compared to preprocedure.    Lab Results   Component Value Date     2019       A/P:   Wean to extubate  Continue subdural drain for now  Guarded prognosis  Family updated  Patient has a history of anticoagulation for atrial fibrillation and recent cardiac valvular surgery.  He will need to hold anticoagulation for at least 7 days.

## 2019-10-12 NOTE — OP NOTE
Preoperative diagnosis: Acute on chronic left-sided subdural hematoma  Postoperative diagnosis: Same    This is a 74-year-old man who was found down by his family earlier tonight.  He was last known normal several days ago.  He was obtunded with a GCS of 7 on arrival here.  He underwent a head CT which demonstrated a large acute on chronic subdural hematoma left to right midline shift and effacement of the mesencephalic cisterns.  He had an impending herniation syndrome.  Debated.  Operative intervention is indicated.    Informed consent for this procedure was not possible due to the acuity of the patient's condition.    Procedure in detail: The patient was brought from the emergency department directly to the operating suite.  Venodyne's and a Julien catheter replaced by the nursing staff.  On admission, the patient's INR was 2.6.  He was given Kcentra.  I waited approximately 30 minutes until after the infusion of the Kcentra I completed to begin the procedure.  The bed was rotated 90 degrees from anesthesia.  The patient's head was rotated to the right exposing the left hemicranium.  A modified pterional type incision was marked out.  The overlying skin was shaved, prepped and draped in the usual, sterile fashion.  A timeout was performed.  Intravenous antibiotics were administered.  The skin was anesthetized using her cane with epinephrine.  A curvilinear incision was then made his flap was reflected anteriorly.  A bur hole was placed over the temporal fossa as well as along the posterior aspect of the skin opening along the temporal line and over the frontal bone.  The craniotomy flap was then turned in the standard fashion.  The dura was opened, and blood products of mixed chronicity under pressure was seen to immediately egress from the durotomy.  The dura was opened in a curvilinear fashion and reflected anteriorly.  The subdural space was then copiously irrigated and several membranes were removed.  No  obvious source of bleeding was identified.  Hemostasis was adequate.      A ventriculostomy catheter was laid along the temporal lobe and tunneled out through separate stab incision.  The dura was then reapproximated using 4-0 Nurolon sutures.  Epidural oozing was controlled using thrombin foam.  The bone flap was reattached using the plating system.  The temporalis fascia and galea were then closed in layers.  Sterile dressing and a head wrap were then applied.    Sponge, instrument, needle counts were correct at the conclusion of the case.  I performed this procedure with Josseline Blount, physician assistant

## 2019-10-12 NOTE — ANESTHESIA PREPROCEDURE EVALUATION
Anesthesia Evaluation     Patient summary reviewed and Nursing notes reviewed   NPO Solid Status: Waived due to emergency  NPO Liquid Status: Waived due to emergency           Airway   Dental      Pulmonary    (+) COPD, sleep apnea, decreased breath sounds,   Cardiovascular     ECG reviewed  Rhythm: irregular  Rate: abnormal    (+) hypertension, dysrhythmias Atrial Fib,       Neuro/Psych  GI/Hepatic/Renal/Endo    (+) obesity,       Musculoskeletal     Abdominal    Substance History      OB/GYN          Other        ROS/Med Hx Other: 6/19 TTE:  EF . 60%, severe MR, mod AI      Phys Exam Other: Patient arrived to OR intubated, sedated and ventilated              Anesthesia Plan    ASA 5 - emergent     general     intravenous induction

## 2019-10-13 ENCOUNTER — APPOINTMENT (OUTPATIENT)
Dept: CARDIOLOGY | Facility: HOSPITAL | Age: 74
End: 2019-10-13

## 2019-10-13 ENCOUNTER — APPOINTMENT (OUTPATIENT)
Dept: GENERAL RADIOLOGY | Facility: HOSPITAL | Age: 74
End: 2019-10-13

## 2019-10-13 LAB
ALBUMIN SERPL-MCNC: 3.6 G/DL (ref 3.5–5.2)
ALBUMIN/GLOB SERPL: 1 G/DL
ALP SERPL-CCNC: 74 U/L (ref 39–117)
ALT SERPL W P-5'-P-CCNC: 8 U/L (ref 1–41)
ANION GAP SERPL CALCULATED.3IONS-SCNC: 13 MMOL/L (ref 5–15)
APTT PPP: 47.7 SECONDS (ref 24–37)
ARTERIAL PATENCY WRIST A: ABNORMAL
ARTERIAL PATENCY WRIST A: ABNORMAL
AST SERPL-CCNC: 13 U/L (ref 1–40)
ATMOSPHERIC PRESS: ABNORMAL MM[HG]
ATMOSPHERIC PRESS: ABNORMAL MM[HG]
BASE EXCESS BLDA CALC-SCNC: 1.5 MMOL/L (ref 0–2)
BASE EXCESS BLDA CALC-SCNC: 2.1 MMOL/L (ref 0–2)
BASOPHILS # BLD AUTO: 0.02 10*3/MM3 (ref 0–0.2)
BASOPHILS NFR BLD AUTO: 0.2 % (ref 0–1.5)
BDY SITE: ABNORMAL
BDY SITE: ABNORMAL
BH CV ECHO MEAS - AI DEC SLOPE: 145 CM/SEC^2
BH CV ECHO MEAS - AI MAX PG: 43.7 MMHG
BH CV ECHO MEAS - AI MAX VEL: 328.7 CM/SEC
BH CV ECHO MEAS - AI P1/2T: 663.7 MSEC
BH CV ECHO MEAS - AO MAX PG (FULL): 8.3 MMHG
BH CV ECHO MEAS - AO MAX PG: 12.1 MMHG
BH CV ECHO MEAS - AO MEAN PG (FULL): 4.1 MMHG
BH CV ECHO MEAS - AO MEAN PG: 6 MMHG
BH CV ECHO MEAS - AO ROOT AREA (BSA CORRECTED): 1.8
BH CV ECHO MEAS - AO ROOT AREA: 13.7 CM^2
BH CV ECHO MEAS - AO ROOT DIAM: 4.2 CM
BH CV ECHO MEAS - AO V2 MAX: 173.7 CM/SEC
BH CV ECHO MEAS - AO V2 MEAN: 115.4 CM/SEC
BH CV ECHO MEAS - AO V2 VTI: 31.2 CM
BH CV ECHO MEAS - AVA(I,A): 1.6 CM^2
BH CV ECHO MEAS - AVA(I,D): 1.6 CM^2
BH CV ECHO MEAS - AVA(V,A): 2 CM^2
BH CV ECHO MEAS - AVA(V,D): 2 CM^2
BH CV ECHO MEAS - BSA(HAYCOCK): 2.4 M^2
BH CV ECHO MEAS - BSA: 2.3 M^2
BH CV ECHO MEAS - BZI_BMI: 31.7 KILOGRAMS/M^2
BH CV ECHO MEAS - BZI_METRIC_HEIGHT: 185.4 CM
BH CV ECHO MEAS - BZI_METRIC_WEIGHT: 108.9 KG
BH CV ECHO MEAS - EDV(CUBED): 70.6 ML
BH CV ECHO MEAS - EDV(MOD-SP2): 47 ML
BH CV ECHO MEAS - EDV(MOD-SP4): 80 ML
BH CV ECHO MEAS - EDV(TEICH): 75.6 ML
BH CV ECHO MEAS - EF(CUBED): 77.5 %
BH CV ECHO MEAS - EF(MOD-BP): 67 %
BH CV ECHO MEAS - EF(MOD-SP2): 63.8 %
BH CV ECHO MEAS - EF(MOD-SP4): 66.3 %
BH CV ECHO MEAS - EF(TEICH): 70.1 %
BH CV ECHO MEAS - ESV(CUBED): 15.8 ML
BH CV ECHO MEAS - ESV(MOD-SP2): 17 ML
BH CV ECHO MEAS - ESV(MOD-SP4): 27 ML
BH CV ECHO MEAS - ESV(TEICH): 22.6 ML
BH CV ECHO MEAS - FS: 39.2 %
BH CV ECHO MEAS - IVS/LVPW: 0.98
BH CV ECHO MEAS - IVSD: 1.4 CM
BH CV ECHO MEAS - LAD MAJOR: 6.5 CM
BH CV ECHO MEAS - LAT PEAK E' VEL: 6 CM/SEC
BH CV ECHO MEAS - LATERAL E/E' RATIO: 16.9
BH CV ECHO MEAS - LV DIASTOLIC VOL/BSA (35-75): 34.4 ML/M^2
BH CV ECHO MEAS - LV MASS(C)D: 216 GRAMS
BH CV ECHO MEAS - LV MASS(C)DI: 92.9 GRAMS/M^2
BH CV ECHO MEAS - LV MAX PG: 3.8 MMHG
BH CV ECHO MEAS - LV MEAN PG: 1.9 MMHG
BH CV ECHO MEAS - LV SYSTOLIC VOL/BSA (12-30): 11.6 ML/M^2
BH CV ECHO MEAS - LV V1 MAX: 97.1 CM/SEC
BH CV ECHO MEAS - LV V1 MEAN: 57.7 CM/SEC
BH CV ECHO MEAS - LV V1 VTI: 13.8 CM
BH CV ECHO MEAS - LVIDD: 4.1 CM
BH CV ECHO MEAS - LVIDS: 2.5 CM
BH CV ECHO MEAS - LVLD AP2: 8 CM
BH CV ECHO MEAS - LVLD AP4: 8.2 CM
BH CV ECHO MEAS - LVLS AP2: 5.9 CM
BH CV ECHO MEAS - LVLS AP4: 5.6 CM
BH CV ECHO MEAS - LVOT AREA (M): 3.8 CM^2
BH CV ECHO MEAS - LVOT AREA: 3.6 CM^2
BH CV ECHO MEAS - LVOT DIAM: 2.2 CM
BH CV ECHO MEAS - LVPWD: 1.4 CM
BH CV ECHO MEAS - MED PEAK E' VEL: 4.3 CM/SEC
BH CV ECHO MEAS - MEDIAL E/E' RATIO: 23.7
BH CV ECHO MEAS - MV A MAX VEL: 91 CM/SEC
BH CV ECHO MEAS - MV DEC SLOPE: 758 CM/SEC^2
BH CV ECHO MEAS - MV DEC TIME: 0.18 SEC
BH CV ECHO MEAS - MV E MAX VEL: 104.7 CM/SEC
BH CV ECHO MEAS - MV E/A: 1.2
BH CV ECHO MEAS - MV MAX PG: 7.4 MMHG
BH CV ECHO MEAS - MV MEAN PG: 2.6 MMHG
BH CV ECHO MEAS - MV P1/2T MAX VEL: 120.3 CM/SEC
BH CV ECHO MEAS - MV P1/2T: 46.5 MSEC
BH CV ECHO MEAS - MV V2 MAX: 136 CM/SEC
BH CV ECHO MEAS - MV V2 MEAN: 88.9 CM/SEC
BH CV ECHO MEAS - MV V2 VTI: 32.8 CM
BH CV ECHO MEAS - MVA P1/2T LCG: 1.8 CM^2
BH CV ECHO MEAS - MVA(P1/2T): 4.7 CM^2
BH CV ECHO MEAS - MVA(VTI): 1.5 CM^2
BH CV ECHO MEAS - PA ACC SLOPE: 781 CM/SEC^2
BH CV ECHO MEAS - PA ACC TIME: 0.1 SEC
BH CV ECHO MEAS - PA MAX PG: 2.6 MMHG
BH CV ECHO MEAS - PA PR(ACCEL): 34.6 MMHG
BH CV ECHO MEAS - PA V2 MAX: 80.8 CM/SEC
BH CV ECHO MEAS - RVSP: 23 MMHG
BH CV ECHO MEAS - SI(AO): 183.8 ML/M^2
BH CV ECHO MEAS - SI(CUBED): 23.5 ML/M^2
BH CV ECHO MEAS - SI(LVOT): 21.6 ML/M^2
BH CV ECHO MEAS - SI(MOD-SP2): 12.9 ML/M^2
BH CV ECHO MEAS - SI(MOD-SP4): 22.8 ML/M^2
BH CV ECHO MEAS - SI(TEICH): 22.8 ML/M^2
BH CV ECHO MEAS - SV(AO): 427.3 ML
BH CV ECHO MEAS - SV(CUBED): 54.7 ML
BH CV ECHO MEAS - SV(LVOT): 50.3 ML
BH CV ECHO MEAS - SV(MOD-SP2): 30 ML
BH CV ECHO MEAS - SV(MOD-SP4): 53 ML
BH CV ECHO MEAS - SV(TEICH): 53 ML
BH CV ECHO MEAS - TAPSE (>1.6): 2.4 CM2
BH CV ECHO MEAS - TR MAX PG: 20 MMHG
BH CV ECHO MEAS - TR MAX VEL: 222 CM/SEC
BH CV ECHO MEASUREMENTS AVERAGE E/E' RATIO: 20.33
BH CV VAS BP RIGHT ARM: NORMAL MMHG
BH CV XLRA - RV BASE: 3.5 CM
BH CV XLRA - RV LENGTH: 7 CM
BH CV XLRA - RV MID: 2.6 CM
BH CV XLRA - TDI S': 20 CM/SEC
BILIRUB SERPL-MCNC: 1.4 MG/DL (ref 0.2–1.2)
BODY TEMPERATURE: 37 C
BODY TEMPERATURE: 37 C
BUN BLD-MCNC: 29 MG/DL (ref 8–23)
BUN/CREAT SERPL: 27.1 (ref 7–25)
CALCIUM SPEC-SCNC: 9.5 MG/DL (ref 8.6–10.5)
CHLORIDE SERPL-SCNC: 109 MMOL/L (ref 98–107)
CO2 SERPL-SCNC: 28 MMOL/L (ref 22–29)
COHGB MFR BLD: 1.5 % (ref 0–2)
COHGB MFR BLD: 1.5 % (ref 0–2)
CREAT BLD-MCNC: 1.07 MG/DL (ref 0.76–1.27)
DEPRECATED RDW RBC AUTO: 48.3 FL (ref 37–54)
EOSINOPHIL # BLD AUTO: 0 10*3/MM3 (ref 0–0.4)
EOSINOPHIL NFR BLD AUTO: 0 % (ref 0.3–6.2)
ERYTHROCYTE [DISTWIDTH] IN BLOOD BY AUTOMATED COUNT: 16.1 % (ref 12.3–15.4)
GFR SERPL CREATININE-BSD FRML MDRD: 67 ML/MIN/1.73
GLOBULIN UR ELPH-MCNC: 3.5 GM/DL
GLUCOSE BLD-MCNC: 134 MG/DL (ref 65–99)
GLUCOSE BLDC GLUCOMTR-MCNC: 112 MG/DL (ref 70–130)
GLUCOSE BLDC GLUCOMTR-MCNC: 123 MG/DL (ref 70–130)
HCO3 BLDA-SCNC: 24.7 MMOL/L (ref 20–26)
HCO3 BLDA-SCNC: 26.3 MMOL/L (ref 20–26)
HCT VFR BLD AUTO: 43.4 % (ref 37.5–51)
HCT VFR BLD CALC: 39.1 %
HCT VFR BLD CALC: 40.4 %
HGB BLD-MCNC: 13.1 G/DL (ref 13–17.7)
HGB BLDA-MCNC: 12.8 G/DL (ref 13.5–17.5)
HGB BLDA-MCNC: 13.2 G/DL (ref 13.5–17.5)
HOROWITZ INDEX BLD+IHG-RTO: 40 %
HOROWITZ INDEX BLD+IHG-RTO: 40 %
IMM GRANULOCYTES # BLD AUTO: 0.07 10*3/MM3 (ref 0–0.05)
IMM GRANULOCYTES NFR BLD AUTO: 0.6 % (ref 0–0.5)
INR PPP: 1.64 (ref 0.85–1.16)
LEFT ATRIUM VOLUME INDEX: 36.6 ML/M^2
LEFT ATRIUM VOLUME: 85 ML
LYMPHOCYTES # BLD AUTO: 0.67 10*3/MM3 (ref 0.7–3.1)
LYMPHOCYTES NFR BLD AUTO: 5.3 % (ref 19.6–45.3)
MAGNESIUM SERPL-MCNC: 2.3 MG/DL (ref 1.6–2.4)
MAXIMAL PREDICTED HEART RATE: 144 BPM
MCH RBC QN AUTO: 25 PG (ref 26.6–33)
MCHC RBC AUTO-ENTMCNC: 30.2 G/DL (ref 31.5–35.7)
MCV RBC AUTO: 82.8 FL (ref 79–97)
METHGB BLD QL: 0.5 % (ref 0–1.5)
METHGB BLD QL: 0.6 % (ref 0–1.5)
MODALITY: ABNORMAL
MODALITY: ABNORMAL
MONOCYTES # BLD AUTO: 1.09 10*3/MM3 (ref 0.1–0.9)
MONOCYTES NFR BLD AUTO: 8.6 % (ref 5–12)
NEUTROPHILS # BLD AUTO: 10.84 10*3/MM3 (ref 1.7–7)
NEUTROPHILS NFR BLD AUTO: 85.3 % (ref 42.7–76)
NOTE: ABNORMAL
NOTE: ABNORMAL
NRBC BLD AUTO-RTO: 0 /100 WBC (ref 0–0.2)
OXYHGB MFR BLDV: 93.8 % (ref 94–99)
OXYHGB MFR BLDV: 96.8 % (ref 94–99)
PCO2 BLDA: 33.8 MM HG
PCO2 BLDA: 38.6 MM HG
PCO2 TEMP ADJ BLD: 33.8 MM HG (ref 35–48)
PCO2 TEMP ADJ BLD: 38.6 MM HG (ref 35–48)
PH BLDA: 7.44 PH UNITS (ref 7.35–7.45)
PH BLDA: 7.47 PH UNITS (ref 7.35–7.45)
PH, TEMP CORRECTED: 7.44 PH UNITS
PH, TEMP CORRECTED: 7.47 PH UNITS
PHOSPHATE SERPL-MCNC: 2.7 MG/DL (ref 2.5–4.5)
PLATELET # BLD AUTO: 260 10*3/MM3 (ref 140–450)
PMV BLD AUTO: 10.5 FL (ref 6–12)
PO2 BLDA: 111 MM HG (ref 83–108)
PO2 BLDA: 74.4 MM HG (ref 83–108)
PO2 TEMP ADJ BLD: 111 MM HG (ref 83–108)
PO2 TEMP ADJ BLD: 74.4 MM HG (ref 83–108)
POTASSIUM BLD-SCNC: 3.1 MMOL/L (ref 3.5–5.2)
PROT SERPL-MCNC: 7.1 G/DL (ref 6–8.5)
PROTHROMBIN TIME: 18.7 SECONDS (ref 11.2–14.3)
RBC # BLD AUTO: 5.24 10*6/MM3 (ref 4.14–5.8)
SODIUM BLD-SCNC: 150 MMOL/L (ref 136–145)
STRESS TARGET HR: 122 BPM
VENTILATOR MODE: ABNORMAL
VENTILATOR MODE: ABNORMAL
WBC NRBC COR # BLD: 12.69 10*3/MM3 (ref 3.4–10.8)

## 2019-10-13 PROCEDURE — 92610 EVALUATE SWALLOWING FUNCTION: CPT

## 2019-10-13 PROCEDURE — 82962 GLUCOSE BLOOD TEST: CPT

## 2019-10-13 PROCEDURE — 99291 CRITICAL CARE FIRST HOUR: CPT | Performed by: INTERNAL MEDICINE

## 2019-10-13 PROCEDURE — 94003 VENT MGMT INPAT SUBQ DAY: CPT

## 2019-10-13 PROCEDURE — 80053 COMPREHEN METABOLIC PANEL: CPT | Performed by: INTERNAL MEDICINE

## 2019-10-13 PROCEDURE — 87205 SMEAR GRAM STAIN: CPT | Performed by: INTERNAL MEDICINE

## 2019-10-13 PROCEDURE — 85730 THROMBOPLASTIN TIME PARTIAL: CPT | Performed by: INTERNAL MEDICINE

## 2019-10-13 PROCEDURE — 94799 UNLISTED PULMONARY SVC/PX: CPT

## 2019-10-13 PROCEDURE — 85025 COMPLETE CBC W/AUTO DIFF WBC: CPT | Performed by: INTERNAL MEDICINE

## 2019-10-13 PROCEDURE — 93005 ELECTROCARDIOGRAM TRACING: CPT | Performed by: INTERNAL MEDICINE

## 2019-10-13 PROCEDURE — 71045 X-RAY EXAM CHEST 1 VIEW: CPT

## 2019-10-13 PROCEDURE — 87070 CULTURE OTHR SPECIMN AEROBIC: CPT | Performed by: INTERNAL MEDICINE

## 2019-10-13 PROCEDURE — 93306 TTE W/DOPPLER COMPLETE: CPT | Performed by: INTERNAL MEDICINE

## 2019-10-13 PROCEDURE — 84100 ASSAY OF PHOSPHORUS: CPT | Performed by: INTERNAL MEDICINE

## 2019-10-13 PROCEDURE — 93306 TTE W/DOPPLER COMPLETE: CPT

## 2019-10-13 PROCEDURE — 85610 PROTHROMBIN TIME: CPT | Performed by: INTERNAL MEDICINE

## 2019-10-13 PROCEDURE — 93010 ELECTROCARDIOGRAM REPORT: CPT | Performed by: INTERNAL MEDICINE

## 2019-10-13 PROCEDURE — 83735 ASSAY OF MAGNESIUM: CPT | Performed by: INTERNAL MEDICINE

## 2019-10-13 PROCEDURE — 82805 BLOOD GASES W/O2 SATURATION: CPT

## 2019-10-13 RX ORDER — LORAZEPAM 2 MG/ML
1 INJECTION INTRAMUSCULAR
Status: DISCONTINUED | OUTPATIENT
Start: 2019-10-13 | End: 2019-10-17 | Stop reason: HOSPADM

## 2019-10-13 RX ORDER — DOCUSATE SODIUM 100 MG/1
100 CAPSULE, LIQUID FILLED ORAL 2 TIMES DAILY
Status: DISCONTINUED | OUTPATIENT
Start: 2019-10-13 | End: 2019-10-17 | Stop reason: HOSPADM

## 2019-10-13 RX ORDER — SODIUM CHLORIDE, SODIUM LACTATE, POTASSIUM CHLORIDE, CALCIUM CHLORIDE 600; 310; 30; 20 MG/100ML; MG/100ML; MG/100ML; MG/100ML
75 INJECTION, SOLUTION INTRAVENOUS CONTINUOUS
Status: DISCONTINUED | OUTPATIENT
Start: 2019-10-13 | End: 2019-10-14

## 2019-10-13 RX ORDER — ALLOPURINOL 300 MG/1
300 TABLET ORAL DAILY
Status: DISCONTINUED | OUTPATIENT
Start: 2019-10-13 | End: 2019-10-17 | Stop reason: HOSPADM

## 2019-10-13 RX ORDER — POTASSIUM CHLORIDE 1.5 G/1.77G
40 POWDER, FOR SOLUTION ORAL AS NEEDED
Status: DISCONTINUED | OUTPATIENT
Start: 2019-10-13 | End: 2019-10-17 | Stop reason: HOSPADM

## 2019-10-13 RX ORDER — POTASSIUM CHLORIDE 750 MG/1
40 CAPSULE, EXTENDED RELEASE ORAL AS NEEDED
Status: DISCONTINUED | OUTPATIENT
Start: 2019-10-13 | End: 2019-10-17 | Stop reason: HOSPADM

## 2019-10-13 RX ORDER — LORAZEPAM 0.5 MG/1
0.5 TABLET ORAL
Status: DISCONTINUED | OUTPATIENT
Start: 2019-10-13 | End: 2019-10-17 | Stop reason: HOSPADM

## 2019-10-13 RX ORDER — POTASSIUM CHLORIDE 7.45 MG/ML
10 INJECTION INTRAVENOUS
Status: DISCONTINUED | OUTPATIENT
Start: 2019-10-13 | End: 2019-10-17 | Stop reason: HOSPADM

## 2019-10-13 RX ORDER — IPRATROPIUM BROMIDE AND ALBUTEROL SULFATE 2.5; .5 MG/3ML; MG/3ML
3 SOLUTION RESPIRATORY (INHALATION) EVERY 4 HOURS PRN
Status: DISCONTINUED | OUTPATIENT
Start: 2019-10-13 | End: 2019-10-17 | Stop reason: HOSPADM

## 2019-10-13 RX ORDER — FOLIC ACID 1 MG/1
1 TABLET ORAL DAILY
Status: COMPLETED | OUTPATIENT
Start: 2019-10-14 | End: 2019-10-16

## 2019-10-13 RX ORDER — LORAZEPAM 1 MG/1
1 TABLET ORAL
Status: DISCONTINUED | OUTPATIENT
Start: 2019-10-13 | End: 2019-10-17 | Stop reason: HOSPADM

## 2019-10-13 RX ORDER — THIAMINE MONONITRATE (VIT B1) 100 MG
100 TABLET ORAL DAILY
Status: COMPLETED | OUTPATIENT
Start: 2019-10-14 | End: 2019-10-16

## 2019-10-13 RX ORDER — LORAZEPAM 2 MG/ML
0.5 INJECTION INTRAMUSCULAR
Status: DISCONTINUED | OUTPATIENT
Start: 2019-10-13 | End: 2019-10-17 | Stop reason: HOSPADM

## 2019-10-13 RX ORDER — DIPHENOXYLATE HYDROCHLORIDE AND ATROPINE SULFATE 2.5; .025 MG/1; MG/1
1 TABLET ORAL DAILY
Status: COMPLETED | OUTPATIENT
Start: 2019-10-14 | End: 2019-10-16

## 2019-10-13 RX ORDER — LORAZEPAM 2 MG/ML
2 INJECTION INTRAMUSCULAR
Status: DISCONTINUED | OUTPATIENT
Start: 2019-10-13 | End: 2019-10-17 | Stop reason: HOSPADM

## 2019-10-13 RX ORDER — LORAZEPAM 1 MG/1
2 TABLET ORAL
Status: DISCONTINUED | OUTPATIENT
Start: 2019-10-13 | End: 2019-10-17 | Stop reason: HOSPADM

## 2019-10-13 RX ORDER — IPRATROPIUM BROMIDE AND ALBUTEROL SULFATE 2.5; .5 MG/3ML; MG/3ML
3 SOLUTION RESPIRATORY (INHALATION)
Status: DISCONTINUED | OUTPATIENT
Start: 2019-10-13 | End: 2019-10-17

## 2019-10-13 RX ADMIN — FUROSEMIDE 40 MG: 40 TABLET ORAL at 08:12

## 2019-10-13 RX ADMIN — METOPROLOL SUCCINATE 25 MG: 25 TABLET, EXTENDED RELEASE ORAL at 08:12

## 2019-10-13 RX ADMIN — ATORVASTATIN CALCIUM 10 MG: 10 TABLET, FILM COATED ORAL at 20:10

## 2019-10-13 RX ADMIN — POTASSIUM CHLORIDE 40 MEQ: 750 CAPSULE, EXTENDED RELEASE ORAL at 16:21

## 2019-10-13 RX ADMIN — ALLOPURINOL 300 MG: 300 TABLET ORAL at 16:12

## 2019-10-13 RX ADMIN — SODIUM CHLORIDE, POTASSIUM CHLORIDE, SODIUM LACTATE AND CALCIUM CHLORIDE 75 ML/HR: 600; 310; 30; 20 INJECTION, SOLUTION INTRAVENOUS at 16:12

## 2019-10-13 RX ADMIN — FAMOTIDINE 20 MG: 20 TABLET ORAL at 08:12

## 2019-10-13 RX ADMIN — FUROSEMIDE 40 MG: 40 TABLET ORAL at 17:31

## 2019-10-13 RX ADMIN — DOCUSATE SODIUM 100 MG: 100 CAPSULE, LIQUID FILLED ORAL at 20:10

## 2019-10-13 RX ADMIN — CHLORHEXIDINE GLUCONATE 0.12% ORAL RINSE 15 ML: 1.2 LIQUID ORAL at 08:12

## 2019-10-13 RX ADMIN — POTASSIUM CHLORIDE 20 MEQ: 750 CAPSULE, EXTENDED RELEASE ORAL at 08:12

## 2019-10-13 RX ADMIN — FAMOTIDINE 20 MG: 20 TABLET ORAL at 20:10

## 2019-10-13 RX ADMIN — POTASSIUM CHLORIDE 40 MEQ: 750 CAPSULE, EXTENDED RELEASE ORAL at 20:11

## 2019-10-13 NOTE — PROGRESS NOTES
Intensivist Note     10/13/2019  Hospital Day: 2  2 Days Post-Op      Mr. Alok Tobias, 74 y.o. male is followed for:    Acute on chronic subdural hematoma with impending herniation s/p left frontotemporal craniotomy 10/11/2019     Acute respiratory failure due to neurologic event (CMS/HCC)    Atrial fibrillation with RVR to 6/19/2019 s/p successful ECV x2.  On home Coumadin post mitral valve repair (CMS/HCC)    S/P mitral valve repair/annuloplasty for severe MR 7/25/2019 in [place]    COPD/asthma    ANDREA     Hypertension    Alcoholism (CMS/HCC)       SUBJECTIVE     74-year-old white male with a history of PAF newly diagnosed 6/2019 status post successful ECV, severe MR and CHF, s/p mitral valve repair 7/2019, asthma/COPD, ANDREA, and alcoholism.  Apparently was found down by his family the evening of 10/11/2019 after not being seen for several days.  CT revealed a large acute on chronic subdural hematoma with left to right midline shift and impending herniation. Was taken directly to the OR and right frontotemporal craniotomy was successfully performed.       Interval history: Initially was sedated on the ventilator and unable to be evaluated.    Over the last 24 hours however with sedation held he is become more alert and cooperative.  I held off extubation 10/12/2019 because he was not clearing secretions well but did convert him to IMV and pressure support on which he has done quite well.  Follow-up CT scan has revealed evacuation of extra-axial fluid collection.  He remains in atrial fibrillation with a heart rate of 99 and adequate blood pressure (135/89) since the addition of metoprolol for rate control. TMax is 100 and he completed his perioperative cefepime yesterday.      ROS: Unable to obtain due to intubated state.    The patient's relevant PMH, PSH, FH, and SH were reviewed and updated in Epic as appropriate. Allergies and Medications reviewed.    OBJECTIVE     /91 (BP Location: Left arm,  "Patient Position: Lying)   Pulse 98   Temp 98.8 °F (37.1 °C) (Axillary)   Resp 18   Ht 185.4 cm (72.99\")   Wt 109 kg (240 lb 11.9 oz)   SpO2 96%   BMI 31.77 kg/m²   Oxygen Concentration (%): 40      Flowsheet Rows      First Filed Value   Admission Height  185.4 cm (73\") Documented at 10/11/2019 1938   Admission Weight  115 kg (254 lb) Documented at 10/11/2019 1938        Intake & Output (last day)       10/12 0701 - 10/13 0700 10/13 0701 - 10/14 0700    I.V. (mL/kg) 508.6 (4.7)     Other  60    NG/     IV Piggyback      Total Intake(mL/kg) 618.6 (5.7) 60 (0.6)    Urine (mL/kg/hr) 1875 (0.7) 900 (1.1)    Drains (ventriculostomy) 216 165    Total Output 2091 1065    Net -1472.4 -1005                Exam:  General Exam:  Elderly white male intubated and on ventilatory support.  NAD  HEENT: Ventriculostomy drain in place and head bandaged.  Pupils equal and reactive.  ETT and OG tube in place  Neck:                          Supple, no JVD, thyromegaly, or adenopathy  Lungs: Clear to auscultation and percussion anteriorly and posteriorly.  Cardiovascular: Irregularly irregular rhythm with a variable S1 and normal S2.  No murmurs or gallops.  HR 99 bpm (atrial fibrillation on monitor)  Abdomen: Soft nontender without organomegaly or masses.   and rectal: Julien catheter in place  Extremities: No cyanosis clubbing edema.  Right radial line in place  Neurologic:                 Symmetric strength. No focal deficits.    Chest X-Ray: Cardiomegaly.  Sternal wires from previous heart surgery.  ET tube in position.  Lung parenchyma clear.  No pleural effusions but some chronic appearing pleural thickening in the right lateral base.    INFUSIONS    niCARdipine 5-15 mg/hr Last Rate: Stopped (10/12/19 0321)       Results from last 7 days   Lab Units 10/11/19  2009 10/11/19  1941   WBC 10*3/mm3 13.16*  --    HEMOGLOBIN g/dL 14.3  --    HEMOGLOBIN, POC g/dL  --  15.0   HEMATOCRIT % 47.5  --    HEMATOCRIT POC %  --  44 "   PLATELETS 10*3/mm3 304  --      Results from last 7 days   Lab Units 10/11/19  1941   CREATININE mg/dL 0.80               No results found for: SEDRATE  No results found for: BNP  Lab Results   Component Value Date    TROPONINT <0.010 06/18/2019    TROPONINT <0.010 06/18/2019     Lab Results   Component Value Date    TSH 0.665 06/19/2019     Lab Results   Component Value Date    LACTATE 1.7 05/26/2019     No results found for: CORTISOL    Results from last 7 days   Lab Units 10/13/19  0908 10/13/19  0334 10/11/19  2144   PH, ARTERIAL pH units 7.472* 7.442 7.374   PCO2, ARTERIAL mm Hg 33.8 38.6 36.0   PO2 ART mm Hg 74.4* 111.0* 351.0*   HCO3 ART mmol/L 24.7 26.3* 21.0   FIO2 % 40 40 100       Vent Settings:  IMV and pressure support  Vt (Set, L): 0.5 L  Resp Rate (Set): 12  Pressure Support (cm H2O): 12 cm H20  FiO2 (%): 40 %  PEEP/CPAP (cm H2O): 6 cm H20    Minute Ventilation (L/min) (Obs): 7.59 L/min  Resp Rate (Observed) Vent: 12  I:E Ratio (Obs): 1:2.60  PIP Observed (cm H2O): 18 cm H2O      I reviewed the patient's results, images and medication.    Assessment/Plan   ASSESSMENT        Acute on chronic subdural hematoma with impending herniation s/p left frontotemporal craniotomy 10/11/2019     Acute respiratory failure due to neurologic event (CMS/[facility])    Atrial fibrillation with RVR to 6/19/2019 s/p successful ECV x2.  On home Coumadin post mitral valve repair (CMS/[facility])    S/P mitral valve repair/annuloplasty for severe MR 7/25/2019 in [place]    COPD/asthma    ANDREA     Hypertension    Alcoholism (CMS/[facility])      DISCUSSION: Improved neurologically, following commands, and has a spontaneous cough.  Should be able to extubate today.  Oral intake will be reduced for a while so we will continue some IV fluids.  Will obtain a baseline sputum culture before extubation.  Neurosurgery indicates subdural drain will remain in place for 1 more day    PLAN     1.  Extubate  2.  Increase pulmonary toilet with flutter  valve and add duo nebs  3.  Mobilize up in chair  4.  Physical therapy to begin mobilization and strengthening  5.  Lactated Ringer's at 75 cc an hour  6.  Will ask speech to perform a dysphagia evaluation before allowing oral intake    Plan of care and goals reviewed with mulitdisciplinary team at daily rounds.    I discussed the patient's findings and my recommendations with patient, family and nursing staff    High level of risk due to: severe exacerbation of chronic illness, illness with threat to life or bodily function and parenteral controlled substances.    Time spent Critical care 30 min (It does not include procedure time).    Amos Beverly MD  Intensive Care Medicine  10/13/19 2:14 PM

## 2019-10-13 NOTE — PROGRESS NOTES
Subjective: Postoperative day 1 status post left frontotemporal craniotomy    Objective:    Vitals:    10/13/19 0830   BP: 143/85   Pulse: (!) 124   Resp:    Temp:    SpO2: 92%     Pulse  Av.2  Min: 73  Max: 144  Systolic (24hrs), Av , Min:97 , Max:184     Diastolic (24hrs), Av, Min:54, Max:109    Temp (24hrs), Av.2 °F (37.3 °C), Min:98.8 °F (37.1 °C), Max:100 °F (37.8 °C)      He is intubated, and off sedation.  He follows commands in all 4 extremities.  He nods appropriately to questions.    Lab Results   Component Value Date     2019       A/P:   Continue subdural drain for 1 more day.  Wean to extubate  Anticipate discharge to rehab in the next few days

## 2019-10-13 NOTE — PROGRESS NOTES
Discharge Planning Assessment  [facility]     Patient Name: Alok Tobias  MRN: [id number]  Today's Date: 10/13/2019    Admit Date: 10/11/2019    Discharge Needs Assessment     Row Name 10/13/19 1059       Living Environment    Lives With  alone    Current Living Arrangements  home/apartment/condo    Primary Care Provided by  self    Provides Primary Care For  no one    Family Caregiver if Needed  child(yani), adult    Quality of Family Relationships  helpful;involved;supportive    Able to Return to Prior Arrangements  yes       Resource/Environmental Concerns    Resource/Environmental Concerns  none       Transition Planning    Patient/Family Anticipates Transition to  inpatient rehabilitation facility    Patient/Family Anticipated Services at Transition  ;skilled nursing;rehabilitation services    Transportation Anticipated  family or friend will provide       Discharge Needs Assessment    Readmission Within the Last 30 Days  no previous admission in last 30 days    Concerns to be Addressed  discharge planning    Equipment Currently Used at Home  none Has a RW and WC available    Discharge Facility/Level of Care Needs  rehabilitation facility;nursing facility, skilled    Current Discharge Risk  lives alone        Discharge Plan     Row Name 10/13/19 1102       Plan    Plan  [facility] SN[facility] vs other    Patient/Family in Agreement with Plan  yes    Plan Comments  Met with patient and his daughter in the room to initiate discharge planning. Patient lives alone in a single-story home [place]. He is normally independent with ADLs and mobility. He has a RW and WC available. Patient has been to [facility] for skilled rehab in the past and this would be his first choice if rehab is recommended. He will need PT and OT evals. Please order PT and OT when appropriate. SLP is already following. CM will make the referral to [facility] on Monday. Anticipate patient will be able to transfer by car  with family. CM will continue to follow patient's POC and assist with DC planning.         Destination      Service Provider Request Status Selected Services Address Phone Number Fax Number    [facility] Pending - No Request Sent N/A [address] [address] [phone]      Durable Medical Equipment      No service coordination in this encounter.      Dialysis/Infusion      No service coordination in this encounter.      Home Medical Care      No service coordination in this encounter.      Therapy      No service coordination in this encounter.      Community Resources      No service coordination in this encounter.          Demographic Summary     Row Name 10/13/19 1059       General Information    Admission Type  inpatient    Referral Source  admission list    Reason for Consult  discharge planning    General Information Comments  PCP is Ivet Chau       Contact Information    Permission Granted to Share Info With  ;family/designee daughter, Maria Dolores Francis        Functional Status     Row Name 10/13/19 1059       Functional Status    Usual Activity Tolerance  good       Functional Status, IADL    Medications  independent    Meal Preparation  independent    Housekeeping  independent    Laundry  independent    Shopping  independent       Employment/    Employment/ Comments  Confirmed with patient and daughter that he has medical coverage through Medicare A [employer] B and [employer]. He has rx coverage through [employer] Medicare D.         Psychosocial    No documentation.       Abuse/Neglect    No documentation.       Legal    No documentation.       Substance Abuse    No documentation.       Patient Forms    No documentation.           Lauren Colon RN

## 2019-10-13 NOTE — PLAN OF CARE
Problem: Patient Care Overview  Goal: Plan of Care Review  Outcome: Ongoing (interventions implemented as appropriate)   10/13/19 0091   Coping/Psychosocial   Plan of Care Reviewed With patient;daughter   SLP evaluation completed. Will address dysphagia. Please see note for further details and recommendations.

## 2019-10-13 NOTE — THERAPY EVALUATION
Acute Care - Speech Language Pathology   Swallow Initial Evaluation  [place]   Clinical Swallow Evaluation     Patient Name: Alok Tobias  : 1945  MRN: [id number]  Today's Date: 10/13/2019               Admit Date: 10/11/2019    Visit Dx:     ICD-10-CM ICD-9-CM   1. Subdural hematoma (CMS/HCC) S06.5X9A 432.1     Patient Active Problem List   Diagnosis   • Obesity, Class III, BMI 40-49.9 (morbid obesity) (CMS/HCC)   • Hypertension   • COPD/asthma   • Chest pain   • Shortness of breath   • COPD (chronic obstructive pulmonary disease) (CMS/HCC)   • ANDREA    • Renal insufficiency   • Alcoholism (CMS/HCC)   • Atrial fibrillation with RVR to 2019 s/p successful ECV x2.  On home Coumadin post mitral valve repair (CMS/HCC)   • HCAP (healthcare-associated pneumonia)   • Bilateral pleural effusion   • Acute diastolic heart failure (CMS/HCC)   • Multiple lung nodules on CT   • PAF (paroxysmal atrial fibrillation) (CMS/[facility])   • S/P mitral valve repair/annuloplasty for severe MR 2019 in [place]   • Acute on chronic subdural hematoma with impending herniation s/p left frontotemporal craniotomy 10/11/2019    • Acute respiratory failure due to neurologic event (CMS/[facility])     Past Medical History:   Diagnosis Date   • Asthma    • CHF (congestive heart failure) (CMS/HCC)    • COPD (chronic obstructive pulmonary disease) (CMS/HCC)    • Elevated PSA    • Hypertension      Past Surgical History:   Procedure Laterality Date   • APPENDECTOMY     • CARDIAC CATHETERIZATION N/A 10/3/2018    Procedure: Left Heart Cath;  Surgeon: Twan Harrison MD;  Location: [facility] INVASIVE LOCATION;  Service: Cardiology   • MITRAL VALVE REPLACEMENT     • TONSILLECTOMY          SWALLOW EVALUATION (last 72 hours)      SLP Adult Swallow Evaluation     Row Name 10/13/19 1420                   Rehab Evaluation    Document Type  evaluation  -MP        Subjective Information  no complaints  -MP        Patient Observations   alert;cooperative  -MP        Patient/Family Observations  Dtr present  -MP        Patient Effort  good  -MP           General Information    Patient Profile Reviewed  yes  -MP        Pertinent History Of Current Problem  Adm w/ acute-on-chronic subdural hematoma w/ impending herniation s/p L frontotemporal crani 10/11. Hx HTN, COPD, ANDREA, alcoholism, Afib w/ RVR, mitral valve repair 7/15/19, CHF. Intubated 10/11 2028 - 10/13 1000.   -MP        Current Method of Nutrition  NPO  -MP        Precautions/Limitations, Vision  WFL;for purposes of eval  -MP        Precautions/Limitations, Hearing  WFL;for purposes of eval  -MP        Prior Level of Function-Communication  other (see comments) baseline unknown  -MP        Prior Level of Function-Swallowing  no diet consistency restrictions;safe, efficient swallowing in all situations  -MP        Plans/Goals Discussed with  patient;family;agreed upon  -MP        Barriers to Rehab  medically complex  -MP        Patient's Goals for Discharge  return to PO diet  -MP        Family Goals for Discharge  patient able to return to PO diet  -MP           Pain Assessment    Additional Documentation  Pain Scale: FACES Pre/Post-Treatment (Group)  -MP           Pain Scale: FACES Pre/Post-Treatment    Pain: FACES Scale, Pretreatment  0-->no hurt  -MP        Pain: FACES Scale, Post-Treatment  0-->no hurt  -MP           Oral Motor and Function    Dentition Assessment  edentulous, dentures not available  -MP        Secretion Management  WNL/WFL  -MP        Mucosal Quality  moist, healthy  -MP           Oral Musculature and Cranial Nerve Assessment    Oral Motor General Assessment  vocal impairment;generalized oral motor weakness  -MP        Vocal Impairment, Detail. Cranial Nerve X (Vagus)  vocal quality abnormality (see comments);other (see comments) hoarse vocal quality  -MP           General Eating/Swallowing Observations    Respiratory Support Currently in Use  room air  -MP         Eating/Swallowing Skills  self-fed;fed by SLP  -MP        Positioning During Eating  upright in bed  -MP        Utensils Used  spoon;cup;straw  -MP        Consistencies Trialed  thin liquids;pureed  -MP           Clinical Swallow Eval    Pharyngeal Phase  suspected pharyngeal impairment  -MP        Clinical Swallow Evaluation Summary  Clinical swallow evaluation completed. Pt given trials of ice chipx1, thin via tsp/cup/straw, and puree. Cough observed w/ large/consecutive straw sips of thin liquid. No s/sxs w/ thin via tsp or puree. Rec continue NPO x meds in puree, 5-6 ice chips per hour following oral care. Suspect that pt will look much improved when more time has passed post-extubation. Will f/u for re-eval tomorrow AM.  -MP           Pharyngeal Phase Concerns    Pharyngeal Phase Concerns  cough  -MP        Cough  thin;other (see comments) via large/consecutive straw sips  -MP           Clinical Impression    SLP Swallowing Diagnosis  suspected pharyngeal dysfunction  -MP        Functional Impact  risk of aspiration/pneumonia  -MP        Rehab Potential/Prognosis, Swallowing  good, to achieve stated therapy goals  -MP        Swallow Criteria for Skilled Therapeutic Interventions Met  demonstrates skilled criteria  -MP           Recommendations    SLP Diet Recommendation  NPO;other (see comments) 5-6 ice chips/hour following oral care  -MP        Recommended Precautions and Strategies  other (see comments) Oral care BID/PRN  -MP        SLP Rec. for Method of Medication Administration  meds crushed;with pudding or applesauce  -MP        Monitor for Signs of Aspiration  yes;notify SLP if any concerns  -MP        Anticipated Dischage Disposition  unknown  -MP          User Key  (r) = Recorded By, (t) = Taken By, (c) = Cosigned By    Initials Name Effective Dates    Sandip Teresa MS CCC-SLP 06/19/19 -           EDUCATION  The patient has been educated in the following areas:   Dysphagia (Swallowing  Impairment) NPO rationale.    SLP Recommendation and Plan  SLP Swallowing Diagnosis: suspected pharyngeal dysfunction  SLP Diet Recommendation: NPO, other (see comments)(5-6 ice chips/hour following oral care)  Recommended Precautions and Strategies: other (see comments)(Oral care BID/PRN)  SLP Rec. for Method of Medication Administration: meds crushed, with pudding or applesauce     Monitor for Signs of Aspiration: yes, notify SLP if any concerns     Swallow Criteria for Skilled Therapeutic Interventions Met: demonstrates skilled criteria  Anticipated Dischage Disposition: unknown  Rehab Potential/Prognosis, Swallowing: good, to achieve stated therapy goals             Plan of Care Reviewed With: patient, daughter           Time Calculation:   Time Calculation- SLP     Row Name 10/13/19 1557             Time Calculation- SLP    SLP Start Time  1420  -MP      SLP Received On  10/13/19  -MP        User Key  (r) = Recorded By, (t) = Taken By, (c) = Cosigned By    Initials Name Provider Type    Sandip Teresa MS CCC-SLP Speech and Language Pathologist          Therapy Charges for Today     Code Description Service Date Service Provider Modifiers Qty    [id number] HC ST EVAL ORAL PHARYNG SWALLOW 2 10/13/2019 Sandip You MS CCC-SLP GN 1               Sandip You MS CCC-SLP  10/13/2019

## 2019-10-13 NOTE — PLAN OF CARE
Problem: Patient Care Overview  Goal: Plan of Care Review  Outcome: Ongoing (interventions implemented as appropriate)   10/13/19 0609   Coping/Psychosocial   Plan of Care Reviewed With patient;daughter   Plan of Care Review   Progress improving   OTHER   Outcome Summary Patient placed back on Propofol to rest on the vent this shift. Patient's neuro status has remained unchanged. Subdural drain with pink tinged, bright red, and yellow drainage throughout night. VSS. Pt remains in afib. Will continue to monitor.      Goal: Individualization and Mutuality  Outcome: Ongoing (interventions implemented as appropriate)    Goal: Discharge Needs Assessment  Outcome: Ongoing (interventions implemented as appropriate)    Goal: Interprofessional Rounds/Family Conf  Outcome: Ongoing (interventions implemented as appropriate)      Problem: Skin Injury Risk (Adult)  Goal: Identify Related Risk Factors and Signs and Symptoms  Outcome: Ongoing (interventions implemented as appropriate)    Goal: Skin Health and Integrity  Outcome: Ongoing (interventions implemented as appropriate)      Problem: Restraint, Nonbehavioral (Nonviolent)  Goal: Rationale and Justification  Outcome: Ongoing (interventions implemented as appropriate)    Goal: Nonbehavioral (Nonviolent) Restraint: Absence of Injury/Harm  Outcome: Ongoing (interventions implemented as appropriate)    Goal: Nonbehavioral (Nonviolent) Restraint: Achievement of Discontinuation Criteria  Outcome: Ongoing (interventions implemented as appropriate)    Goal: Nonbehavioral (Nonviolent) Restraint: Preservation of Dignity and Wellbeing  Outcome: Ongoing (interventions implemented as appropriate)      Problem: Ventilation, Mechanical Invasive (Adult)  Goal: Signs and Symptoms of Listed Potential Problems Will be Absent, Minimized or Managed (Ventilation, Mechanical Invasive)  Outcome: Ongoing (interventions implemented as appropriate)      Problem: Craniotomy/Craniectomy/Cranioplasty  (Adult)  Goal: Signs and Symptoms of Listed Potential Problems Will be Absent, Minimized or Managed (Craniotomy/Craniectomy/Cranioplasty)  Outcome: Ongoing (interventions implemented as appropriate)    Goal: Anesthesia/Sedation Recovery  Outcome: Ongoing (interventions implemented as appropriate)

## 2019-10-13 NOTE — PROGRESS NOTES
Intensivist Note     10/12/2019  Hospital Day: 1  1 Day Post-Op      Mr. Alok Tobias, 74 y.o. male is followed for:    Acute on chronic subdural hematoma with impending herniation s/p left frontotemporal craniotomy 10/11/2019     Acute respiratory failure due to neurologic event (CMS/HCC)    Atrial fibrillation with RVR to 6/19/2019 s/p successful ECV x2.  On home Coumadin post mitral valve repair (CMS/HCC)    S/P mitral valve repair/annuloplasty for severe MR 7/25/2019 in [place]    COPD/asthma    ANDREA     Hypertension    Alcoholism (CMS/HCC)       SUBJECTIVE     74-year-old white male with a history of PAF newly diagnosed 6/2019 status post successful ECV, severe MR and CHF status post mitral valve repair 7/2019, asthma/COPD, ANDREA, and alcoholism.  Apparently was found down by his family the evening of 10/11/2019 after not being seen for several days.  CT revealed a large acute on chronic subdural hematoma with left to right midline shift and impending herniation.  Was taken directly to the OR right left frontotemporal craniotomy was performed.      Interval history: Initially was sedated on the ventilator and unable to be evaluated.  When sedation held he began to follow commands in all 4 extremities.  Follow-up CT scan revealed evacuation of extra-axial fluid collection.  There was still some pneumocephalus with residual left to right midline shift but it was improved significantly compared to preprocedure.  TMax is 100 and he has completed his perioperative cefepime.  He remains on ventilatory support and FiO2 is being weaned.  Is presently in atrial fibrillation with a heart rate of 116.  Will focus on rate control with beta-blockers.     ROS: Due to acuity of illness and intubated state unable to obtain    The patient's relevant PMH, PSH, FH, and SH were reviewed and updated in Epic as appropriate. Allergies and Medications reviewed.    OBJECTIVE     /57   Pulse 74   Temp 100 °F (37.8 °C)  "(Axillary)   Resp 14   Ht 185.4 cm (72.99\")   Wt 104 kg (229 lb 0.9 oz)   SpO2 97%   BMI 30.23 kg/m²   Oxygen Concentration (%): 45       Flowsheet Rows      First Filed Value   Admission Height  185.4 cm (73\") Documented at 10/11/2019 1938   Admission Weight  115 kg (254 lb) Documented at 10/11/2019 1938        Intake & Output (last day)   intake: 841 cc    output: 575 cc       Exam:  General Exam:  Elderly white male intubated and on ventilatory support.  HEENT: Pupils equal and reactive.  ETT and OG tube in place  Neck:                          Supple, no JVD, thyromegaly, or adenopathy  Lungs: Few scattered rhonchi but otherwise clear  Cardiovascular: Irregularly irregular rhythm with a variable S1 and normal S2.  No murmurs or gallops.   bpm  Abdomen: Soft nontender without organomegaly or masses.  Obese   and rectal: Julien catheter in place  Extremities: No cyanosis clubbing edema.  Neurologic:                 Became more arousable after sedation discontinued.  Can move all extremities to command    Chest X-Ray 10/11/2019: Cardiomegaly but no congestive changes.  ET tube and OG in place.  Minimal patchy linear infiltrates in costophrenic angle    INFUSIONS    niCARdipine 5-15 mg/hr Last Rate: Stopped (10/12/19 0321)   propofol 5-50 mcg/kg/min Last Rate: 10 mcg/kg/min (10/12/19 2136)       Results from last 7 days   Lab Units 10/11/19  2009 10/11/19  1941   WBC 10*3/mm3 13.16*  --    HEMOGLOBIN g/dL 14.3  --    HEMOGLOBIN, POC g/dL  --  15.0   HEMATOCRIT % 47.5  --    HEMATOCRIT POC %  --  44   PLATELETS 10*3/mm3 304  --      Results from last 7 days   Lab Units 10/11/19  1941   CREATININE mg/dL 0.80               No results found for: SEDRATE  No results found for: BNP  Lab Results   Component Value Date    TROPONINT <0.010 06/18/2019    TROPONINT <0.010 06/18/2019     Lab Results   Component Value Date    TSH 0.665 06/19/2019     Lab Results   Component Value Date    LACTATE 1.7 05/26/2019     No " results found for: CORTISOL    Results from last 7 days   Lab Units 10/11/19  2144   PH, ARTERIAL pH units 7.374   PCO2, ARTERIAL mm Hg 36.0   PO2 ART mm Hg 351.0*   HCO3 ART mmol/L 21.0   FIO2 % 100       Vent Settings:  Assist control  Vt (Set, L): 0.56 L  Resp Rate (Set): 14  FiO2 (%): 45 %  PEEP/CPAP (cm H2O): 5 cm H20    Minute Ventilation (L/min) (Obs): 8.37 L/min  Resp Rate (Observed) Vent: 14  I:E Ratio (Obs): 1:3.30  PIP Observed (cm H2O): 21 cm H2O        I reviewed the patient's results, images and medication.    Assessment/Plan   ASSESSMENT        Acute on chronic subdural hematoma with impending herniation s/p left frontotemporal craniotomy 10/11/2019     Acute respiratory failure due to neurologic event (CMS/[facility])    Atrial fibrillation with RVR to 6/19/2019 s/p successful ECV x2.  On home Coumadin post mitral valve repair (CMS/[facility])    S/P mitral valve repair/annuloplasty for severe MR 7/25/2019 in [place]    COPD/asthma    ANDREA     Hypertension    Alcoholism (CMS/[facility])      DISCUSSION: Remains critically ill and on ventilatory support.  Hemodynamics however are good without pressors, CT scan improved although still with postoperative pneumocephalus and left to right shift, and gas exchange improving.  If able to wean FiO2 and he remains alert neurologically should be able to convert him to IMV and pressure support and wean further.  I want to make sure he can protect his airway before extubating him    PLAN     1.  IMV and pressure support  2.  Respiratory mechanics  3.  If remains alert and mechanics adequate wean to extubate  4.  Baseline NT suction before extubation    Plan of care and goals reviewed with mulitdisciplinary team at daily rounds.    I discussed the patient's findings and my recommendations with patient, family and primary care team    High level of risk due to: severe exacerbation of chronic illness, illness with threat to life or bodily function and parenteral controlled  substances.    Time spent Critical care 35 min (It does not include procedure time).    Amos Beverly MD  Intensive Care Medicine  10/12/19 11:51 PM

## 2019-10-13 NOTE — PLAN OF CARE
Problem: Patient Care Overview  Goal: Plan of Care Review  Outcome: Ongoing (interventions implemented as appropriate)   10/13/19 6168   Coping/Psychosocial   Plan of Care Reviewed With patient   Plan of Care Review   Progress improving   OTHER   Outcome Summary pt. extubated,restraints discontinued speech to reevaluate tomorow, vss will continue to monitor.        Problem: Sleep Pattern Disturbance (Adult)  Goal: Identify Related Risk Factors and Signs and Symptoms  Outcome: Ongoing (interventions implemented as appropriate)   10/13/19 0017   Sleep Pattern Disturbance (Adult)   Related Risk Factors (Sleep Pattern Disturbance) hospital routine/care;pain/discomfort;parent-child interaction;unfamiliar surroundings   Signs and Symptoms (Sleep Pattern Disturbance) difficulty performing routine tasks     Goal: Adequate Sleep/Rest  Outcome: Ongoing (interventions implemented as appropriate)   10/13/19 6743   Sleep Pattern Disturbance (Adult)   Adequate Sleep/Rest making progress toward outcome

## 2019-10-13 NOTE — PROGRESS NOTES
Brief visit. See my full consultation note of 10/12/19.  Will follow at a distance. Spoke with the patient and his family.

## 2019-10-13 NOTE — H&P
Chief complaint altered mental status     Subjective     History of Present Illness:  Patient is a 74 y.o. male presents with with a past medical history significant for COPD, HTN, CHF, mitral valve repair in 2019 on Coumadin therapy.  Patient presented to [facility] ED on 10/11/19 via flight EMS after being found down by his daughter at approximately 1800. Upon arrival to the ED, the patient is unarousable. GCS 7 and NIH 30 on arrival. Patient's last known well is 10/8/19 when his daughter spoke to him on the phone.head CT performed in the emergency department demonstrates large acute left subdural hemorrhage with left to right midline shift with mass-effect resulting in effacement of left lateral ventricle.  The patient will be taken for an emergent craniotomy for evacuation of subdural hematoma.  Verbal consent was obtained from the patient's daughter via telephone.       Family History   Problem Relation Age of Onset   • Multiple sclerosis Mother      Social History     Tobacco Use   • Smoking status: Former Smoker     Packs/day: 1.50     Years: 20.00     Pack years: 30.00     Types: Cigarettes     Last attempt to quit: 1986     Years since quittin.4   • Smokeless tobacco: Never Used   Substance Use Topics   • Alcohol use: Yes     Alcohol/week: 1.8 - 2.4 oz     Types: 3 - 4 Cans of beer per week     Comment: 3-4 beers every evening   • Drug use: No     Past Medical History:   Diagnosis Date   • Asthma    • CHF (congestive heart failure) (CMS/[facility])    • COPD (chronic obstructive pulmonary disease) (CMS/[facility])    • Elevated PSA    • Hypertension      Past Surgical History:   Procedure Laterality Date   • APPENDECTOMY     • CARDIAC CATHETERIZATION N/A 10/3/2018    Procedure: Left Heart Cath;  Surgeon: Twan Harrison MD;  Location: [facility] CATH INVASIVE LOCATION;  Service: Cardiology   • MITRAL VALVE REPLACEMENT     • TONSILLECTOMY       Medications Prior to Admission   Medication Sig Dispense Refill Last  Dose   • albuterol (PROVENTIL HFA;VENTOLIN HFA) 108 (90 Base) MCG/ACT inhaler Inhale 2 puffs Every 4 (Four) Hours As Needed for Wheezing.   Unknown at Unknown time   • allopurinol (ZYLOPRIM) 300 MG tablet Take 300 mg by mouth Daily.   Unknown at Unknown time   • aspirin 81 MG EC tablet Take 81 mg by mouth Daily.   Unknown at Unknown time   • atorvastatin (LIPITOR) 10 MG tablet Take 10 mg by mouth Every Night.   Unknown at Unknown time   • budesonide-formoterol (SYMBICORT) 160-4.5 MCG/ACT inhaler Inhale 2 puffs 2 (two) times a day.   Taking   • docusate sodium (COLACE) 100 MG capsule Take 100 mg by mouth 2 (Two) Times a Day.   Unknown at Unknown time   • famotidine (PEPCID) 20 MG tablet Take 20 mg by mouth 2 (Two) Times a Day.   Unknown at Unknown time   • furosemide (LASIX) 40 MG tablet Take 1 tablet by mouth 2 (Two) Times a Day. (Patient taking differently: Take 40 mg by mouth Daily.) 60 tablet 3 Unknown at Unknown time   • iron polysacch complex-B12-FA (FERREX 150 FORTE) 150-0.025-1 MG capsule capsule Take 1 capsule by mouth Daily.   Unknown at Unknown time   • metoprolol succinate XL (TOPROL-XL) 25 MG 24 hr tablet Take 1 tablet by mouth Daily. 30 tablet 11 Unknown at Unknown time   • potassium chloride (K-DUR,KLOR-CON) 20 MEQ CR tablet Take 1 tablet by mouth Daily. 30 tablet 3 Unknown at Unknown time   • theophylline (THEODUR) 300 MG 12 hr tablet Take 300 mg by mouth 2 (Two) Times a Day.   Unknown at Unknown time   • warfarin (COUMADIN) 3 MG tablet Take 3 mg by mouth Daily.   Unknown at Unknown time     Dye fdc red [red dye] and Penicillins    Objective     Review of Systems/Physical Exam:   Review of Systems   Unable to perform ROS: Mental status change       Patient is obtunded and unarousable.  He withdraws to pain in his right upper extremity, and bilateral lower extremities.  Left upper extremity appears to be flaccid.  He is not following commands.  He gives a slight  when stimulated in right upper  extremity.  Patient does not open his eyes to voice.  He is currently being intubated by ED staff     GCS upon my exam is a 5  NIH 30 per nursing report        Assessment/Plan     Acute Subdural Hemorrhage      74-year-old gentleman found to have a large left subdural hemorrhage resulting in left to right midline shift with mass-effect and effacement of left lateral ventricles.  Patient will be taken to the OR for an emergent craniotomy for evacuation of subdural hematoma.    Verbal consent for the procedure was obtained from the patient's daughter who is his POA.  The patient will be admitted to the neuro ICU.       Josseline Blount PA-C  10/11/19  10:16 AM

## 2019-10-14 ENCOUNTER — APPOINTMENT (OUTPATIENT)
Dept: CT IMAGING | Facility: HOSPITAL | Age: 74
End: 2019-10-14

## 2019-10-14 PROBLEM — J96.00 ACUTE RESPIRATORY FAILURE: Status: RESOLVED | Noted: 2019-10-12 | Resolved: 2019-10-14

## 2019-10-14 LAB
ANION GAP SERPL CALCULATED.3IONS-SCNC: 11 MMOL/L (ref 5–15)
ANION GAP SERPL CALCULATED.3IONS-SCNC: 8 MMOL/L (ref 5–15)
BUN BLD-MCNC: 18 MG/DL (ref 8–23)
BUN BLD-MCNC: 24 MG/DL (ref 8–23)
BUN/CREAT SERPL: 21.7 (ref 7–25)
BUN/CREAT SERPL: 27.3 (ref 7–25)
CALCIUM SPEC-SCNC: 8.8 MG/DL (ref 8.6–10.5)
CALCIUM SPEC-SCNC: 9.7 MG/DL (ref 8.6–10.5)
CHLORIDE SERPL-SCNC: 101 MMOL/L (ref 98–107)
CHLORIDE SERPL-SCNC: 110 MMOL/L (ref 98–107)
CO2 SERPL-SCNC: 31 MMOL/L (ref 22–29)
CO2 SERPL-SCNC: 33 MMOL/L (ref 22–29)
CREAT BLD-MCNC: 0.83 MG/DL (ref 0.76–1.27)
CREAT BLD-MCNC: 0.88 MG/DL (ref 0.76–1.27)
GFR SERPL CREATININE-BSD FRML MDRD: 84 ML/MIN/1.73
GFR SERPL CREATININE-BSD FRML MDRD: 90 ML/MIN/1.73
GLUCOSE BLD-MCNC: 136 MG/DL (ref 65–99)
GLUCOSE BLD-MCNC: 161 MG/DL (ref 65–99)
POTASSIUM BLD-SCNC: 3.3 MMOL/L (ref 3.5–5.2)
POTASSIUM BLD-SCNC: 3.9 MMOL/L (ref 3.5–5.2)
SODIUM BLD-SCNC: 143 MMOL/L (ref 136–145)
SODIUM BLD-SCNC: 151 MMOL/L (ref 136–145)

## 2019-10-14 PROCEDURE — 99232 SBSQ HOSP IP/OBS MODERATE 35: CPT | Performed by: INTERNAL MEDICINE

## 2019-10-14 PROCEDURE — 92610 EVALUATE SWALLOWING FUNCTION: CPT

## 2019-10-14 PROCEDURE — 97530 THERAPEUTIC ACTIVITIES: CPT

## 2019-10-14 PROCEDURE — 70450 CT HEAD/BRAIN W/O DYE: CPT

## 2019-10-14 PROCEDURE — 94799 UNLISTED PULMONARY SVC/PX: CPT

## 2019-10-14 PROCEDURE — 80048 BASIC METABOLIC PNL TOTAL CA: CPT | Performed by: NEUROLOGICAL SURGERY

## 2019-10-14 PROCEDURE — 97163 PT EVAL HIGH COMPLEX 45 MIN: CPT

## 2019-10-14 PROCEDURE — 80048 BASIC METABOLIC PNL TOTAL CA: CPT | Performed by: PHYSICIAN ASSISTANT

## 2019-10-14 PROCEDURE — 94640 AIRWAY INHALATION TREATMENT: CPT

## 2019-10-14 RX ORDER — BUDESONIDE AND FORMOTEROL FUMARATE DIHYDRATE 160; 4.5 UG/1; UG/1
2 AEROSOL RESPIRATORY (INHALATION)
Status: DISCONTINUED | OUTPATIENT
Start: 2019-10-14 | End: 2019-10-17 | Stop reason: HOSPADM

## 2019-10-14 RX ORDER — HYDROCODONE BITARTRATE AND ACETAMINOPHEN 5; 325 MG/1; MG/1
1 TABLET ORAL EVERY 6 HOURS PRN
Status: DISCONTINUED | OUTPATIENT
Start: 2019-10-14 | End: 2019-10-17 | Stop reason: HOSPADM

## 2019-10-14 RX ORDER — SODIUM CHLORIDE 9 MG/ML
75 INJECTION, SOLUTION INTRAVENOUS CONTINUOUS
Status: DISCONTINUED | OUTPATIENT
Start: 2019-10-14 | End: 2019-10-17 | Stop reason: HOSPADM

## 2019-10-14 RX ADMIN — MULTIVITAMIN TABLET 1 TABLET: TABLET at 09:21

## 2019-10-14 RX ADMIN — HYDROCODONE BITARTRATE AND ACETAMINOPHEN 1 TABLET: 5; 325 TABLET ORAL at 17:01

## 2019-10-14 RX ADMIN — ALLOPURINOL 300 MG: 300 TABLET ORAL at 09:21

## 2019-10-14 RX ADMIN — DOCUSATE SODIUM 100 MG: 100 CAPSULE, LIQUID FILLED ORAL at 20:00

## 2019-10-14 RX ADMIN — FOLIC ACID 1 MG: 1 TABLET ORAL at 09:22

## 2019-10-14 RX ADMIN — FAMOTIDINE 20 MG: 20 TABLET ORAL at 09:21

## 2019-10-14 RX ADMIN — IPRATROPIUM BROMIDE AND ALBUTEROL SULFATE 3 ML: 2.5; .5 SOLUTION RESPIRATORY (INHALATION) at 19:00

## 2019-10-14 RX ADMIN — IPRATROPIUM BROMIDE AND ALBUTEROL SULFATE 3 ML: 2.5; .5 SOLUTION RESPIRATORY (INHALATION) at 12:47

## 2019-10-14 RX ADMIN — POTASSIUM CHLORIDE 40 MEQ: 750 CAPSULE, EXTENDED RELEASE ORAL at 23:42

## 2019-10-14 RX ADMIN — METOPROLOL SUCCINATE 25 MG: 25 TABLET, EXTENDED RELEASE ORAL at 09:22

## 2019-10-14 RX ADMIN — IPRATROPIUM BROMIDE AND ALBUTEROL SULFATE 3 ML: 2.5; .5 SOLUTION RESPIRATORY (INHALATION) at 16:24

## 2019-10-14 RX ADMIN — DOCUSATE SODIUM 100 MG: 100 CAPSULE, LIQUID FILLED ORAL at 09:21

## 2019-10-14 RX ADMIN — BUDESONIDE AND FORMOTEROL FUMARATE DIHYDRATE 2 PUFF: 160; 4.5 AEROSOL RESPIRATORY (INHALATION) at 19:01

## 2019-10-14 RX ADMIN — ATORVASTATIN CALCIUM 10 MG: 10 TABLET, FILM COATED ORAL at 20:00

## 2019-10-14 RX ADMIN — FAMOTIDINE 20 MG: 20 TABLET ORAL at 20:00

## 2019-10-14 RX ADMIN — Medication 100 MG: at 09:22

## 2019-10-14 RX ADMIN — FUROSEMIDE 40 MG: 40 TABLET ORAL at 09:21

## 2019-10-14 RX ADMIN — BUDESONIDE AND FORMOTEROL FUMARATE DIHYDRATE 2 PUFF: 160; 4.5 AEROSOL RESPIRATORY (INHALATION) at 12:47

## 2019-10-14 RX ADMIN — POTASSIUM CHLORIDE 40 MEQ: 750 CAPSULE, EXTENDED RELEASE ORAL at 00:10

## 2019-10-14 RX ADMIN — IPRATROPIUM BROMIDE AND ALBUTEROL SULFATE 3 ML: 2.5; .5 SOLUTION RESPIRATORY (INHALATION) at 08:32

## 2019-10-14 RX ADMIN — POTASSIUM CHLORIDE 20 MEQ: 750 CAPSULE, EXTENDED RELEASE ORAL at 09:22

## 2019-10-14 RX ADMIN — FUROSEMIDE 40 MG: 40 TABLET ORAL at 17:01

## 2019-10-14 NOTE — THERAPY RE-EVALUATION
Acute Care - Speech Language Pathology   Swallow Re-Evaluation [facility]   Clinical Swallow Evaluation       Patient Name: Alok Tobias  : 1945  MRN: [id number]  Today's Date: 10/14/2019               Admit Date: 10/11/2019    Visit Dx:     ICD-10-CM ICD-9-CM   1. Subdural hematoma (CMS/HCC) S06.5X9A 432.1     Patient Active Problem List   Diagnosis   • Obesity, Class III, BMI 40-49.9 (morbid obesity) (CMS/HCC)   • Hypertension   • COPD/asthma   • Chest pain   • Shortness of breath   • COPD (chronic obstructive pulmonary disease) (CMS/HCC)   • ANDREA    • Renal insufficiency   • Alcoholism (CMS/HCC)   • Atrial fibrillation with RVR to 2019 s/p successful ECV x2.  On home Coumadin post mitral valve repair (CMS/HCC)   • HCAP (healthcare-associated pneumonia)   • Bilateral pleural effusion   • Acute diastolic heart failure (CMS/HCC)   • Multiple lung nodules on CT   • PAF (paroxysmal atrial fibrillation) (CMS/[facility])   • S/P mitral valve repair/annuloplasty for severe MR 2019 in [place]   • Acute on chronic subdural hematoma with impending herniation s/p left frontotemporal craniotomy 10/11/2019    • Acute respiratory failure due to neurologic event (CMS/[facility])     Past Medical History:   Diagnosis Date   • Asthma    • CHF (congestive heart failure) (CMS/HCC)    • COPD (chronic obstructive pulmonary disease) (CMS/[facility])    • Elevated PSA    • Hypertension      Past Surgical History:   Procedure Laterality Date   • APPENDECTOMY     • CARDIAC CATHETERIZATION N/A 10/3/2018    Procedure: Left Heart Cath;  Surgeon: Twan Harrison MD;  Location:  [facility];  Service: Cardiology   • MITRAL VALVE REPLACEMENT     • TONSILLECTOMY          SWALLOW EVALUATION (last 72 hours)      SLP Adult Swallow Evaluation     Row Name 10/14/19 0830 10/13/19 1420                Rehab Evaluation    Document Type  re-evaluation  -VO  evaluation  -MP       Subjective Information  no complaints  -VO  no  complaints  -MP       Patient Observations  alert;cooperative  -VO  alert;cooperative  -MP       Patient/Family Observations  dtr present  -VO  Dtr present  -MP       Patient Effort  good  -VO  good  -MP          General Information    Patient Profile Reviewed  yes  -VO  yes  -MP       Pertinent History Of Current Problem  --  Adm w/ acute-on-chronic subdural hematoma w/ impending herniation s/p L frontotemporal crani 10/11. Hx HTN, COPD, ANDREA, alcoholism, Afib w/ RVR, mitral valve repair 7/15/19, CHF. Intubated 10/11 2028 - 10/13 1000.   -MP       Current Method of Nutrition  NPO  -VO  NPO  -MP       Precautions/Limitations, Vision  WFL;for purposes of eval  -VO  WFL;for purposes of eval  -MP       Precautions/Limitations, Hearing  WFL;for purposes of eval  -VO  WFL;for purposes of eval  -MP       Prior Level of Function-Communication  WFL  -VO  other (see comments) baseline unknown  -MP       Prior Level of Function-Swallowing  no diet consistency restrictions  -VO  no diet consistency restrictions;safe, efficient swallowing in all situations  -MP       Plans/Goals Discussed with  patient;family;agreed upon  -VO  patient;family;agreed upon  -MP       Barriers to Rehab  none identified  -VO  medically complex  -MP       Patient's Goals for Discharge  return to PO diet  -VO  return to PO diet  -MP       Family Goals for Discharge  patient able to return to PO diet  -VO  patient able to return to PO diet  -MP          Pain Assessment    Additional Documentation  --  Pain Scale: FACES Pre/Post-Treatment (Group)  -MP          Pain Scale: FACES Pre/Post-Treatment    Pain: FACES Scale, Pretreatment  0-->no hurt  -VO  0-->no hurt  -MP       Pain: FACES Scale, Post-Treatment  0-->no hurt  -VO  0-->no hurt  -MP          Oral Motor and Function    Dentition Assessment  upper dentures/partial in place  -VO  edentulous, dentures not available  -MP       Secretion Management  WNL/WFL  -VO  WNL/WFL  -MP       Mucosal Quality   moist, healthy  -VO  moist, healthy  -MP       Volitional Swallow  WFL  -VO  --       Volitional Cough  WFL  -VO  --          Oral Musculature and Cranial Nerve Assessment    Oral Motor General Assessment  WFL  -VO  vocal impairment;generalized oral motor weakness  -MP       Vocal Impairment, Detail. Cranial Nerve X (Vagus)  --  vocal quality abnormality (see comments);other (see comments) hoarse vocal quality  -MP          General Eating/Swallowing Observations    Respiratory Support Currently in Use  room air  -VO  room air  -MP       Eating/Swallowing Skills  self-fed  -VO  self-fed;fed by SLP  -MP       Positioning During Eating  upright in bed  -VO  upright in bed  -MP       Utensils Used  spoon;cup;straw  -VO  spoon;cup;straw  -MP       Consistencies Trialed  thin liquids;pureed;regular textures  -VO  thin liquids;pureed  -MP          Clinical Swallow Eval    Oral Prep Phase  WFL  -VO  --       Oral Transit  WFL  -VO  --       Oral Residue  WFL  -VO  --       Pharyngeal Phase  no overt signs/symptoms of pharyngeal impairment  -VO  suspected pharyngeal impairment  -MP       Esophageal Phase  unremarkable  -VO  --       Clinical Swallow Evaluation Summary  Re-eval completed: Pt w/ no overt s/sxs aspiration w/ any consistency even when pushed w/ consecutive sips of thins. Slight inc'rd mastication w/ regular solids 2' wkns and only upper partials in place (does not wear lower dentition). Rec: soft/whole foods w/ thins. DT and needs cognitive-communication eval.   -VO  Clinical swallow evaluation completed. Pt given trials of ice chipx1, thin via tsp/cup/straw, and puree. Cough observed w/ large/consecutive straw sips of thin liquid. No s/sxs w/ thin via tsp or puree. Rec continue NPO x meds in puree, 5-6 ice chips per hour following oral care. Suspect that pt will look much improved when more time has passed post-extubation. Will f/u for re-eval tomorrow AM.  -MP          Oral Prep Concerns    Oral Prep Concerns   prolonged mastication  -VO  --       Prolonged Mastication  regular consistencies  -VO  --       Oral Prep Concerns, Comment  dentition impacting, functional however  -VO  --          Pharyngeal Phase Concerns    Pharyngeal Phase Concerns  --  cough  -MP       Cough  --  thin;other (see comments) via large/consecutive straw sips  -MP          Clinical Impression    SLP Swallowing Diagnosis  functional oral phase;functional pharyngeal phase  -VO  suspected pharyngeal dysfunction  -MP       Functional Impact  risk of aspiration/pneumonia  -VO  risk of aspiration/pneumonia  -MP       Rehab Potential/Prognosis, Swallowing  good, to achieve stated therapy goals  -VO  good, to achieve stated therapy goals  -MP       Swallow Criteria for Skilled Therapeutic Interventions Met  demonstrates skilled criteria  -VO  demonstrates skilled criteria  -MP          Recommendations    Therapy Frequency (Swallow)  PRN  -VO  --       Predicted Duration Therapy Intervention (Days)  1 week  -VO  --       SLP Diet Recommendation  soft textures;whole;thin liquids  -VO  NPO;other (see comments) 5-6 ice chips/hour following oral care  -MP       Recommended Diagnostics  other (see comments) [employer] eval  -VO  --       Recommended Precautions and Strategies  --  other (see comments) Oral care BID/PRN  -MP       SLP Rec. for Method of Medication Administration  meds whole;with thin liquids;as tolerated  -VO  meds crushed;with pudding or applesauce  -MP       Monitor for Signs of Aspiration  yes;notify SLP if any concerns  -VO  yes;notify SLP if any concerns  -MP       Anticipated Dischage Disposition  unknown  -VO  unknown  -MP         User Key  (r) = Recorded By, (t) = Taken By, (c) = Cosigned By    Initials Name Effective Dates    VO Abigail Triana MA,CCC-SLP 10/24/18 -     MP Sandip You MS CCC-SLP 06/19/19 -           EDUCATION  The patient has been educated in the following areas:   Dysphagia (Swallowing Impairment) Oral  Care/Hydration Modified Diet Instruction.    SLP Recommendation and Plan  SLP Swallowing Diagnosis: functional oral phase, functional pharyngeal phase  SLP Diet Recommendation: soft textures, whole, thin liquids     SLP Rec. for Method of Medication Administration: meds whole, with thin liquids, as tolerated     Monitor for Signs of Aspiration: yes, notify SLP if any concerns  Recommended Diagnostics: other (see comments)([employer] eval)  Swallow Criteria for Skilled Therapeutic Interventions Met: demonstrates skilled criteria  Anticipated Dischage Disposition: unknown  Rehab Potential/Prognosis, Swallowing: good, to achieve stated therapy goals  Therapy Frequency (Swallow): PRN  Predicted Duration Therapy Intervention (Days): 1 week       Plan of Care Reviewed With: patient, daughter    SLP GOALS     Row Name 10/14/19 0830             Oral Nutrition/Hydration Goal 1 (SLP)    Oral Nutrition/Hydration Goal 1, SLP  LTG: Will tolerate recommended diet w/o overt s/sxs aspiration or difficulty w/ 100% acc.  -VO      Time Frame (Oral Nutrition/Hydration Goal 1, SLP)  by discharge  -VO      Progress/Outcomes (Oral Nutrition/Hydration Goal 1, SLP)  goal ongoing  -VO        User Key  (r) = Recorded By, (t) = Taken By, (c) = Cosigned By    Initials Name Provider Type    VO Abigail Triana MA,CCC-SLP Speech and Language Pathologist             Time Calculation:   Time Calculation- SLP     Row Name 10/14/19 0938             Time Calculation- SLP    SLP Start Time  0830  -VO      SLP Received On  10/14/19  -VO        User Key  (r) = Recorded By, (t) = Taken By, (c) = Cosigned By    Initials Name Provider Type    Abigail Gurrola MA,CCC-SLP Speech and Language Pathologist          Therapy Charges for Today     Code Description Service Date Service Provider Modifiers Qty    [id number]  ST EVAL ORAL PHARYNG SWALLOW 3 10/14/2019 Abigail Triana MA,CCC-SLP GN 1               Abigail Triana  MA,CCC-SLP  10/14/2019

## 2019-10-14 NOTE — PROGRESS NOTES
Intensive Care Follow-up     Hospital:  LOS: 3 days   Mr. Alok Tobias, 74 y.o. male is followed for:   Subdural hematoma (CMS/HCC)        History of present illness:   74-year-old white male with a history of PAF newly diagnosed 6/2019 status post successful ECV, severe MR and CHF, s/p mitral valve repair 7/2019, asthma/COPD, ANDREA, and alcoholism.  Apparently was found down by his family the evening of 10/11/2019 after not being seen for several days.  CT revealed a large acute on chronic subdural hematoma with left to right midline shift and impending herniation. Was taken directly to the OR and right frontotemporal craniotomy was successfully performed.        Subjective   Interval History:  Patient was extubated on 10/13/2019.  Doing very well this morning.  He is slightly confused and had poor sleep, but overall has made significant improvement.  Denies any chest pain, shortness of breath, fever or chills.       The patient's past medical, surgical and social history were reviewed and updated in Epic as appropriate.       Objective     Infusions:    lactated ringers 75 mL/hr Last Rate: 75 mL/hr (10/13/19 1612)     Medications:    allopurinol 300 mg Oral Daily   atorvastatin 10 mg Oral Nightly   budesonide-formoterol 2 puff Inhalation BID - RT   docusate sodium 100 mg Oral BID   famotidine 20 mg Oral BID   vitamin B-1 100 mg Oral Daily   And      multivitamin 1 tablet Oral Daily   And      folic acid 1 mg Oral Daily   furosemide 40 mg Oral BID   ipratropium-albuterol 3 mL Nebulization 4x Daily - RT   metoprolol succinate XL 25 mg Oral Q24H   potassium chloride 20 mEq Oral Daily With Breakfast     I reviewed the patient's medications.    Vital Sign Min/Max for last 24 hours  Temp  Min: 97.7 °F (36.5 °C)  Max: 98.7 °F (37.1 °C)   BP  Min: 112/81  Max: 150/99   Pulse  Min: 96  Max: 121   Resp  Min: 16  Max: 20   SpO2  Min: 83 %  Max: 99 %   Flow (L/min)  Min: 1  Max: 4       Input/Output for last 24 hour  shift  10/13 0701 - 10/14 0700  In: 1078 [P.O.:25; I.V.:993]  Out: 2676 [Urine:2300; Drains:376]      GENERAL : NAD, conversant  RESPIRATORY/THORAX : normal respiratory effort and no intercostal retractions, clear to auscultation bilaterally  CARDIOVASCULAR : Normal S1/S2, RRR. 1+ lower ext edema.  GASTROINTESTINAL : Soft, NT/ND. BS x 4 normoactive. No hepatosplenomegaly.  MUSCULOSKELETAL : No cyanosis, clubbing, or ischemia  NEUROLOGICAL: alert and oriented to person, place and time  PSYCHOLOGICAL : Appropriate affect    Results from last 7 days   Lab Units 10/13/19  1416 10/11/19  2009 10/11/19  1941   WBC 10*3/mm3 12.69* 13.16*  --    HEMOGLOBIN g/dL 13.1 14.3  --    HEMOGLOBIN, POC g/dL  --   --  15.0   PLATELETS 10*3/mm3 260 304  --      Results from last 7 days   Lab Units 10/14/19  0944 10/13/19  1416 10/11/19  1941   SODIUM mmol/L 151* 150*  --    POTASSIUM mmol/L 3.9 3.1*  --    CO2 mmol/L 33.0* 28.0  --    BUN mg/dL 24* 29*  --    CREATININE mg/dL 0.88 1.07 0.80   MAGNESIUM mg/dL  --  2.3  --    PHOSPHORUS mg/dL  --  2.7  --    GLUCOSE mg/dL 161* 134*  --      Estimated Creatinine Clearance: 94.4 mL/min (by C-G formula based on SCr of 0.88 mg/dL).    Results from last 7 days   Lab Units 10/13/19  0908   PH, ARTERIAL pH units 7.472*   PCO2, ARTERIAL mm Hg 33.8   PO2 ART mm Hg 74.4*       I reviewed the patient's new clinical results.  I reviewed the patient's new imaging results/reports including actual images and agree with reports.              Imaging Results (last 24 hours)     Procedure Component Value Units Date/Time    XR Chest 1 View [[id number]] Collected:  10/13/19 0830     Updated:  10/13/19 2340    Narrative:          EXAMINATION: XR CHEST 1 VW - 10/13/2019     INDICATION: Respiratory failure; S06.5D9T-[id number] subdural hemorrhage  with loss of consciousness of unspecified duration, initial encounter.      COMPARISON: 10/11/2019     FINDINGS: ET tube and NG tube appear to be in good position.  The heart  is enlarged. Vasculature appears normal. Lungs are well expanded and  clear except for granulomatous calcifications. There is minimal right  basilar pleural thickening.           Impression:       Cardiomegaly without congestive failure. No new chest  disease is seen.     DICTATED:   10/13/2019  EDITED/ls :   10/13/2019      This report was finalized on 10/13/2019 11:37 PM by DR. Roque Pascal MD.             Assessment/Plan   Impression        Acute on chronic subdural hematoma with impending herniation s/p left frontotemporal craniotomy 10/11/2019     Hypertension    COPD/asthma    ANDREA     Alcoholism (CMS/HCC)    Atrial fibrillation with RVR to 6/19/2019 s/p successful ECV x2.  On home Coumadin post mitral valve repair (CMS/[facility])    S/P mitral valve repair/annuloplasty for severe MR 7/25/2019 in [place]       Plan        -Continue pulmonary toilet with flutter valve and add duo nebs  -Mobilize up in chair  -Physical therapy to begin mobilization and strengthening  -Lactated Ringer's at 75 cc an hour  -Past speech evaluation, advance diet as tolerated  -Downgrade to the floor    Plan of care and goals reviewed with mulitdisciplinary/antibiotic stewardship team during rounds.   I discussed the patient's findings and my recommendations with patient, family and nursing staff       Alphonso Villafana, DO  Pulmonary, Critical care and Sleep Medicine

## 2019-10-14 NOTE — PLAN OF CARE
Problem: Patient Care Overview  Goal: Plan of Care Review   10/14/19 0544   Coping/Psychosocial   Plan of Care Reviewed With patient;daughter   Plan of Care Review   Progress improving   OTHER   Outcome Summary VSS. AFEB. A/Ox4. Short-term memory deficit present (at baseline per daughter). Pt continues to ask for water and ice chips 2-3 times per hour. Pt currently NPO and awaiting SLP evaluation this morning. Drain ouput steadily increased throughout the shift with an average output of approximately 25 ml/hr. Will continue to monitor.       Problem: Skin Injury Risk (Adult)  Goal: Identify Related Risk Factors and Signs and Symptoms  Outcome: Ongoing (interventions implemented as appropriate)   10/13/19 0609   Skin Injury Risk (Adult)   Related Risk Factors (Skin Injury Risk) critical care admission;medical devices;mobility impaired;nutritional deficiencies     Goal: Skin Health and Integrity  Outcome: Ongoing (interventions implemented as appropriate)   10/14/19 0544   Skin Injury Risk (Adult)   Skin Health and Integrity making progress toward outcome       Problem: Sleep Pattern Disturbance (Adult)  Goal: Identify Related Risk Factors and Signs and Symptoms  Outcome: Ongoing (interventions implemented as appropriate)   10/13/19 4629   Sleep Pattern Disturbance (Adult)   Related Risk Factors (Sleep Pattern Disturbance) hospital routine/care;pain/discomfort;parent-child interaction;unfamiliar surroundings   Signs and Symptoms (Sleep Pattern Disturbance) difficulty performing routine tasks     Goal: Adequate Sleep/Rest  Outcome: Ongoing (interventions implemented as appropriate)   10/13/19 8356   Sleep Pattern Disturbance (Adult)   Adequate Sleep/Rest making progress toward outcome

## 2019-10-14 NOTE — PROGRESS NOTES
"[facility] at [facility]  IP Progress Note   LOS: 3 days   Patient Care Team:  Ivet Chau MD as PCP - General (Family Medicine)  Ivet Chau MD as PCP - Family Medicine        Following at a distance. Vitals stable. Waiting for transfer to [facility] bed          Vitals:  /98   Pulse 101   Temp 97.7 °F (36.5 °C)   Resp 20   Ht 185.4 cm (72.99\")   Wt 114 kg (250 lb 7.1 oz)   SpO2 97%   BMI 33.05 kg/m²    Body mass index is 33.05 kg/m².    Intake/Output Summary (Last 24 hours) at 10/14/2019 1123  Last data filed at 10/14/2019 0900  Gross per 24 hour   Intake 1204 ml   Output 1719 ml   Net -515 ml           Results from last 7 days   Lab Units 10/13/19  1416   WBC 10*3/mm3 12.69*   HEMOGLOBIN g/dL 13.1   HEMATOCRIT % 43.4   PLATELETS 10*3/mm3 260   INR  1.64*     Results from last 7 days   Lab Units 10/14/19  0944 10/13/19  1416   SODIUM mmol/L 151* 150*   POTASSIUM mmol/L 3.9 3.1*   CHLORIDE mmol/L 110* 109*   CO2 mmol/L 33.0* 28.0   BUN mg/dL 24* 29*   CREATININE mg/dL 0.88 1.07   GLUCOSE mg/dL 161* 134*   CALCIUM mg/dL 9.7 9.5   ALT (SGPT) U/L  --  8   AST (SGOT) U/L  --  13             Scheduled Meds:  allopurinol 300 mg Oral Daily   atorvastatin 10 mg Oral Nightly   budesonide-formoterol 2 puff Inhalation BID - RT   docusate sodium 100 mg Oral BID   famotidine 20 mg Oral BID   vitamin B-1 100 mg Oral Daily   And      multivitamin 1 tablet Oral Daily   And      folic acid 1 mg Oral Daily   furosemide 40 mg Oral BID   ipratropium-albuterol 3 mL Nebulization 4x Daily - RT   metoprolol succinate XL 25 mg Oral Q24H   potassium chloride 20 mEq Oral Daily With Breakfast       Continuous Infusions:  lactated ringers 75 mL/hr Last Rate: 75 mL/hr (10/13/19 1612)     A bit confused. I have reviewed surgical report and he did NOR have MAZE or MING exclusion. Management of PAF will be problematic but the decision does not have to be made at this time.        Electronically signed by " GARY Miller, 10/14/19, 11:25 AM.

## 2019-10-14 NOTE — THERAPY EVALUATION
Patient Name: Alok Tobias  : 1945    MRN: [id number]                              Today's Date: 10/14/2019       Admit Date: 10/11/2019    Visit Dx:     ICD-10-CM ICD-9-CM   1. Impaired functional mobility, balance, gait, and endurance Z74.09 V49.89   2. Subdural hematoma (CMS/HCC) S06.5X9A 432.1     Patient Active Problem List   Diagnosis   • Obesity, Class III, BMI 40-49.9 (morbid obesity) (CMS/[facility])   • Hypertension   • COPD/asthma   • Chest pain   • Shortness of breath   • COPD (chronic obstructive pulmonary disease) (CMS/[facility])   • ANDREA    • Renal insufficiency   • Alcoholism (CMS/[facility])   • Atrial fibrillation with RVR to 2019 s/p successful ECV x2.  On home Coumadin post mitral valve repair (CMS/[facility])   • HCAP (healthcare-associated pneumonia)   • Bilateral pleural effusion   • Acute diastolic heart failure (CMS/[facility])   • Multiple lung nodules on CT   • PAF (paroxysmal atrial fibrillation) (CMS/[facility])   • S/P mitral valve repair/annuloplasty for severe MR 2019 in [place]   • Acute on chronic subdural hematoma with impending herniation s/p left frontotemporal craniotomy 10/11/2019      Past Medical History:   Diagnosis Date   • Asthma    • CHF (congestive heart failure) (CMS/[facility])    • COPD (chronic obstructive pulmonary disease) (CMS/[facility])    • Elevated PSA    • Hypertension      Past Surgical History:   Procedure Laterality Date   • APPENDECTOMY     • CARDIAC CATHETERIZATION N/A 10/3/2018    Procedure: Left Heart Cath;  Surgeon: Twan Harrison MD;  Location:  [facility];  Service: Cardiology   • CRANIOTOMY FOR SUBDURAL HEMATOMA Left 10/11/2019    Procedure: CRANIOTOMY FOR SUBDURAL HEMATOMA;  Surgeon: Reagan Patten MD;  Location:  [facility] OR;  Service: Neurosurgery   • MITRAL VALVE REPLACEMENT     • TONSILLECTOMY       General Information     Row Name 10/14/19 1059          PT Evaluation Time/Intention    Document Type  evaluation  -SJ     Mode of Treatment   individual therapy  -[facility] Name 10/14/19 1059          General Information    Patient Profile Reviewed?  yes  -     Prior Level of Function  independent:;all household mobility;community mobility;gait;transfer;bed mobility;ADL's;driving  -     Existing Precautions/Restrictions  fall;oxygen therapy device and L/min  -     Barriers to Rehab  cognitive status  -[facility] Name 10/14/19 1059          Relationship/Environment    Lives With  alone  -[facility] Name 10/14/19 1059          Resource/Environmental Concerns    Current Living Arrangements  home/apartment/condo  -[facility] Name 10/14/19 1059          Stairs Within Home, Primary    Number of Stairs, Within Home, Primary  none  -[facility] Name 10/14/19 1059          Cognitive Assessment/Intervention- PT/OT    Orientation Status (Cognition)  oriented x 3  -[facility] Name 10/14/19 1059          Safety Issues, Functional Mobility    Safety Issues Affecting Function (Mobility)  awareness of need for assistance;impulsivity;insight into deficits/self awareness;sequencing abilities  -     Impairments Affecting Function (Mobility)  balance;cognition;postural/trunk control;strength;coordination  -       User Key  (r) = Recorded By, (t) = Taken By, (c) = Cosigned By    Initials Name Provider Type    SJ Daisy Gates PT Physical Therapist        Mobility     [facility] Name 10/14/19 1134          Bed Mobility Assessment/Treatment    Bed Mobility Assessment/Treatment  supine-sit  -     Supine-Sit [place] (Bed Mobility)  moderate assist (50% patient effort);1 person assist  -     Assistive Device (Bed Mobility)  bed rails;draw sheet;head of bed elevated  -     Comment (Bed Mobility)  assist needed for sequencing and for trunk  -[facility] Name 10/14/19 1134          Transfer Assessment/Treatment    Comment (Transfers)  max cues for sequencing and hand placement  -[facility] Name 10/14/19 1134          Bed-Chair Transfer    Bed-Chair [place]  (Transfers)  moderate assist (50% patient effort);1 person assist  -SJ     Assistive Device (Bed-Chair Transfers)  other (see comments) gait belt  -SJ     Row Name 10/14/19 1134          Sit-Stand Transfer    Sit-Stand [place] (Transfers)  moderate assist (50% patient effort);1 person assist  -SJ     Row Name 10/14/19 1134          Gait/Stairs Assessment/Training    [place] Level (Gait)  not tested  -SJ     Comment (Gait/Stairs)  gait not tested. Pt pre-occupied with trying to use urinal.  -SJ       User Key  (r) = Recorded By, (t) = Taken By, (c) = Cosigned By    Initials Name Provider Type    Daisy Rodirguez, PT Physical Therapist        Obj/Interventions     Row Name 10/14/19 1135          General ROM    GENERAL ROM COMMENTS  BLE's WFL  -SJ     Row Name 10/14/19 1135          MMT (Manual Muscle Testing)    General MMT Comments  BLE's 4/5  -SJ     Row Name 10/14/19 1135          Static Sitting Balance    Level of [place] (Unsupported Sitting, Static Balance)  supervision  -SJ     Sitting Position (Unsupported Sitting, Static Balance)  sitting on edge of bed  -SJ     Row Name 10/14/19 1135          Static Standing Balance    Level of [place] (Supported Standing, Static Balance)  moderate assist, 50 to 74% patient effort;1 person assist  -SJ       User Key  (r) = Recorded By, (t) = Taken By, (c) = Cosigned By    Initials Name Provider Type    Daisy Rodriguez, PT Physical Therapist        Goals/Plan     Row Name 10/14/19 1143          Bed Mobility Goal 1 (PT)    Activity/Assistive Device (Bed Mobility Goal 1, PT)  sit to supine;supine to sit  -SJ     [place] Level/Cues Needed (Bed Mobility Goal 1, PT)  conditional independence  -SJ     Time Frame (Bed Mobility Goal 1, PT)  long term goal (LTG);2 weeks  -SJ     Row Name 10/14/19 1143          Transfer Goal 1 (PT)    Activity/Assistive Device (Transfer Goal 1, PT)  sit-to-stand/stand-to-sit;bed-to-chair/chair-to-bed  -SJ      [place] Level/Cues Needed (Transfer Goal 1, PT)  conditional independence  -SJ     Time Frame (Transfer Goal 1, PT)  long term goal (LTG);2 weeks  -     Row Name 10/14/19 1143          Gait Training Goal 1 (PT)    Activity/Assistive Device (Gait Training Goal 1, PT)  gait (walking locomotion);improve balance and speed  -SJ     [place] Level (Gait Training Goal 1, PT)  contact guard assist;1 person assist  -SJ     Distance (Gait Goal 1, PT)  200  -SJ     Time Frame (Gait Training Goal 1, PT)  long term goal (LTG);2 weeks  -       User Key  (r) = Recorded By, (t) = Taken By, (c) = Cosigned By    Initials Name Provider Type    Daiys Rodriguez, PT Physical Therapist        Clinical Impression     [facility] Name 10/14/19 1136          Pain Scale: FACES Pre/Post-Treatment    Pain: FACES Scale, Pretreatment  0-->no hurt  -SJ     Pain: FACES Scale, Post-Treatment  0-->no hurt  -SJ     [facility] Name 10/14/19 1136          Plan of Care Review    Plan of Care Reviewed With  patient;daughter  -[facility] 10/14/19 1136          Physical Therapy Clinical Impression    Patient/Family Goals Statement (PT Clinical Impression)  d/c to rehab  -     Criteria for Skilled Interventions Met (PT Clinical Impression)  yes;treatment indicated  -     Rehab Potential (PT Clinical Summary)  good, to achieve stated therapy goals  -     Predicted Duration of Therapy (PT)  2wks  -[facility] Name 10/14/19 1136          Vital Signs    Pre Systolic BP Rehab  125  -SJ     Pre Treatment Diastolic BP  79  -SJ     Post Systolic BP Rehab  128  -SJ     Post Treatment Diastolic BP  96  -SJ     Pretreatment Heart Rate (beats/min)  99  -SJ     Posttreatment Heart Rate (beats/min)  105  -SJ     Pre SpO2 (%)  97  -SJ     O2 Delivery Pre Treatment  supplemental O2  -SJ     Post SpO2 (%)  94  -SJ     O2 Delivery Post Treatment  supplemental O2  -SJ     Pre Patient Position  Supine  -SJ     Intra Patient Position  Sitting  -SJ     Post Patient  Position  Sitting  -[facility] Name 10/14/19 1136          Positioning and Restraints    Pre-Treatment Position  in bed  -SJ     Post Treatment Position  chair  -SJ     In Chair  sitting;call light within reach;encouraged to call for assist;exit alarm on;with family/caregiver;waffle cushion  -       User Key  (r) = Recorded By, (t) = Taken By, (c) = Cosigned By    Initials Name Provider Type    Daisy Rodriguez PT Physical Therapist        Outcome Measures     Row Name 10/14/19 1144          How much help from another person do you currently need...    Turning from your back to your side while in flat bed without using bedrails?  2  -SJ     Moving from lying on back to sitting on the side of a flat bed without bedrails?  2  -SJ     Moving to and from a bed to a chair (including a wheelchair)?  2  -SJ     Standing up from a chair using your arms (e.g., wheelchair, bedside chair)?  2  -SJ     Climbing 3-5 steps with a railing?  1  -SJ     To walk in hospital room?  2  -SJ     AM-[facility] 6 Clicks Score (PT)  11  -SJ     Row Name 10/14/19 1144          Modified [place] Scale    Pre-Stroke Modified [place] Scale  0 - No Symptoms at all.  -     Modified [place] Scale  4 - Moderately severe disability.  Unable to walk without assistance, and unable to attend to own bodily needs without assistance.  -[facility] Name 10/14/19 1144          Functional Assessment    Outcome Measure Options  AM-PAC 6 Clicks Basic Mobility (PT);Modified [place]  -       User Key  (r) = Recorded By, (t) = Taken By, (c) = Cosigned By    Initials Name Provider Type    Daisy Rodriguez PT Physical Therapist          PT Recommendation and Plan  Planned Therapy Interventions (PT Eval): balance training, bed mobility training, gait training, home exercise program, neuromuscular re-education, patient/family education, strengthening, transfer training  Outcome Summary/Treatment Plan (PT)  Anticipated Equipment Needs at Discharge (PT):  (TBD)  Anticipated Discharge Disposition (PT): inpatient rehabilitation facility  Plan of Care Reviewed With: patient, daughter  Outcome Summary: PT eval completed. Pt presents with confusion, expressive aphasia, weakness, and decreased functional mobility independence per PLOF. Pt req modA for bed mobility and t/f's. PT recommends d/c to rehab.     Time Calculation:   PT Charges     Row Name 10/14/19 1059             Time Calculation    Start Time  1059 chart review completed [id number]  -      PT Received On  10/14/19  -      PT Goal Re-Cert Due Date  10/24/19  -         Time Calculation- PT    Total Timed Code Minutes- PT  10 minute(s)  -SJ         Timed Charges    23404 - PT Therapeutic Activity Minutes  10  -SJ        User Key  (r) = Recorded By, (t) = Taken By, (c) = Cosigned By    Initials Name Provider Type    Daisy Rodriguez, PT Physical Therapist        Therapy Charges for Today     Code Description Service Date Service Provider Modifiers Qty    60949871674 HC PT EVAL HIGH COMPLEXITY 4 10/14/2019 Daisy Gates, PT GP 1    15453576454 HC PT THERAPEUTIC ACT EA 15 MIN 10/14/2019 Daisy Gates, PT GP 1          PT G-Codes  Outcome Measure Options: AM-PAC 6 Clicks Basic Mobility (PT), Modified Ruby  AM-PAC 6 Clicks Score (PT): 11  Modified [place] Scale: 4 - Moderately severe disability.  Unable to walk without assistance, and unable to attend to own bodily needs without assistance.    Daisy Gates PT  10/14/2019

## 2019-10-14 NOTE — NURSING NOTE
Lt tempral area noted to be swollen and causing discomfort. Dr. Patten on unit assessed and informed faimly that is to be expected and will probably swell more. New orders obtained for Norco

## 2019-10-14 NOTE — PROGRESS NOTES
Continued Stay Note  [facility]     Patient Name: Alok Tobias  MRN: [id number]  Today's Date: 10/14/2019    Admit Date: 10/11/2019    Discharge Plan     Row Name 10/14/19 1119       Plan    Plan  placement update    Patient/Family in Agreement with Plan  yes    Plan Comments  Referral made to [facility] for placement - spoke humberto davis-they should have a bed on Wed but need to assess pt prior to accepting.  Left a message humberto Jennings for a ref to [facility], will f/u on this.  CM will continue to follow.    Final Discharge Disposition Code  03 - skilled nursing facility (SNF)        Discharge Codes    No documentation.             Heidi Molina, RN

## 2019-10-14 NOTE — PROGRESS NOTES
Subjective: Postoperative day 3 status post left frontotemporal craniotomy for acute on chronic subdural hematoma.  Extubated yesterday.    Objective:    Vitals:    10/14/19 0832   BP:    Pulse: 101   Resp: 20   Temp:    SpO2: 96%     Pulse  Av.3  Min: 96  Max: 121  Systolic (24hrs), Av , Min:113 , Max:166     Diastolic (24hrs), Av, Min:77, Max:125    Temp (24hrs), Av.1 °F (36.7 °C), Min:97.7 °F (36.5 °C), Max:98.8 °F (37.1 °C)      He is sitting up in bed.  He has dysphonic speech.  He has no pronator drift.  He is oriented to person and place.    Lab Results   Component Value Date     (H) 10/13/2019       A/P:   DC subdural drain today  Transfer to telemetry  Hypernatremic on yesterday's lab draw, repeating.  Suspect dehydration  PT/OT, swallow eval  Hold anticoagulation for 5 additional days

## 2019-10-14 NOTE — PLAN OF CARE
Problem: Patient Care Overview  Goal: Plan of Care Review  Outcome: Ongoing (interventions implemented as appropriate)   10/14/19 3541   Coping/Psychosocial   Plan of Care Reviewed With patient;daughter   SLP re-evaluation completed. Will continue to address dysphagia w/ diet tolerance and assess cognitive-communication. Please see note for further details and recommendations.

## 2019-10-14 NOTE — PLAN OF CARE
Problem: Patient Care Overview  Goal: Plan of Care Review  Outcome: Ongoing (interventions implemented as appropriate)   10/14/19 1506   Coping/Psychosocial   Plan of Care Reviewed With patient;daughter   Plan of Care Review   Progress improving   OTHER   Outcome Summary Daughter has concerns r/t patient confusion. Patient answers questions appropriately. Had mild head pain with initial removal of drain, but self resolved. Requires assist of 1 of staff for close transfers from chair to bed. VSS remains in afib controlled. Daughter remains at bedside. Expresses increased thirst requesting water. Discussed with patient need to slow down in liquid intake. Lungs clear but diminished. IS at bedside. Able to wean off O2 NC . Will cont to mx       Problem: Skin Injury Risk (Adult)  Goal: Identify Related Risk Factors and Signs and Symptoms  Outcome: Ongoing (interventions implemented as appropriate)   10/14/19 1506   Skin Injury Risk (Adult)   Related Risk Factors (Skin Injury Risk) cognitive impairment;tissue perfusion altered

## 2019-10-14 NOTE — PLAN OF CARE
Problem: Patient Care Overview  Goal: Plan of Care Review  Outcome: Ongoing (interventions implemented as appropriate)   10/14/19 1539   Coping/Psychosocial   Plan of Care Reviewed With patient;daughter   OTHER   Outcome Summary PT eval completed. Pt presents with confusion, expressive aphasia, weakness, and decreased functional mobility independence per PLOF. Pt req modA for bed mobility and t/f's. PT recommends d/c to rehab.

## 2019-10-14 NOTE — PROGRESS NOTES
"Clinical Nutrition Note      Patient Name: Alok Tobias  MRN: [id number]  Admission date: 10/11/2019      Multidisciplinary Rounds    Additional information obtained during MDR:  RN reports pt passed dysphagia evaluation doing well s/p crani , ready for transfer to telemetry room.      Current diet: Diet Soft Texture; Whole Foods; Thin; Cardiac    Pertinent medical data reviewed  No nutrition risk identified on nursing screen; MST score \"4\"; 55 lbs wt loss reported prior to cardiac surgery r/t diuresis per dtr    Intervention:  Advised pt and dtr of alternate menu selections; declined nutrition supplement; intake encouraged.  Plan of care and goals reviewed    Monitor:  RD to follow per protocol      Polina Lord MS,RD,LD  10/14/19 12:53 PM  Time: 20 mins       "

## 2019-10-15 ENCOUNTER — APPOINTMENT (OUTPATIENT)
Dept: CT IMAGING | Facility: HOSPITAL | Age: 74
End: 2019-10-15

## 2019-10-15 LAB
BACTERIA SPEC RESP CULT: ABNORMAL
BACTERIA SPEC RESP CULT: ABNORMAL
GRAM STN SPEC: ABNORMAL
POTASSIUM BLD-SCNC: 4.2 MMOL/L (ref 3.5–5.2)

## 2019-10-15 PROCEDURE — 99232 SBSQ HOSP IP/OBS MODERATE 35: CPT | Performed by: INTERNAL MEDICINE

## 2019-10-15 PROCEDURE — 92526 ORAL FUNCTION THERAPY: CPT

## 2019-10-15 PROCEDURE — 92523 SPEECH SOUND LANG COMPREHEN: CPT

## 2019-10-15 PROCEDURE — 97166 OT EVAL MOD COMPLEX 45 MIN: CPT | Performed by: OCCUPATIONAL THERAPIST

## 2019-10-15 PROCEDURE — 97530 THERAPEUTIC ACTIVITIES: CPT

## 2019-10-15 PROCEDURE — 99024 POSTOP FOLLOW-UP VISIT: CPT | Performed by: PHYSICIAN ASSISTANT

## 2019-10-15 PROCEDURE — 70450 CT HEAD/BRAIN W/O DYE: CPT

## 2019-10-15 PROCEDURE — 84132 ASSAY OF SERUM POTASSIUM: CPT | Performed by: NEUROLOGICAL SURGERY

## 2019-10-15 PROCEDURE — 94799 UNLISTED PULMONARY SVC/PX: CPT

## 2019-10-15 RX ORDER — LEVETIRACETAM 500 MG/1
500 TABLET ORAL EVERY 12 HOURS SCHEDULED
Status: DISCONTINUED | OUTPATIENT
Start: 2019-10-15 | End: 2019-10-17 | Stop reason: HOSPADM

## 2019-10-15 RX ADMIN — FAMOTIDINE 20 MG: 20 TABLET ORAL at 08:10

## 2019-10-15 RX ADMIN — POTASSIUM CHLORIDE 40 MEQ: 750 CAPSULE, EXTENDED RELEASE ORAL at 03:43

## 2019-10-15 RX ADMIN — ALLOPURINOL 300 MG: 300 TABLET ORAL at 08:11

## 2019-10-15 RX ADMIN — SODIUM CHLORIDE 75 ML/HR: 9 INJECTION, SOLUTION INTRAVENOUS at 08:09

## 2019-10-15 RX ADMIN — DOCUSATE SODIUM 100 MG: 100 CAPSULE, LIQUID FILLED ORAL at 21:54

## 2019-10-15 RX ADMIN — IPRATROPIUM BROMIDE AND ALBUTEROL SULFATE 3 ML: 2.5; .5 SOLUTION RESPIRATORY (INHALATION) at 19:52

## 2019-10-15 RX ADMIN — BUDESONIDE AND FORMOTEROL FUMARATE DIHYDRATE 2 PUFF: 160; 4.5 AEROSOL RESPIRATORY (INHALATION) at 07:27

## 2019-10-15 RX ADMIN — IPRATROPIUM BROMIDE AND ALBUTEROL SULFATE 3 ML: 2.5; .5 SOLUTION RESPIRATORY (INHALATION) at 07:26

## 2019-10-15 RX ADMIN — POTASSIUM CHLORIDE 20 MEQ: 750 CAPSULE, EXTENDED RELEASE ORAL at 08:10

## 2019-10-15 RX ADMIN — ATORVASTATIN CALCIUM 10 MG: 10 TABLET, FILM COATED ORAL at 21:54

## 2019-10-15 RX ADMIN — FOLIC ACID 1 MG: 1 TABLET ORAL at 08:10

## 2019-10-15 RX ADMIN — MULTIVITAMIN TABLET 1 TABLET: TABLET at 08:10

## 2019-10-15 RX ADMIN — SODIUM CHLORIDE 75 ML/HR: 9 INJECTION, SOLUTION INTRAVENOUS at 00:41

## 2019-10-15 RX ADMIN — METOPROLOL SUCCINATE 25 MG: 25 TABLET, EXTENDED RELEASE ORAL at 08:11

## 2019-10-15 RX ADMIN — FAMOTIDINE 20 MG: 20 TABLET ORAL at 21:54

## 2019-10-15 RX ADMIN — FUROSEMIDE 40 MG: 40 TABLET ORAL at 08:11

## 2019-10-15 RX ADMIN — FUROSEMIDE 40 MG: 40 TABLET ORAL at 20:02

## 2019-10-15 RX ADMIN — Medication 100 MG: at 08:11

## 2019-10-15 RX ADMIN — IPRATROPIUM BROMIDE AND ALBUTEROL SULFATE 3 ML: 2.5; .5 SOLUTION RESPIRATORY (INHALATION) at 12:50

## 2019-10-15 RX ADMIN — IPRATROPIUM BROMIDE AND ALBUTEROL SULFATE 3 ML: 2.5; .5 SOLUTION RESPIRATORY (INHALATION) at 16:17

## 2019-10-15 RX ADMIN — BUDESONIDE AND FORMOTEROL FUMARATE DIHYDRATE 2 PUFF: 160; 4.5 AEROSOL RESPIRATORY (INHALATION) at 19:52

## 2019-10-15 RX ADMIN — LEVETIRACETAM 500 MG: 500 TABLET, FILM COATED ORAL at 21:54

## 2019-10-15 RX ADMIN — DOCUSATE SODIUM 100 MG: 100 CAPSULE, LIQUID FILLED ORAL at 08:10

## 2019-10-15 RX ADMIN — LEVETIRACETAM 500 MG: 500 TABLET, FILM COATED ORAL at 12:37

## 2019-10-15 RX ADMIN — SODIUM CHLORIDE 75 ML/HR: 9 INJECTION, SOLUTION INTRAVENOUS at 19:18

## 2019-10-15 NOTE — NURSING NOTE
Pt exhibiting worsening aphasia. NIHSS of 9. Cait Mcqueen PA-C, notified of change in pt condition. STAT head CT, STAT BMP, NS started at 100 ml/hr x3 hours decreasing to 75 ml/hr after initial 3 hours per telephone order. Repeat CT ordered for 0500 10/15/19. Will continue to monitor.

## 2019-10-15 NOTE — PROGRESS NOTES
Intensive Care Follow-up     Hospital:  LOS: 4 days   Mr. Alok Tobias, 74 y.o. male is followed for:   Subdural hematoma (CMS/HCC)            History of present illness:   74-year-old white male with a history of PAF newly diagnosed 6/2019 status post successful ECV, severe MR and CHF, s/p mitral valve repair 7/2019, asthma/COPD, ANDREA, and alcoholism.  Apparently was found down by his family the evening of 10/11/2019 after not being seen for several days.  CT revealed a large acute on chronic subdural hematoma with left to right midline shift and impending herniation. Was taken directly to the OR and right frontotemporal craniotomy was successfully performed.        Subjective   Interval History:  No acute events overnight.  Patient is up at the side of the bed this morning having breakfast.  He is much more lucid this morning and had an excellent night sleep per the patient's wife.  He denies any complaints of chest pain, shortness of breath, fever, or chills.             The patient's past medical, surgical and social history were reviewed and updated in Epic as appropriate.       Objective     Infusions:    sodium chloride 75 mL/hr Last Rate: 75 mL/hr (10/15/19 0809)     Medications:    allopurinol 300 mg Oral Daily   atorvastatin 10 mg Oral Nightly   budesonide-formoterol 2 puff Inhalation BID - RT   docusate sodium 100 mg Oral BID   famotidine 20 mg Oral BID   vitamin B-1 100 mg Oral Daily   And      multivitamin 1 tablet Oral Daily   And      folic acid 1 mg Oral Daily   furosemide 40 mg Oral BID   ipratropium-albuterol 3 mL Nebulization 4x Daily - RT   levETIRAcetam 500 mg Oral Q12H   metoprolol succinate XL 25 mg Oral Q24H   potassium chloride 20 mEq Oral Daily With Breakfast     I reviewed the patient's medications.    Vital Sign Min/Max for last 24 hours  Temp  Min: 97.7 °F (36.5 °C)  Max: 98.8 °F (37.1 °C)   BP  Min: 91/79  Max: 181/120   Pulse  Min: 66  Max: 101   Resp  Min: 16  Max: 20   SpO2  Min:  80 %  Max: 100 %   Flow (L/min)  Min: 1  Max: 2       Input/Output for last 24 hour shift  10/14 0701 - 10/15 0700  In: 4166 [P.O.:2780; I.V.:1386]  Out: 1283 [Urine:1225; Drains:58]      GENERAL : NAD, conversant  RESPIRATORY/THORAX : normal respiratory effort and no intercostal retractions, clear to auscultation bilaterally  CARDIOVASCULAR : Normal S1/S2, RRR. 1+ lower ext edema.  GASTROINTESTINAL : Soft, NT/ND. BS x 4 normoactive. No hepatosplenomegaly.  MUSCULOSKELETAL : No cyanosis, clubbing, or ischemia  NEUROLOGICAL: alert and oriented to person, place and time  PSYCHOLOGICAL : Appropriate affect    Results from last 7 days   Lab Units 10/13/19  1416 10/11/19  2009 10/11/19  1941   WBC 10*3/mm3 12.69* 13.16*  --    HEMOGLOBIN g/dL 13.1 14.3  --    HEMOGLOBIN, POC g/dL  --   --  15.0   PLATELETS 10*3/mm3 260 304  --      Results from last 7 days   Lab Units 10/15/19  0819 10/14/19  2150 10/14/19  0944 10/13/19  1416   SODIUM mmol/L  --  143 151* 150*   POTASSIUM mmol/L 4.2 3.3* 3.9 3.1*   CO2 mmol/L  --  31.0* 33.0* 28.0   BUN mg/dL  --  18 24* 29*   CREATININE mg/dL  --  0.83 0.88 1.07   MAGNESIUM mg/dL  --   --   --  2.3   PHOSPHORUS mg/dL  --   --   --  2.7   GLUCOSE mg/dL  --  136* 161* 134*     Estimated Creatinine Clearance: 100.1 mL/min (by C-G formula based on SCr of 0.83 mg/dL).    Results from last 7 days   Lab Units 10/13/19  0908   PH, ARTERIAL pH units 7.472*   PCO2, ARTERIAL mm Hg 33.8   PO2 ART mm Hg 74.4*       I reviewed the patient's new clinical results.  I reviewed the patient's new imaging results/reports including actual images and agree with reports.         Imaging Results (last 24 hours)     Procedure Component Value Units Date/Time    CT Head Without Contrast [[id number]] Collected:  10/15/19 0805     Updated:  10/15/19 0933    Narrative:       EXAMINATION: CT HEAD WO CONTRAST- 10/15/2019     INDICATION: Focal neuro deficit, new, fixed or worsening, <6 hours     TECHNIQUE: 5 mm  unenhanced images through the brain     The radiation dose reduction device was turned on for each scan per the  ALARA (As Low as Reasonably Achievable) protocol.     COMPARISON: 10/14/2019     FINDINGS: Note is again made of left frontotemporal craniotomy. There is  decreasing residual intracranial air. Residual left subdural hemorrhage  is slightly smaller in size, similar in appearance, with denser clot  anteriorly, and lower density smaller posterior subdural component.  There is no evidence of parenchymal hemorrhage or intraventricular  hemorrhage. There is no evidence of infarct/edema.       Impression:       Small residual subdural hemorrhage, slightly decreased in  size from yesterday's exam, otherwise stable in appearance. No new  intracranial disease is seen.     D:  10/15/2019  E:  10/15/2019       CT Head Without Contrast [[id number]] Collected:  10/14/19 2114     Updated:  10/14/19 2117    Narrative:       CT Head WO: 10/14/2019 10:02 PM    HISTORY:   Worsening aphasia following removal of subdural drainage catheter.    TECHNIQUE:   Axial unenhanced head CT. Radiation dose reduction techniques included automated exposure control or exposure modulation based on body size. Radiation audit for number of CT and nuclear cardiology exams performed in the last year: 4.      COMPARISON:   Knee dated 10/11/2019    FINDINGS:   Is been slight reaccumulation of blood in the left subdural space since the prior CT, a rightward midline shift has decreased, previously 1.5 cm and now about 7 mm.    There is no hydrocephalus. There is no parenchymal hemorrhage.      Impression:       Small amount of reaccumulation of subdural blood with decreased midline shift..    Signer Name: Andrei Dukes MD   Signed: 10/14/2019 9:14 PM   Workstation Name: [email or web address]    [facility]          Assessment/Plan   Impression        Acute on chronic subdural hematoma with impending herniation s/p left  frontotemporal craniotomy 10/11/2019     Hypertension    COPD/asthma    ANDREA     Alcoholism (CMS/[facility])    Atrial fibrillation with RVR to 6/19/2019 s/p successful ECV x2.  On home Coumadin post mitral valve repair (CMS/[facility])    S/P mitral valve repair/annuloplasty for severe MR 7/25/2019 in [place]       Plan      -Continue pulmonary toilet with flutter valve and duo nebs  -Mobilize up in chair  -Physical therapy to begin mobilization and strengthening  -Lactated Ringer's at 75 cc an hour  -Past speech evaluation, advance diet as tolerated  -Downgrade to the floor; awaiting rehab placement    Plan of care and goals reviewed with mulitdisciplinary/antibiotic stewardship team during rounds.   I discussed the patient's findings and my recommendations with patient, family and nursing staff       Alphonso Villafana, DO  Pulmonary, Critical care and Sleep Medicine

## 2019-10-15 NOTE — PROGRESS NOTES
"Clinical Nutrition Note      Patient Name: Alok Tobias  MRN: [id number]  Admission date: 10/11/2019      Multidisciplinary Rounds    Additional information obtained during MDR:  RN reports pt eating well; needs to have BM; bowel regimen ordered. MD states pt ready for transfer to rehab or to floor until bed available.    Current diet: Diet Soft Texture; Whole Foods; Thin; Cardiac    Pertinent medical data reviewed  No nutrition risk identified on nursing screen; MST score \"0\"  Intervention:  Plan of care and goals reviewed    Monitor:  RD to follow per protocol      Polina Lord MS,RD,LD  10/15/19 3:26 PM  Time: 20 mins       "

## 2019-10-15 NOTE — THERAPY RE-EVALUATION
Acute Care - Speech Language Pathology Initial Evaluation  [facility]     Patient Name: Alok Tobias  : 1945  MRN: [id number]  Today's Date: 10/15/2019  Onset of Illness/Injury or Date of Surgery: 10/11/19            Admit Date: 10/11/2019     Visit Dx:    ICD-10-CM ICD-9-CM   1. Impaired functional mobility, balance, gait, and endurance Z74.09 V49.89   2. Subdural hematoma (CMS/HCC) S06.5X9A 432.1   3. Altered mental status, unspecified altered mental status type R41.82 780.97   4. Impaired mobility and ADLs Z74.09 799.89     Patient Active Problem List   Diagnosis   • Obesity, Class III, BMI 40-49.9 (morbid obesity) (CMS/[facility])   • Hypertension   • COPD/asthma   • Chest pain   • Shortness of breath   • COPD (chronic obstructive pulmonary disease) (CMS/[facility])   • ANDREA    • Renal insufficiency   • Alcoholism (CMS/[facility])   • Atrial fibrillation with RVR to 2019 s/p successful ECV x2.  On home Coumadin post mitral valve repair (CMS/[facility])   • HCAP (healthcare-associated pneumonia)   • Bilateral pleural effusion   • Acute diastolic heart failure (CMS/[facility])   • Multiple lung nodules on CT   • PAF (paroxysmal atrial fibrillation) (CMS/[facility])   • S/P mitral valve repair/annuloplasty for severe MR 2019 in [place]   • Acute on chronic subdural hematoma with impending herniation s/p left frontotemporal craniotomy 10/11/2019      Past Medical History:   Diagnosis Date   • Asthma    • CHF (congestive heart failure) (CMS/[facility])    • COPD (chronic obstructive pulmonary disease) (CMS/[facility])    • Elevated PSA    • Hypertension      Past Surgical History:   Procedure Laterality Date   • APPENDECTOMY     • CARDIAC CATHETERIZATION N/A 10/3/2018    Procedure: Left Heart Cath;  Surgeon: Twan Harrison MD;  Location: [facility] CATH INVASIVE LOCATION;  Service: Cardiology   • CRANIOTOMY FOR SUBDURAL HEMATOMA Left 10/11/2019    Procedure: CRANIOTOMY FOR SUBDURAL HEMATOMA;  Surgeon: Reagan Patten MD;  Location: [facility]  OR;  Service: Neurosurgery   • MITRAL VALVE REPLACEMENT     • TONSILLECTOMY          SLP EVALUATION (last 72 hours)      SLP SLC Evaluation     Row Name 10/15/19 1000                   Communication Assessment/Intervention    Document Type  evaluation  -AV        Subjective Information  no complaints  -AV        Patient Observations  cooperative  -AV        Patient/Family Observations  daughter present, sitting in chair, sleepy   -AV        Patient Effort  adequate  -AV           Pain Assessment    Additional Documentation  Pain Scale: FACES Pre/Post-Treatment (Group)  -AV           Pain Scale: FACES Pre/Post-Treatment    Pain: FACES Scale, Pretreatment  2-->hurts little bit  -AV        Pain: FACES Scale, Post-Treatment  2-->hurts little bit  -AV           Comprehension Assessment/Intervention    Comprehension Assessment/Intervention  Auditory Comprehension  -AV           Auditory Comprehension Assessment/Intervention    Auditory Comprehension (Communication)  WFL  -AV           Expression Assessment/Intervention    Expression Assessment/Intervention  verbal expression  -AV           Verbal Expression Assessment/Intervention    Verbal Expression  mild impairment;moderate impairment  -AV        Automatic Speech (Communication)  WFL  -AV        Sentence Formulation  mild impairment  -AV        Conversational Discourse/Fluency  mild impairment;moderate impairment  -AV           Oral Motor Structure and Function    Oral Motor Structure and Function  WFL  -AV           Oral Musculature and Cranial Nerve Assessment    Oral Motor General Assessment  WFL  -AV           Motor Speech Assessment/Intervention    Motor Speech Function  WFL  -AV           SLP Clinical Impressions    SLP Diagnosis  expressive aphasia   -AV        Rehab Potential/Prognosis  good  -AV        SLC Criteria for Skilled Therapy Interventions Met  yes  -AV        Functional Impact  functional impact in social situations;functional impact in ADLs  -AV            Recommendations    Therapy Frequency (SLP SLC)  5 days per week  -AV        Predicted Duration Therapy Intervention (Days)  until discharge  -AV        Anticipated Dischage Disposition  skilled nursing facility;anticipate therapy at next level of care  -AV          User Key  (r) = Recorded By, (t) = Taken By, (c) = Cosigned By    Initials Name Effective Dates    AV Rosina Garner Kylee, MS CCC-SLP 05/23/19 -              EDUCATION  The patient has been educated in the following areas:     Cognitive Impairment Communication Impairment.    SLP Recommendation and Plan  SLP Diagnosis: expressive aphasia      SLC Criteria for Skilled Therapy Interventions Met: yes  Anticipated Dischage Disposition: skilled nursing facility, anticipate therapy at next level of care     Predicted Duration Therapy Intervention (Days): until discharge    Plan of Care Reviewed With: patient, daughter  Progress: improving      SLP GOALS     Row Name 10/15/19 1000 10/14/19 0830          Oral Nutrition/Hydration Goal 1 (SLP)    Oral Nutrition/Hydration Goal 1, SLP  LTG: Will tolerate recommended diet w/o overt s/sxs aspiration or difficulty w/ 100% acc.  -AV  LTG: Will tolerate recommended diet w/o overt s/sxs aspiration or difficulty w/ 100% acc.  -VO     Time Frame (Oral Nutrition/Hydration Goal 1, SLP)  by discharge  -AV  by discharge  -VO     Progress/Outcomes (Oral Nutrition/Hydration Goal 1, SLP)  good progress toward goal  -AV  goal ongoing  -VO        Ability to Construct Phrase and Sentence Level Response Goal 1 (SLP)    Improve Ability to Construct Phrase and Sentence Level Responses By Goal 1 (SLP)  answering question with phrase;constructing a sentence with a key word;80%;with minimal cues (75-90%)  -AV  --     Time Frame (Phrase and Sentence Level Response Goal 1, SLP)  short term goal (STG);by discharge  -AV  --        Connected Speech to Express Thoughts Goal 1 (SLP)    Improve Narrative Discourse to Express Thoughts By  Goal 1 (SLP)  conversational task on a given topic;conversational task, self-directed;describing a picture;80%;with minimal cues (75-90%)  -AV  --     Time Frame (Connected Speech Goal 1, SLP)  short term goal (STG);by discharge  -AV  --        Additional Goal 1 (SLP)    Additional Goal 1, SLP  LTG:  Patient will improve communication skills to participate in plan of care and ADLS to independence.   -AV  --     Time Frame (Additional Goal 1, SLP)  by discharge  -AV  --       User Key  (r) = Recorded By, (t) = Taken By, (c) = Cosigned By    Initials Name Provider Type    AV Kylee Chen MS CCC-SLP Speech and Language Pathologist    Abigail Gurrola MA,CCC-SLP Speech and Language Pathologist                  Time Calculation:     Time Calculation- SLP     Row Name 10/15/19 1049             Time Calculation- SLP    SLP Start Time  0945  -AV      SLP Received On  10/15/19  -AV        User Key  (r) = Recorded By, (t) = Taken By, (c) = Cosigned By    Initials Name Provider Type    AV Kylee Chen MS CCC-SLP Speech and Language Pathologist          Therapy Charges for Today     Code Description Service Date Service Provider Modifiers Qty    22986416666 HC ST EVAL SPEECH AND PROD W LANG  3 10/15/2019 Kylee Chen MS CCC-SLP GN 1    49807322885 HC ST TREATMENT SWALLOW 2 10/15/2019 Kylee Chen MS CCC-SLP GN 1                     MS JENIFFER Lala  10/15/2019 and Acute Care - Speech Language Pathology   Swallow Progress Note [facility]     Patient Name: Alok Tobias  : 1945  MRN: [id number]  Today's Date: 10/15/2019  Onset of Illness/Injury or Date of Surgery: 10/11/19            Admit Date: 10/11/2019    Visit Dx:      ICD-10-CM ICD-9-CM   1. Impaired functional mobility, balance, gait, and endurance Z74.09 V49.89   2. Subdural hematoma (CMS/HCC) S06.5X9A 432.1   3. Altered mental status, unspecified altered mental status type R41.82  780.97   4. Impaired mobility and ADLs Z74.09 799.89     Patient Active Problem List   Diagnosis   • Obesity, Class III, BMI 40-49.9 (morbid obesity) (CMS/[facility])   • Hypertension   • COPD/asthma   • Chest pain   • Shortness of breath   • COPD (chronic obstructive pulmonary disease) (CMS/[facility])   • ANDREA    • Renal insufficiency   • Alcoholism (CMS/[facility])   • Atrial fibrillation with RVR to 6/19/2019 s/p successful ECV x2.  On home Coumadin post mitral valve repair (CMS/[facility])   • HCAP (healthcare-associated pneumonia)   • Bilateral pleural effusion   • Acute diastolic heart failure (CMS/[facility])   • Multiple lung nodules on CT   • PAF (paroxysmal atrial fibrillation) (CMS/[facility])   • S/P mitral valve repair/annuloplasty for severe MR 7/25/2019 in [place]   • Acute on chronic subdural hematoma with impending herniation s/p left frontotemporal craniotomy 10/11/2019        Therapy Treatment      Outcome Summary  Outcome Summary/Treatment Plan (SLP)  Anticipated Dischage Disposition: skilled nursing facility, anticipate therapy at next level of care (10/15/19 1000 : Kylee Chen, MS CCC-SLP)      SLP GOALS     Row Name 10/15/19 1000 10/14/19 0830          Oral Nutrition/Hydration Goal 1 (SLP)    Oral Nutrition/Hydration Goal 1, SLP  LTG: Will tolerate recommended diet w/o overt s/sxs aspiration or difficulty w/ 100% acc.  -AV  LTG: Will tolerate recommended diet w/o overt s/sxs aspiration or difficulty w/ 100% acc.  -VO     Time Frame (Oral Nutrition/Hydration Goal 1, SLP)  by discharge  -AV  by discharge  -VO     Progress/Outcomes (Oral Nutrition/Hydration Goal 1, SLP)  good progress toward goal  -AV  goal ongoing  -VO        Ability to Construct Phrase and Sentence Level Response Goal 1 (SLP)    Improve Ability to Construct Phrase and Sentence Level Responses By Goal 1 (SLP)  answering question with phrase;constructing a sentence with a key word;80%;with minimal cues (75-90%)  -AV  --     Time Frame (Phrase and Sentence  Level Response Goal 1, SLP)  short term goal (STG);by discharge  -AV  --        Connected Speech to Express Thoughts Goal 1 (SLP)    Improve Narrative Discourse to Express Thoughts By Goal 1 (SLP)  conversational task on a given topic;conversational task, self-directed;describing a picture;80%;with minimal cues (75-90%)  -AV  --     Time Frame (Connected Speech Goal 1, SLP)  short term goal (STG);by discharge  -AV  --        Additional Goal 1 (SLP)    Additional Goal 1, SLP  LTG:  Patient will improve communication skills to participate in plan of care and ADLS to independence.   -AV  --     Time Frame (Additional Goal 1, SLP)  by discharge  -AV  --       User Key  (r) = Recorded By, (t) = Taken By, (c) = Cosigned By    Initials Name Provider Type    Kylee Hinojosa MS CCC-SLP Speech and Language Pathologist    Abigail Gurrola MA,CCC-SLP Speech and Language Pathologist          EDUCATION  The patient has been educated in the following areas:   Dysphagia (Swallowing Impairment) Oral Care/Hydration.    SLP Recommendation and Plan           Anticipated Dischage Disposition: skilled nursing facility, anticipate therapy at next level of care                    Time Calculation:   Time Calculation- SLP     Row Name 10/15/19 1049             Time Calculation- SLP    SLP Start Time  0945  -AV      SLP Received On  10/15/19  -AV        User Key  (r) = Recorded By, (t) = Taken By, (c) = Cosigned By    Initials Name Provider Type    Kylee Hinojosa MS CCC-SLP Speech and Language Pathologist          Therapy Charges for Today     Code Description Service Date Service Provider Modifiers Qty    88281157530  ST EVAL SPEECH AND PROD W LANG  3 10/15/2019 Kylee Chen MS CCC-SLP GN 1    17943519056 HC ST TREATMENT SWALLOW 2 10/15/2019 Kylee Chen MS CCC-SLP GN 1                 Kylee Garner MS CCC-ROSINA  10/15/2019

## 2019-10-15 NOTE — PLAN OF CARE
Problem: Patient Care Overview  Goal: Plan of Care Review  Outcome: Ongoing (interventions implemented as appropriate)   10/15/19 1130   Coping/Psychosocial   Plan of Care Reviewed With patient;daughter   OTHER   Outcome Summary patient is able to ambulate 40 ft with walker and min assist. patient showed improved strength needing less assist for ambulation, transfers and bed mobility

## 2019-10-15 NOTE — PROGRESS NOTES
Head CT reviewed. Small amount of accumulation of subacute blood products in the operative bed with improved mass effect on the L hemisphere. No indication for re-operation.    Cleared for SNF vs rehab placement at this point from my perspective.

## 2019-10-15 NOTE — PLAN OF CARE
Problem: Patient Care Overview  Goal: Plan of Care Review  Outcome: Ongoing (interventions implemented as appropriate)   10/15/19 5817   Coping/Psychosocial   Plan of Care Reviewed With patient;daughter   Plan of Care Review   Progress no change   OTHER   Outcome Summary OT IE completed. Pt. sitting EOB feeding himself breakfast. Pt. with L posterior lean in sitting. Pt. scooted further to edge of bed with CGA. Pt. with limited B UE shldr flex/abd d/t rotator cuff injuries. Pt. STS with min A. Pt. t/f'ed EOB to chair with mod A, and v/c's for safety. Pt. is appropriate for OT skilled services. Recommend IP Rehab upon d/c.

## 2019-10-15 NOTE — PROGRESS NOTES
Continued Stay Note  [facility]     Patient Name: Alok Tobias  MRN: [id number]  Today's Date: 10/15/2019    Admit Date: 10/11/2019    Discharge Plan     Row Name 10/15/19 1429       Plan    Plan Comments  Called [facility] 5 times, and no one answers the phone today. The phone systems in [place] are not in working order today.   [facility]ed [facility] and left VM.  Faxed to other SNF's in [place].   Hope to find placement in [place] and d/c on Wed. 10/16.          Discharge Codes    No documentation.             Lo Meza RN

## 2019-10-15 NOTE — PROGRESS NOTES
"NEUROSURGERY PROGRESS NOTE    Interval History:   Postoperative day #4 status post left frontotemporal craniotomy for acute on chronic subdural hematoma    Patient sitting up in bed this morning working with OT.  No new complaints this morning.  Subdural drain removed yesterday without issue.  CT head performed and reviewed by Dr. Patten, appears stable.    Vital Signs  Blood pressure 91/79, pulse 98, temperature 98.5 °F (36.9 °C), temperature source Oral, resp. rate 20, height 185.4 cm (72.99\"), weight 114 kg (250 lb 10.6 oz), SpO2 (!) 80 %.    Physical Exam:  -Patient is alert and oriented  -Speech is intact  -No pronator drift  -No facial asymmetry noted  -Moves extremities to command  -Incision is clean and dry with staples intact     Results Review:    I/O last 3 completed shifts:  In: 5004 [P.O.:2805; I.V.:2199]  Out: 2274 [Urine:2025; Drains:249]  I/O this shift:  In: 693.5 [P.O.:600; I.V.:93.5]  Out: -       Assessment/Plan:   Awaiting telemetry bed. Ready for transfer to rehab once arrangements have been made.       Jessica Bey PA-C  10/15/19  9:33 AM  "

## 2019-10-15 NOTE — PLAN OF CARE
Problem: Patient Care Overview  Goal: Plan of Care Review  Outcome: Ongoing (interventions implemented as appropriate)   10/15/19 1040   Coping/Psychosocial   Plan of Care Reviewed With patient;daughter   Plan of Care Review   Progress improving   SLP re-evaluation and treatment completed. Will address cog/comm deficits. Please see note for further details and recommendations.

## 2019-10-15 NOTE — THERAPY TREATMENT NOTE
Patient Name: Alok Tobias  : 1945    MRN: [id number]                              Today's Date: 10/15/2019       Admit Date: 10/11/2019    Visit Dx:     ICD-10-CM ICD-9-CM   1. Impaired functional mobility, balance, gait, and endurance Z74.09 V49.89   2. Subdural hematoma (CMS/HCC) S06.5X9A 432.1   3. Altered mental status, unspecified altered mental status type R41.82 780.97   4. Impaired mobility and ADLs Z74.09 799.89     Patient Active Problem List   Diagnosis   • Obesity, Class III, BMI 40-49.9 (morbid obesity) (CMS/[facility])   • Hypertension   • COPD/asthma   • Chest pain   • Shortness of breath   • COPD (chronic obstructive pulmonary disease) (CMS/[facility])   • ANDREA    • Renal insufficiency   • Alcoholism (CMS/[facility])   • Atrial fibrillation with RVR to 2019 s/p successful ECV x2.  On home Coumadin post mitral valve repair (CMS/[facility])   • HCAP (healthcare-associated pneumonia)   • Bilateral pleural effusion   • Acute diastolic heart failure (CMS/[facility])   • Multiple lung nodules on CT   • PAF (paroxysmal atrial fibrillation) (CMS/[facility])   • S/P mitral valve repair/annuloplasty for severe MR 2019 in [place]   • Acute on chronic subdural hematoma with impending herniation s/p left frontotemporal craniotomy 10/11/2019      Past Medical History:   Diagnosis Date   • Asthma    • CHF (congestive heart failure) (CMS/[facility])    • COPD (chronic obstructive pulmonary disease) (CMS/[facility])    • Elevated PSA    • Hypertension      Past Surgical History:   Procedure Laterality Date   • APPENDECTOMY     • CARDIAC CATHETERIZATION N/A 10/3/2018    Procedure: Left Heart Cath;  Surgeon: Twan Harrison MD;  Location:  [facility];  Service: Cardiology   • CRANIOTOMY FOR SUBDURAL HEMATOMA Left 10/11/2019    Procedure: CRANIOTOMY FOR SUBDURAL HEMATOMA;  Surgeon: Reagan Patten MD;  Location:  [facility];  Service: Neurosurgery   • MITRAL VALVE REPLACEMENT     • TONSILLECTOMY       General Information      Row Name 10/15/19 1307          PT Evaluation Time/Intention    Document Type  therapy note (daily note)  -NANDO     Mode of Treatment  physical therapy  -NANDO     Row Name 10/15/19 1307          General Information    Patient Profile Reviewed?  yes  -NANDO     Prior Level of Function  independent:;gait;transfer;bed mobility;ADL's  -NANDO     Existing Precautions/Restrictions  fall  -NANDO     Barriers to Rehab  cognitive status  -NANDO     Row Name 10/15/19 1307          Relationship/Environment    Lives With  alone  -NANDO     Row Name 10/15/19 1307          Resource/Environmental Concerns    Current Living Arrangements  home/apartment/condo  -NANDO     Row Name 10/15/19 1307          Cognitive Assessment/Intervention- PT/OT    Orientation Status (Cognition)  oriented x 3;oriented to;place;situation  -NANDO     Row Name 10/15/19 1307          Safety Issues, Functional Mobility    Safety Issues Affecting Function (Mobility)  safety precautions follow-through/compliance;safety precaution awareness;awareness of need for assistance;insight into deficits/self awareness  -NANDO     Impairments Affecting Function (Mobility)  balance;endurance/activity tolerance;strength;shortness of breath  -NANDO       User Key  (r) = Recorded By, (t) = Taken By, (c) = Cosigned By    Initials Name Provider Type    NANDO Bozena Batista, PT Physical Therapist        Mobility     Row Name 10/15/19 1307          Bed Mobility Assessment/Treatment    Bed Mobility Assessment/Treatment  rolling left;rolling right;scooting/bridging;supine-sit;sit-supine  -NANDO     Rolling Left [place] (Bed Mobility)  minimum assist (75% patient effort)  -NANDO     Rolling Right [place] (Bed Mobility)  minimum assist (75% patient effort)  -NANDO     Scooting/Bridging [place] (Bed Mobility)  minimum assist (75% patient effort)  -NANDO     Supine-Sit [place] (Bed Mobility)  minimum assist (75% patient effort);2 person assist  -NANDO     Sit-Supine [place] (Bed Mobility)  minimum  assist (75% patient effort);2 person assist  -NANDO     Assistive Device (Bed Mobility)  bed rails;draw sheet;head of bed elevated  -NANDO     Comment (Bed Mobility)  cues for sequencing patient needs assist with trunk  -NANDO     Row Name 10/15/19 1307          Transfer Assessment/Treatment    Comment (Transfers)  cues for safety and hand placement   -NANDO     Row Name 10/15/19 1307          Bed-Chair Transfer    Bed-Chair [place] (Transfers)  minimum assist (75% patient effort);2 person assist  -NANDO     Assistive Device (Bed-Chair Transfers)  walker, front-wheeled  -NANDO     Row Name 10/15/19 1307          Sit-Stand Transfer    Sit-Stand [place] (Transfers)  minimum assist (75% patient effort);2 person assist  -NANDO     Assistive Device (Sit-Stand Transfers)  walker, front-wheeled  -NANDO     Row Name 10/15/19 1307          Gait/Stairs Assessment/Training    Gait/Stairs Assessment/Training  gait/ambulation independence  -NANDO     [place] Level (Gait)  minimum assist (75% patient effort);2 person assist  -NANDO     Assistive Device (Gait)  walker, front-wheeled  -NANDO     Distance in Feet (Gait)  40  -NANDO     Pattern (Gait)  step-through  -NANDO     Comment (Gait/Stairs)  patient needs some assist with maneuvering walker around the turn. needs cues to stay on task  -NANDO       User Key  (r) = Recorded By, (t) = Taken By, (c) = Cosigned By    Initials Name Provider Type    Bozena Milan, PT Physical Therapist        Obj/Interventions     Row Name 10/15/19 1310          Therapeutic Exercise    Lower Extremity Range of Motion (Therapeutic Exercise)  hip flexion/extension, bilateral;knee flexion/extension, bilateral;ankle dorsiflexion/plantar flexion, bilateral  -NANDO     Exercise Type (Therapeutic Exercise)  AROM (active range of motion)  -NANDO     Position (Therapeutic Exercise)  seated  -NANDO     Sets/Reps (Therapeutic Exercise)  1/10  -NANDO     Expected Outcome (Therapeutic Exercise)  facilitate normal movement patterns;improve  functional tolerance, self-care activity;improve functional stability  -NANDO     Row Name 10/15/19 1310          Static Sitting Balance    Level of [place] (Unsupported Sitting, Static Balance)  supervision  -NANDO     Sitting Position (Unsupported Sitting, Static Balance)  sitting on edge of bed  -NANDO     Time Able to Maintain Position (Unsupported Sitting, Static Balance)  more than 5 minutes  -NANDO     Row Name 10/15/19 1310          Dynamic Sitting Balance    Level of [place], Reaches Outside Midline (Sitting, Dynamic Balance)  supervision  -NANDO     Sitting Position, Reaches Outside Midline (Sitting, Dynamic Balance)  sitting on edge of bed  -NANDO     Row Name 10/15/19 1310          Static Standing Balance    Level of [place] (Supported Standing, Static Balance)  minimal assist, 75% patient effort  -NANDO     Time Able to Maintain Position (Supported Standing, Static Balance)  1 to 2 minutes  -NANDO     Assistive Device Utilized (Supported Standing, Static Balance)  walker, rolling  -NANDO     Row Name 10/15/19 1310          Dynamic Standing Balance    Level of [place], Reaches Outside Midline (Standing, Dynamic Balance)  minimal assist, 75% patient effort  -NANDO     Time Able to Maintain Position, Reaches Outside Midline (Standing, Dynamic Balance)  more than 5 minutes  -NANDO     Assistive Device Utilized (Supported Standing, Dynamic Balance)  walker, rolling  -NANDO     Comment, Reaches Outside Midline (Standing, Dynamic Balance)  better balance shown today with standing  -NANDO       User Key  (r) = Recorded By, (t) = Taken By, (c) = Cosigned By    Initials Name Provider Type    Bozena Milan PT Physical Therapist        Goals/Plan    No documentation.       Clinical Impression     Row Name 10/15/19 1311          Pain Scale: FACES Pre/Post-Treatment    Pain: FACES Scale, Pretreatment  2-->hurts little bit  -NADNO     Pain: FACES Scale, Post-Treatment  2-->hurts little bit  -NANDO     Row Name 10/15/19 1311           Plan of Care Review    Plan of Care Reviewed With  patient;daughter  -NANDO     Row Name 10/15/19 1311          Physical Therapy Clinical Impression    Patient/Family Goals Statement (PT Clinical Impression)  to rehab  -NANDO     Criteria for Skilled Interventions Met (PT Clinical Impression)  yes;treatment indicated  -NANDO     Rehab Potential (PT Clinical Summary)  good, to achieve stated therapy goals  -NANDO     Row Name 10/15/19 1311          Vital Signs    Pre Systolic BP Rehab  128  -NANDO     Pre Treatment Diastolic BP  70  -NANDO     Post Systolic BP Rehab  136  -NANDO     Post Treatment Diastolic BP  75  -NANDO     Pretreatment Heart Rate (beats/min)  76  -NANDO     Posttreatment Heart Rate (beats/min)  88  -NANDO     Pre SpO2 (%)  96  -NANDO     O2 Delivery Pre Treatment  room air  -NANDO     Post SpO2 (%)  96  -NANDO     O2 Delivery Post Treatment  room air  -NANDO     Pre Patient Position  Supine  -NANDO     Intra Patient Position  Standing  -NANDO     Post Patient Position  Supine  -NANDO     Row Name 10/15/19 1311          Positioning and Restraints    Pre-Treatment Position  in bed  -NANDO     Post Treatment Position  bed  -NANDO     In Bed  notified nsg;supine;call light within reach;with family/caregiver;encouraged to call for assist  -NANDO       User Key  (r) = Recorded By, (t) = Taken By, (c) = Cosigned By    Initials Name Provider Type    NANDO Bozena Batista, PT Physical Therapist        Outcome Measures     Row Name 10/15/19 1315          How much help from another person do you currently need...    Turning from your back to your side while in flat bed without using bedrails?  2  -NANDO     Moving from lying on back to sitting on the side of a flat bed without bedrails?  2  -NANDO     Moving to and from a bed to a chair (including a wheelchair)?  3  -NANDO     Standing up from a chair using your arms (e.g., wheelchair, bedside chair)?  3  -NANDO     Climbing 3-5 steps with a railing?  1  -NANDO     To walk in hospital room?  3  -NANDO     AM-PAC 6 Clicks Score (PT)   14  -     Row Name 10/15/19 2485          Modified Yanet Scale    Pre-Stroke Modified [place] Scale  0 - No Symptoms at all.  -NANDO     Modified [place] Scale  4 - Moderately severe disability.  Unable to walk without assistance, and unable to attend to own bodily needs without assistance.  -       User Key  (r) = Recorded By, (t) = Taken By, (c) = Cosigned By    Initials Name Provider Type    Bozena Milan PT Physical Therapist        Physical Therapy Education     Title: PT OT SLP Therapies (In Progress)     Topic: Physical Therapy (In Progress)     Point: Mobility training (In Progress)     Learning Progress Summary           Patient Acceptance, E, NR by NANDO at 10/15/2019 11:30 AM   Family Acceptance, E, NR by NANDO at 10/15/2019 11:30 AM                   Point: Home exercise program (In Progress)     Learning Progress Summary           Patient Acceptance, E, NR by NANDO at 10/15/2019 11:30 AM   Family Acceptance, E, NR by NANDO at 10/15/2019 11:30 AM                   Point: Body mechanics (In Progress)     Learning Progress Summary           Patient Acceptance, E, NR by NANDO at 10/15/2019 11:30 AM   Family Acceptance, E, NR by NANDO at 10/15/2019 11:30 AM                   Point: Precautions (In Progress)     Learning Progress Summary           Patient Acceptance, E, NR by NANDO at 10/15/2019 11:30 AM   Family Acceptance, E, NR by NANDO at 10/15/2019 11:30 AM                               User Key     Initials Effective Dates Name Provider Type Discipline    NANDO 06/19/15 -  Bozena Batista PT Physical Therapist PT              PT Recommendation and Plan  Planned Therapy Interventions (PT Eval): balance training, bed mobility training, gait training, home exercise program, strengthening, transfer training  Outcome Summary/Treatment Plan (PT)  Anticipated Equipment Needs at Discharge (PT): front wheeled walker  Anticipated Discharge Disposition (PT): inpatient rehabilitation facility  Plan of Care Reviewed With:  patient, daughter  Outcome Summary: patient is able to ambulate 40 ft with walker and min assist. patient showed improved strength needing less assist for ambulation, transfers and bed mobility     Time Calculation:   PT Charges     Row Name 10/15/19 1130             Time Calculation    Start Time  1130  -NANDO      PT Received On  10/15/19  -NANDO      PT Goal Re-Cert Due Date  10/24/19  -NANDO         Time Calculation- PT    Total Timed Code Minutes- PT  24 minute(s)  -NANDO         Timed Charges    08255 - PT Therapeutic Activity Minutes  24  -NANDO        User Key  (r) = Recorded By, (t) = Taken By, (c) = Cosigned By    Initials Name Provider Type    Bozena Milan PT Physical Therapist        Therapy Charges for Today     Code Description Service Date Service Provider Modifiers Qty    29256349489 HC PT THERAPEUTIC ACT EA 15 MIN 10/15/2019 Bozena Batista, PT GP 2    05271544616 HC PT THER SUPP EA 15 MIN 10/15/2019 Bozena Batista, PT GP 2          PT G-Codes  Outcome Measure Options: AM-PAC 6 Clicks Daily Activity (OT)  AM-PAC 6 Clicks Score (PT): 14  AM-PAC 6 Clicks Score (OT): 14  Modified [place] Scale: 4 - Moderately severe disability.  Unable to walk without assistance, and unable to attend to own bodily needs without assistance.    Bozena Batista PT  10/15/2019

## 2019-10-15 NOTE — PLAN OF CARE
Problem: Patient Care Overview  Goal: Plan of Care Review  Outcome: Ongoing (interventions implemented as appropriate)      Problem: Skin Injury Risk (Adult)  Goal: Skin Health and Integrity  Outcome: Ongoing (interventions implemented as appropriate)   10/14/19 0544   Skin Injury Risk (Adult)   Skin Health and Integrity making progress toward outcome       Problem: Sleep Pattern Disturbance (Adult)  Goal: Adequate Sleep/Rest  Outcome: Ongoing (interventions implemented as appropriate)   10/15/19 0454   Sleep Pattern Disturbance (Adult)   Adequate Sleep/Rest making progress toward outcome       Problem: Fall Risk (Adult)  Goal: Absence of Fall  Outcome: Ongoing (interventions implemented as appropriate)   10/15/19 6986   Fall Risk (Adult)   Absence of Fall achieves outcome

## 2019-10-15 NOTE — THERAPY EVALUATION
Acute Care - Occupational Therapy Initial Evaluation  [facility]     Patient Name: Alok Tobias  : 1945  MRN: [id number]  Today's Date: 10/15/2019  Onset of Illness/Injury or Date of Surgery: 10/11/19  Date of Referral to OT: 10/14/19       Admit Date: 10/11/2019       ICD-10-CM ICD-9-CM   1. Impaired functional mobility, balance, gait, and endurance Z74.09 V49.89   2. Subdural hematoma (CMS/HCC) S06.5X9A 432.1   3. Altered mental status, unspecified altered mental status type R41.82 780.97   4. Impaired mobility and ADLs Z74.09 799.89     Patient Active Problem List   Diagnosis   • Obesity, Class III, BMI 40-49.9 (morbid obesity) (CMS/[facility])   • Hypertension   • COPD/asthma   • Chest pain   • Shortness of breath   • COPD (chronic obstructive pulmonary disease) (CMS/[facility])   • ANDREA    • Renal insufficiency   • Alcoholism (CMS/[facility])   • Atrial fibrillation with RVR to 2019 s/p successful ECV x2.  On home Coumadin post mitral valve repair (CMS/[facility])   • HCAP (healthcare-associated pneumonia)   • Bilateral pleural effusion   • Acute diastolic heart failure (CMS/[facility])   • Multiple lung nodules on CT   • PAF (paroxysmal atrial fibrillation) (CMS/[facility])   • S/P mitral valve repair/annuloplasty for severe MR 2019 in [place]   • Acute on chronic subdural hematoma with impending herniation s/p left frontotemporal craniotomy 10/11/2019      Past Medical History:   Diagnosis Date   • Asthma    • CHF (congestive heart failure) (CMS/[facility])    • COPD (chronic obstructive pulmonary disease) (CMS/[facility])    • Elevated PSA    • Hypertension      Past Surgical History:   Procedure Laterality Date   • APPENDECTOMY     • CARDIAC CATHETERIZATION N/A 10/3/2018    Procedure: Left Heart Cath;  Surgeon: Twan Harrison MD;  Location: [facility] INVASIVE LOCATION;  Service: Cardiology   • CRANIOTOMY FOR SUBDURAL HEMATOMA Left 10/11/2019    Procedure: CRANIOTOMY FOR SUBDURAL HEMATOMA;  Surgeon: Reagan Patten MD;   Location: [facility] OR;  Service: Neurosurgery   • MITRAL VALVE REPLACEMENT     • TONSILLECTOMY            OT ASSESSMENT FLOWSHEET (last 12 hours)      Occupational Therapy Evaluation     Row Name 10/15/19 0833                   OT Evaluation Time/Intention    Subjective Information  complains of;pain  -SD        Document Type  evaluation  -SD        Mode of Treatment  occupational therapy  -SD        Total Evaluation Minutes, Occupational Therapy  45  -SD        Patient Effort  good  -SD        Symptoms Noted During/After Treatment  fatigue  -SD           General Information    Patient Profile Reviewed?  yes  -SD        Onset of Illness/Injury or Date of Surgery  10/11/19  -SD        Patient Observations  alert;cooperative;agree to therapy  -SD        Patient/Family Observations  Pt. sitting up on EOB feeding himself breakfast. Pt.'s dtr. at bedside.  -SD        General Observations of Patient  Pt. with IV, RA, tele, O2 sensor, & BP every 30 min.  -SD        Prior Level of Function  independent:;all household mobility;community mobility;ADL's;driving;home management;shopping;cleaning;cooking  -SD        Equipment Currently Used at Home  none owns a walker & w/c from prior heart sx  -SD        Existing Precautions/Restrictions  fall  -SD        Risks Reviewed  patient and family:;LOB;nausea/vomiting;dizziness;increased discomfort;change in vital signs;lines disloged;increased drainage  -SD        Benefits Reviewed  patient and family:;improve function;increase independence;increase strength;increase balance;decrease pain;increase knowledge  -SD        Barriers to Rehab  cognitive status  -SD           Relationship/Environment    Primary Source of Support/Comfort  child(yani)  -SD        Lives With  alone  -SD           Resource/Environmental Concerns    Current Living Arrangements  home/apartment/condo  -SD        Resource/Environmental Concerns  none  -SD           Stairs Within Home, Primary    Number of Stairs,  Within Home, Primary  none  -SD           Cognitive Assessment/Intervention- PT/OT    Orientation Status (Cognition)  oriented to;person;place;situation  -SD        Follows Commands (Cognition)  follows one step commands;75-90% accuracy;verbal cues/prompting required;repetition of directions required  -SD        Safety Deficit (Cognitive)  mild deficit;awareness of need for assistance;insight into deficits/self awareness;safety precautions awareness;safety precautions follow-through/compliance  -SD           Safety Issues, Functional Mobility    Safety Issues Affecting Function (Mobility)  awareness of need for assistance;impulsivity;insight into deficits/self awareness;positioning of assistive device;safety precaution awareness;safety precautions follow-through/compliance  -SD        Impairments Affecting Function (Mobility)  balance;cognition;postural/trunk control;strength;endurance/activity tolerance  -SD           Bed Mobility Assessment/Treatment    Bed Mobility Assessment/Treatment  scooting/bridging  -SD        Scooting/Bridging [place] (Bed Mobility)  minimum assist (75% patient effort)  -SD           Functional Mobility    Functional Mobility- Comment  defer to PT  -SD           Transfer Assessment/Treatment    Transfer Assessment/Treatment  bed-chair transfer;sit-stand transfer;stand-sit transfer  -SD           Bed-Chair Transfer    Bed-Chair [place] (Transfers)  moderate assist (50% patient effort)  -SD        Assistive Device (Bed-Chair Transfers)  walker, front-wheeled  -SD           Sit-Stand Transfer    Sit-Stand [place] (Transfers)  minimum assist (75% patient effort)  -SD        Assistive Device (Sit-Stand Transfers)  walker, front-wheeled  -SD           Stand-Sit Transfer    Stand-Sit [place] (Transfers)  minimum assist (75% patient effort)  -SD        Assistive Device (Stand-Sit Transfers)  walker, front-wheeled  -SD           ADL Assessment/Intervention    [facility]  Assessment/Intervention  upper body dressing;feeding;grooming  -SD           Upper Body Dressing Assessment/Training    Upper Body Dressing [place] Level  don;pajama/robe;maximum assist (25% patient effort)  -SD        Upper Body Dressing Position  edge of bed sitting  -SD        Comment (Upper Body Dressing)  pt. with B rotator cuff injuries, limiited shldr ROM  -SD           Grooming Assessment/Training    [place] Level (Grooming)  wash face, hands;set up  -SD        Grooming Position  edge of bed sitting  -SD           Self-Feeding Assessment/Training    [place] Level (Feeding)  feeding skills;set up  -SD        Self-Feeding Assess/Train, Spill[place] Amount  minimal  -SD        Position (Self-Feeding)  edge of bed sitting  -SD           BADL Safety/Performance    Impairments, BADL Safety/Performance  balance;cognition;endurance/activity tolerance;trunk/postural control;strength  -SD        Cognitive Impairments, BADL Safety/Performance  awareness, need for assistance;impulsivity;insight into deficits/self awareness;safety precaution awareness;safety precaution follow-through  -SD        Skilled BADL Treatment/Intervention  BADL process/adaptation training;cognitive/safety deficit modifications;compensatory training;energy conservation  -SD           General ROM    GENERAL ROM COMMENTS  B UE AROM WFL, except B shldr flex/abd limited to 90 degrees  -SD           MMT (Manual Muscle Testing)    General MMT Comments  B UE Strength: 4-/5 proximally, 4+/5 distally  -SD           Motor Assessment/Interventions    Additional Documentation  Balance (Group)  -SD           Balance    Balance  static sitting balance;static standing balance  -SD           Static Sitting Balance    Level of [place] (Unsupported Sitting, Static Balance)  supervision  -SD        Sitting Position (Unsupported Sitting, Static Balance)  sitting on edge of bed  -SD        Time Able to Maintain Position (Unsupported Sitting, Static  Balance)  more than 5 minutes  -SD        Comment (Unsupported Sitting, Static Balance)  pt. leaning to left & posteriorly  -SD           Static Standing Balance    Level of [place] (Supported Standing, Static Balance)  moderate assist, 50 to 74% patient effort  -SD        Time Able to Maintain Position (Supported Standing, Static Balance)  1 to 2 minutes  -SD        Assistive Device Utilized (Supported Standing, Static Balance)  walker, rolling  -SD        Comment (Supported Standing, Static Balance)  pt. leaning posteriorly  -SD           Positioning and Restraints    Pre-Treatment Position  in bed  -SD        Post Treatment Position  chair  -SD        In Chair  notified nsg;reclined;call light within reach;encouraged to call for assist;exit alarm on;with family/caregiver;legs elevated  -SD           Pain Scale: FACES Pre/Post-Treatment    Pain: FACES Scale, Pretreatment  2-->hurts little bit  -SD        Pain: FACES Scale, Post-Treatment  0-->no hurt  -SD           Wound 10/11/19 2253 Left head Incision    Wound - Properties Group Date first assessed: 10/11/19  -CS Time first assessed: 2253  -CS Side: Left  -CS Location: head  -CS Primary Wound Type: Incision  -CS       Coping    Observed Emotional State  accepting;calm;cooperative  -SD        Verbalized Emotional State  acceptance  -SD           Plan of Care Review    Plan of Care Reviewed With  patient;daughter  -SD           Clinical Impression (OT)    Date of Referral to OT  10/14/19  -SD        OT Diagnosis  decreased ADL & functional mobility independence  -SD        Patient/Family Goals Statement (OT Eval)  return to PLOF  -SD        Criteria for Skilled Therapeutic Interventions Met (OT Eval)  yes;treatment indicated  -SD        Rehab Potential (OT Eval)  good, to achieve stated therapy goals  -SD        Therapy Frequency (OT Eval)  daily  -SD        Care Plan Review (OT)  evaluation/treatment results reviewed;care plan/treatment goals  reviewed;risks/benefits reviewed;current/potential barriers reviewed;patient/other agree to care plan  -SD        Care Plan Review, Other Participant (OT Eval)  daughter  -SD        Anticipated Discharge Disposition (OT)  inpatient rehabilitation facility  -SD           Vital Signs    Pre Systolic BP Rehab  128  -SD        Pre Treatment Diastolic BP  78  -SD        Intra Systolic BP Rehab  126  -SD        Intra Treatment Diastolic BP  57  -SD        Post Systolic BP Rehab  134  -SD        Post Treatment Diastolic BP  83  -SD        Posttreatment Heart Rate (beats/min)  101  -SD        Pre SpO2 (%)  90  -SD        O2 Delivery Pre Treatment  room air  -SD        Intra SpO2 (%)  88  -SD        O2 Delivery Intra Treatment  room air  -SD        Post SpO2 (%)  97  -SD        O2 Delivery Post Treatment  room air  -SD        Pre Patient Position  Sitting  -SD        Intra Patient Position  Standing  -SD        Post Patient Position  Sitting  -SD           OT Goals    Bed Mobility Goal Selection (OT)  bed mobility, OT goal 1  -SD        Transfer Goal Selection (OT)  transfer, OT goal 1  -SD        Dressing Goal Selection (OT)  dressing, OT goal 1  -SD        Toileting Goal Selection (OT)  toileting, OT goal 1  -SD           Bed Mobility Goal 1 (OT)    Activity/Assistive Device (Bed Mobility Goal 1, OT)  bed mobility activities, all  -SD        [place] Level/Cues Needed (Bed Mobility Goal 1, OT)  contact guard assist  -SD        Time Frame (Bed Mobility Goal 1, OT)  long term goal (LTG);by discharge  -SD        Progress/Outcomes (Bed Mobility Goal 1, OT)  goal ongoing  -SD           Transfer Goal 1 (OT)    Activity/Assistive Device (Transfer Goal 1, OT)  sit-to-stand/stand-to-sit;bed-to-chair/chair-to-bed;toilet;commode;commode, bedside without drop arms;walker, rolling  -SD        [place] Level/Cues Needed (Transfer Goal 1, OT)  minimum assist (75% or more patient effort)  -SD        Time Frame (Transfer Goal 1,  OT)  long term goal (LTG);by discharge  -SD        Progress/Outcome (Transfer Goal 1, OT)  goal ongoing  -SD           Dressing Goal 1 (OT)    Activity/Assistive Device (Dressing Goal 1, OT)  dressing skills, all  -SD        [place]/Cues Needed (Dressing Goal 1, OT)  minimum assist (75% or more patient effort)  -SD        Time Frame (Dressing Goal 1, OT)  long term goal (LTG);by discharge  -SD        Progress/Outcome (Dressing Goal 1, OT)  goal ongoing  -SD           Toileting Goal 1 (OT)    Activity/Device (Toileting Goal 1, OT)  toileting skills, all;commode, bedside without drop arms;raised toilet seat  -SD        [place] Level/Cues Needed (Toileting Goal 1, OT)  standby assist  -SD        Time Frame (Toileting Goal 1, OT)  long term goal (LTG);by discharge  -SD        Progress/Outcome (Toileting Goal 1, OT)  goal ongoing  -SD           Living Environment    Home Accessibility  wheelchair accessible  -SD          User Key  (r) = Recorded By, (t) = Taken By, (c) = Cosigned By    Initials Name Effective Dates    Karen Canada OT 06/08/18 -     Masoud Amaya RN 02/27/19 -          Occupational Therapy Education     Title: PT OT SLP Therapies (Done)     Topic: Occupational Therapy (Done)     Point: ADL training (Done)     Description: Instruct learner(s) on proper safety adaptation and remediation techniques during self care or transfers.   Instruct in proper use of assistive devices.    Learning Progress Summary           Patient Acceptance, E, VU,NR by SD at 10/15/2019  9:35 AM    Comment:  Pt. & dtr. educated on role of OT, and are agreeable with OT POC.   Family Acceptance, E, VU,NR by SD at 10/15/2019  9:35 AM    Comment:  Pt. & dtr. educated on role of OT, and are agreeable with OT POC.                   Point: Home exercise program (Done)     Description: Instruct learner(s) on appropriate technique for monitoring, assisting and/or progressing therapeutic exercises/activities.     Learning Progress Summary           Patient Acceptance, E, VU,NR by SD at 10/15/2019  9:35 AM    Comment:  Pt. & dtr. educated on role of OT, and are agreeable with OT POC.   Family Acceptance, E, VU,NR by SD at 10/15/2019  9:35 AM    Comment:  Pt. & dtr. educated on role of OT, and are agreeable with OT POC.                   Point: Precautions (Done)     Description: Instruct learner(s) on prescribed precautions during self-care and functional transfers.    Learning Progress Summary           Patient Acceptance, E, VU,NR by SD at 10/15/2019  9:35 AM    Comment:  Pt. & dtr. educated on role of OT, and are agreeable with OT POC.   Family Acceptance, E, VU,NR by SD at 10/15/2019  9:35 AM    Comment:  Pt. & dtr. educated on role of OT, and are agreeable with OT POC.                   Point: Body mechanics (Done)     Description: Instruct learner(s) on proper positioning and spine alignment during self-care, functional mobility activities and/or exercises.    Learning Progress Summary           Patient Acceptance, E, VU,NR by SD at 10/15/2019  9:35 AM    Comment:  Pt. & dtr. educated on role of OT, and are agreeable with OT POC.   Family Acceptance, E, VU,NR by SD at 10/15/2019  9:35 AM    Comment:  Pt. & dtr. educated on role of OT, and are agreeable with OT POC.                               User Key     Initials Effective Dates Name Provider Type Discipline    SD 06/08/18 -  Karen Lazo, OT Occupational Therapist OT                  OT Recommendation and Plan  Outcome Summary/Treatment Plan (OT)  Anticipated Discharge Disposition (OT): inpatient rehabilitation facility  Therapy Frequency (OT Eval): daily  Plan of Care Review  Plan of Care Reviewed With: patient, daughter  Plan of Care Reviewed With: patient, daughter  Outcome Summary: OT IE completed. Pt. sitting EOB feeding himself breakfast. Pt. with L posterior lean in sitting. Pt. scooted further to edge of bed with CGA. Pt. with limited B UE shldr  flex/abd d/t rotator cuff injuries. Pt. STS with min A. Pt. t/f'ed EOB to chair with mod A, and v/c's for safety. Pt. is appropriate for OT skilled services. Recommend IP Rehab upon d/c.    Outcome Measures     Row Name 10/15/19 0833             How much help from another is currently needed...    Putting on and taking off regular lower body clothing?  2  -SD      Bathing (including washing, rinsing, and drying)  2  -SD      Toileting (which includes using toilet bed pan or urinal)  2  -SD      Putting on and taking off regular upper body clothing  2  -SD      Taking care of personal grooming (such as brushing teeth)  3  -SD      Eating meals  3  -SD      AM-PAC 6 Clicks Score (OT)  14  -SD         Functional Assessment    Outcome Measure Options  AM-PAC 6 Clicks Daily Activity (OT)  -SD        User Key  (r) = Recorded By, (t) = Taken By, (c) = Cosigned By    Initials Name Provider Type    Karen Canada OT Occupational Therapist          Time Calculation:   Time Calculation- OT     Row Name 10/15/19 0833             Time Calculation- OT    OT Start Time  0833  -SD      OT Stop Time  0936  -SD      OT Time Calculation (min)  63 min  -SD      OT Received On  10/15/19  -SD      OT Goal Re-Cert Due Date  10/25/19  -SD        User Key  (r) = Recorded By, (t) = Taken By, (c) = Cosigned By    Initials Name Provider Type    Karen Canada OT Occupational Therapist        Therapy Charges for Today     Code Description Service Date Service Provider Modifiers Qty    [id number]  OT EVAL MOD COMPLEXITY 4 10/15/2019 Karen Lazo OT GO 1               Karen Lazo OT  10/15/2019

## 2019-10-15 NOTE — DISCHARGE PLACEMENT REQUEST
"Alok Tobias (74 y.o. Male)     Christianne Meza, LILIAN  Case Management  [facility]  [phone]        Date of Birth Social Security Number Address Home Phone MRN    1945  [id number]  [facility] [address] [phone] [id number]    Amish Marital Status          [place]        Admission Date Admission Type Admitting Provider Attending Provider Department, Room/Bed    10/11/19 Urgent Reagan Patten MD Newman, Charles Benjamin, MD [facility] 2B ICU, N239/1    Discharge Date Discharge Disposition Discharge Destination                       Attending Provider:  Reagan Patten MD    Allergies:  Dye Fdc Red [Red Dye], Penicillins    Isolation:  None   Infection:  None   Code Status:  CPR    Ht:  185.4 cm (72.99\")   Wt:  114 kg (250 lb 10.6 oz)    Admission Cmt:  None   Principal Problem:  Acute on chronic subdural hematoma with impending herniation s/p left frontotemporal craniotomy 10/11/2019  [S06.5X9A]                 Active Insurance as of 10/11/2019     Primary Coverage     Payor Plan Insurance Group Employer/Plan Group    MEDICARE MEDICARE A & B      Payor Plan Address Payor Plan Phone Number Payor Plan Fax Number Effective Dates    [address] [phone]  7/1/2008 - None Entered    [facility] [id number]       Subscriber Name Subscriber Birth Date Member ID       ALOK TOBIAS 1945 [id number]           Secondary Coverage     Payor Plan Insurance Group Employer/Plan Group    HUMANA HUMANA SUPPLEMENT [id number]     Payor Plan Address Payor Plan Phone Number Payor Plan Fax Number Effective Dates    [address] [address]   8/1/2010 - None Entered    [facility] [id number]       Subscriber Name Subscriber Birth Date Member ID       ALOK TOBIAS 1945 [id number]                 Emergency Contacts      (Rel.) Home Phone Work Phone Mobile Phone    Maria Dolores Francis (Daughter) -- [phone]               History & " Physical      Josseline Blount PA-C at 10/11/19 1015            Chief complaint altered mental status     Subjective     History of Present Illness:  Patient is a 74 y.o. male presents with with a past medical history significant for COPD, HTN, CHF, mitral valve repair in 2019 on Coumadin therapy.  Patient presented to [facility] ED on 10/11/19 via flight EMS after being found down by his daughter at approximately 1800. Upon arrival to the ED, the patient is unarousable. GCS 7 and NIH 30 on arrival. Patient's last known well is 10/8/19 when his daughter spoke to him on the phone.head CT performed in the emergency department demonstrates large acute left subdural hemorrhage with left to right midline shift with mass-effect resulting in effacement of left lateral ventricle.  The patient will be taken for an emergent craniotomy for evacuation of subdural hematoma.  Verbal consent was obtained from the patient's daughter via telephone.       Family History   Problem Relation Age of Onset   • Multiple sclerosis Mother      Social History     Tobacco Use   • Smoking status: Former Smoker     Packs/day: 1.50     Years: 20.00     Pack years: 30.00     Types: Cigarettes     Last attempt to quit: 1986     Years since quittin.4   • Smokeless tobacco: Never Used   Substance Use Topics   • Alcohol use: Yes     Alcohol/week: 1.8 - 2.4 oz     Types: 3 - 4 Cans of beer per week     Comment: 3-4 beers every evening   • Drug use: No     Past Medical History:   Diagnosis Date   • Asthma    • CHF (congestive heart failure) (CMS/[facility])    • COPD (chronic obstructive pulmonary disease) (CMS/[facility])    • Elevated PSA    • Hypertension      Past Surgical History:   Procedure Laterality Date   • APPENDECTOMY     • CARDIAC CATHETERIZATION N/A 10/3/2018    Procedure: Left Heart Cath;  Surgeon: Twan Harrison MD;  Location: [facility] CATH INVASIVE LOCATION;  Service: Cardiology   • MITRAL VALVE REPLACEMENT     • TONSILLECTOMY        Medications Prior to Admission   Medication Sig Dispense Refill Last Dose   • albuterol (PROVENTIL HFA;VENTOLIN HFA) 108 (90 Base) MCG/ACT inhaler Inhale 2 puffs Every 4 (Four) Hours As Needed for Wheezing.   Unknown at Unknown time   • allopurinol (ZYLOPRIM) 300 MG tablet Take 300 mg by mouth Daily.   Unknown at Unknown time   • aspirin 81 MG EC tablet Take 81 mg by mouth Daily.   Unknown at Unknown time   • atorvastatin (LIPITOR) 10 MG tablet Take 10 mg by mouth Every Night.   Unknown at Unknown time   • budesonide-formoterol (SYMBICORT) 160-4.5 MCG/ACT inhaler Inhale 2 puffs 2 (two) times a day.   Taking   • docusate sodium (COLACE) 100 MG capsule Take 100 mg by mouth 2 (Two) Times a Day.   Unknown at Unknown time   • famotidine (PEPCID) 20 MG tablet Take 20 mg by mouth 2 (Two) Times a Day.   Unknown at Unknown time   • furosemide (LASIX) 40 MG tablet Take 1 tablet by mouth 2 (Two) Times a Day. (Patient taking differently: Take 40 mg by mouth Daily.) 60 tablet 3 Unknown at Unknown time   • iron polysacch complex-B12-FA (FERREX 150 FORTE) 150-0.025-1 MG capsule capsule Take 1 capsule by mouth Daily.   Unknown at Unknown time   • metoprolol succinate XL (TOPROL-XL) 25 MG 24 hr tablet Take 1 tablet by mouth Daily. 30 tablet 11 Unknown at Unknown time   • potassium chloride (K-DUR,KLOR-CON) 20 MEQ CR tablet Take 1 tablet by mouth Daily. 30 tablet 3 Unknown at Unknown time   • theophylline (THEODUR) 300 MG 12 hr tablet Take 300 mg by mouth 2 (Two) Times a Day.   Unknown at Unknown time   • warfarin (COUMADIN) 3 MG tablet Take 3 mg by mouth Daily.   Unknown at Unknown time     Dye fdc red [red dye] and Penicillins    Objective     Review of Systems/Physical Exam:   Review of Systems   Unable to perform ROS: Mental status change       Patient is obtunded and unarousable.  He withdraws to pain in his right upper extremity, and bilateral lower extremities.  Left upper extremity appears to be flaccid.  He is not  "following commands.  He  gives a slight  when stimulated in right upper extremity.  Patient does not open his eyes to voice.  He is currently being intubated by ED staff     GCS upon my exam is a 5  NIH 30 per nursing report        Assessment/Plan     Acute Subdural Hemorrhage      74-year-old gentleman found to have a large left subdural hemorrhage resulting in left to right midline shift with mass-effect and effacement of left lateral ventricles.  Patient will be taken to the OR for an emergent craniotomy for evacuation of subdural hematoma.     Verbal consent for the procedure was obtained from the patient's daughter who is his POA.  The patient will be admitted to the neuro ICU.       Josseline Blount PA-C  10/11/19  10:16 AM            Electronically signed by Reagan Patten MD at 10/13/19 [address] [place] [address] Medications (active)       Dose Frequency Start End    allopurinol (ZYLOPRIM) tablet 300 mg 300 mg Daily 10/13/2019     Sig - Route: Take 1 tablet by mouth Daily. - Oral    atorvastatin (LIPITOR) tablet 10 mg 10 mg Nightly 10/11/2019     Sig - Route: Take 1 tablet by mouth Every Night. - Oral    bisacodyl (DULCOLAX) suppository 10 mg 10 mg Daily PRN 10/11/2019     Sig - Route: Insert 1 suppository into the rectum Daily As Needed for Constipation. - Rectal    budesonide-formoterol (SYMBICORT) 160-4.5 MCG/ACT inhaler 2 puff 2 puff 2 Times Daily - RT 10/14/2019     Sig - Route: Inhale 2 puffs 2 (Two) Times a Day. - Inhalation    docusate sodium (COLACE) capsule 100 mg 100 mg 2 Times Daily 10/13/2019     Sig - Route: Take 1 capsule by mouth 2 (Two) Times a Day. - Oral    famotidine (PEPCID) tablet 20 mg 20 mg 2 Times Daily 10/11/2019     Sig - Route: Take 1 tablet by mouth 2 (Two) Times a Day. - Oral    folic acid (FOLVITE) tablet 1 mg 1 mg Daily 10/14/2019 10/17/2019    Sig - Route: Take 1 tablet by mouth Daily. - Oral    Linked Group 1:  \"And\" Linked Group Details        furosemide (LASIX) tablet " "40 mg 40 mg 2 Times Daily (Diuretics) 10/11/2019     Sig - Route: Take 1 tablet by mouth 2 (Two) Times a Day. - Oral    HYDROcodone-acetaminophen (NORCO) 5-325 MG per tablet 1 tablet 1 tablet Every 6 Hours PRN 10/14/2019 10/24/2019    Sig - Route: Take 1 tablet by mouth Every 6 (Six) Hours As Needed for Moderate Pain . - Oral    Influenza Vac Subunit Quad (FLUCELVAX) injection 0.5 mL 0.5 mL During Hospitalization 10/12/2019     Sig - Route: Inject 0.5 mL into the appropriate muscle as directed by prescriber During Hospitalization for Immunization. - Intramuscular    Cosign for Ordering: Accepted by Reagan Patten MD on 10/12/2019  9:35 AM    ipratropium-albuterol (DUO-NEB) nebulizer solution 3 mL 3 mL 4 Times Daily - RT 10/13/2019     Sig - Route: Take 3 mL by nebulization 4 (Four) Times a Day. - Nebulization    ipratropium-albuterol (DUO-NEB) nebulizer solution 3 mL 3 mL Every 4 Hours PRN 10/13/2019     Sig - Route: Take 3 mL by nebulization Every 4 (Four) Hours As Needed for Shortness of Air. - Nebulization    ketorolac (TORADOL) injection 15 mg 15 mg Every 6 Hours PRN 10/11/2019 10/16/2019    Sig - Route: Infuse 1 mL into a venous catheter Every 6 (Six) Hours As Needed for Moderate Pain . - Intravenous    levETIRAcetam (KEPPRA) tablet 500 mg 500 mg Every 12 Hours Scheduled 10/15/2019     Sig - Route: Take 1 tablet by mouth Every 12 (Twelve) Hours. - Oral    LORazepam (ATIVAN) injection 0.5 mg 0.5 mg Every 2 Hours PRN 10/13/2019 10/23/2019    Sig - Route: Infuse 0.25 mL into a venous catheter Every 2 (Two) Hours As Needed for Withdrawal (For CIWA-Ar 8-10). - Intravenous    Linked Group 2:  \"Or\" Linked Group Details        LORazepam (ATIVAN) injection 1 mg 1 mg Every 1 Hour PRN 10/13/2019 10/23/2019    Sig - Route: Infuse 0.5 mL into a venous catheter Every 1 (One) Hour As Needed for Withdrawal (For CIWA-Ar 11-15). - Intravenous    Linked Group 2:  \"Or\" Linked Group Details        LORazepam (ATIVAN) " "injection 1 mg 1 mg Every 15 Minutes PRN 10/13/2019 10/23/2019    Sig - Route: Infuse 0.5 mL into a venous catheter Every 15 (Fifteen) Minutes As Needed for Withdrawal (For CIWA-Ar Greater Than 15.  Repeat Dose in 15 Minutes if CIWA-Ar Does Not Decrease). - Intravenous    Linked Group 2:  \"Or\" Linked Group Details        LORazepam (ATIVAN) injection 1 mg 1 mg Every 15 Minutes PRN 10/13/2019 10/23/2019    Sig - Route: Inject 0.5 mL into the appropriate muscle as directed by prescriber Every 15 (Fifteen) Minutes As Needed for Withdrawal (If Unable to Administer IV - For CIWA-Ar Greater Than 15.  Repeat Dose in 15 Minutes if CIWA-Ar Does Not Decrease). - Intramuscular    Linked Group 2:  \"Or\" Linked Group Details        LORazepam (ATIVAN) injection 2 mg 2 mg Every 1 Hour PRN 10/13/2019 10/23/2019    Sig - Route: Infuse 1 mL into a venous catheter Every 1 (One) Hour As Needed for Withdrawal (For CIWA-Ar Greater Than 15 After 2 Doses of 1 mg IV/IM.  Repeat Dose in 1 Hour if CIWA-Ar Does Not Decrease). - Intravenous    Linked Group 2:  \"Or\" Linked Group Details        LORazepam (ATIVAN) tablet 0.5 mg 0.5 mg Every 2 Hours PRN 10/13/2019 10/23/2019    Sig - Route: Take 1 tablet by mouth Every 2 (Two) Hours As Needed for Withdrawal (For CIWA-Ar 8-10). - Oral    Linked Group 2:  \"Or\" Linked Group Details        LORazepam (ATIVAN) tablet 1 mg 1 mg Every 1 Hour PRN 10/13/2019 10/23/2019    Sig - Route: Take 1 tablet by mouth Every 1 (One) Hour As Needed for Withdrawal (For CIWA-Ar 11-15). - Oral    Linked Group 2:  \"Or\" Linked Group Details        LORazepam (ATIVAN) tablet 2 mg 2 mg Every 1 Hour PRN 10/13/2019 10/23/2019    Sig - Route: Take 2 tablets by mouth Every 1 (One) Hour As Needed for Withdrawal (If Unable to Administer IV - For CIWA-Ar Greater Than 15 After 2 Doses of 1 mg IV/IM.  Repeat Dose in 1 Hour if CIWA-Ar Does Not Decrease). - Oral    Linked Group 2:  \"Or\" Linked Group Details        metoprolol succinate XL " "(TOPROL-XL) 24 hr tablet 25 mg 25 mg Every 24 Hours Scheduled 10/12/2019     Sig - Route: Take 1 tablet by mouth Daily. - Oral    multivitamin (THERAGRAN) tablet 1 tablet 1 tablet Daily 10/14/2019 10/17/2019    Sig - Route: Take 1 tablet by mouth Daily. - Oral    Linked Group 1:  \"And\" Linked Group Details        ondansetron (ZOFRAN) injection 4 mg 4 mg Every 6 Hours PRN 10/11/2019     Sig - Route: Infuse 2 mL into a venous catheter Every 6 (Six) Hours As Needed for Nausea or Vomiting. - Intravenous    potassium chloride (KLOR-CON) packet 40 mEq 40 mEq As Needed 10/13/2019     Sig - Route: Take 40 mEq by mouth As Needed (potassium replacement, see admin instructions). - Oral    Linked Group 3:  \"Or\" Linked Group Details        potassium chloride (MICRO-K) CR capsule 20 mEq 20 mEq Daily With Breakfast 10/12/2019     Sig - Route: Take 2 capsules by mouth Daily With Breakfast. - Oral    potassium chloride (MICRO-K) CR capsule 40 mEq 40 mEq As Needed 10/13/2019     Sig - Route: Take 4 capsules by mouth As Needed (Potassium Replacement.  See Admin Instructions). - Oral    Linked Group 3:  \"Or\" Linked Group Details        potassium chloride 10 mEq in 100 mL IVPB 10 mEq Every 1 Hour PRN 10/13/2019     Sig - Route: Infuse 100 mL into a venous catheter Every 1 (One) Hour As Needed (Potassium Replacement - See Admin Instructions). - Intravenous    Linked Group 3:  \"Or\" Linked Group Details        promethazine (PHENERGAN) injection 12.5 mg 12.5 mg Every 6 Hours PRN 10/11/2019     Sig - Route: Inject 0.5 mL into the appropriate muscle as directed by prescriber Every 6 (Six) Hours As Needed for Nausea or Vomiting. - Intramuscular    sodium chloride 0.9 % infusion 75 mL/hr Continuous 10/14/2019     Sig - Route: Infuse 75 mL/hr into a venous catheter Continuous. - Intravenous    thiamine (VITAMIN B-1) tablet 100 mg 100 mg Daily 10/14/2019 10/17/2019    Sig - Route: Take 1 tablet by mouth Daily. - Oral    Linked Group 1:  \"And\" " Linked Group Details        lactated ringers infusion (Discontinued) 75 mL/hr Continuous 10/13/2019 10/14/2019    Sig - Route: Infuse 75 mL/hr into a venous catheter Continuous. - Intravenous             Physician Progress Notes (last 24 hours) (Notes from 10/14/19 1319 through 10/15/19 1319)      Memo Villafana, DO at 10/15/19 1143          Intensive Care Follow-up     Hospital:  LOS: 4 days   Mr. Alok Tobias, 74 y.o. male is followed for:   Subdural hematoma (CMS/HCC)     Subjective       History of present illness:   74-year-old white male with a history of PAF newly diagnosed 6/2019 status post successful ECV, severe MR and CHF, s/p mitral valve repair 7/2019, asthma/COPD, ANDREA, and alcoholism.  Apparently was found down by his family the evening of 10/11/2019 after not being seen for several days.  CT revealed a large acute on chronic subdural hematoma with left to right midline shift and impending herniation. Was taken directly to the OR and right frontotemporal craniotomy was successfully performed.        Subjective   Interval History:  No acute events overnight.  Patient is up at the side of the bed this morning having breakfast.  He is much more lucid this morning and had an excellent night sleep per the patient's wife.  He denies any complaints of chest pain, shortness of breath, fever, or chills.             The patient's past medical, surgical and social history were reviewed and updated in Epic as appropriate.    Objective   Objective     Infusions:    sodium chloride 75 mL/hr Last Rate: 75 mL/hr (10/15/19 0809)     Medications:    allopurinol 300 mg Oral Daily   atorvastatin 10 mg Oral Nightly   budesonide-formoterol 2 puff Inhalation BID - RT   docusate sodium 100 mg Oral BID   famotidine 20 mg Oral BID   vitamin B-1 100 mg Oral Daily   And      multivitamin 1 tablet Oral Daily   And      folic acid 1 mg Oral Daily   furosemide 40 mg Oral BID   ipratropium-albuterol 3 mL Nebulization  4x Daily - RT   levETIRAcetam 500 mg Oral Q12H   metoprolol succinate XL 25 mg Oral Q24H   potassium chloride 20 mEq Oral Daily With Breakfast     I reviewed the patient's medications.    Vital Sign Min/Max for last 24 hours  Temp  Min: 97.7 °F (36.5 °C)  Max: 98.8 °F (37.1 °C)   BP  Min: 91/79  Max: 181/120   Pulse  Min: 66  Max: 101   Resp  Min: 16  Max: 20   SpO2  Min: 80 %  Max: 100 %   Flow (L/min)  Min: 1  Max: 2       Input/Output for last 24 hour shift  10/14 0701 - 10/15 0700  In: 4166 [P.O.:2780; I.V.:1386]  Out: 1283 [Urine:1225; Drains:58]      GENERAL : NAD, conversant  RESPIRATORY/THORAX : normal respiratory effort and no intercostal retractions, clear to auscultation bilaterally  CARDIOVASCULAR : Normal S1/S2, RRR. 1+ lower ext edema.  GASTROINTESTINAL : Soft, NT/ND. BS x 4 normoactive. No hepatosplenomegaly.  MUSCULOSKELETAL : No cyanosis, clubbing, or ischemia  NEUROLOGICAL: alert and oriented to person, place and time  PSYCHOLOGICAL : Appropriate affect    Results from last 7 days   Lab Units 10/13/19  1416 10/11/19  2009 10/11/19  1941   WBC 10*3/mm3 12.69* 13.16*  --    HEMOGLOBIN g/dL 13.1 14.3  --    HEMOGLOBIN, POC g/dL  --   --  15.0   PLATELETS 10*3/mm3 260 304  --      Results from last 7 days   Lab Units 10/15/19  0819 10/14/19  2150 10/14/19  0944 10/13/19  1416   SODIUM mmol/L  --  143 151* 150*   POTASSIUM mmol/L 4.2 3.3* 3.9 3.1*   CO2 mmol/L  --  31.0* 33.0* 28.0   BUN mg/dL  --  18 24* 29*   CREATININE mg/dL  --  0.83 0.88 1.07   MAGNESIUM mg/dL  --   --   --  2.3   PHOSPHORUS mg/dL  --   --   --  2.7   GLUCOSE mg/dL  --  136* 161* 134*     Estimated Creatinine Clearance: 100.1 mL/min (by C-G formula based on SCr of 0.83 mg/dL).    Results from last 7 days   Lab Units 10/13/19  0908   PH, ARTERIAL pH units 7.472*   PCO2, ARTERIAL mm Hg 33.8   PO2 ART mm Hg 74.4*       I reviewed the patient's new clinical results.  I reviewed the patient's new imaging results/reports including actual  images and agree with reports.         Imaging Results (last 24 hours)     Procedure Component Value Units Date/Time    CT Head Without Contrast [[id number]] Collected:  10/15/19 0805     Updated:  10/15/19 0933    Narrative:       EXAMINATION: CT HEAD WO CONTRAST- 10/15/2019     INDICATION: Focal neuro deficit, new, fixed or worsening, <6 hours     TECHNIQUE: 5 mm unenhanced images through the brain     The radiation dose reduction device was turned on for each scan per the  ALARA (As Low as Reasonably Achievable) protocol.     COMPARISON: 10/14/2019     FINDINGS: Note is again made of left frontotemporal craniotomy. There is  decreasing residual intracranial air. Residual left subdural hemorrhage  is slightly smaller in size, similar in appearance, with denser clot  anteriorly, and lower density smaller posterior subdural component.  There is no evidence of parenchymal hemorrhage or intraventricular  hemorrhage. There is no evidence of infarct/edema.       Impression:       Small residual subdural hemorrhage, slightly decreased in  size from yesterday's exam, otherwise stable in appearance. No new  intracranial disease is seen.     D:  10/15/2019  E:  10/15/2019       CT Head Without Contrast [[id number]] Collected:  10/14/19 2114     Updated:  10/14/19 2117    Narrative:       CT Head WO: 10/14/2019 10:02 PM    HISTORY:   Worsening aphasia following removal of subdural drainage catheter.    TECHNIQUE:   Axial unenhanced head CT. Radiation dose reduction techniques included automated exposure control or exposure modulation based on body size. Radiation audit for number of CT and nuclear cardiology exams performed in the last year: 4.      COMPARISON:   Knee dated 10/11/2019    FINDINGS:   Is been slight reaccumulation of blood in the left subdural space since the prior CT, a rightward midline shift has decreased, previously 1.5 cm and now about 7 mm.    There is no hydrocephalus. There is no parenchymal  hemorrhage.      Impression:       Small amount of reaccumulation of subdural blood with decreased midline shift..    Signer Name: Andrei Dukes MD   Signed: 10/14/2019 9:14 PM   Workstation Name: [id number]    [facility]          Assessment/Plan   Impression        Acute on chronic subdural hematoma with impending herniation s/p left frontotemporal craniotomy 10/11/2019     Hypertension    COPD/asthma    ANDREA     Alcoholism (CMS/HCC)    Atrial fibrillation with RVR to 6/19/2019 s/p successful ECV x2.  On home Coumadin post mitral valve repair (CMS/HCC)    S/P mitral valve repair/annuloplasty for severe MR 7/25/2019 in [place]       Plan      -Continue pulmonary toilet with flutter valve and duo nebs  -Mobilize up in chair  -Physical therapy to begin mobilization and strengthening  -Lactated Ringer's at 75 cc an hour  -Past speech evaluation, advance diet as tolerated  -Downgrade to the floor; awaiting rehab placement    Plan of care and goals reviewed with mulitdisciplinary/antibiotic stewardship team during rounds.   I discussed the patient's findings and my recommendations with patient, family and nursing staff       Alphonso Villafana DO  Pulmonary, Critical care and Sleep Medicine       Electronically signed by Memo Villafana DO at 10/15/19 1203     Jesisca Bey PA-C at 10/15/19 0931     Attestation signed by Reagan Patten MD at 10/15/19 1247    I have reviewed the documentation above and agree.                  NEUROSURGERY PROGRESS NOTE    Interval History:   Postoperative day #4 status post left frontotemporal craniotomy for acute on chronic subdural hematoma    Patient sitting up in bed this morning working with OT.  No new complaints this morning.  Subdural drain removed yesterday without issue.  CT head performed and reviewed by Dr. Patten, appears stable.    Vital Signs  Blood pressure 91/79, pulse 98, temperature 98.5 °F (36.9 °C),  "temperature source Oral, resp. rate 20, height 185.4 cm (72.99\"), weight 114 kg (250 lb 10.6 oz), SpO2 (!) 80 %.    Physical Exam:  -Patient is alert and oriented  -Speech is intact  -No pronator drift  -No facial asymmetry noted  -Moves extremities to command  -Incision is clean and dry with staples intact     Results Review:    I/O last 3 completed shifts:  In: 5004 [P.O.:2805; I.V.:2199]  Out: 2274 [Urine:2025; Drains:249]  I/O this shift:  In: 693.5 [P.O.:600; I.V.:93.5]  Out: -       Assessment/Plan:   Awaiting telemetry bed. Ready for transfer to rehab once arrangements have been made.       Jessica Bey PA-C  10/15/19  9:33 AM    Electronically signed by Reagan Patten MD at 10/15/19 1247     Reagan Patten MD at 10/15/19 0910        Head CT reviewed. Small amount of accumulation of subacute blood products in the operative bed with improved mass effect on the L hemisphere. No indication for re-operation.    Cleared for SNF vs rehab placement at this point from my perspective.    Electronically signed by Reagan Patten MD at 10/15/19 0912     Memo Villafana DO at 10/14/19 1347          Intensive Care Follow-up     Hospital:  LOS: 3 days   Mr. Alok Tobias, 74 y.o. male is followed for:   Subdural hematoma (CMS/HCC)     Subjective   History of present illness:   74-year-old white male with a history of PAF newly diagnosed 6/2019 status post successful ECV, severe MR and CHF, s/p mitral valve repair 7/2019, asthma/COPD, ANDREA, and alcoholism.  Apparently was found down by his family the evening of 10/11/2019 after not being seen for several days.  CT revealed a large acute on chronic subdural hematoma with left to right midline shift and impending herniation. Was taken directly to the OR and right frontotemporal craniotomy was successfully performed.        Subjective   Interval History:  Patient was extubated on 10/13/2019.  Doing very well this morning.  He is " slightly confused and had poor sleep, but overall has made significant improvement.  Denies any chest pain, shortness of breath, fever or chills.       The patient's past medical, surgical and social history were reviewed and updated in Epic as appropriate.    Objective   Objective     Infusions:    lactated ringers 75 mL/hr Last Rate: 75 mL/hr (10/13/19 1612)     Medications:    allopurinol 300 mg Oral Daily   atorvastatin 10 mg Oral Nightly   budesonide-formoterol 2 puff Inhalation BID - RT   docusate sodium 100 mg Oral BID   famotidine 20 mg Oral BID   vitamin B-1 100 mg Oral Daily   And      multivitamin 1 tablet Oral Daily   And      folic acid 1 mg Oral Daily   furosemide 40 mg Oral BID   ipratropium-albuterol 3 mL Nebulization 4x Daily - RT   metoprolol succinate XL 25 mg Oral Q24H   potassium chloride 20 mEq Oral Daily With Breakfast     I reviewed the patient's medications.    Vital Sign Min/Max for last 24 hours  Temp  Min: 97.7 °F (36.5 °C)  Max: 98.7 °F (37.1 °C)   BP  Min: 112/81  Max: 150/99   Pulse  Min: 96  Max: 121   Resp  Min: 16  Max: 20   SpO2  Min: 83 %  Max: 99 %   Flow (L/min)  Min: 1  Max: 4       Input/Output for last 24 hour shift  10/13 0701 - 10/14 0700  In: 1078 [P.O.:25; I.V.:993]  Out: 2676 [Urine:2300; Drains:376]      GENERAL : NAD, conversant  RESPIRATORY/THORAX : normal respiratory effort and no intercostal retractions, clear to auscultation bilaterally  CARDIOVASCULAR : Normal S1/S2, RRR. 1+ lower ext edema.  GASTROINTESTINAL : Soft, NT/ND. BS x 4 normoactive. No hepatosplenomegaly.  MUSCULOSKELETAL : No cyanosis, clubbing, or ischemia  NEUROLOGICAL: alert and oriented to person, place and time  PSYCHOLOGICAL : Appropriate affect    Results from last 7 days   Lab Units 10/13/19  1416 10/11/19  2009 10/11/19  1941   WBC 10*3/mm3 12.69* 13.16*  --    HEMOGLOBIN g/dL 13.1 14.3  --    HEMOGLOBIN, POC g/dL  --   --  15.0   PLATELETS 10*3/mm3 260 304  --      Results from last 7 days    Lab Units 10/14/19  0944 10/13/19  1416 10/11/19  1941   SODIUM mmol/L 151* 150*  --    POTASSIUM mmol/L 3.9 3.1*  --    CO2 mmol/L 33.0* 28.0  --    BUN mg/dL 24* 29*  --    CREATININE mg/dL 0.88 1.07 0.80   MAGNESIUM mg/dL  --  2.3  --    PHOSPHORUS mg/dL  --  2.7  --    GLUCOSE mg/dL 161* 134*  --      Estimated Creatinine Clearance: 94.4 mL/min (by C-G formula based on SCr of 0.88 mg/dL).    Results from last 7 days   Lab Units 10/13/19  0908   PH, ARTERIAL pH units 7.472*   PCO2, ARTERIAL mm Hg 33.8   PO2 ART mm Hg 74.4*       I reviewed the patient's new clinical results.  I reviewed the patient's new imaging results/reports including actual images and agree with reports.              Imaging Results (last 24 hours)     Procedure Component Value Units Date/Time    XR Chest 1 View [[id number]] Collected:  10/13/19 0830     Updated:  10/13/19 2340    Narrative:          EXAMINATION: XR CHEST 1 VW - 10/13/2019     INDICATION: Respiratory failure; S06.0Y3M-Aqgmpkaqd subdural hemorrhage  with loss of consciousness of unspecified duration, initial encounter.      COMPARISON: 10/11/2019     FINDINGS: ET tube and NG tube appear to be in good position. The heart  is enlarged. Vasculature appears normal. Lungs are well expanded and  clear except for granulomatous calcifications. There is minimal right  basilar pleural thickening.           Impression:       Cardiomegaly without congestive failure. No new chest  disease is seen.     DICTATED:   10/13/2019  EDITED/ls :   10/13/2019      This report was finalized on 10/13/2019 11:37 PM by DR. Roque Pascal MD.             Assessment/Plan   Impression        Acute on chronic subdural hematoma with impending herniation s/p left frontotemporal craniotomy 10/11/2019     Hypertension    COPD/asthma    ANDREA     Alcoholism (CMS/HCC)    Atrial fibrillation with RVR to 6/19/2019 s/p successful ECV x2.  On home Coumadin post mitral valve repair (CMS/HCC)    S/P mitral valve  repair/annuloplasty for severe MR 2019 in [place]       Plan        -Continue pulmonary toilet with flutter valve and add duo nebs  -Mobilize up in chair  -Physical therapy to begin mobilization and strengthening  -Lactated Ringer's at 75 cc an hour  -Past speech evaluation, advance diet as tolerated  -Downgrade to the floor    Plan of care and goals reviewed with mulitdisciplinary/antibiotic stewardship team during rounds.   I discussed the patient's findings and my recommendations with patient, family and nursing staff       Alphonso Villafana DO  Pulmonary, Critical care and Sleep Medicine       Electronically signed by Memo Villafana DO at 10/14/19 1352       Nutrition Notes (last 24 hours) (Notes from 10/14/19 1319 through 10/15/19 131)     No notes of this type exist for this encounter.           Physical Therapy Notes (last 24 hours) (Notes from 10/14/19 1319 through 10/15/19 131)      Bozena Batista, PT at 10/15/19 1130  Version 1 of 1         Patient Name: Alok Tobias  : 1945    MRN: [id number]                              Today's Date: 10/15/2019       Admit Date: 10/11/2019    Visit Dx:     ICD-10-CM ICD-9-CM   1. Impaired functional mobility, balance, gait, and endurance Z74.09 V49.89   2. Subdural hematoma (CMS/HCC) S06.5X9A 432.1   3. Altered mental status, unspecified altered mental status type R41.82 780.97   4. Impaired mobility and ADLs Z74.09 799.89     Patient Active Problem List   Diagnosis   • Obesity, Class III, BMI 40-49.9 (morbid obesity) (CMS/[facility])   • Hypertension   • COPD/asthma   • Chest pain   • Shortness of breath   • COPD (chronic obstructive pulmonary disease) (CMS/[facility])   • ANDREA    • Renal insufficiency   • Alcoholism (CMS/[facility])   • Atrial fibrillation with RVR to 2019 s/p successful ECV x2.  On home Coumadin post mitral valve repair (CMS/[facility])   • HCAP (healthcare-associated pneumonia)   • Bilateral pleural effusion   • Acute diastolic heart  failure (CMS/HCC)   • Multiple lung nodules on CT   • PAF (paroxysmal atrial fibrillation) (CMS/HCC)   • S/P mitral valve repair/annuloplasty for severe MR 7/25/2019 in [place]   • Acute on chronic subdural hematoma with impending herniation s/p left frontotemporal craniotomy 10/11/2019      Past Medical History:   Diagnosis Date   • Asthma    • CHF (congestive heart failure) (CMS/HCC)    • COPD (chronic obstructive pulmonary disease) (CMS/HCC)    • Elevated PSA    • Hypertension      Past Surgical History:   Procedure Laterality Date   • APPENDECTOMY     • CARDIAC CATHETERIZATION N/A 10/3/2018    Procedure: Left Heart Cath;  Surgeon: Twan Harrison MD;  Location:  [facility] CATH INVASIVE LOCATION;  Service: Cardiology   • CRANIOTOMY FOR SUBDURAL HEMATOMA Left 10/11/2019    Procedure: CRANIOTOMY FOR SUBDURAL HEMATOMA;  Surgeon: Reagan Patten MD;  Location:  [facility];  Service: Neurosurgery   • MITRAL VALVE REPLACEMENT     • TONSILLECTOMY       General Information     Row Name 10/15/19 1307          PT Evaluation Time/Intention    Document Type  therapy note (daily note)  -     Mode of Treatment  physical therapy  -     Row Name 10/15/19 1307          General Information    Patient Profile Reviewed?  yes  -NANDO     Prior Level of Function  independent:;gait;transfer;bed mobility;ADL's  -     Existing Precautions/Restrictions  fall  -     Barriers to Rehab  cognitive status  -     Row Name 10/15/19 1307          Relationship/Environment    Lives With  alone  -     Row Name 10/15/19 1307          Resource/Environmental Concerns    Current Living Arrangements  home/apartment/condo  -     Row Name 10/15/19 1307          Cognitive Assessment/Intervention- PT/OT    Orientation Status (Cognition)  oriented x 3;oriented to;place;situation  -     Row Name 10/15/19 1307          Safety Issues, Functional Mobility    Safety Issues Affecting Function (Mobility)  safety precautions  follow-through/compliance;safety precaution awareness;awareness of need for assistance;insight into deficits/self awareness  -NANDO     Impairments Affecting Function (Mobility)  balance;endurance/activity tolerance;strength;shortness of breath  -NANDO       User Key  (r) = Recorded By, (t) = Taken By, (c) = Cosigned By    Initials Name Provider Type    NANDO Bozena Batista, PT Physical Therapist        Mobility     Row Name 10/15/19 1307          Bed Mobility Assessment/Treatment    Bed Mobility Assessment/Treatment  rolling left;rolling right;scooting/bridging;supine-sit;sit-supine  -NANDO     Rolling Left [place] (Bed Mobility)  minimum assist (75% patient effort)  -NANDO     Rolling Right [place] (Bed Mobility)  minimum assist (75% patient effort)  -NANDO     Scooting/Bridging [place] (Bed Mobility)  minimum assist (75% patient effort)  -NANDO     Supine-Sit [place] (Bed Mobility)  minimum assist (75% patient effort);2 person assist  -NANDO     Sit-Supine [place] (Bed Mobility)  minimum assist (75% patient effort);2 person assist  -NANDO     Assistive Device (Bed Mobility)  bed rails;draw sheet;head of bed elevated  -NANDO     Comment (Bed Mobility)  cues for sequencing patient needs assist with trunk  -NANDO     Row Name 10/15/19 1307          Transfer Assessment/Treatment    Comment (Transfers)  cues for safety and hand placement   -NANDO     Row Name 10/15/19 1304          Bed-Chair Transfer    Bed-Chair [place] (Transfers)  minimum assist (75% patient effort);2 person assist  -NANDO     Assistive Device (Bed-Chair Transfers)  walker, front-wheeled  -NANDO     Row Name 10/15/19 1308          Sit-Stand Transfer    Sit-Stand [place] (Transfers)  minimum assist (75% patient effort);2 person assist  -NANDO     Assistive Device (Sit-Stand Transfers)  walker, front-wheeled  -NANDO     Row Name 10/15/19 1300          Gait/Stairs Assessment/Training    Gait/Stairs Assessment/Training  gait/ambulation independence  -NANDO      [place] Level (Gait)  minimum assist (75% patient effort);2 person assist  -NANDO     Assistive Device (Gait)  walker, front-wheeled  -NANDO     Distance in Feet (Gait)  40  -NANDO     Pattern (Gait)  step-through  -NANDO     Comment (Gait/Stairs)  patient needs some assist with maneuvering walker around the turn. needs cues to stay on task  -NANDO       User Key  (r) = Recorded By, (t) = Taken By, (c) = Cosigned By    Initials Name Provider Type    NANDO Bozena Batista PT Physical Therapist        Obj/Interventions     Row Name 10/15/19 1310          Therapeutic Exercise    Lower Extremity Range of Motion (Therapeutic Exercise)  hip flexion/extension, bilateral;knee flexion/extension, bilateral;ankle dorsiflexion/plantar flexion, bilateral  -NANDO     Exercise Type (Therapeutic Exercise)  AROM (active range of motion)  -NANDO     Position (Therapeutic Exercise)  seated  -NANDO     Sets/Reps (Therapeutic Exercise)  1/10  -NANDO     Expected Outcome (Therapeutic Exercise)  facilitate normal movement patterns;improve functional tolerance, self-care activity;improve functional stability  -NANDO     Row Name 10/15/19 1310          Static Sitting Balance    Level of [place] (Unsupported Sitting, Static Balance)  supervision  -NANDO     Sitting Position (Unsupported Sitting, Static Balance)  sitting on edge of bed  -NANDO     Time Able to Maintain Position (Unsupported Sitting, Static Balance)  more than 5 minutes  -NANDO     Row Name 10/15/19 1310          Dynamic Sitting Balance    Level of [place], Reaches Outside Midline (Sitting, Dynamic Balance)  supervision  -NANDO     Sitting Position, Reaches Outside Midline (Sitting, Dynamic Balance)  sitting on edge of bed  -NANDO     Row Name 10/15/19 1310          Static Standing Balance    Level of [place] (Supported Standing, Static Balance)  minimal assist, 75% patient effort  -NANDO     Time Able to Maintain Position (Supported Standing, Static Balance)  1 to 2 minutes  -NANDO     Assistive Device  Utilized (Supported Standing, Static Balance)  walker, rolling  -NANDO     Row Name 10/15/19 1310          Dynamic Standing Balance    Level of [place], Reaches Outside Midline (Standing, Dynamic Balance)  minimal assist, 75% patient effort  -NANDO     Time Able to Maintain Position, Reaches Outside Midline (Standing, Dynamic Balance)  more than 5 minutes  -NANDO     Assistive Device Utilized (Supported Standing, Dynamic Balance)  walker, rolling  -NANDO     Comment, Reaches Outside Midline (Standing, Dynamic Balance)  better balance shown today with standing  -NANDO       User Key  (r) = Recorded By, (t) = Taken By, (c) = Cosigned By    Initials Name Provider Type    Bozena Milan, PT Physical Therapist        Goals/Plan    No documentation.       Clinical Impression     Row Name 10/15/19 1311          Pain Scale: FACES Pre/Post-Treatment    Pain: FACES Scale, Pretreatment  2-->hurts little bit  -NANDO     Pain: FACES Scale, Post-Treatment  2-->hurts little bit  -NANDO     Row Name 10/15/19 1311          Plan of Care Review    Plan of Care Reviewed With  patient;daughter  -NANDO     Row Name 10/15/19 1311          Physical Therapy Clinical Impression    Patient/Family Goals Statement (PT Clinical Impression)  to rehab  -NANDO     Criteria for Skilled Interventions Met (PT Clinical Impression)  yes;treatment indicated  -NANDO     Rehab Potential (PT Clinical Summary)  good, to achieve stated therapy goals  -NANDO     Row Name 10/15/19 1311          Vital Signs    Pre Systolic BP Rehab  128  -NANDO     Pre Treatment Diastolic BP  70  -NANDO     Post Systolic BP Rehab  136  -NANDO     Post Treatment Diastolic BP  75  -NANDO     Pretreatment Heart Rate (beats/min)  76  -NANDO     Posttreatment Heart Rate (beats/min)  88  -NANDO     Pre SpO2 (%)  96  -NANDO     O2 Delivery Pre Treatment  room air  -NANDO     Post SpO2 (%)  96  -NANDO     O2 Delivery Post Treatment  room air  -NANDO     Pre Patient Position  Supine  -NANDO     Intra Patient Position  Standing  -NANDO     Post  Patient Position  Supine  -NANDO     Row Name 10/15/19 1311          Positioning and Restraints    Pre-Treatment Position  in bed  -NANDO     Post Treatment Position  bed  -NANDO     In Bed  notified nsg;supine;call light within reach;with family/caregiver;encouraged to call for assist  -NANDO       User Key  (r) = Recorded By, (t) = Taken By, (c) = Cosigned By    Initials Name Provider Type    Bozena Milan PT Physical Therapist        Outcome Measures     Row Name 10/15/19 1315          How much help from another person do you currently need...    Turning from your back to your side while in flat bed without using bedrails?  2  -NANDO     Moving from lying on back to sitting on the side of a flat bed without bedrails?  2  -NANDO     Moving to and from a bed to a chair (including a wheelchair)?  3  -NANDO     Standing up from a chair using your arms (e.g., wheelchair, bedside chair)?  3  -NANDO     Climbing 3-5 steps with a railing?  1  -NANDO     To walk in hospital room?  3  -NANDO     AM-PAC 6 Clicks Score (PT)  14  -NANDO     Row Name 10/15/19 1315          Modified [place] Scale    Pre-Stroke Modified [place] Scale  0 - No Symptoms at all.  -NANDO     Modified [place] Scale  4 - Moderately severe disability.  Unable to walk without assistance, and unable to attend to own bodily needs without assistance.  -NANDO       User Key  (r) = Recorded By, (t) = Taken By, (c) = Cosigned By    Initials Name Provider Type    Bozena Milan PT Physical Therapist        Physical Therapy Education     Title: PT OT SLP Therapies (In Progress)     Topic: Physical Therapy (In Progress)     Point: Mobility training (In Progress)     Learning Progress Summary           Patient Acceptance, E, NR by NANDO at 10/15/2019 11:30 AM   Family Acceptance, E, NR by NANDO at 10/15/2019 11:30 AM                   Point: Home exercise program (In Progress)     Learning Progress Summary           Patient Acceptance, E, NR by NANDO at 10/15/2019 11:30 AM   Family Acceptance, E,  NR by NANDO at 10/15/2019 11:30 AM                   Point: Body mechanics (In Progress)     Learning Progress Summary           Patient Acceptance, E, NR by NANDO at 10/15/2019 11:30 AM   Family Acceptance, E, NR by NANDO at 10/15/2019 11:30 AM                   Point: Precautions (In Progress)     Learning Progress Summary           Patient Acceptance, E, NR by NANDO at 10/15/2019 11:30 AM   Family Acceptance, E, NR by NANDO at 10/15/2019 11:30 AM                               User Key     Initials Effective Dates Name Provider Type Discipline     06/19/15 -  Bozena Batista PT Physical Therapist PT              PT Recommendation and Plan  Planned Therapy Interventions (PT Eval): balance training, bed mobility training, gait training, home exercise program, strengthening, transfer training  Outcome Summary/Treatment Plan (PT)  Anticipated Equipment Needs at Discharge (PT): front wheeled walker  Anticipated Discharge Disposition (PT): inpatient rehabilitation facility  Plan of Care Reviewed With: patient, daughter  Outcome Summary: patient is able to ambulate 40 ft with walker and min assist. patient showed improved strength needing less assist for ambulation, transfers and bed mobility     Time Calculation:   PT Charges     Row Name 10/15/19 1130             Time Calculation    Start Time  1130  -NANDO      PT Received On  10/15/19  -      PT Goal Re-Cert Due Date  10/24/19  -         Time Calculation- PT    Total Timed Code Minutes- PT  24 minute(s)  -NANDO         Timed Charges    03340 - PT Therapeutic Activity Minutes  24  -NANDO        User Key  (r) = Recorded By, (t) = Taken By, (c) = Cosigned By    Initials Name Provider Type    Bozena Milan PT Physical Therapist        Therapy Charges for Today     Code Description Service Date Service Provider Modifiers Qty    43248969660 HC PT THERAPEUTIC ACT EA 15 MIN 10/15/2019 Bozena Batista, PT GP 2    09484549862 HC PT THER SUPP EA 15 MIN 10/15/2019 Bozena Batista  KAILA, PT GP 2          PT G-Codes  Outcome Measure Options: AM-PAC 6 Clicks Daily Activity (OT)  AM-PAC 6 Clicks Score (PT): 14  AM-PAC 6 Clicks Score (OT): 14  Modified [place] Scale: 4 - Moderately severe disability.  Unable to walk without assistance, and unable to attend to own bodily needs without assistance.    Bozena Batista PT  10/15/2019         Electronically signed by Bozena Batista PT at 10/15/19 1317     Bozena Batista PT at 10/15/19 1315  Version 1 of 1         Problem: Patient Care Overview  Goal: Plan of Care Review  Outcome: Ongoing (interventions implemented as appropriate)   10/15/19 1130   Coping/Psychosocial   Plan of Care Reviewed With patient;daughter   OTHER   Outcome Summary patient is able to ambulate 40 ft with walker and min assist. patient showed improved strength needing less assist for ambulation, transfers and bed mobility           Electronically signed by Bozena Batista PT at 10/15/19 1315          Occupational Therapy Notes (last 24 hours) (Notes from 10/14/19 1319 through 10/15/19 1319)      Karen Lazo, OT at 10/15/19 0936          Acute Care - Occupational Therapy Initial Evaluation  [facility]     Patient Name: Alok Tobias  : 1945  MRN: [id number]  Today's Date: 10/15/2019  Onset of Illness/Injury or Date of Surgery: 10/11/19  Date of Referral to OT: 10/14/19       Admit Date: 10/11/2019       ICD-10-CM ICD-9-CM   1. Impaired functional mobility, balance, gait, and endurance Z74.09 V49.89   2. Subdural hematoma (CMS/HCC) S06.5X9A 432.1   3. Altered mental status, unspecified altered mental status type R41.82 780.97   4. Impaired mobility and ADLs Z74.09 799.89     Patient Active Problem List   Diagnosis   • Obesity, Class III, BMI 40-49.9 (morbid obesity) (CMS/[facility])   • Hypertension   • COPD/asthma   • Chest pain   • Shortness of breath   • COPD (chronic obstructive pulmonary disease) (CMS/[facility])   • ANDREA    • Renal insufficiency   •  Alcoholism (CMS/HCC)   • Atrial fibrillation with RVR to 6/19/2019 s/p successful ECV x2.  On home Coumadin post mitral valve repair (CMS/HCC)   • HCAP (healthcare-associated pneumonia)   • Bilateral pleural effusion   • Acute diastolic heart failure (CMS/HCC)   • Multiple lung nodules on CT   • PAF (paroxysmal atrial fibrillation) (CMS/HCC)   • S/P mitral valve repair/annuloplasty for severe MR 7/25/2019 in [place]   • Acute on chronic subdural hematoma with impending herniation s/p left frontotemporal craniotomy 10/11/2019      Past Medical History:   Diagnosis Date   • Asthma    • CHF (congestive heart failure) (CMS/HCC)    • COPD (chronic obstructive pulmonary disease) (CMS/HCC)    • Elevated PSA    • Hypertension      Past Surgical History:   Procedure Laterality Date   • APPENDECTOMY     • CARDIAC CATHETERIZATION N/A 10/3/2018    Procedure: Left Heart Cath;  Surgeon: Twan Harrison MD;  Location:  [facility] CATH INVASIVE LOCATION;  Service: Cardiology   • CRANIOTOMY FOR SUBDURAL HEMATOMA Left 10/11/2019    Procedure: CRANIOTOMY FOR SUBDURAL HEMATOMA;  Surgeon: Reagan Patten MD;  Location:  [facility];  Service: Neurosurgery   • MITRAL VALVE REPLACEMENT     • TONSILLECTOMY            OT ASSESSMENT FLOWSHEET (last 12 hours)      Occupational Therapy Evaluation     Row Name 10/15/19 0833                   OT Evaluation Time/Intention    Subjective Information  complains of;pain  -SD        Document Type  evaluation  -SD        Mode of Treatment  occupational therapy  -SD        Total Evaluation Minutes, Occupational Therapy  45  -SD        Patient Effort  good  -SD        Symptoms Noted During/After Treatment  fatigue  -SD           General Information    Patient Profile Reviewed?  yes  -SD        Onset of Illness/Injury or Date of Surgery  10/11/19  -SD        Patient Observations  alert;cooperative;agree to therapy  -SD        Patient/Family Observations  Pt. sitting up on EOB feeding himself  breakfast. Pt.'s dtr. at bedside.  -SD        General Observations of Patient  Pt. with IV, RA, tele, O2 sensor, & BP every 30 min.  -SD        Prior Level of Function  independent:;all household mobility;community mobility;ADL's;driving;home management;shopping;cleaning;cooking  -SD        Equipment Currently Used at Home  none owns a walker & w/c from prior heart sx  -SD        Existing Precautions/Restrictions  fall  -SD        Risks Reviewed  patient and family:;LOB;nausea/vomiting;dizziness;increased discomfort;change in vital signs;lines disloged;increased drainage  -SD        Benefits Reviewed  patient and family:;improve function;increase independence;increase strength;increase balance;decrease pain;increase knowledge  -SD        Barriers to Rehab  cognitive status  -SD           Relationship/Environment    Primary Source of Support/Comfort  child(yani)  -SD        Lives With  alone  -SD           Resource/Environmental Concerns    Current Living Arrangements  home/apartment/condo  -SD        Resource/Environmental Concerns  none  -SD           Stairs Within Home, Primary    Number of Stairs, Within Home, Primary  none  -SD           Cognitive Assessment/Intervention- PT/OT    Orientation Status (Cognition)  oriented to;person;place;situation  -SD        Follows Commands (Cognition)  follows one step commands;75-90% accuracy;verbal cues/prompting required;repetition of directions required  -SD        Safety Deficit (Cognitive)  mild deficit;awareness of need for assistance;insight into deficits/self awareness;safety precautions awareness;safety precautions follow-through/compliance  -SD           Safety Issues, Functional Mobility    Safety Issues Affecting Function (Mobility)  awareness of need for assistance;impulsivity;insight into deficits/self awareness;positioning of assistive device;safety precaution awareness;safety precautions follow-through/compliance  -SD        Impairments Affecting Function  (Mobility)  balance;cognition;postural/trunk control;strength;endurance/activity tolerance  -SD           Bed Mobility Assessment/Treatment    Bed Mobility Assessment/Treatment  scooting/bridging  -SD        Scooting/Bridging [place] (Bed Mobility)  minimum assist (75% patient effort)  -SD           Functional Mobility    Functional Mobility- Comment  defer to PT  -SD           Transfer Assessment/Treatment    Transfer Assessment/Treatment  bed-chair transfer;sit-stand transfer;stand-sit transfer  -SD           Bed-Chair Transfer    Bed-Chair [place] (Transfers)  moderate assist (50% patient effort)  -SD        Assistive Device (Bed-Chair Transfers)  walker, front-wheeled  -SD           Sit-Stand Transfer    Sit-Stand [place] (Transfers)  minimum assist (75% patient effort)  -SD        Assistive Device (Sit-Stand Transfers)  walker, front-wheeled  -SD           Stand-Sit Transfer    Stand-Sit [place] (Transfers)  minimum assist (75% patient effort)  -SD        Assistive Device (Stand-Sit Transfers)  walker, front-wheeled  -SD           ADL Assessment/Intervention    BADL Assessment/Intervention  upper body dressing;feeding;grooming  -SD           Upper Body Dressing Assessment/Training    Upper Body Dressing [place] Level  don;pajama/robe;maximum assist (25% patient effort)  -SD        Upper Body Dressing Position  edge of bed sitting  -SD        Comment (Upper Body Dressing)  pt. with B rotator cuff injuries, limiited shldr ROM  -SD           Grooming Assessment/Training    [place] Level (Grooming)  wash face, hands;set up  -SD        Grooming Position  edge of bed sitting  -SD           Self-Feeding Assessment/Training    [place] Level (Feeding)  feeding skills;set up  -SD        Self-Feeding Assess/Train, Spillage Amount  minimal  -SD        Position (Self-Feeding)  edge of bed sitting  -SD           BADL Safety/Performance    Impairments, BADL Safety/Performance   balance;cognition;endurance/activity tolerance;trunk/postural control;strength  -SD        Cognitive Impairments, BADL Safety/Performance  awareness, need for assistance;impulsivity;insight into deficits/self awareness;safety precaution awareness;safety precaution follow-through  -SD        Skilled BADL Treatment/Intervention  BADL process/adaptation training;cognitive/safety deficit modifications;compensatory training;energy conservation  -SD           General ROM    GENERAL ROM COMMENTS  B UE AROM WFL, except B shldr flex/abd limited to 90 degrees  -SD           MMT (Manual Muscle Testing)    General MMT Comments  B UE Strength: 4-/5 proximally, 4+/5 distally  -SD           Motor Assessment/Interventions    Additional Documentation  Balance (Group)  -SD           Balance    Balance  static sitting balance;static standing balance  -SD           Static Sitting Balance    Level of [place] (Unsupported Sitting, Static Balance)  supervision  -SD        Sitting Position (Unsupported Sitting, Static Balance)  sitting on edge of bed  -SD        Time Able to Maintain Position (Unsupported Sitting, Static Balance)  more than 5 minutes  -SD        Comment (Unsupported Sitting, Static Balance)  pt. leaning to left & posteriorly  -SD           Static Standing Balance    Level of [place] (Supported Standing, Static Balance)  moderate assist, 50 to 74% patient effort  -SD        Time Able to Maintain Position (Supported Standing, Static Balance)  1 to 2 minutes  -SD        Assistive Device Utilized (Supported Standing, Static Balance)  walker, rolling  -SD        Comment (Supported Standing, Static Balance)  pt. leaning posteriorly  -SD           Positioning and Restraints    Pre-Treatment Position  in bed  -SD        Post Treatment Position  chair  -SD        In Chair  notified nsg;reclined;call light within reach;encouraged to call for assist;exit alarm on;with family/caregiver;legs elevated  -SD           Pain  Scale: FACES Pre/Post-Treatment    Pain: FACES Scale, Pretreatment  2-->hurts little bit  -SD        Pain: FACES Scale, Post-Treatment  0-->no hurt  -SD           Wound 10/11/19 2253 Left head Incision    Wound - Properties Group Date first assessed: 10/11/19  -CS Time first assessed: 2253  -CS Side: Left  -CS Location: head  -CS Primary Wound Type: Incision  -CS       Coping    Observed Emotional State  accepting;calm;cooperative  -SD        Verbalized Emotional State  acceptance  -SD           Plan of Care Review    Plan of Care Reviewed With  patient;daughter  -SD           Clinical Impression (OT)    Date of Referral to OT  10/14/19  -SD        OT Diagnosis  decreased ADL & functional mobility independence  -SD        Patient/Family Goals Statement (OT Eval)  return to PLOF  -SD        Criteria for Skilled Therapeutic Interventions Met (OT Eval)  yes;treatment indicated  -SD        Rehab Potential (OT Eval)  good, to achieve stated therapy goals  -SD        Therapy Frequency (OT Eval)  daily  -SD        Care Plan Review (OT)  evaluation/treatment results reviewed;care plan/treatment goals reviewed;risks/benefits reviewed;current/potential barriers reviewed;patient/other agree to care plan  -SD        Care Plan Review, Other Participant (OT Eval)  daughter  -SD        Anticipated Discharge Disposition (OT)  inpatient rehabilitation facility  -SD           Vital Signs    Pre Systolic BP Rehab  128  -SD        Pre Treatment Diastolic BP  78  -SD        Intra Systolic BP Rehab  126  -SD        Intra Treatment Diastolic BP  57  -SD        Post Systolic BP Rehab  134  -SD        Post Treatment Diastolic BP  83  -SD        Posttreatment Heart Rate (beats/min)  101  -SD        Pre SpO2 (%)  90  -SD        O2 Delivery Pre Treatment  room air  -SD        Intra SpO2 (%)  88  -SD        O2 Delivery Intra Treatment  room air  -SD        Post SpO2 (%)  97  -SD        O2 Delivery Post Treatment  room air  -SD        Pre  Patient Position  Sitting  -SD        Intra Patient Position  Standing  -SD        Post Patient Position  Sitting  -SD           OT Goals    Bed Mobility Goal Selection (OT)  bed mobility, OT goal 1  -SD        Transfer Goal Selection (OT)  transfer, OT goal 1  -SD        Dressing Goal Selection (OT)  dressing, OT goal 1  -SD        Toileting Goal Selection (OT)  toileting, OT goal 1  -SD           Bed Mobility Goal 1 (OT)    Activity/Assistive Device (Bed Mobility Goal 1, OT)  bed mobility activities, all  -SD        [place] Level/Cues Needed (Bed Mobility Goal 1, OT)  contact guard assist  -SD        Time Frame (Bed Mobility Goal 1, OT)  long term goal (LTG);by discharge  -SD        Progress/Outcomes (Bed Mobility Goal 1, OT)  goal ongoing  -SD           Transfer Goal 1 (OT)    Activity/Assistive Device (Transfer Goal 1, OT)  sit-to-stand/stand-to-sit;bed-to-chair/chair-to-bed;toilet;commode;commode, bedside without drop arms;walker, rolling  -SD        [place] Level/Cues Needed (Transfer Goal 1, OT)  minimum assist (75% or more patient effort)  -SD        Time Frame (Transfer Goal 1, OT)  long term goal (LTG);by discharge  -SD        Progress/Outcome (Transfer Goal 1, OT)  goal ongoing  -SD           Dressing Goal 1 (OT)    Activity/Assistive Device (Dressing Goal 1, OT)  dressing skills, all  -SD        [place]/Cues Needed (Dressing Goal 1, OT)  minimum assist (75% or more patient effort)  -SD        Time Frame (Dressing Goal 1, OT)  long term goal (LTG);by discharge  -SD        Progress/Outcome (Dressing Goal 1, OT)  goal ongoing  -SD           Toileting Goal 1 (OT)    Activity/Device (Toileting Goal 1, OT)  toileting skills, all;commode, bedside without drop arms;raised toilet seat  -SD        [place] Level/Cues Needed (Toileting Goal 1, OT)  standby assist  -SD        Time Frame (Toileting Goal 1, OT)  long term goal (LTG);by discharge  -SD        Progress/Outcome (Toileting Goal 1, OT)   goal ongoing  -SD           Living Environment    Home Accessibility  wheelchair accessible  -SD          User Key  (r) = Recorded By, (t) = Taken By, (c) = Cosigned By    Initials Name Effective Dates    SD Lazo Karendella Wyatt OT 06/08/18 -     Masoud Amaya RN 02/27/19 -          Occupational Therapy Education     Title: PT OT SLP Therapies (Done)     Topic: Occupational Therapy (Done)     Point: ADL training (Done)     Description: Instruct learner(s) on proper safety adaptation and remediation techniques during self care or transfers.   Instruct in proper use of assistive devices.    Learning Progress Summary           Patient Acceptance, E, VU,NR by SD at 10/15/2019  9:35 AM    Comment:  Pt. & dtr. educated on role of OT, and are agreeable with OT POC.   Family Acceptance, E, VU,NR by SD at 10/15/2019  9:35 AM    Comment:  Pt. & dtr. educated on role of OT, and are agreeable with OT POC.                   Point: Home exercise program (Done)     Description: Instruct learner(s) on appropriate technique for monitoring, assisting and/or progressing therapeutic exercises/activities.    Learning Progress Summary           Patient Acceptance, E, VU,NR by SD at 10/15/2019  9:35 AM    Comment:  Pt. & dtr. educated on role of OT, and are agreeable with OT POC.   Family Acceptance, E, VU,NR by SD at 10/15/2019  9:35 AM    Comment:  Pt. & dtr. educated on role of OT, and are agreeable with OT POC.                   Point: Precautions (Done)     Description: Instruct learner(s) on prescribed precautions during self-care and functional transfers.    Learning Progress Summary           Patient Acceptance, E, VU,NR by SD at 10/15/2019  9:35 AM    Comment:  Pt. & dtr. educated on role of OT, and are agreeable with OT POC.   Family Acceptance, E, VU,NR by SD at 10/15/2019  9:35 AM    Comment:  Pt. & dtr. educated on role of OT, and are agreeable with OT POC.                   Point: Body mechanics (Done)     Description:  Instruct learner(s) on proper positioning and spine alignment during self-care, functional mobility activities and/or exercises.    Learning Progress Summary           Patient Acceptance, E, VU,NR by SD at 10/15/2019  9:35 AM    Comment:  Pt. & dtr. educated on role of OT, and are agreeable with OT POC.   Family Acceptance, E, VU,NR by SD at 10/15/2019  9:35 AM    Comment:  Pt. & dtr. educated on role of OT, and are agreeable with OT POC.                               User Key     Initials Effective Dates Name Provider Type Discipline    SD 06/08/18 -  Karen Lazo, OT Occupational Therapist OT                  OT Recommendation and Plan  Outcome Summary/Treatment Plan (OT)  Anticipated Discharge Disposition (OT): inpatient rehabilitation facility  Therapy Frequency (OT Eval): daily  Plan of Care Review  Plan of Care Reviewed With: patient, daughter  Plan of Care Reviewed With: patient, daughter  Outcome Summary: OT IE completed. Pt. sitting EOB feeding himself breakfast. Pt. with L posterior lean in sitting. Pt. scooted further to edge of bed with CGA. Pt. with limited B UE shldr flex/abd d/t rotator cuff injuries. Pt. STS with min A. Pt. t/f'ed EOB to chair with mod A, and v/c's for safety. Pt. is appropriate for OT skilled services. Recommend IP Rehab upon d/c.    Outcome Measures     Row Name 10/15/19 0833             How much help from another is currently needed...    Putting on and taking off regular lower body clothing?  2  -SD      Bathing (including washing, rinsing, and drying)  2  -SD      Toileting (which includes using toilet bed pan or urinal)  2  -SD      Putting on and taking off regular upper body clothing  2  -SD      Taking care of personal grooming (such as brushing teeth)  3  -SD      Eating meals  3  -SD      AM-PAC 6 Clicks Score (OT)  14  -SD         Functional Assessment    Outcome Measure Options  AM-PAC 6 Clicks Daily Activity (OT)  -SD        User Key  (r) = Recorded By, (t) =  Taken By, (c) = Cosigned By    Initials Name Provider Type    Karen Canada OT Occupational Therapist          Time Calculation:   Time Calculation- OT     Row Name 10/15/19 0833             Time Calculation- OT    OT Start Time  0833  -SD      OT Stop Time  36  -SD      OT Time Calculation (min)  63 min  -SD      OT Received On  10/15/19  -SD      OT Goal Re-Cert Due Date  10/25/19  -SD        User Key  (r) = Recorded By, (t) = Taken By, (c) = Cosigned By    Initials Name Provider Type    Karen Canada OT Occupational Therapist        Therapy Charges for Today     Code Description Service Date Service Provider Modifiers Qty    38030292313 HC OT EVAL MOD COMPLEXITY 4 10/15/2019 Karen Lazo OT GO 1               Karen Lazo OT  10/15/2019    Electronically signed by Karen Lazo OT at 10/15/19 0936     Karen Lazo OT at 10/15/19 0934          Problem: Patient Care Overview  Goal: Plan of Care Review  Outcome: Ongoing (interventions implemented as appropriate)   10/15/19 0932   Coping/Psychosocial   Plan of Care Reviewed With patient;daughter   Plan of Care Review   Progress no change   OTHER   Outcome Summary OT IE completed. Pt. sitting EOB feeding himself breakfast. Pt. with L posterior lean in sitting. Pt. scooted further to edge of bed with CGA. Pt. with limited B UE shldr flex/abd d/t rotator cuff injuries. Pt. STS with min A. Pt. t/f'ed EOB to chair with mod A, and v/c's for safety. Pt. is appropriate for OT skilled services. Recommend IP Rehab upon d/c.           Electronically signed by Karen Lazo OT at 10/15/19 0934          Speech Language Pathology Notes (last 24 hours) (Notes from 10/14/19 1319 through 10/15/19 1319)      Kylee Chen, MS CCC-SLP at 10/15/19 1049          Acute Care - Speech Language Pathology Initial Evaluation  [facility]     Patient Name: Alok Tobias  : 1945  MRN:  [id number]  Today's Date: 10/15/2019  Onset of Illness/Injury or Date of Surgery: 10/11/19            Admit Date: 10/11/2019     Visit Dx:    ICD-10-CM ICD-9-CM   1. Impaired functional mobility, balance, gait, and endurance Z74.09 V49.89   2. Subdural hematoma (CMS/HCC) S06.5X9A 432.1   3. Altered mental status, unspecified altered mental status type R41.82 780.97   4. Impaired mobility and ADLs Z74.09 799.89     Patient Active Problem List   Diagnosis   • Obesity, Class III, BMI 40-49.9 (morbid obesity) (CMS/[facility])   • Hypertension   • COPD/asthma   • Chest pain   • Shortness of breath   • COPD (chronic obstructive pulmonary disease) (CMS/[facility])   • ANDREA    • Renal insufficiency   • Alcoholism (CMS/[facility])   • Atrial fibrillation with RVR to 6/19/2019 s/p successful ECV x2.  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 s/p left frontotemporal craniotomy 10/11/2019      Past Medical History:   Diagnosis Date   • Asthma    • CHF (congestive heart failure) (CMS/[facility])    • COPD (chronic obstructive pulmonary disease) (CMS/[facility])    • Elevated PSA    • Hypertension      Past Surgical History:   Procedure Laterality Date   • APPENDECTOMY     • CARDIAC CATHETERIZATION N/A 10/3/2018    Procedure: Left Heart Cath;  Surgeon: Twan Harrison MD;  Location:  [facility];  Service: Cardiology   • CRANIOTOMY FOR SUBDURAL HEMATOMA Left 10/11/2019    Procedure: CRANIOTOMY FOR SUBDURAL HEMATOMA;  Surgeon: Reagan Patten MD;  Location:  [facility] OR;  Service: Neurosurgery   • MITRAL VALVE REPLACEMENT     • TONSILLECTOMY          SLP EVALUATION (last 72 hours)      SLP SLC  Evaluation     Row Name 10/15/19 1000                   Communication Assessment/Intervention    Document Type  evaluation  -AV        Subjective Information  no complaints  -AV        Patient Observations  cooperative  -AV        Patient/Family Observations  daughter present, sitting in chair, sleepy   -AV        Patient Effort  adequate  -AV           Pain Assessment    Additional Documentation  Pain Scale: FACES Pre/Post-Treatment (Group)  -AV           Pain Scale: FACES Pre/Post-Treatment    Pain: FACES Scale, Pretreatment  2-->hurts little bit  -AV        Pain: FACES Scale, Post-Treatment  2-->hurts little bit  -AV           Comprehension Assessment/Intervention    Comprehension Assessment/Intervention  Auditory Comprehension  -AV           Auditory Comprehension Assessment/Intervention    Auditory Comprehension (Communication)  WFL  -AV           Expression Assessment/Intervention    Expression Assessment/Intervention  verbal expression  -AV           Verbal Expression Assessment/Intervention    Verbal Expression  mild impairment;moderate impairment  -AV        Automatic Speech (Communication)  WFL  -AV        Sentence Formulation  mild impairment  -AV        Conversational Discourse/Fluency  mild impairment;moderate impairment  -AV           Oral Motor Structure and Function    Oral Motor Structure and Function  WFL  -AV           Oral Musculature and Cranial Nerve Assessment    Oral Motor General Assessment  WFL  -AV           Motor Speech Assessment/Intervention    Motor Speech Function  WFL  -AV           SLP Clinical Impressions    SLP Diagnosis  expressive aphasia   -AV        Rehab Potential/Prognosis  good  -AV        SLC Criteria for Skilled Therapy Interventions Met  yes  -AV        Functional Impact  functional impact in social situations;functional impact in ADLs  -AV           Recommendations    Therapy Frequency (SLP SLC)  5 days per week  -AV        Predicted Duration Therapy Intervention (Days)   until discharge  -AV        Anticipated Dischage Disposition  skilled nursing facility;anticipate therapy at next level of care  -AV          User Key  (r) = Recorded By, (t) = Taken By, (c) = Cosigned By    Initials Name Effective Dates    AV Kylee Chen, MS CCC-SLP 05/23/19 -              EDUCATION  The patient has been educated in the following areas:     Cognitive Impairment Communication Impairment.    SLP Recommendation and Plan  SLP Diagnosis: expressive aphasia      SLC Criteria for Skilled Therapy Interventions Met: yes  Anticipated Dischage Disposition: skilled nursing facility, anticipate therapy at next level of care     Predicted Duration Therapy Intervention (Days): until discharge    Plan of Care Reviewed With: patient, daughter  Progress: improving      SLP GOALS     Row Name 10/15/19 1000 10/14/19 0830          Oral Nutrition/Hydration Goal 1 (SLP)    Oral Nutrition/Hydration Goal 1, SLP  LTG: Will tolerate recommended diet w/o overt s/sxs aspiration or difficulty w/ 100% acc.  -AV  LTG: Will tolerate recommended diet w/o overt s/sxs aspiration or difficulty w/ 100% acc.  -VO     Time Frame (Oral Nutrition/Hydration Goal 1, SLP)  by discharge  -AV  by discharge  -VO     Progress/Outcomes (Oral Nutrition/Hydration Goal 1, SLP)  good progress toward goal  -AV  goal ongoing  -VO        Ability to Construct Phrase and Sentence Level Response Goal 1 (SLP)    Improve Ability to Construct Phrase and Sentence Level Responses By Goal 1 (SLP)  answering question with phrase;constructing a sentence with a key word;80%;with minimal cues (75-90%)  -AV  --     Time Frame (Phrase and Sentence Level Response Goal 1, SLP)  short term goal (STG);by discharge  -AV  --        Connected Speech to Express Thoughts Goal 1 (SLP)    Improve Narrative Discourse to Express Thoughts By Goal 1 (SLP)  conversational task on a given topic;conversational task, self-directed;describing a picture;80%;with minimal cues  (75-90%)  -AV  --     Time Frame (Connected Speech Goal 1, SLP)  short term goal (STG);by discharge  -AV  --        Additional Goal 1 (SLP)    Additional Goal 1, SLP  LTG:  Patient will improve communication skills to participate in plan of care and ADLS to independence.   -AV  --     Time Frame (Additional Goal 1, SLP)  by discharge  -AV  --       User Key  (r) = Recorded By, (t) = Taken By, (c) = Cosigned By    Initials Name Provider Type    Kylee Hinojosa MS CCC-SLP Speech and Language Pathologist    Abigail Gurrola MA,CCC-SLP Speech and Language Pathologist                  Time Calculation:     Time Calculation- SLP     Row Name 10/15/19 1049             Time Calculation- SLP    SLP Start Time  0945  -AV      SLP Received On  10/15/19  -AV        User Key  (r) = Recorded By, (t) = Taken By, (c) = Cosigned By    Initials Name Provider Type    Kylee Hinojosa MS CCC-SLP Speech and Language Pathologist          Therapy Charges for Today     Code Description Service Date Service Provider Modifiers Qty    70282415409 HC ST EVAL SPEECH AND PROD W LANG  3 10/15/2019 Kylee Chen MS CCC-SLP GN 1    85335948503 HC ST TREATMENT SWALLOW 2 10/15/2019 Kylee Chen MS CCC-SLP GN 1                     Kylee Garner MS CCC-SLP  10/15/2019 and Acute Care - Speech Language Pathology   Swallow Progress Note [facility]     Patient Name: Alok Tobias  : 1945  MRN: [id number]  Today's Date: 10/15/2019  Onset of Illness/Injury or Date of Surgery: 10/11/19            Admit Date: 10/11/2019    Visit Dx:      ICD-10-CM ICD-9-CM   1. Impaired functional mobility, balance, gait, and endurance Z74.09 V49.89   2. Subdural hematoma (CMS/HCC) S06.5X9A 432.1   3. Altered mental status, unspecified altered mental status type R41.82 780.97   4. Impaired mobility and ADLs Z74.09 799.89     Patient Active Problem List   Diagnosis   • Obesity, Class III, BMI  40-49.9 (morbid obesity) (CMS/[facility])   • Hypertension   • COPD/asthma   • Chest pain   • Shortness of breath   • COPD (chronic obstructive pulmonary disease) (CMS/[facility])   • ANDREA    • Renal insufficiency   • Alcoholism (CMS/[facility])   • Atrial fibrillation with RVR to 6/19/2019 s/p successful ECV x2.  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  -AV  LTG: Will tolerate recommended diet w/o overt s/sxs aspiration or difficulty w/ 100% acc.  -VO     Time Frame (Oral Nutrition/Hydration Goal 1, SLP)  by discharge  -AV  by discharge  -VO     Progress/Outcomes (Oral Nutrition/Hydration Goal 1, SLP)  good progress toward goal  -AV  goal ongoing  -VO        Ability to Construct Phrase and Sentence Level Response Goal 1 (SLP)    Improve Ability to Construct Phrase and Sentence Level Responses By Goal 1 (SLP)  answering question with phrase;constructing a sentence with a key word;80%;with minimal cues (75-90%)  -AV  --     Time Frame (Phrase and Sentence Level Response Goal 1, SLP)  short term goal (STG);by discharge  -AV  --        Connected Speech to Express Thoughts Goal 1  (SLP)    Improve Narrative Discourse to Express Thoughts By Goal 1 (SLP)  conversational task on a given topic;conversational task, self-directed;describing a picture;80%;with minimal cues (75-90%)  -AV  --     Time Frame (Connected Speech Goal 1, SLP)  short term goal (STG);by discharge  -AV  --        Additional Goal 1 (SLP)    Additional Goal 1, SLP  LTG:  Patient will improve communication skills to participate in plan of care and ADLS to independence.   -AV  --     Time Frame (Additional Goal 1, SLP)  by discharge  -AV  --       User Key  (r) = Recorded By, (t) = Taken By, (c) = Cosigned By    Initials Name Provider Type    Kylee Hinojosa MS CCC-SLP Speech and Language Pathologist    Abigail Gurrola MA,CCC-SLP Speech and Language Pathologist          EDUCATION  The patient has been educated in the following areas:   Dysphagia (Swallowing Impairment) Oral Care/Hydration.    SLP Recommendation and Plan           Anticipated Dischage Disposition: skilled nursing facility, anticipate therapy at next level of care                    Time Calculation:   Time Calculation- SLP     Row Name 10/15/19 1049             Time Calculation- SLP    SLP Start Time  0945  -AV      SLP Received On  10/15/19  -AV        User Key  (r) = Recorded By, (t) = Taken By, (c) = Cosigned By    Initials Name Provider Type    Kylee Hinojosa MS CCC-SLP Speech and Language Pathologist          Therapy Charges for Today     Code Description Service Date Service Provider Modifiers Qty    [id number] HC ST EVAL SPEECH AND PROD W LANG  3 10/15/2019 Kylee Chen MS CCC-SLP GN 1    [id number] HC ST TREATMENT SWALLOW 2 10/15/2019 Kylee Chen MS CCC-SLP GN 1                 MS PEDRO LalaSLP  10/15/2019    Electronically signed by Kylee Chen MS CCC-SLP at 10/15/19 1050     Kylee Chen MS CCC-SLP at 10/15/19 1049          Problem: Patient  Care Overview  Goal: Plan of Care Review  Outcome: Ongoing (interventions implemented as appropriate)   10/15/19 1048   Coping/Psychosocial   Plan of Care Reviewed With patient;daughter   Plan of Care Review   Progress improving   SLP re-evaluation and treatment completed. Will address cog/comm deficits. Please see note for further details and recommendations.          Electronically signed by Kylee Chen MS CCC-SLP at 10/15/19 1049       Respiratory Therapy Notes (last 24 hours) (Notes from 10/14/19 1319 through 10/15/19 1319)     No notes of this type exist for this encounter.

## 2019-10-16 ENCOUNTER — APPOINTMENT (OUTPATIENT)
Dept: CT IMAGING | Facility: HOSPITAL | Age: 74
End: 2019-10-16

## 2019-10-16 PROCEDURE — 99024 POSTOP FOLLOW-UP VISIT: CPT | Performed by: PHYSICIAN ASSISTANT

## 2019-10-16 PROCEDURE — 70450 CT HEAD/BRAIN W/O DYE: CPT

## 2019-10-16 PROCEDURE — 94799 UNLISTED PULMONARY SVC/PX: CPT

## 2019-10-16 PROCEDURE — 99231 SBSQ HOSP IP/OBS SF/LOW 25: CPT | Performed by: INTERNAL MEDICINE

## 2019-10-16 PROCEDURE — 99024 POSTOP FOLLOW-UP VISIT: CPT | Performed by: NEUROLOGICAL SURGERY

## 2019-10-16 RX ORDER — ACETAMINOPHEN 500 MG
1000 TABLET ORAL EVERY 6 HOURS PRN
Status: DISCONTINUED | OUTPATIENT
Start: 2019-10-16 | End: 2019-10-17 | Stop reason: HOSPADM

## 2019-10-16 RX ORDER — ACETAMINOPHEN 500 MG
500 TABLET ORAL EVERY 6 HOURS PRN
Status: DISCONTINUED | OUTPATIENT
Start: 2019-10-16 | End: 2019-10-17 | Stop reason: HOSPADM

## 2019-10-16 RX ORDER — LEVETIRACETAM 500 MG/1
500 TABLET ORAL EVERY 12 HOURS SCHEDULED
Qty: 60 TABLET | Refills: 0 | Status: SHIPPED | OUTPATIENT
Start: 2019-10-16 | End: 2019-11-21

## 2019-10-16 RX ORDER — FUROSEMIDE 40 MG/1
40 TABLET ORAL DAILY
Qty: 30 TABLET | Refills: 6 | Status: SHIPPED | OUTPATIENT
Start: 2019-10-16 | End: 2019-11-21

## 2019-10-16 RX ADMIN — DOCUSATE SODIUM 100 MG: 100 CAPSULE, LIQUID FILLED ORAL at 14:47

## 2019-10-16 RX ADMIN — FUROSEMIDE 40 MG: 40 TABLET ORAL at 09:04

## 2019-10-16 RX ADMIN — IPRATROPIUM BROMIDE AND ALBUTEROL SULFATE 3 ML: 2.5; .5 SOLUTION RESPIRATORY (INHALATION) at 19:47

## 2019-10-16 RX ADMIN — FOLIC ACID 1 MG: 1 TABLET ORAL at 09:03

## 2019-10-16 RX ADMIN — FAMOTIDINE 20 MG: 20 TABLET ORAL at 21:21

## 2019-10-16 RX ADMIN — FAMOTIDINE 20 MG: 20 TABLET ORAL at 09:04

## 2019-10-16 RX ADMIN — BISACODYL 10 MG: 10 SUPPOSITORY RECTAL at 09:05

## 2019-10-16 RX ADMIN — ATORVASTATIN CALCIUM 10 MG: 10 TABLET, FILM COATED ORAL at 21:21

## 2019-10-16 RX ADMIN — LEVETIRACETAM 500 MG: 500 TABLET, FILM COATED ORAL at 21:21

## 2019-10-16 RX ADMIN — IPRATROPIUM BROMIDE AND ALBUTEROL SULFATE 3 ML: 2.5; .5 SOLUTION RESPIRATORY (INHALATION) at 12:36

## 2019-10-16 RX ADMIN — ACETAMINOPHEN 500 MG: 500 TABLET ORAL at 17:02

## 2019-10-16 RX ADMIN — Medication 100 MG: at 09:04

## 2019-10-16 RX ADMIN — DOCUSATE SODIUM 100 MG: 100 CAPSULE, LIQUID FILLED ORAL at 21:21

## 2019-10-16 RX ADMIN — IPRATROPIUM BROMIDE AND ALBUTEROL SULFATE 3 ML: 2.5; .5 SOLUTION RESPIRATORY (INHALATION) at 16:17

## 2019-10-16 RX ADMIN — LEVETIRACETAM 500 MG: 500 TABLET, FILM COATED ORAL at 09:03

## 2019-10-16 RX ADMIN — FUROSEMIDE 40 MG: 40 TABLET ORAL at 17:02

## 2019-10-16 RX ADMIN — BUDESONIDE AND FORMOTEROL FUMARATE DIHYDRATE 2 PUFF: 160; 4.5 AEROSOL RESPIRATORY (INHALATION) at 19:47

## 2019-10-16 RX ADMIN — POTASSIUM CHLORIDE 20 MEQ: 750 CAPSULE, EXTENDED RELEASE ORAL at 09:03

## 2019-10-16 RX ADMIN — METOPROLOL SUCCINATE 25 MG: 25 TABLET, EXTENDED RELEASE ORAL at 09:04

## 2019-10-16 RX ADMIN — MULTIVITAMIN TABLET 1 TABLET: TABLET at 09:02

## 2019-10-16 RX ADMIN — ALLOPURINOL 300 MG: 300 TABLET ORAL at 09:04

## 2019-10-16 NOTE — PROGRESS NOTES
Continued Stay Note  [facility]     Patient Name: Alok Tobias  MRN: [id number]  Today's Date: 10/16/2019    Admit Date: 10/11/2019    Discharge Plan     Row Name 10/16/19 0912       Plan    Plan  [facility]    Patient/Family in Agreement with Plan  yes    Plan Comments  Spoke with the admissions coordinator at [facility] and they can accept the patient and have a bed for him today, if medically ready. Spoke with patient and daughter at bedside. Daughter will transport. Denies any other needs at this time. CM will continue to follow.    Final Discharge Disposition Code  03 - skilled nursing facility (SNF)        Discharge Codes    No documentation.             Milan Paulson RN

## 2019-10-16 NOTE — PROGRESS NOTES
Reviewed.  Improved compared to prior study.    Spoke with Dr. Harrison of cardiology.  Will begin Eliquis on Saturday morning.    He will need to follow-up in my clinic in 10 days for an incision check.  I would like a repeat CT in 1 month.    Cleared to transfer to rehab/SNF

## 2019-10-16 NOTE — PROGRESS NOTES
"  [facility]  PROGRESS NOTE    Date of Admission: 10/11/2019  Length of Stay: 5  Primary Care Physician: Ivet Chau MD    Chief Complaint: f/u SDH in patient with AFib on Warfarin   Problem List:   1.  Coronary Artery Disease, minor and non-obstructive  A. History of abnormal stress test 07/2016, Dr. Shaver  B. Single episode of severe bilateral arm pain at rest October 2018  C. LHC 10/3/2018:  Normal EF, Minor non obstructive CAD. Mild dilation of aortic root  D. CT Angio Chest 10/4/2018: No important abnormalities  2.  Severe MR  A. PENNY June 2019 with MVP, severe MR (at time onset AF)  B. Mitral valve repair 7/25/2019 [facility] with [employer] 29mm annuloplasty band               i. Temporary epicardial pacing wires cut and retained   C. Echo 7/29/2019: LVSF is normal, mild AI, s/p mitral annular ring repair with no evidence of MR  3.  Essential Hypertension  4.  COPD  5.  Asthma  6.  Morbid obesity, BMI 43.85  7.  Atrial Fibrillation with RVR              A. New diagnosis, 6/19/19, CHADS2 Vasc = 3  B. ECV x2 June 2019, successful  C. On Flecainide and Eliquis   D. Post-op MVR, Flecainide discontinued, and Eliquis changed to Warfarin   8.   Obstructive Sleep Apnea  9.  Gout  10. 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 Patten 10/11/2019        Subjective      HPI: Events are noted. Talked with sister, patient, YUE Patten MD      Objective   Vitals: /97 (BP Location: Left arm, Patient Position: Lying)   Pulse 71   Temp 97.6 °F (36.4 °C) (Oral)   Resp 18   Ht 185.4 cm (72.99\")   Wt 114 kg (250 lb 10.6 oz)   SpO2 90%   BMI 33.08 kg/m²     Physical Exam:  GENERAL: Alert, cooperative, in no acute distress.   HEENT: Normocephalic, no jugular venous distention  HEART: No discrete PMI is noted. Irregular rhythm, normal rate, and no murmurs, gallops, or rubs.   LUNGS:  No wheezing, rales or " rhonchi.  ABDOMEN: Soft, bowel sounds present, non-tender   NEUROLOGIC: Per NS  EXTREMITIES: No clubbing, cyanosis, or edema noted.     Results:  Results from last 7 days   Lab Units 10/13/19  1416 10/11/19  2009 10/11/19  1941   WBC 10*3/mm3 12.69* 13.16*  --    HEMOGLOBIN g/dL 13.1 14.3  --    HEMOGLOBIN, POC g/dL  --   --  15.0   HEMATOCRIT % 43.4 47.5  --    HEMATOCRIT POC %  --   --  44   PLATELETS 10*3/mm3 260 304  --      Results from last 7 days   Lab Units 10/15/19  0819 10/14/19  2150 10/14/19  0944 10/13/19  1416   SODIUM mmol/L  --  143 151* 150*   POTASSIUM mmol/L 4.2 3.3* 3.9 3.1*   CHLORIDE mmol/L  --  101 110* 109*   CO2 mmol/L  --  31.0* 33.0* 28.0   BUN mg/dL  --  18 24* 29*   CREATININE mg/dL  --  0.83 0.88 1.07   GLUCOSE mg/dL  --  136* 161* 134*      Lab Results   Component Value Date    CHOL 172 10/03/2018    TRIG 99 10/03/2018    HDL 47 10/03/2018     10/03/2018    AST 13 10/13/2019    ALT 8 10/13/2019         Results from last 7 days   Lab Units 10/13/19  1416 10/11/19  2128 10/11/19  2128 10/11/19  2009   PROTIME Seconds 18.7* 15.2*  --  25.3*   INR  1.64* 1.26*  --  2.42*   APTT seconds 47.7*  --  36.6  --        Intake/Output Summary (Last 24 hours) at 10/16/2019 1133  Last data filed at 10/16/2019 0930  Gross per 24 hour   Intake 1127.7 ml   Output 1125 ml   Net 2.7 ml       I personally reviewed the patient's EKG/Telemetry data    Radiology Data:   CT Head 10/16/19:  IMPRESSION:  There has been no change since the previous examination of  the previous day.    Echo 10/13/19:  Interpretation Summary     · This is a technically limited study.  · Global LV systolic function appears to be normal.  · There is left atrial enlargement. There is moderate concentric LVH.  · There is aortic valve sclerosis with mild aortic insufficiency.  · There is evidence of prior MV repair with no more than mild mitral regurgitation.     A fibrillation persists.     Current Medications:    allopurinol  300 mg Oral Daily   atorvastatin 10 mg Oral Nightly   budesonide-formoterol 2 puff Inhalation BID - RT   docusate sodium 100 mg Oral BID   famotidine 20 mg Oral BID   furosemide 40 mg Oral BID   ipratropium-albuterol 3 mL Nebulization 4x Daily - RT   levETIRAcetam 500 mg Oral Q12H   metoprolol succinate XL 25 mg Oral Q24H   potassium chloride 20 mEq Oral Daily With Breakfast       sodium chloride 75 mL/hr Last Rate: 75 mL/hr (10/15/19 1918)       Assessment and Plan:   1. Large left SDH  - s/p craniotomy and evacuation 10/11/19  - CT head stable  - per Dr. Patten OK to resume anticoagulation with Eliquis with first dose Saturday 10/19/19    2. Afib  - rate controlled  - resume anticoagulation with Eliquis 5mg BID on Saturday 10/19/19    3. MV repair   - echo essentially unremarkable this admission     Electronically signed by Argentina Jacobs PA-C, 10/16/19, 11:41 AM.

## 2019-10-16 NOTE — DISCHARGE SUMMARY
DISCHARGE SUMMARY    Date of Admission: 10/11/2019    Date of Discharge:  10/16/2019    Discharge Diagnosis: Acute subdural hemorrhage    Procedures Performed:  Procedure(s):  CRANIOTOMY FOR SUBDURAL HEMATOMA      Presenting Problem/History of Present Illness  Subdural hematoma (CMS/HCC) [S06.5X9A]     Hospital Course  Patient is a 74 y.o. male with PMH significant for COPD, hypertension, CHF, mitral valve repair July 2019 on Coumadin therapy.  He presented to [facility] ED on 10/11/2019 via flight EMS after he was found down by his daughter.  Upon arrival to the ED patient was unarousable with GCS 7 and NIH 30.  CT performed in the ED demonstrated a large acute left subdural hemorrhage with left to right midline shift with mass-effect resulting in effacement of the left lateral ventricle.  Patient was taken to the OR by Dr. Patten for emergent craniotomy for evacuation of the subdural hematoma.    Postoperative day #1: Extubation was weaned and subdural drain continued.  Postoperative day #2 he remained off of sedation with no acute neurologic changes.  He was successfully extubated.  Postoperative day #3 subdural drain was discontinued without complication. He was transiently hypernatremic. POD #4 patient exhibited worsening aphasia with NIH of 9.  Stat CT head ordered and revealed small amount of accumulation of subacute blood products in the operative bed with improved mass-effect on the left hemisphere.  At this point he was cleared for rehab placement. POD #5 repeat CT head was obtained per Dr. Patten for worsening expressive aphasia. The study demonstrated interval improvement. Rehab/SNF placement finalized on POD #5 and he was stable for discharge.    Discharge Medications     Discharge Medications      New Medications      Instructions Start Date   apixaban 5 MG tablet tablet  Commonly known as:  ELIQUIS   5 mg, Oral, 2 Times Daily, START on Saturday 10/19/19   Start Date:  10/19/2019     levETIRAcetam 500 MG  tablet  Commonly known as:  KEPPRA   500 mg, Oral, Every 12 Hours Scheduled         Continue These Medications      Instructions Start Date   albuterol sulfate  (90 Base) MCG/ACT inhaler  Commonly known as:  PROVENTIL HFA;VENTOLIN HFA;PROAIR HFA   2 puffs, Inhalation, Every 4 Hours PRN      allopurinol 300 MG tablet  Commonly known as:  ZYLOPRIM   300 mg, Oral, Daily      atorvastatin 10 MG tablet  Commonly known as:  LIPITOR   10 mg, Oral, Nightly      budesonide-formoterol 160-4.5 MCG/ACT inhaler  Commonly known as:  SYMBICORT   2 puffs, Inhalation, 2 Times Daily - RT      docusate sodium 100 MG capsule  Commonly known as:  COLACE   100 mg, Oral, 2 Times Daily      famotidine 20 MG tablet  Commonly known as:  PEPCID   20 mg, Oral, 2 Times Daily      furosemide 40 MG tablet  Commonly known as:  LASIX   40 mg, Oral, Daily      iron polysacch complex-B12--0.025-1 MG capsule capsule  Commonly known as:  FERREX 150 FORTE   1 capsule, Oral, Daily      metoprolol succinate XL 25 MG 24 hr tablet  Commonly known as:  TOPROL-XL   25 mg, Oral, Every 24 Hours Scheduled      potassium chloride 20 MEQ CR tablet  Commonly known as:  K-DUR,KLOR-CON   20 mEq, Oral, Daily      theophylline 300 MG 12 hr tablet  Commonly known as:  THEODUR   300 mg, Oral, 2 Times Daily         Stop These Medications    aspirin 81 MG EC tablet     warfarin 3 MG tablet  Commonly known as:  COUMADIN          Plan:  Mr. Tobias is stable for transfer to rehabilitation. Day of discharge vitals remained stable and craniotomy incision site was clean, dry and intact. Motor and sensory function were found to be intact throughout. He was ambulatory, voiding independently, and tolerating oral intake without issue.     Patient will follow up with Josseline Blount PA-C in 10 days for staple removal and wound check. Eliquis may be resumed on Saturday, 10/19/19. CT head to be repeated in 1 month.    Jessica Bey PA-C  10/16/19  4:17 PM

## 2019-10-16 NOTE — PROGRESS NOTES
Continued Stay Note  [facility]     Patient Name: Alok Tobias  MRN: [id number]  Today's Date: 10/16/2019    Admit Date: 10/11/2019    Discharge Plan     Row Name 10/16/19 1621       Plan    Plan  Transfer to rehab at the [facility]    Patient/Family in Agreement with Plan  yes    Plan Comments  Patient medically ready for transfer per charge nurse.  Spoke with Bailee at the [facility].  They cannot accept patient this evening at facility, but would like him as early as possible tomorrow moring 10/17.  Please follow up with Bailee at [phone] for transfer phone and fax numbers, and plans for transfer in am.      Final Discharge Disposition Code  03 - skilled nursing facility (SNF)        Discharge Codes    No documentation.       Expected Discharge Date and Time     Expected Discharge Date Expected Discharge Time    Oct 16, 2019             Merry Miller RN

## 2019-10-17 VITALS
TEMPERATURE: 97.9 F | HEART RATE: 55 BPM | HEIGHT: 73 IN | WEIGHT: 250.66 LBS | BODY MASS INDEX: 33.22 KG/M2 | SYSTOLIC BLOOD PRESSURE: 112 MMHG | DIASTOLIC BLOOD PRESSURE: 70 MMHG | OXYGEN SATURATION: 96 % | RESPIRATION RATE: 18 BRPM

## 2019-10-17 PROCEDURE — 92507 TX SP LANG VOICE COMM INDIV: CPT

## 2019-10-17 PROCEDURE — 92526 ORAL FUNCTION THERAPY: CPT

## 2019-10-17 PROCEDURE — 94799 UNLISTED PULMONARY SVC/PX: CPT

## 2019-10-17 RX ORDER — IPRATROPIUM BROMIDE AND ALBUTEROL SULFATE 2.5; .5 MG/3ML; MG/3ML
3 SOLUTION RESPIRATORY (INHALATION) EVERY 4 HOURS PRN
Status: DISCONTINUED | OUTPATIENT
Start: 2019-10-17 | End: 2019-10-17 | Stop reason: HOSPADM

## 2019-10-17 RX ADMIN — POTASSIUM CHLORIDE 20 MEQ: 750 CAPSULE, EXTENDED RELEASE ORAL at 09:03

## 2019-10-17 RX ADMIN — ACETAMINOPHEN 500 MG: 500 TABLET ORAL at 03:07

## 2019-10-17 RX ADMIN — LEVETIRACETAM 500 MG: 500 TABLET, FILM COATED ORAL at 08:57

## 2019-10-17 RX ADMIN — ALLOPURINOL 300 MG: 300 TABLET ORAL at 08:57

## 2019-10-17 RX ADMIN — FAMOTIDINE 20 MG: 20 TABLET ORAL at 08:57

## 2019-10-17 RX ADMIN — DOCUSATE SODIUM 100 MG: 100 CAPSULE, LIQUID FILLED ORAL at 08:57

## 2019-10-17 RX ADMIN — BUDESONIDE AND FORMOTEROL FUMARATE DIHYDRATE 2 PUFF: 160; 4.5 AEROSOL RESPIRATORY (INHALATION) at 08:30

## 2019-10-17 RX ADMIN — HYDROCODONE BITARTRATE AND ACETAMINOPHEN 1 TABLET: 5; 325 TABLET ORAL at 03:07

## 2019-10-17 NOTE — PLAN OF CARE
Problem: Patient Care Overview  Goal: Plan of Care Review  Outcome: Ongoing (interventions implemented as appropriate)   10/17/19 0247   Coping/Psychosocial   Plan of Care Reviewed With patient;daughter   Plan of Care Review   Progress improving   OTHER   Outcome Summary VSS, A&O. Pt more alert and oriented this shift. Pt being d/c in the morning . Pt has been resting comfortably and sleeping . Daughter at bedside.      Goal: Individualization and Mutuality  Outcome: Ongoing (interventions implemented as appropriate)    Goal: Discharge Needs Assessment  Outcome: Ongoing (interventions implemented as appropriate)      Problem: Skin Injury Risk (Adult)  Goal: Identify Related Risk Factors and Signs and Symptoms  Outcome: Ongoing (interventions implemented as appropriate)    Goal: Skin Health and Integrity  Outcome: Ongoing (interventions implemented as appropriate)      Problem: Craniotomy/Craniectomy/Cranioplasty (Adult)  Goal: Signs and Symptoms of Listed Potential Problems Will be Absent, Minimized or Managed (Craniotomy/Craniectomy/Cranioplasty)  Outcome: Ongoing (interventions implemented as appropriate)    Goal: Anesthesia/Sedation Recovery  Outcome: Ongoing (interventions implemented as appropriate)      Problem: Fall Risk (Adult)  Goal: Identify Related Risk Factors and Signs and Symptoms  Outcome: Ongoing (interventions implemented as appropriate)    Goal: Absence of Fall  Outcome: Ongoing (interventions implemented as appropriate)

## 2019-10-17 NOTE — PLAN OF CARE
Problem: Patient Care Overview  Goal: Plan of Care Review  Outcome: Ongoing (interventions implemented as appropriate)   10/17/19 7298   Coping/Psychosocial   Plan of Care Reviewed With patient;daughter   Plan of Care Review   Progress improving   SLP treatment completed. Will sign-off as patient is discharged from this facility. Recommend continued ST services to adress expressive language at the next level of care. Please see note for further details and recommendations.

## 2019-10-17 NOTE — PROGRESS NOTES
Case Management Discharge Note    Final Note: Plan is to [facility]. Family Will transport. Nurse to call report to [phone]. CM will fax discharge summary to [phone]. Please send any hard scripts for narcoitics with the patient.    Destination - Selection Complete      Service Provider Request Status Selected Services Address Phone Number Fax Number    [facility] Selected Skilled Nursing [address], [place] [address] [phone]      Durable Medical Equipment      No service has been selected for the patient.      Dialysis/Infusion      No service has been selected for the patient.      Home Medical Care      No service has been selected for the patient.      Therapy      No service has been selected for the patient.      Community Resources      No service has been selected for the patient.             Final Discharge Disposition Code: 03 - skilled nursing facility (SNF)

## 2019-10-17 NOTE — DISCHARGE PLACEMENT REQUEST
"Alok Tobias (74 y.o. Male)   Ashish Paulson, RN Case Manager  [phone]    Date of Birth Social Security Number Address Home Phone MRN    1945  [address]  [facility] [facility] [address] [phone] [id number]    [employer] Status          [employer]        Admission Date Admission Type Admitting Provider Attending Provider Department, Room/Bed    10/11/19 Urgent Reagan Patten MD Newman, Charles Benjamin, MD [facility] 3F, S312/1    Discharge Date Discharge Disposition Discharge Destination         Skilled Nursing Facility (DC - External)              Attending Provider:  Reagan Patten MD    Allergies:  Dye Fdc Red [Red Dye], Penicillins    Isolation:  None   Infection:  None   Code Status:  CPR    Ht:  185.4 cm (72.99\")   Wt:  114 kg (250 lb 10.6 oz)    Admission Cmt:  None   Principal Problem:  Acute on chronic subdural hematoma with impending herniation s/p left frontotemporal craniotomy 10/11/2019  [S06.5X9A]                 Active Insurance as of 10/11/2019     Primary Coverage     Payor Plan Insurance Group Employer/Plan Group    MEDICARE MEDICARE A & B      Payor Plan Address Payor Plan Phone Number Payor Plan Fax Number Effective Dates    [address] [phone]  7/1/2008 - None Entered    [facility] [id number]       Subscriber Name Subscriber Birth Date Member ID       ALOK TOBIAS 1945 [id number]           Secondary Coverage     Payor Plan Insurance Group Employer/Plan Group    HUMANA HUMANA SUPPLEMENT [id number]     Payor Plan Address Payor Plan Phone Number Payor Plan Fax Number Effective Dates    [address] [address]   8/1/2010 - None Entered    [facility] [id number]       Subscriber Name Subscriber Birth Date Member ID       ALOK TOBIAS 1945 [id number]                 Emergency Contacts      (Rel.) Home Phone Work Phone Mobile Phone    Maria Dolores Francis (Daughter) -- [phone]               "   Discharge Summary      Jessica Bey PA-C at 10/16/19 1617          DISCHARGE SUMMARY    Date of Admission: 10/11/2019    Date of Discharge:  10/16/2019    Discharge Diagnosis: Acute subdural hemorrhage    Procedures Performed:  Procedure(s):  CRANIOTOMY FOR SUBDURAL HEMATOMA      Presenting Problem/History of Present Illness  Subdural hematoma (CMS/HCC) [S06.5X9A]     Hospital Course  Patient is a 74 y.o. male with PMH significant for COPD, hypertension, CHF, mitral valve repair July 2019 on Coumadin therapy.  He presented to [facility] ED on 10/11/2019 via flight EMS after he was found down by his daughter.  Upon arrival to the ED patient was unarousable with GCS 7 and NIH 30.  CT performed in the ED demonstrated a large acute left subdural hemorrhage with left to right midline shift with mass-effect resulting in effacement of the left lateral ventricle.  Patient was taken to the OR by Dr. Patten for emergent craniotomy for evacuation of the subdural hematoma.    Postoperative day #1: Extubation was weaned and subdural drain continued.  Postoperative day #2 he remained off of sedation with no acute neurologic changes.  He was successfully extubated.  Postoperative day #3 subdural drain was discontinued without complication. He was transiently hypernatremic. POD #4 patient exhibited worsening aphasia with NIH of 9.  Stat CT head ordered and revealed small amount of accumulation of subacute blood products in the operative bed with improved mass-effect on the left hemisphere.  At this point he was cleared for rehab placement. POD #5 repeat CT head was obtained per Dr. Patten for worsening expressive aphasia. The study demonstrated interval improvement. Rehab/SNF placement finalized on POD #5 and he was stable for discharge.    Discharge Medications     Discharge Medications      New Medications      Instructions Start Date   apixaban 5 MG tablet tablet  Commonly known as:  ELIQUIS   5 mg, Oral, 2 Times Daily,  START on Saturday 10/19/19   Start Date:  10/19/2019     levETIRAcetam 500 MG tablet  Commonly known as:  KEPPRA   500 mg, Oral, Every 12 Hours Scheduled         Continue These Medications      Instructions Start Date   albuterol sulfate  (90 Base) MCG/ACT inhaler  Commonly known as:  PROVENTIL HFA;VENTOLIN HFA;PROAIR HFA   2 puffs, Inhalation, Every 4 Hours PRN      allopurinol 300 MG tablet  Commonly known as:  ZYLOPRIM   300 mg, Oral, Daily      atorvastatin 10 MG tablet  Commonly known as:  LIPITOR   10 mg, Oral, Nightly      budesonide-formoterol 160-4.5 MCG/ACT inhaler  Commonly known as:  SYMBICORT   2 puffs, Inhalation, 2 Times Daily - RT      docusate sodium 100 MG capsule  Commonly known as:  COLACE   100 mg, Oral, 2 Times Daily      famotidine 20 MG tablet  Commonly known as:  PEPCID   20 mg, Oral, 2 Times Daily      furosemide 40 MG tablet  Commonly known as:  LASIX   40 mg, Oral, Daily      iron polysacch complex-B12--0.025-1 MG capsule capsule  Commonly known as:  FERREX 150 FORTE   1 capsule, Oral, Daily      metoprolol succinate XL 25 MG 24 hr tablet  Commonly known as:  TOPROL-XL   25 mg, Oral, Every 24 Hours Scheduled      potassium chloride 20 MEQ CR tablet  Commonly known as:  K-DUR,KLOR-CON   20 mEq, Oral, Daily      theophylline 300 MG 12 hr tablet  Commonly known as:  THEODUR   300 mg, Oral, 2 Times Daily         Stop These Medications    aspirin 81 MG EC tablet     warfarin 3 MG tablet  Commonly known as:  COUMADIN          Plan:  Mr. Tobias is stable for transfer to rehabilitation. Day of discharge vitals remained stable and craniotomy incision site was clean, dry and intact. Motor and sensory function were found to be intact throughout. He was ambulatory, voiding independently, and tolerating oral intake without issue.     Patient will follow up with Josseline Blount PA-C in 10 days for staple removal and wound check. Eliquis may be resumed on Saturday, 10/19/19. CT head to be  repeated in 1 month.    Jessica Bey PA-C  10/16/19  4:17 PM    Electronically signed by Jessica Bey PA-C at 10/16/19 9509

## 2019-10-17 NOTE — THERAPY DISCHARGE NOTE
Acute Care - Speech Language Pathology Treatment Note  [facility]     Patient Name: Alok Tobias  : 1945  MRN: [id number]  Today's Date: 10/17/2019         Admit Date: 10/11/2019    Visit Dx:      ICD-10-CM ICD-9-CM   1. Impaired functional mobility, balance, gait, and endurance Z74.09 V49.89   2. Subdural hematoma (CMS/HCC) S06.5X9A 432.1   3. Altered mental status, unspecified altered mental status type R41.82 780.97   4. Impaired mobility and ADLs Z74.09 799.89   5. Cognitive communication deficit R41.841 799.52     Patient Active Problem List   Diagnosis   • Obesity, Class III, BMI 40-49.9 (morbid obesity) (CMS/[facility])   • Hypertension   • COPD/asthma   • Chest pain   • Shortness of breath   • COPD (chronic obstructive pulmonary disease) (CMS/[facility])   • ANDREA    • Renal insufficiency   • Alcoholism (CMS/[facility])   • Atrial fibrillation with RVR to 2019 s/p successful ECV x2.  On home Coumadin post mitral valve repair (CMS/[facility])   • HCAP (healthcare-associated pneumonia)   • Bilateral pleural effusion   • Acute diastolic heart failure (CMS/[facility])   • Multiple lung nodules on CT   • PAF (paroxysmal atrial fibrillation) (CMS/[facility])   • S/P mitral valve repair/annuloplasty for severe MR 2019 in [place]   • Acute on chronic subdural hematoma with impending herniation s/p left frontotemporal craniotomy 10/11/2019         Therapy Treatment  Rehabilitation Treatment Summary     Row Name 10/17/19 0845             Treatment Time/Intention    Discipline  speech language pathologist  -      Document Type  therapy note (daily note)  -      Subjective Information  no complaints  -      Mode of Treatment  individual therapy;speech-language pathology  -      Patient/Family Observations  Daughter present  -      Care Plan Review  evaluation/treatment results reviewed;care plan/treatment goals reviewed;risks/benefits reviewed;current/potential barriers reviewed;patient/other agree to care plan  -      Care  Plan Review, Other Participant(s)  daughter  -CH      Therapy Frequency (Swallow)  PRN  -CH      Therapy Frequency (SLP SLC)  5 days per week  -CH      Patient Effort  good  -CH      Recorded by [CH] Nilsa Diaz MS CCC-SLP 10/17/19 1536      Row Name 10/17/19 0845             Pain Assessment    Additional Documentation  Pain Scale: FACES Pre/Post-Treatment (Group)  -CH      Recorded by [] Nilsa Diaz MS CCC-SLP 10/17/19 1536      Row Name 10/17/19 0845             Pain Scale: FACES Pre/Post-Treatment    Pain: FACES Scale, Pretreatment  0-->no hurt  -CH      Pain: FACES Scale, Post-Treatment  0-->no hurt  -CH      Recorded by [] Nilsa Diaz MS CCC-SLP 10/17/19 1536      Row Name                Wound 10/11/19 2253 Left head Incision    Wound - Properties Group Date first assessed: 10/11/19 [CS] Time first assessed: 2253 [CS] Side: Left [CS] Location: head [CS] Primary Wound Type: Incision [CS] Recorded by:  [CS] Masoud Bashir RN 10/11/19 2253    Row Name 10/17/19 0845             Outcome Summary/Treatment Plan (SLP)    Daily Summary of Progress (SLP)  progress toward functional goals as expected  -      Plan for Continued Treatment (SLP)  Patient to discharge to inpatient rehab at a snf. Recommend continued language tx for expressive language deficts Pt. tolerating soft whole solids and thin liquids without difficulty or s/s of aspiration.   -CH      Anticipated Dischage Disposition  inpatient rehabilitation facility;skilled nursing facility;anticipate therapy at next level of care  -CH      Reason for Discharge  discharge from this facility  -CH      Recorded by [] Nilsa Diaz MS CCC-SLP 10/17/19 1536        User Key  (r) = Recorded By, (t) = Taken By, (c) = Cosigned By    Initials Name Effective Dates Discipline    CH Nilsa Diaz MS CCC-SLP 02/14/19 -  SLP    Masoud Amaya, RN 02/27/19 -  Nurse          EDUCATION  The patient has been educated in the following areas:    Cognitive Impairment Communication Impairment.    SLP Recommendation and Plan  Daily Summary of Progress (SLP): progress toward functional goals as expected     Plan for Continued Treatment (SLP): Patient to discharge to inpatient rehab at a snf. Recommend continued language tx for expressive language deficts Pt. tolerating soft whole solids and thin liquids without difficulty or s/s of aspiration.   Anticipated Dischage Disposition: inpatient rehabilitation facility, skilled nursing facility, anticipate therapy at next level of care        Reason for Discharge: discharge from this facility    SLP GOALS     Row Name 10/17/19 0845 10/15/19 1000          Oral Nutrition/Hydration Goal 1 (SLP)    Oral Nutrition/Hydration Goal 1, SLP  LTG: Will tolerate recommended diet w/o overt s/sxs aspiration or difficulty w/ 100% acc.  -CH  LTG: Will tolerate recommended diet w/o overt s/sxs aspiration or difficulty w/ 100% acc.  -AV     Time Frame (Oral Nutrition/Hydration Goal 1, SLP)  by discharge  -CH  by discharge  -AV     Progress/Outcomes (Oral Nutrition/Hydration Goal 1, SLP)  goal met  -CH  good progress toward goal  -AV        Ability to Construct Phrase and Sentence Level Response Goal 1 (SLP)    Improve Ability to Construct Phrase and Sentence Level Responses By Goal 1 (SLP)  answering question with phrase;constructing a sentence with a key word;80%;with minimal cues (75-90%)  -CH  answering question with phrase;constructing a sentence with a key word;80%;with minimal cues (75-90%)  -AV     Time Frame (Phrase and Sentence Level Response Goal 1, SLP)  short term goal (STG);by discharge  -CH  short term goal (STG);by discharge  -AV     Barriers (Phrase and Sentence Level Response Goal 1, SLP)  Patient constructed a sentence with a key word with 80% acc and min verbal cueing  -CH  --     Progress (Construct Phrase and Sentence Level Response Goal 1, SLP)  with minimal cues (75-90%);80%  -CH  --     Progress/Outcomes  (Phrase and Sentence Level Response Goal 1, SLP)  good progress toward goal  -CH  --        Connected Speech to Express Thoughts Goal 1 (SLP)    Improve Narrative Discourse to Express Thoughts By Goal 1 (SLP)  conversational task on a given topic;conversational task, self-directed;describing a picture;80%;with minimal cues (75-90%)  -  conversational task on a given topic;conversational task, self-directed;describing a picture;80%;with minimal cues (75-90%)  -AV     Time Frame (Connected Speech Goal 1, SLP)  short term goal (STG);by discharge  -  short term goal (STG);by discharge  -AV     Barriers (Connected Speech Goal 1, SLP)  COmpleted descriptive task with min verbal cues and 90% acc this session  -CH  --     Progress (Connected Speech Goal 1, SLP)  90%;with minimal cues (75-90%)  -  --     Comment (Connected Speech Goal 1, SLP)  Patient continues to c/o difficulty with word finding and especially notices when getting tired.   -CH  --        Additional Goal 1 (SLP)    Additional Goal 1, SLP  LTG:  Patient will improve communication skills to participate in plan of care and ADLS to independence.   -CH  LTG:  Patient will improve communication skills to participate in plan of care and ADLS to independence.   -AV     Time Frame (Additional Goal 1, SLP)  by discharge  -  by discharge  -AV     Progress/Outcomes (Additional Goal 1, SLP)  good progress toward goal  -CH  --       User Key  (r) = Recorded By, (t) = Taken By, (c) = Cosigned By    Initials Name Provider Type     Kylee Chen, MS CCC-SLP Speech and Language Pathologist    Nilsa Ramos MS CCC-SLP Speech and Language Pathologist              Time Calculation:     Time Calculation- SLP     Row Name 10/17/19 1542             Time Calculation- SLP    SLP Start Time  0845  -      SLP Received On  10/17/19  -        User Key  (r) = Recorded By, (t) = Taken By, (c) = Cosigned By    Initials Name Provider Type    ZACH Diaz  MS Nilsa CCC-SLP Speech and Language Pathologist          Therapy Charges for Today     Code Description Service Date Service Provider Modifiers Qty    38107095970 HC ST TREATMENT SPEECH 4 10/17/2019 Diaz, Nilsa, MS CCC-SLP GN 1    [id number] HC ST TREATMENT SWALLOW 2 10/17/2019 Diaz, Nilsa, MS CCC-SLP GN 1                     Nilsa Emily MS CCC-SLP  10/17/2019   and Acute Care - Speech Language Pathology   Swallow Treatment Note [facility]     Patient Name: Alok Tobias  : 1945  MRN: [id number]  Today's Date: 10/17/2019  Onset of Illness/Injury or Date of Surgery: 10/11/19            Admit Date: 10/11/2019    Visit Dx:      ICD-10-CM ICD-9-CM   1. Impaired functional mobility, balance, gait, and endurance Z74.09 V49.89   2. Subdural hematoma (CMS/HCC) S06.5X9A 432.1   3. Altered mental status, unspecified altered mental status type R41.82 780.97   4. Impaired mobility and ADLs Z74.09 799.89   5. Cognitive communication deficit R41.841 799.52     Patient Active Problem List   Diagnosis   • Obesity, Class III, BMI 40-49.9 (morbid obesity) (CMS/[facility])   • Hypertension   • COPD/asthma   • Chest pain   • Shortness of breath   • COPD (chronic obstructive pulmonary disease) (CMS/[facility])   • ANDREA    • Renal insufficiency   • Alcoholism (CMS/[facility])   • Atrial fibrillation with RVR to 2019 s/p successful ECV x2.  On home Coumadin post mitral valve repair (CMS/[facility])   • HCAP (healthcare-associated pneumonia)   • Bilateral pleural effusion   • Acute diastolic heart failure (CMS/[facility])   • Multiple lung nodules on CT   • PAF (paroxysmal atrial fibrillation) (CMS/[facility])   • S/P mitral valve repair/annuloplasty for severe MR 2019 in [place]   • Acute on chronic subdural hematoma with impending herniation s/p left frontotemporal craniotomy 10/11/2019        Therapy Treatment  Rehabilitation Treatment Summary     Row Name 10/17/19 0845             Treatment Time/Intention    Discipline  speech  language pathologist  -      Document Type  therapy note (daily note)  -      Subjective Information  no complaints  -CH      Mode of Treatment  individual therapy;speech-language pathology  -CH      Patient/Family Observations  Daughter present  -      Care Plan Review  evaluation/treatment results reviewed;care plan/treatment goals reviewed;risks/benefits reviewed;current/potential barriers reviewed;patient/other agree to care plan  -      Care Plan Review, Other Participant(s)  daughter  -CH      Therapy Frequency (Swallow)  PRN  -CH      Therapy Frequency (SLP SLC)  5 days per week  -CH      Patient Effort  good  -CH      Recorded by [] Nilsa Diaz MS CCC-SLP 10/17/19 1536      Row Name 10/17/19 0845             Pain Assessment    Additional Documentation  Pain Scale: FACES Pre/Post-Treatment (Group)  -CH      Recorded by [] Nilsa Diaz MS CCC-SLP 10/17/19 1536      Row Name 10/17/19 0845             Pain Scale: FACES Pre/Post-Treatment    Pain: FACES Scale, Pretreatment  0-->no hurt  -CH      Pain: FACES Scale, Post-Treatment  0-->no hurt  -CH      Recorded by [] Nilsa Diaz MS CCC-SLP 10/17/19 1536      Row Name                Wound 10/11/19 2253 Left head Incision    Wound - Properties Group Date first assessed: 10/11/19 [CS] Time first assessed: 2253 [CS] Side: Left [CS] Location: head [CS] Primary Wound Type: Incision [CS] Recorded by:  [CS] Masoud Bashir RN 10/11/19 2253    Row Name 10/17/19 0845             Outcome Summary/Treatment Plan (SLP)    Daily Summary of Progress (SLP)  progress toward functional goals as expected  -      Plan for Continued Treatment (SLP)  Patient to discharge to inpatient rehab at a snf. Recommend continued language tx for expressive language deficts Pt. tolerating soft whole solids and thin liquids without difficulty or s/s of aspiration.   -CH      Anticipated Dischage Disposition  inpatient rehabilitation facility;skilled nursing  facility;anticipate therapy at next level of care  -CH      Reason for Discharge  discharge from this facility  -CH      Recorded by [CH] Nilsa Diaz MS CCC-SLP 10/17/19 1536        User Key  (r) = Recorded By, (t) = Taken By, (c) = Cosigned By    Initials Name Effective Dates Discipline    CH Nilsa Diaz MS CCC-SLP 02/14/19 -  SLP    Masoud Amaya RN 02/27/19 -  Nurse          Outcome Summary  Outcome Summary/Treatment Plan (SLP)  Daily Summary of Progress (SLP): progress toward functional goals as expected (10/17/19 0845 : Nilsa Diaz MS CCC-SLP)  Plan for Continued Treatment (SLP): Patient to discharge to inpatient rehab at a snf. Recommend continued language tx for expressive language deficts Pt. tolerating soft whole solids and thin liquids without difficulty or s/s of aspiration.  (10/17/19 0845 : Nilsa Diaz MS CCC-SLP)  Anticipated Dischage Disposition: inpatient rehabilitation facility, skilled nursing facility, anticipate therapy at next level of care (10/17/19 0845 : Nilsa Diaz MS CCC-SLP)  Reason for Discharge: discharge from this facility (10/17/19 0845 : Nilsa Diaz MS CCC-SLP)      SLP GOALS     Row Name 10/17/19 0845 10/15/19 1000          Oral Nutrition/Hydration Goal 1 (SLP)    Oral Nutrition/Hydration Goal 1, SLP  LTG: Will tolerate recommended diet w/o overt s/sxs aspiration or difficulty w/ 100% acc.  -CH  LTG: Will tolerate recommended diet w/o overt s/sxs aspiration or difficulty w/ 100% acc.  -AV     Time Frame (Oral Nutrition/Hydration Goal 1, SLP)  by discharge  -CH  by discharge  -AV     Progress/Outcomes (Oral Nutrition/Hydration Goal 1, SLP)  goal met  -CH  good progress toward goal  -AV        Ability to Construct Phrase and Sentence Level Response Goal 1 (SLP)    Improve Ability to Construct Phrase and Sentence Level Responses By Goal 1 (SLP)  answering question with phrase;constructing a sentence with a key word;80%;with minimal cues  (75-90%)  -CH  answering question with phrase;constructing a sentence with a key word;80%;with minimal cues (75-90%)  -AV     Time Frame (Phrase and Sentence Level Response Goal 1, SLP)  short term goal (STG);by discharge  -CH  short term goal (STG);by discharge  -AV     Barriers (Phrase and Sentence Level Response Goal 1, SLP)  Patient constructed a sentence with a key word with 80% acc and min verbal cueing  -CH  --     Progress (Construct Phrase and Sentence Level Response Goal 1, SLP)  with minimal cues (75-90%);80%  -CH  --     Progress/Outcomes (Phrase and Sentence Level Response Goal 1, SLP)  good progress toward goal  -CH  --        Connected Speech to Express Thoughts Goal 1 (SLP)    Improve Narrative Discourse to Express Thoughts By Goal 1 (SLP)  conversational task on a given topic;conversational task, self-directed;describing a picture;80%;with minimal cues (75-90%)  -CH  conversational task on a given topic;conversational task, self-directed;describing a picture;80%;with minimal cues (75-90%)  -AV     Time Frame (Connected Speech Goal 1, SLP)  short term goal (STG);by discharge  -CH  short term goal (STG);by discharge  -AV     Barriers (Connected Speech Goal 1, SLP)  COmpleted descriptive task with min verbal cues and 90% acc this session  -CH  --     Progress (Connected Speech Goal 1, SLP)  90%;with minimal cues (75-90%)  -CH  --     Comment (Connected Speech Goal 1, SLP)  Patient continues to c/o difficulty with word finding and especially notices when getting tired.   -CH  --        Additional Goal 1 (SLP)    Additional Goal 1, SLP  LTG:  Patient will improve communication skills to participate in plan of care and ADLS to independence.   -CH  LTG:  Patient will improve communication skills to participate in plan of care and ADLS to independence.   -AV     Time Frame (Additional Goal 1, SLP)  by discharge  -CH  by discharge  -AV     Progress/Outcomes (Additional Goal 1, SLP)  good progress toward goal   -  --       User Key  (r) = Recorded By, (t) = Taken By, (c) = Cosigned By    Initials Name Provider Type    Kylee Hinojosa MS CCC-SLP Speech and Language Pathologist    Nilsa Ramos MS CCC-SLP Speech and Language Pathologist          EDUCATION  The patient has been educated in the following areas:   Dysphagia (Swallowing Impairment) Modified Diet Instruction.    SLP Recommendation and Plan  Daily Summary of Progress (SLP): progress toward functional goals as expected     Plan for Continued Treatment (SLP): Patient to discharge to inpatient rehab at a snf. Recommend continued language tx for expressive language deficts Pt. tolerating soft whole solids and thin liquids without difficulty or s/s of aspiration.   Anticipated Dischage Disposition: inpatient rehabilitation facility, skilled nursing facility, anticipate therapy at next level of care        Reason for Discharge: discharge from this facility           Time Calculation:   Time Calculation- SLP     Row Name 10/17/19 1542             Time Calculation- SLP    SLP Start Time  0845  -      SLP Received On  10/17/19  -        User Key  (r) = Recorded By, (t) = Taken By, (c) = Cosigned By    Initials Name Provider Type    Nilsa Ramos MS CCC-SLP Speech and Language Pathologist          Therapy Charges for Today     Code Description Service Date Service Provider Modifiers Qty    02611922512 HC ST TREATMENT SPEECH 4 10/17/2019 Nilsa Diaz MS CCC-SLP GN 1    [id number] HC ST TREATMENT SWALLOW 2 10/17/2019 Nilsa Diaz MS CCC-SLP GN 1                 MS JENIFFER Manzanares  10/17/2019

## 2019-10-21 ENCOUNTER — TELEPHONE (OUTPATIENT)
Dept: PHARMACY | Facility: HOSPITAL | Age: 74
End: 2019-10-21

## 2019-10-21 NOTE — TELEPHONE ENCOUNTER
Dr. Harrison,    Alok Tobias has been managed by the [facility] while on warfarin. Recently, however, he has been switched to one of the direct oral anticoagulants, Eliquis. If you would like Alok Tobias to continue to be followed in our clinic, we would be happy to continue to work with him to provide care. Unless instructed otherwise, we will discharge Alok Tobias from our services.    Thank you for allowing us to participate in this patient's care.      Sincerely,  The [facility] Team  [phone]

## 2019-10-21 NOTE — TELEPHONE ENCOUNTER
Patient was discharged from [facility] on 10/16/19 to [facility]. Have verified patient is a resident of their facility.    Of note, upon discharge from [facility] patient's warfarin was discontinued and was started on Eliquis. Have verified this information with patient's RN, Heidi

## 2019-10-24 ENCOUNTER — LAB REQUISITION (OUTPATIENT)
Dept: LAB | Facility: HOSPITAL | Age: 74
End: 2019-10-24

## 2019-10-24 DIAGNOSIS — D64.9 ANEMIA, UNSPECIFIED: ICD-10-CM

## 2019-10-24 DIAGNOSIS — E78.5 HYPERLIPIDEMIA, UNSPECIFIED: ICD-10-CM

## 2019-10-24 LAB
ALBUMIN SERPL-MCNC: 3.51 G/DL (ref 3.5–5.2)
ALBUMIN/GLOB SERPL: 1.1 G/DL
ALP SERPL-CCNC: 91 U/L (ref 39–117)
ALT SERPL W P-5'-P-CCNC: 14 U/L (ref 1–41)
ANION GAP SERPL CALCULATED.3IONS-SCNC: 13.7 MMOL/L (ref 5–15)
AST SERPL-CCNC: 16 U/L (ref 1–40)
BASOPHILS # BLD AUTO: 0.02 10*3/MM3 (ref 0–0.2)
BASOPHILS NFR BLD AUTO: 0.3 % (ref 0–1.5)
BILIRUB SERPL-MCNC: 0.4 MG/DL (ref 0.2–1.2)
BUN BLD-MCNC: 12 MG/DL (ref 8–23)
BUN/CREAT SERPL: 12.9 (ref 7–25)
CALCIUM SPEC-SCNC: 9.4 MG/DL (ref 8.6–10.5)
CHLORIDE SERPL-SCNC: 100 MMOL/L (ref 98–107)
CHOLEST SERPL-MCNC: 118 MG/DL (ref 0–200)
CO2 SERPL-SCNC: 26.3 MMOL/L (ref 22–29)
CREAT BLD-MCNC: 0.93 MG/DL (ref 0.76–1.27)
DEPRECATED RDW RBC AUTO: 48.3 FL (ref 37–54)
EOSINOPHIL # BLD AUTO: 0.15 10*3/MM3 (ref 0–0.4)
EOSINOPHIL NFR BLD AUTO: 2 % (ref 0.3–6.2)
ERYTHROCYTE [DISTWIDTH] IN BLOOD BY AUTOMATED COUNT: 16.4 % (ref 12.3–15.4)
GFR SERPL CREATININE-BSD FRML MDRD: 79 ML/MIN/1.73
GLOBULIN UR ELPH-MCNC: 3.2 GM/DL
GLUCOSE BLD-MCNC: 152 MG/DL (ref 65–99)
HCT VFR BLD AUTO: 38.1 % (ref 37.5–51)
HDLC SERPL-MCNC: 35 MG/DL (ref 40–60)
HGB BLD-MCNC: 11.6 G/DL (ref 13–17.7)
IMM GRANULOCYTES # BLD AUTO: 0.1 10*3/MM3 (ref 0–0.05)
IMM GRANULOCYTES NFR BLD AUTO: 1.4 % (ref 0–0.5)
LDLC SERPL CALC-MCNC: 58 MG/DL (ref 0–100)
LDLC/HDLC SERPL: 1.67 {RATIO}
LYMPHOCYTES # BLD AUTO: 1.42 10*3/MM3 (ref 0.7–3.1)
LYMPHOCYTES NFR BLD AUTO: 19.2 % (ref 19.6–45.3)
MCH RBC QN AUTO: 25.1 PG (ref 26.6–33)
MCHC RBC AUTO-ENTMCNC: 30.4 G/DL (ref 31.5–35.7)
MCV RBC AUTO: 82.5 FL (ref 79–97)
MONOCYTES # BLD AUTO: 0.71 10*3/MM3 (ref 0.1–0.9)
MONOCYTES NFR BLD AUTO: 9.6 % (ref 5–12)
NEUTROPHILS # BLD AUTO: 4.98 10*3/MM3 (ref 1.7–7)
NEUTROPHILS NFR BLD AUTO: 67.5 % (ref 42.7–76)
NRBC BLD AUTO-RTO: 0 /100 WBC (ref 0–0.2)
PLATELET # BLD AUTO: 293 10*3/MM3 (ref 140–450)
PMV BLD AUTO: 10.6 FL (ref 6–12)
POTASSIUM BLD-SCNC: 3.3 MMOL/L (ref 3.5–5.2)
PROT SERPL-MCNC: 6.7 G/DL (ref 6–8.5)
RBC # BLD AUTO: 4.62 10*6/MM3 (ref 4.14–5.8)
SODIUM BLD-SCNC: 140 MMOL/L (ref 136–145)
TRIGL SERPL-MCNC: 123 MG/DL (ref 0–150)
VLDLC SERPL-MCNC: 24.6 MG/DL
WBC NRBC COR # BLD: 7.38 10*3/MM3 (ref 3.4–10.8)

## 2019-10-24 PROCEDURE — 80061 LIPID PANEL: CPT | Performed by: INTERNAL MEDICINE

## 2019-10-24 PROCEDURE — 80053 COMPREHEN METABOLIC PANEL: CPT | Performed by: INTERNAL MEDICINE

## 2019-10-24 PROCEDURE — 85025 COMPLETE CBC W/AUTO DIFF WBC: CPT | Performed by: INTERNAL MEDICINE

## 2019-11-21 ENCOUNTER — OFFICE VISIT (OUTPATIENT)
Dept: CARDIOLOGY | Facility: CLINIC | Age: 74
End: 2019-11-21

## 2019-11-21 VITALS
HEART RATE: 90 BPM | WEIGHT: 244 LBS | BODY MASS INDEX: 38.3 KG/M2 | SYSTOLIC BLOOD PRESSURE: 125 MMHG | HEIGHT: 67 IN | DIASTOLIC BLOOD PRESSURE: 81 MMHG

## 2019-11-21 DIAGNOSIS — I48.0 PAF (PAROXYSMAL ATRIAL FIBRILLATION) (HCC): Primary | ICD-10-CM

## 2019-11-21 DIAGNOSIS — I10 ESSENTIAL HYPERTENSION: ICD-10-CM

## 2019-11-21 DIAGNOSIS — Z98.890 S/P MVR (MITRAL VALVE REPAIR): ICD-10-CM

## 2019-11-21 DIAGNOSIS — I48.91 ATRIAL FIBRILLATION WITH RVR (HCC): ICD-10-CM

## 2019-11-21 PROCEDURE — 93000 ELECTROCARDIOGRAM COMPLETE: CPT | Performed by: INTERNAL MEDICINE

## 2019-11-21 PROCEDURE — 99214 OFFICE O/P EST MOD 30 MIN: CPT | Performed by: INTERNAL MEDICINE

## 2019-11-21 RX ORDER — FLECAINIDE ACETATE 50 MG/1
50 TABLET ORAL 2 TIMES DAILY
Qty: 60 TABLET | Refills: 11 | Status: SHIPPED | OUTPATIENT
Start: 2019-11-21 | End: 2020-05-07 | Stop reason: SDUPTHER

## 2019-11-21 RX ORDER — BISACODYL 10 MG
SUPPOSITORY, RECTAL RECTAL AS NEEDED
Refills: 3 | COMMUNITY
Start: 2019-11-06 | End: 2021-03-30

## 2019-11-21 RX ORDER — LACTULOSE 10 G/15ML
SOLUTION ORAL AS NEEDED
Refills: 3 | COMMUNITY
Start: 2019-11-06 | End: 2021-03-30

## 2019-11-21 RX ORDER — POLYETHYLENE GLYCOL 3350 17 G/17G
POWDER, FOR SOLUTION ORAL AS NEEDED
Refills: 3 | COMMUNITY
Start: 2019-11-06 | End: 2021-03-30

## 2019-11-21 NOTE — PROGRESS NOTES
"  OFFICE FOLLOW UP     Date of Encounter:2019     Name: Alok Tobias  : 1945  Address: [facility] [facility] [place] [address]    PCP: Ivet Chau MD  [address]  [place] [address]    Alok Tobias is a 74 y.o. male.      Chief Complaint: Follow up of CAD, VHD, Afib    Problem List:   1.  Coronary Artery Disease, minor and non-obstructive  A. History of abnormal stress test 2016, Dr. Shaver  B. Single episode of severe bilateral arm pain at rest 2018  C. LHC 10/3/2018:  Normal EF, Minor non obstructive CAD. Mild dilation of aortic root  D. CT Angio Chest 10/4/2018: No important abnormalities  2.  Severe MR  A. PENNY 2019 with MVP, severe MR (at time onset AF)  B. Mitral valve repair 2019 [facility] with Medtronic 29 mm annuloplasty band               i. Temporary epicardial pacing wires cut and retained   C. Echo 2019: LVSF is normal, mild AI, s/p mitral annular ring repair with no evidence of MR  3.  Essential Hypertension  4.  COPD  5.  Asthma  6.  Morbid obesity, BMI 43.85  7.  Atrial Fibrillation with RVR              A. New diagnosis, 19, CHADS2 Vasc = 3  B. ECV x2 2019, successful  C. On Flecainide and Eliquis   D. Post-op MVR, Flecainide discontinued, and Eliquis changed to Warfarin   8.   Obstructive Sleep Apnea  9.   Gout  10. 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 Patten 10/11/2019     Allergies:  Allergies   Allergen Reactions   • Dye Fdc Red [Red Dye] Nausea And Vomiting     All dyes, IV dye, hair dyes   • Penicillins Other (See Comments)     \"CAUSES CHLAMYDIA\"       Current Medications:  •  albuterol (PROVENTIL HFA;VENTOLIN HFA) 108 (90 Base) MCG/ACT inhaler, Inhale 2 puffs Every 4 (Four) Hours As Needed for Wheezing  •  allopurinol (ZYLOPRIM) 300 MG tablet, Take 300 mg by mouth Daily  •  apixaban (ELIQUIS) 5 MG tablet tablet, Take 1 tablet by mouth 2 (Two) " "Times a Day. START on Saturday 10/19/19  •  atorvastatin (LIPITOR) 10 MG tablet, Take 10 mg by mouth Every Night  •  bisacodyl (DULCOLAX) 10 MG suppository, As Needed  •  budesonide-formoterol (SYMBICORT) 160-4.5 MCG/ACT inhaler, Inhale 2 puffs 2 (two) times a day., Disp: , Rfl:   •  CONSTULOSE 10 GM/15ML solution, As Needed  •  docusate sodium (COLACE) 100 MG capsule, Take 100 mg by mouth 2 (Two) Times a Day.  •  famotidine (PEPCID) 20 MG tablet, Take 20 mg by mouth 2 (Two) Times a Day As Needed.  •  metoprolol succinate XL (TOPROL-XL) 25 MG 24 hr tablet, Take 1 tablet by mouth Daily.  •  polyethylene glycol (MIRALAX) powder, As Needed  •  theophylline (THEODUR) 300 MG 12 hr tablet, Take 300 mg by mouth 2 (Two) Times a Day    History of Present Illness:       Alok Tobias returns for follow up after hospitalization for subdural hematoma. He has been back on anticoagulation since 10/19/19 after Dr. Patten's clearance. He has not had unusual bleeding or recurrent neurologic symptoms. He is not having angina or heart failure. His flecainide was stopped in [place] after valve surgery. He has noticed an irregular, fast heart rate at times on his home pulse oximeter.    The following portions of the patient's history were reviewed and updated as appropriate: allergies, current medications and problem list.    ROS: Pertinent positives as listed in the HPI.  All other systems reviewed and negative.    Objective:    Vitals:    11/21/19 1308 11/21/19 1315   BP: 136/84 125/81   BP Location: Left arm Left arm   Patient Position: Sitting Standing   Pulse: 98 90   Weight: 111 kg (244 lb)    Height: 170.2 cm (67\")        Physical Exam:  GENERAL: Alert, cooperative, in no acute distress.   HEENT: Normocephalic, no adenopathy, no jugular venous distention  HEART: No discrete PMI is noted. IRREGULARLY, IRREGULAR rhythm, HIGH VENTRICULAR rate, and no murmurs, gallops, or rubs.   LUNGS: Clear to auscultation bilaterally. No " wheezing, rales or ronchi.  ABDOMEN: Soft, bowel sounds present, non-tender   NEUROLOGIC: No focal abnormalities involving strength or sensation are noted.   EXTREMITIES: No clubbing, cyanosis, or edema noted.    Diagnostic Data:    Lab Results   Component Value Date    CHOL 118 10/24/2019    TRIG 123 10/24/2019    HDL 35 (L) 10/24/2019    LDL 58 10/24/2019     Lab Results   Component Value Date    GLUCOSE 152 (H) 10/24/2019    BUN 12 10/24/2019    CREATININE 0.93 10/24/2019    EGFRIFNONA 79 10/24/2019    BCR 12.9 10/24/2019    K 3.3 (L) 10/24/2019    CO2 26.3 10/24/2019    CALCIUM 9.4 10/24/2019    ALBUMIN 3.51 10/24/2019    AST 16 10/24/2019    ALT 14 10/24/2019      Lab Results   Component Value Date    WBC 7.38 10/24/2019    HGB 11.6 (L) 10/24/2019    HCT 38.1 10/24/2019    MCV 82.5 10/24/2019     10/24/2019       ECG 12 Lead  Date/Time: 11/21/2019 2:22 PM  Performed by: Twan Harrison MD  Authorized by: Twan Harrison MD   Comparison: compared with previous ECG from 10/13/2019  Similar to previous ECG  Rhythm: atrial fibrillation  Ectopy: unifocal PVCs  Rate: tachycardic  BPM: 106  QRS axis: left    Clinical impression: abnormal EKG        Assessment and Plan:   1.  VHD: No angina or heart failure noted. He had an excellent result from surgery.  2.  Afib:  He has recurrent atrial fibrillation and has been back on anticoagulation just over one month. We will begin flecainide 50 mg by mouth twice daily on Sunday, 11/24/19 and plan external cardioversion with monitored anesthesia on Wednesday, 11/27/19. He is agreeable to this plan.     I will see Alok Tobias back in 3 months or sooner on an as needed basis.    Scribed for Twan Harrison MD by Maine Braun RN. 11/21/2019 1:18 PM.      EMR Dragon/Transcription Disclaimer:  Much of this encounter note is an electronic transcription/translation of spoken language to printed text.  The electronic translation of spoken language may permit  erroneous, or at times, nonsensical words or phrases to be inadvertently transcribed.  Although I have reviewed the note for such errors, some may still exist.

## 2019-11-25 ENCOUNTER — PREP FOR SURGERY (OUTPATIENT)
Dept: OTHER | Facility: HOSPITAL | Age: 74
End: 2019-11-25

## 2019-11-25 RX ORDER — SODIUM CHLORIDE 0.9 % (FLUSH) 0.9 %
10 SYRINGE (ML) INJECTION AS NEEDED
Status: CANCELLED | OUTPATIENT
Start: 2019-11-25

## 2019-11-25 RX ORDER — ACETAMINOPHEN 325 MG/1
650 TABLET ORAL EVERY 4 HOURS PRN
Status: CANCELLED | OUTPATIENT
Start: 2019-11-25

## 2019-11-25 RX ORDER — SODIUM CHLORIDE 0.9 % (FLUSH) 0.9 %
3 SYRINGE (ML) INJECTION EVERY 12 HOURS SCHEDULED
Status: CANCELLED | OUTPATIENT
Start: 2019-11-25

## 2019-11-27 ENCOUNTER — HOSPITAL ENCOUNTER (OUTPATIENT)
Dept: CARDIOLOGY | Facility: HOSPITAL | Age: 74
Discharge: HOME | End: 2019-11-27
Attending: INTERNAL MEDICINE | Admitting: INTERNAL MEDICINE

## 2019-11-27 VITALS
DIASTOLIC BLOOD PRESSURE: 78 MMHG | BODY MASS INDEX: 38.34 KG/M2 | HEART RATE: 100 BPM | HEIGHT: 67 IN | TEMPERATURE: 97.3 F | WEIGHT: 244.27 LBS | OXYGEN SATURATION: 95 % | SYSTOLIC BLOOD PRESSURE: 135 MMHG

## 2019-11-27 DIAGNOSIS — I48.0 PAF (PAROXYSMAL ATRIAL FIBRILLATION) (HCC): ICD-10-CM

## 2019-11-27 DIAGNOSIS — Z98.890 S/P MVR (MITRAL VALVE REPAIR): ICD-10-CM

## 2019-11-27 LAB
ALBUMIN SERPL-MCNC: 3.6 G/DL (ref 3.5–5.2)
ALBUMIN/GLOB SERPL: 1.1 G/DL
ALP SERPL-CCNC: 97 U/L (ref 39–117)
ALT SERPL W P-5'-P-CCNC: 7 U/L (ref 1–41)
ANION GAP SERPL CALCULATED.3IONS-SCNC: 12 MMOL/L (ref 5–15)
AST SERPL-CCNC: 13 U/L (ref 1–40)
BILIRUB SERPL-MCNC: 0.6 MG/DL (ref 0.2–1.2)
BUN BLD-MCNC: 12 MG/DL (ref 8–23)
BUN/CREAT SERPL: 14 (ref 7–25)
CALCIUM SPEC-SCNC: 9.3 MG/DL (ref 8.6–10.5)
CHLORIDE SERPL-SCNC: 106 MMOL/L (ref 98–107)
CO2 SERPL-SCNC: 25 MMOL/L (ref 22–29)
CREAT BLD-MCNC: 0.86 MG/DL (ref 0.76–1.27)
DEPRECATED RDW RBC AUTO: 53.2 FL (ref 37–54)
ERYTHROCYTE [DISTWIDTH] IN BLOOD BY AUTOMATED COUNT: 17.6 % (ref 12.3–15.4)
GFR SERPL CREATININE-BSD FRML MDRD: 86 ML/MIN/1.73
GLOBULIN UR ELPH-MCNC: 3.3 GM/DL
GLUCOSE BLD-MCNC: 111 MG/DL (ref 65–99)
HCT VFR BLD AUTO: 39.6 % (ref 37.5–51)
HGB BLD-MCNC: 11.6 G/DL (ref 13–17.7)
MCH RBC QN AUTO: 24.3 PG (ref 26.6–33)
MCHC RBC AUTO-ENTMCNC: 29.3 G/DL (ref 31.5–35.7)
MCV RBC AUTO: 82.8 FL (ref 79–97)
PLATELET # BLD AUTO: 283 10*3/MM3 (ref 140–450)
PMV BLD AUTO: 10 FL (ref 6–12)
POTASSIUM BLD-SCNC: 3.9 MMOL/L (ref 3.5–5.2)
PROT SERPL-MCNC: 6.9 G/DL (ref 6–8.5)
RBC # BLD AUTO: 4.78 10*6/MM3 (ref 4.14–5.8)
SODIUM BLD-SCNC: 143 MMOL/L (ref 136–145)
WBC NRBC COR # BLD: 7.4 10*3/MM3 (ref 3.4–10.8)

## 2019-11-27 PROCEDURE — 85027 COMPLETE CBC AUTOMATED: CPT | Performed by: PHYSICIAN ASSISTANT

## 2019-11-27 PROCEDURE — 93005 ELECTROCARDIOGRAM TRACING: CPT | Performed by: PHYSICIAN ASSISTANT

## 2019-11-27 PROCEDURE — 80053 COMPREHEN METABOLIC PANEL: CPT | Performed by: PHYSICIAN ASSISTANT

## 2019-11-27 PROCEDURE — 92960 CARDIOVERSION ELECTRIC EXT: CPT

## 2019-11-27 PROCEDURE — 92960 CARDIOVERSION ELECTRIC EXT: CPT | Performed by: INTERNAL MEDICINE

## 2019-11-27 PROCEDURE — 93005 ELECTROCARDIOGRAM TRACING: CPT | Performed by: INTERNAL MEDICINE

## 2019-11-27 PROCEDURE — 93005 ELECTROCARDIOGRAM TRACING: CPT

## 2019-11-27 RX ORDER — SODIUM CHLORIDE 0.9 % (FLUSH) 0.9 %
10 SYRINGE (ML) INJECTION AS NEEDED
Status: DISCONTINUED | OUTPATIENT
Start: 2019-11-27 | End: 2019-11-27 | Stop reason: HOSPADM

## 2019-11-27 RX ORDER — ACETAMINOPHEN 325 MG/1
650 TABLET ORAL EVERY 4 HOURS PRN
Status: DISCONTINUED | OUTPATIENT
Start: 2019-11-27 | End: 2019-11-27 | Stop reason: HOSPADM

## 2019-11-27 RX ORDER — SODIUM CHLORIDE 0.9 % (FLUSH) 0.9 %
3 SYRINGE (ML) INJECTION EVERY 12 HOURS SCHEDULED
Status: DISCONTINUED | OUTPATIENT
Start: 2019-11-27 | End: 2019-11-27 | Stop reason: HOSPADM

## 2019-12-12 ENCOUNTER — TELEPHONE (OUTPATIENT)
Dept: CARDIOLOGY | Facility: CLINIC | Age: 74
End: 2019-12-12

## 2019-12-12 NOTE — TELEPHONE ENCOUNTER
Patient called to report Eliquis is going to cost him over $300 for a 30 day supply.  Would like alternative.

## 2019-12-13 NOTE — TELEPHONE ENCOUNTER
Pt had bleed on warfarin. His cost of Eliquis is >$500. Deductible has not been met. We will try in January to see if cost is down, otherwise discuss further alternatives. Will sample for now. Pt will  in [facility] on 12/17/19.

## 2019-12-31 NOTE — PLAN OF CARE
Problem: Patient Care Overview  Goal: Plan of Care Review  Outcome: Ongoing (interventions implemented as appropriate)   10/16/19 0500   Coping/Psychosocial   Plan of Care Reviewed With patient;daughter   Plan of Care Review   Progress improving     Goal: Individualization and Mutuality  Outcome: Ongoing (interventions implemented as appropriate)    Goal: Discharge Needs Assessment  Outcome: Ongoing (interventions implemented as appropriate)      Problem: Skin Injury Risk (Adult)  Goal: Identify Related Risk Factors and Signs and Symptoms  Outcome: Ongoing (interventions implemented as appropriate)    Goal: Skin Health and Integrity  Outcome: Ongoing (interventions implemented as appropriate)      Problem: Craniotomy/Craniectomy/Cranioplasty (Adult)  Goal: Signs and Symptoms of Listed Potential Problems Will be Absent, Minimized or Managed (Craniotomy/Craniectomy/Cranioplasty)  Outcome: Ongoing (interventions implemented as appropriate)    Goal: Anesthesia/Sedation Recovery  Outcome: Ongoing (interventions implemented as appropriate)      Problem: Fall Risk (Adult)  Goal: Identify Related Risk Factors and Signs and Symptoms  Outcome: Ongoing (interventions implemented as appropriate)    Goal: Absence of Fall  Outcome: Ongoing (interventions implemented as appropriate)         silvia full  1st attempted to call to schedule an appt   Sent text to call our number back    Mailing 3 appt reminder, I scheduled annual on 01/21/20

## 2020-02-27 ENCOUNTER — OFFICE VISIT (OUTPATIENT)
Dept: CARDIOLOGY | Facility: CLINIC | Age: 75
End: 2020-02-27

## 2020-02-27 VITALS
HEIGHT: 67 IN | DIASTOLIC BLOOD PRESSURE: 83 MMHG | BODY MASS INDEX: 38.45 KG/M2 | WEIGHT: 245 LBS | SYSTOLIC BLOOD PRESSURE: 128 MMHG | HEART RATE: 90 BPM

## 2020-02-27 DIAGNOSIS — I10 ESSENTIAL HYPERTENSION: ICD-10-CM

## 2020-02-27 DIAGNOSIS — I48.0 PAF (PAROXYSMAL ATRIAL FIBRILLATION) (HCC): Primary | ICD-10-CM

## 2020-02-27 DIAGNOSIS — I25.10 ATHEROSCLEROSIS OF NATIVE CORONARY ARTERY OF NATIVE HEART WITHOUT ANGINA PECTORIS: ICD-10-CM

## 2020-02-27 DIAGNOSIS — I48.91 ATRIAL FIBRILLATION WITH RVR (HCC): ICD-10-CM

## 2020-02-27 PROCEDURE — 93000 ELECTROCARDIOGRAM COMPLETE: CPT | Performed by: INTERNAL MEDICINE

## 2020-02-27 PROCEDURE — 99214 OFFICE O/P EST MOD 30 MIN: CPT | Performed by: INTERNAL MEDICINE

## 2020-02-27 RX ORDER — PRAVASTATIN SODIUM 20 MG
20 TABLET ORAL NIGHTLY
Qty: 30 TABLET | Refills: 11 | Status: SHIPPED | OUTPATIENT
Start: 2020-02-27 | End: 2021-01-14
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
## 2020-05-07 RX ORDER — FLECAINIDE ACETATE 50 MG/1
50 TABLET ORAL 2 TIMES DAILY
Qty: 180 TABLET | Refills: 2 | Status: SHIPPED | OUTPATIENT
Start: 2020-05-07 | End: 2020-08-27

## 2020-08-27 ENCOUNTER — OFFICE VISIT (OUTPATIENT)
Dept: CARDIOLOGY | Facility: CLINIC | Age: 75
End: 2020-08-27

## 2020-08-27 ENCOUNTER — PREP FOR SURGERY (OUTPATIENT)
Dept: OTHER | Facility: HOSPITAL | Age: 75
End: 2020-08-27

## 2020-08-27 VITALS
DIASTOLIC BLOOD PRESSURE: 73 MMHG | OXYGEN SATURATION: 98 % | TEMPERATURE: 97.7 F | SYSTOLIC BLOOD PRESSURE: 141 MMHG | HEIGHT: 66 IN | BODY MASS INDEX: 41.14 KG/M2 | WEIGHT: 256 LBS | HEART RATE: 62 BPM

## 2020-08-27 DIAGNOSIS — Z98.890 S/P MITRAL VALVE REPAIR: Primary | ICD-10-CM

## 2020-08-27 DIAGNOSIS — I48.0 PAF (PAROXYSMAL ATRIAL FIBRILLATION) (HCC): ICD-10-CM

## 2020-08-27 DIAGNOSIS — I10 ESSENTIAL HYPERTENSION: ICD-10-CM

## 2020-08-27 PROCEDURE — 93000 ELECTROCARDIOGRAM COMPLETE: CPT | Performed by: INTERNAL MEDICINE

## 2020-08-27 PROCEDURE — 99214 OFFICE O/P EST MOD 30 MIN: CPT | Performed by: INTERNAL MEDICINE

## 2020-08-27 RX ORDER — GUAIFENESIN 600 MG/1
1200 TABLET, EXTENDED RELEASE ORAL 2 TIMES DAILY
COMMUNITY

## 2020-08-27 NOTE — PROGRESS NOTES
"  OFFICE FOLLOW UP     Date of Encounter:2020     Name: Alok Tobias  : 1945  Address: [facility] [employer] [address] [employer] [address] [facility] [place] [address]    PCP: Ivet Chau MD  [address] [place] [id number]    Alok Tobais is a 75 y.o. male.    Chief Complaint: Follow up of CAD, VHD, HTN, Afib    Problem List:   1.  Coronary Artery Disease, minor and non-obstructive  A. History of abnormal stress test 2016, Dr. Shaver  B. Single episode of severe bilateral arm pain at rest 2018  C. LHC 10/3/2018:  Normal EF, Minor non obstructive CAD. Mild dilation of aortic root  D. CT Angio Chest 10/4/2018: No important abnormalities  2.  Severe MR  A. PENNY 2019 with MVP, severe MR (at time onset AF)  B. Mitral valve repair 2019 [facility] with Medtronic 29 mm annuloplasty band               i. Temporary epicardial pacing wires cut and retained   C. Echo 2019: LVSF is normal, mild AI, s/p mitral annular ring repair with no evidence of MR  3.  Essential Hypertension  4.  COPD  5.  Asthma  6.  Morbid obesity, BMI 43.85  7.  Atrial Fibrillation with RVR              A. New diagnosis, 19, CHADS2 Vasc = 3  B. ECV x2 2019, successful  C. On Flecainide and Eliquis   D. Post-op MVR, Flecainide   8.   Obstructive Sleep Apnea  9.   Gout  10. 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 Patten 10/11/2019     Allergies:  Allergies   Allergen Reactions   • Dye Fdc Red [Red Dye] Nausea And Vomiting     All dyes, IV dye, hair dyes   • Penicillins Other (See Comments)     \"CAUSES CHLAMYDIA\"     Current Medications:  •  albuterol (PROVENTIL HFA;VENTOLIN HFA) 108 (90 Base) MCG/ACT inhaler, Inhale 2 puffs Every 4 (Four) Hours As Needed for Wheezing.  •  allopurinol (ZYLOPRIM) 300 MG tablet, Take 300 mg by mouth Daily.  •  apixaban (ELIQUIS) 5 MG tablet tablet, Take 1 tablet by mouth 2 (Two) Times a Day  •  bisacodyl (DULCOLAX) 10 MG " "suppository, As Needed  •  budesonide-formoterol (SYMBICORT) 160-4.5 MCG/ACT inhaler, Inhale 2 puffs As Needed.   •  CONSTULOSE 10 GM/15ML solution, As Needed  •  famotidine (PEPCID) 20 MG tablet, Take 20 mg by mouth 2 (Two) Times a Day As Needed.  •  flecainide (TAMBOCOR) 50 MG tablet, Take 1 tablet by mouth 2 (Two) Times a Day   •  guaiFENesin (MUCINEX) 600 MG 12 hr tablet, Take 1,200 mg by mouth 2 (Two) Times a Day.   •  metoprolol succinate XL (TOPROL-XL) 25 MG 24 hr tablet, Take 1 tablet by mouth Daily.  •  polyethylene glycol (MIRALAX) powder, As Needed.  •  pravastatin (PRAVACHOL) 20 MG tablet, Take 1 tablet by mouth Every Night.  •  theophylline (THEODUR) 300 MG 12 hr tablet, Take 300 mg by mouth 2 (Two) Times a Day    History of Present Illness:         Alok Tobias returns for scheduled follow up today.  He is doing very well from cardiac perspective and denies any dyspnea, palpitations, syncope or heart failure symptoms. He is frustrated with the cost of some of his asthma medications and inhalers.       The following portions of the patient's history were reviewed and updated as appropriate: allergies, current medications and problem list.    ROS: Pertinent positives as listed in the HPI.  All other systems reviewed and negative.    Objective:  Vitals:    08/27/20 1437 08/27/20 1440   BP: 149/85 141/73   BP Location: Left arm Left arm   Patient Position: Sitting Standing   Pulse: 59 62   Temp: 97.7 °F (36.5 °C)    SpO2: 98%    Weight: 116 kg (256 lb)    Height: 167.6 cm (66\")      Physical Exam:  GENERAL: Alert, cooperative, in no acute distress.   HEENT: Normocephalic, no adenopathy, no jugular venous distention  HEART: No discrete PMI is noted. Regular rhythm, normal rate, and no murmurs, gallops, or rubs.   LUNGS: Clear to auscultation bilaterally. No wheezing, rales or ronchi.  ABDOMEN: Soft, bowel sounds present, non-tender   NEUROLOGIC: No focal abnormalities involving strength or sensation are " noted.   EXTREMITIES: No clubbing, cyanosis, or edema noted.     Diagnostic Data:  No new labs available to review.       ECG 12 Lead  Date/Time: 8/27/2020 3:34 PM  Performed by: Argentina Jacobs PA-C  Authorized by: Argentina Jacobs PA-C   Comparison: compared with previous ECG from 2/27/2020  Similar to previous ECG  Rhythm: sinus rhythm  Rate: normal  BPM: 60  Conduction: 1st degree AV block and non-specific intraventricular conduction delay  QRS axis: left    Clinical impression: abnormal EKG  Comments: Since 2/27/20, MS has lengthened and HR has fallen         Assessment and Plan:   1.  VHD, s/p mitral valve repair: stable and asymptomatic, without dyspnea or heart failure symptoms.   2.  HTN:  Slightly elevated today, however historically controlled at home. We will not make any changes at this time, however have asked him to continue checking home blood pressures.   3.  PAF: His EKG today shows his MS interval has lengthened slightly (now pr approximately 400 msec c/w first degree AV block) since February, and the QRS may be slightly wider. We will discontinue his Flecainide at this time, however he needs to remain on Eliquis.   4.  HLD:  Continue Pravastatin.    I will see Alok Tobias back in 6 months or sooner on an as needed basis.      Scribed for Twan Harrison MD by Argentina Jacobs PA-C. 8/27/2020  15:31

## 2021-01-14 NOTE — TELEPHONE ENCOUNTER
Lab Results   Component Value Date    CHOL 118 10/24/2019    TRIG 123 10/24/2019    HDL 35 (L) 10/24/2019    LDL 58 10/24/2019    Needs 6 MO FU with MRJ due in 2/2021

## 2021-01-15 RX ORDER — PRAVASTATIN SODIUM 20 MG
20 TABLET ORAL NIGHTLY
Qty: 90 TABLET | Refills: 1 | Status: SHIPPED | OUTPATIENT
Start: 2021-01-15 | End: 2021-03-03 | Stop reason: SDUPTHER

## 2021-03-03 RX ORDER — PRAVASTATIN SODIUM 20 MG
20 TABLET ORAL NIGHTLY
Qty: 90 TABLET | Refills: 0 | Status: SHIPPED | OUTPATIENT
Start: 2021-03-03 | End: 2021-03-30 | Stop reason: SDUPTHER

## 2021-03-03 NOTE — TELEPHONE ENCOUNTER
Need updated labs at next appt 3/2021 - requested from PCP (labs received from 2019)- will need new labs at FU

## 2021-03-30 ENCOUNTER — OFFICE VISIT (OUTPATIENT)
Dept: CARDIOLOGY | Facility: CLINIC | Age: 76
End: 2021-03-30

## 2021-03-30 ENCOUNTER — LAB (OUTPATIENT)
Dept: LAB | Facility: HOSPITAL | Age: 76
End: 2021-03-30

## 2021-03-30 VITALS
WEIGHT: 272.6 LBS | SYSTOLIC BLOOD PRESSURE: 153 MMHG | HEART RATE: 73 BPM | DIASTOLIC BLOOD PRESSURE: 75 MMHG | BODY MASS INDEX: 42.79 KG/M2 | OXYGEN SATURATION: 97 % | HEIGHT: 67 IN

## 2021-03-30 DIAGNOSIS — I25.10 ATHEROSCLEROSIS OF NATIVE CORONARY ARTERY OF NATIVE HEART WITHOUT ANGINA PECTORIS: ICD-10-CM

## 2021-03-30 DIAGNOSIS — Z98.890 S/P MVR (MITRAL VALVE REPAIR): ICD-10-CM

## 2021-03-30 DIAGNOSIS — E78.5 HYPERLIPIDEMIA LDL GOAL <70: ICD-10-CM

## 2021-03-30 DIAGNOSIS — G47.33 OSA (OBSTRUCTIVE SLEEP APNEA): ICD-10-CM

## 2021-03-30 DIAGNOSIS — I10 ESSENTIAL HYPERTENSION: ICD-10-CM

## 2021-03-30 DIAGNOSIS — I48.91 ATRIAL FIBRILLATION WITH RVR (HCC): Primary | ICD-10-CM

## 2021-03-30 DIAGNOSIS — I48.91 ATRIAL FIBRILLATION WITH RVR (HCC): ICD-10-CM

## 2021-03-30 LAB
ALBUMIN SERPL-MCNC: 3.9 G/DL (ref 3.5–5.2)
ALBUMIN/GLOB SERPL: 1.7 G/DL
ALP SERPL-CCNC: 87 U/L (ref 39–117)
ALT SERPL W P-5'-P-CCNC: 20 U/L (ref 1–41)
ANION GAP SERPL CALCULATED.3IONS-SCNC: 9.7 MMOL/L (ref 5–15)
AST SERPL-CCNC: 23 U/L (ref 1–40)
BILIRUB SERPL-MCNC: 0.6 MG/DL (ref 0–1.2)
BUN SERPL-MCNC: 16 MG/DL (ref 8–23)
BUN/CREAT SERPL: 19.3 (ref 7–25)
CALCIUM SPEC-SCNC: 8.5 MG/DL (ref 8.6–10.5)
CHLORIDE SERPL-SCNC: 104 MMOL/L (ref 98–107)
CHOLEST SERPL-MCNC: 155 MG/DL (ref 0–200)
CO2 SERPL-SCNC: 26.3 MMOL/L (ref 22–29)
CREAT SERPL-MCNC: 0.83 MG/DL (ref 0.76–1.27)
GFR SERPL CREATININE-BSD FRML MDRD: 90 ML/MIN/1.73
GLOBULIN UR ELPH-MCNC: 2.3 GM/DL
GLUCOSE SERPL-MCNC: 92 MG/DL (ref 65–99)
HDLC SERPL-MCNC: 51 MG/DL (ref 40–60)
LDLC SERPL CALC-MCNC: 78 MG/DL (ref 0–100)
LDLC/HDLC SERPL: 1.44 {RATIO}
POTASSIUM SERPL-SCNC: 4.3 MMOL/L (ref 3.5–5.2)
PROT SERPL-MCNC: 6.2 G/DL (ref 6–8.5)
SODIUM SERPL-SCNC: 140 MMOL/L (ref 136–145)
TRIGL SERPL-MCNC: 153 MG/DL (ref 0–150)
VLDLC SERPL-MCNC: 26 MG/DL (ref 5–40)

## 2021-03-30 PROCEDURE — 36415 COLL VENOUS BLD VENIPUNCTURE: CPT

## 2021-03-30 PROCEDURE — 80053 COMPREHEN METABOLIC PANEL: CPT

## 2021-03-30 PROCEDURE — 80061 LIPID PANEL: CPT

## 2021-03-30 PROCEDURE — 99214 OFFICE O/P EST MOD 30 MIN: CPT | Performed by: INTERNAL MEDICINE

## 2021-03-30 RX ORDER — METOPROLOL SUCCINATE 25 MG/1
25 TABLET, EXTENDED RELEASE ORAL
Qty: 90 TABLET | Refills: 3 | Status: SHIPPED | OUTPATIENT
Start: 2021-03-30 | End: 2022-01-14 | Stop reason: SDUPTHER

## 2021-03-30 RX ORDER — PRAVASTATIN SODIUM 20 MG
20 TABLET ORAL NIGHTLY
Qty: 90 TABLET | Refills: 3 | Status: SHIPPED | OUTPATIENT
Start: 2021-03-30 | End: 2022-01-14 | Stop reason: SDUPTHER

## 2021-03-30 RX ORDER — LOSARTAN POTASSIUM 50 MG/1
50 TABLET ORAL DAILY
Qty: 90 TABLET | Refills: 3 | Status: SHIPPED | OUTPATIENT
Start: 2021-03-30 | End: 2022-01-14 | Stop reason: SDUPTHER

## 2021-03-30 NOTE — PROGRESS NOTES
"  OFFICE FOLLOW UP     Date of Encounter:2021     Name: Alok Tobias  : 1945  Address: [facility] [employer] [place] [facility] [place] [address]    PCP: Ivet Chau MD  [facility] [address] [address]    Alok Tobias is a 75 y.o. male.      Chief Complaint: Follow up of CAD, VHD, HTN, PAF    Problem List:   1.  Coronary Artery Disease, minor and non-obstructive  A. History of abnormal stress test 2016, Dr. Shaver  B. Single episode of severe bilateral arm pain at rest 2018  C. LHC 10/3/2018:  Normal EF, Minor non obstructive CAD. Mild dilation of aortic root  D. CT Angio Chest 10/4/2018: No important abnormalities  2.  Severe MR  A. PENNY 2019 with MVP, severe MR (at time onset AF)  B. [facility] Medtronic 29 mm annuloplasty band               i. Temporary epicardial pacing wires cut and retained   C. Echo 2019: LVSF is normal, mild AI, s/p mitral annular ring repair with no evidence of MR  3.  Essential Hypertension  4.  COPD  5.  Asthma  6.  Morbid obesity, BMI 43.85  7.  Atrial Fibrillation with RVR              A. New diagnosis, 19, CHADS2 Vasc = 3  B. ECV x2 2019, successful  C. On Flecainide and Eliquis   D. Post-op MVR, Flecainide   8.   Obstructive Sleep Apnea - declines CPAP  9.   Gout  10. 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 Patten 10/11/2019     Allergies:  Allergies   Allergen Reactions   • Dye Fdc Red [Red Dye] Nausea And Vomiting     All dyes, IV dye, hair dyes   • Penicillins Other (See Comments)     \"CAUSES CHLAMYDIA\"       Current Medications:  Current Outpatient Medications   Medication Instructions   • allopurinol (ZYLOPRIM) 300 mg, Oral, Daily   • apixaban (ELIQUIS) 5 mg, Oral, 2 Times Daily   • budesonide-formoterol (SYMBICORT) 160-4.5 MCG/ACT inhaler 2 puffs, Inhalation, As Needed   • guaiFENesin (MUCINEX) 1,200 mg, Oral, 2 Times " "Daily   • metoprolol succinate XL (TOPROL-XL) 25 mg, Oral, Every 24 Hours Scheduled   • pravastatin (PRAVACHOL) 20 mg, Oral, Nightly        History of Present Illness: Alok Tobias returns for scheduled follow up today.    He has gained weight since his last visit and reports home blood pressures have been up to 170/100. Lowest BP reading 130/78. He has arthritic pain in his legs and back. He drinks 2-5 cans of beer a day and feels this is better than drinking sode. He also has gout flare ups that are treated with allopurinol. He states he does not have sleep apnea and sent the machine back a long time ago. He denies chest pain, unusual shortness of breath, edema, and syncope.  His Eliquis is now affordable after insurance approval.     The following portions of the patient's history were reviewed and updated as appropriate: allergies, current medications and problem list.    ROS: Pertinent positives as listed in the HPI.  All other systems reviewed and negative.    Objective:    Vitals:    03/30/21 1431 03/30/21 1432   BP: 147/77 153/75   BP Location: Left arm Left arm   Patient Position: Sitting Standing   Pulse: 70 73   SpO2: 97%    Weight: 124 kg (272 lb 9.6 oz)    Height: 170.2 cm (67\")      Wt Readings from Last 3 Encounters:   03/30/21 124 kg (272 lb 9.6 oz)   08/27/20 116 kg (256 lb)   02/27/20 111 kg (245 lb)       Body mass index is 42.7 kg/m².    Physical Exam:  GENERAL: Alert, cooperative, in no acute distress.   HEENT: Normocephalic, no adenopathy, no jugular venous distention  HEART: No discrete PMI is noted. Regular rhythm, normal rate, and no murmurs, gallops, or rubs.   LUNGS: Clear to auscultation bilaterally. No wheezing, rales or ronchi.  ABDOMEN: Soft, bowel sounds present, non-tender, obese  NEUROLOGIC: No focal abnormalities involving strength or sensation are noted.   EXTREMITIES: No clubbing, cyanosis, or edema noted.      Diagnostic Data:  No new labs available to review. "     Procedures  Advance Care Planning   ACP discussion was held with the patient during this visit. Patient has an advance directive (not in EMR), copy requested.      Assessment and Plan:   1.  CAD:  Denies angina.   2.  HTN: Elevated today and at home. We will begin losartan 50 mg daily and ask him to follow home blood pressures closely. He understands target is less than 130/80 and he should reduce ETOH intake to facilitate this.   3.  HLD:  No lipid panel on file. We will check lipid and CMP today. We will refill pravastatin at current dose pending labs.   4.  VHD: Denies angina or heart failure. Continue BMT.   5.  PAF: He is not wearing CPAP. Regular rate and rhythm today. Continue metoprolol and Eliquis. Refills were sent in to mail order pharmacy at his request. Checking renal function today.   6.  ANDREA: He has returned his CPAP mask and does not feel he has ANDREA. We will let PCP address.   7. Obesity: Up 16 pounds since August 2020. We discussed weight loss.  8.  Gout: followed by Dr. Chau, he continues to drink 2-5 beers daily. He will schedule follow up with Dr. Chau for physical and refill of allopurinol.      I will see Alok Tobias back in 6 months or sooner on an as needed basis.  Scribed for Twan Harrison MD by Maine Braun RN. 03/30/2021 15:00 EDT.       EMR Dragon/Transcription Disclaimer:  Much of this encounter note is an electronic transcription/translation of spoken language to printed text.  The electronic translation of spoken language may permit erroneous, or at times, nonsensical words or phrases to be inadvertently transcribed.  Although I have reviewed the note for such errors, some may still exist.

## 2021-06-09 NOTE — PROGRESS NOTES
ASSESSMENT/PLAN:   1. Strep pharyngitis: Well appearing on exam. Playful in room. Discussed abx as below, NSAID's, and f/u with any worsening.   - amoxicillin (AMOXIL) 400 mg/5 mL suspension; Take 9.5 mL (750 mg total) by mouth daily for 10 days.  Dispense: 95 mL; Refill: 0    2. Throat pain  - Rapid Strep A Screen-Throat        SUBJECTIVE:   Dhaval Casper is a 1 y.o. male presents today, with his mother and a [place] phone , with 3 days complaint of abdominal pain, sore throat, and high fevers last week. Mom reports his abdominal pain is diffuse and gets worse after eating. No vomiting. He also complains of his mouth hurting with eating. He had a fever up to 106.5 a few days ago. Mom reports that he was very red. She removed his clothes and placed ice on his forehead. She gave him Ibuprofen 5 mg and he improved. Denies other concerns. No recent illnesses or antibiotics. Sick contacts: younger brother with recent influenza and AOM. Older sister and younger brother being seen today for mild febrile illnesses.     Patient Active Problem List   Diagnosis     Febrile seizure     Death of family member     Current Medications:  Current Outpatient Prescriptions on File Prior to Visit   Medication Sig Dispense Refill     pediatric nutrition, iron, LF 0.03-1 gram-kcal/mL Liqd Drink 1 bottle once to twice daily. 60 Bottle 0     polyethylene glycol (MIRALAX) 17 gram/dose powder Mix 1/2 capful in 4-6 oz of juice once to twice daily as needed for constipation. 255 g 0     sodium chloride (OCEAN) 0.65 % nasal spray 1 spray in each nostril as needed for congestion. 15 mL 3     No current facility-administered medications on file prior to visit.        Allergies:   No Known Allergies    OBJECTIVE:   Vitals:    04/03/17 1821   Pulse: 107   Resp: 24   Temp: 97.6  F (36.4  C)   TempSrc: Axillary   SpO2: 97%   Weight: 31 lb 6.4 oz (14.2 kg)     Physical exam reveals a pleasant 1 y.o. male.   Appears healthy, alert and  Subjective: Postoperative day 5 status post left frontotemporal craniotomy for subdural hematoma.  Patient's daughter reports some worsening expressive aphasia yesterday.    Objective:    Vitals:    10/16/19 0753   BP: 114/66   Pulse: 69   Resp: 20   Temp: 98.1 °F (36.7 °C)   SpO2: 96%     Pulse  Av.1  Min: 68  Max: 100  Systolic (24hrs), Av , Min:90 , Max:131     Diastolic (24hrs), Av, Min:58, Max:96    Temp (24hrs), Av.2 °F (36.8 °C), Min:97.5 °F (36.4 °C), Max:99.1 °F (37.3 °C)      Naming and repetition are intact.  He does have some dysarthric speech.  He has right-sided pronator drift which is slightly worse yesterday as compared to the day before.    Lab Results   Component Value Date     10/14/2019       A/P:   I would like to order a repeat head CT.  He was started on some Keppra yesterday for possible cortical irritation.  If his head CT is stable, then he is cleared to discharge to rehabilitation from my perspective today.  Final recommendations will be left pending availability of the head CT.     cooperative.  Eyes:  No conjunctivitis, lids normal.   Ears:  normal TMs bilaterally  Nose:    Mucosa normal. Scant, clear rhinorrhea.  Mouth:  Mucosa pink and moist.  normal-appearing mucosa and mild erythema   Lymph: normal and few small anterior cervical nodes  Lungs: Chest is clear, no wheezing, rhonchi or rales. Symmetric air entry throughout both lung fields.  Heart: regular rate and rhythm, no murmur, rub or gallop  Abdomen: soft, nontender. No masses or hepatosplenomegaly  Skin: pink, warm, dry, and without lesions on limited skin exam.

## 2022-01-05 ENCOUNTER — OFFICE VISIT (OUTPATIENT)
Dept: CARDIOLOGY | Facility: CLINIC | Age: 77
End: 2022-01-05

## 2022-01-05 VITALS
SYSTOLIC BLOOD PRESSURE: 136 MMHG | BODY MASS INDEX: 44.73 KG/M2 | DIASTOLIC BLOOD PRESSURE: 70 MMHG | HEART RATE: 78 BPM | WEIGHT: 285 LBS | HEIGHT: 67 IN | OXYGEN SATURATION: 97 %

## 2022-01-05 DIAGNOSIS — I25.10 ATHEROSCLEROSIS OF NATIVE CORONARY ARTERY OF NATIVE HEART WITHOUT ANGINA PECTORIS: ICD-10-CM

## 2022-01-05 DIAGNOSIS — I48.0 PAF (PAROXYSMAL ATRIAL FIBRILLATION): ICD-10-CM

## 2022-01-05 DIAGNOSIS — I48.91 ATRIAL FIBRILLATION WITH RVR: Primary | ICD-10-CM

## 2022-01-05 PROCEDURE — 99214 OFFICE O/P EST MOD 30 MIN: CPT | Performed by: INTERNAL MEDICINE

## 2022-01-05 NOTE — PROGRESS NOTES
"[facility] at [facility]  Office visit  Alok Tobias  1945  [phone]  There is no work phone number on file.    VISIT DATE:  1/5/2022    PCP: Ivet Chau MD  [address] [place] [address]    CC:  Chief Complaint   Patient presents with   • Atrial Fibrillation       Problem List:   1.  Coronary Artery Disease, minor and non-obstructive  A. History of abnormal stress test 07/2016, Dr. Shaver  B. Single episode of severe bilateral arm pain at rest October 2018  C. LHC 10/3/2018:  Normal EF, Minor non obstructive CAD. Mild dilation of aortic root  D. CT Angio Chest 10/4/2018: No important abnormalities  2.  Severe MR  A. PENNY June 2019 with MVP, severe MR (at time onset AF)  B. Mitral valve repair 7/25/2019 [facility] with Medtronic 29 mm annuloplasty band               i. Temporary epicardial pacing wires cut and retained   C. Echo 7/29/2019: LVSF is normal, mild AI, s/p mitral annular ring repair with no evidence of MR  3.  Essential Hypertension  4.  COPD  5.  Asthma  6.  Morbid obesity, BMI 43.85  7.  Atrial Fibrillation with RVR              A. New diagnosis, 6/19/19, CHADS2 Vasc = 3  B. ECV x2 June 2019, successful  C. On Flecainide and Eliquis   D. Post-op MVR, Flecainide   8.   Obstructive Sleep Apnea - declines CPAP  9.   Gout  10. 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 Patten 10/11/2019     ASSESSMENT:   Diagnosis Plan   1. Atrial fibrillation with RVR to 6/19/2019 s/p successful ECV x2.  On home Coumadin post mitral valve repair (CMS/formerly [facility])     2. Atherosclerosis of native coronary artery of native heart without angina pectoris     3. PAF (paroxysmal atrial fibrillation) (formerly [facility])         PLAN:  Paroxysmal atrial fibrillation: Currently stable and asymptomatic.  Continue Eliquis 5 mg p.o. twice daily for stroke prophylaxis.  Continue metoprolol succinate 25 mg p.o. daily.    Status post mitral valve " "repair with mitral valve prolapse: Asymptomatic, stable on exam today.  Continue annual clinical follow-up with intermittent echocardiographic surveillance.    Hyperlipidemia: Goal LDL less than 100.  Continue pravastatin 20 mg/day.    Hypertension: Goal less than 130/80 mmHg.  Currently well controlled.  Continue current medical therapy.    Subjective  Normal assessment: Stable functional capacity.  Denies chest pain, palpitations or dyspnea on exertion.  Blood pressures running less than 135/80 mmHg.  He is compliant with medical therapy.  Eliquis is becoming cost prohibitive.    PHYSICAL EXAMINATION:  Vitals:    01/05/22 1449   BP: 136/70   BP Location: Left arm   Patient Position: Sitting   Pulse: 78   SpO2: 97%   Weight: 129 kg (285 lb)   Height: 170.2 cm (67\")     General Appearance:    Alert, cooperative, no distress, appears stated age   Head:    Normocephalic, without obvious abnormality, atraumatic   Eyes:    conjunctiva/corneas clear   Nose:   Nares normal, septum midline, mucosa normal, no drainage   Throat:   Lips, teeth and gums normal   Neck:   Supple, symmetrical, trachea midline, no carotid    bruit or JVD   Lungs:     Clear to auscultation bilaterally, respirations unlabored   Chest Wall:    No tenderness or deformity    Heart:    Regular rate and rhythm, S1 and S2 normal, no murmur, rub   or gallop, normal carotid impulse bilaterally without bruit.   Abdomen:     Soft, non-tender   Extremities:   Extremities normal, atraumatic, no cyanosis or edema   Pulses:   2+ and symmetric all extremities   Skin:   Skin color, texture, turgor normal, no rashes or lesions       Diagnostic Data:  Procedures  Lab Results   Component Value Date    TRIG 153 (H) 03/30/2021    HDL 51 03/30/2021     Lab Results   Component Value Date    GLUCOSE 92 03/30/2021    BUN 16 03/30/2021    CREATININE 0.83 03/30/2021     03/30/2021    K 4.3 03/30/2021     03/30/2021    CO2 26.3 03/30/2021     Lab Results " "  Component Value Date    HGBA1C 5.90 (H) 10/03/2018     Lab Results   Component Value Date    WBC 7.40 2019    HGB 11.6 (L) 2019    HCT 39.6 2019     2019       Allergies  Allergies   Allergen Reactions   • Dye Fdc Red [Red Dye] Nausea And Vomiting     All dyes, IV dye, hair dyes   • Penicillins Other (See Comments)     \"CAUSES CHLAMYDIA\"       Current Medications    Current Outpatient Medications:   •  allopurinol (ZYLOPRIM) 300 MG tablet, Take 300 mg by mouth Daily., Disp: , Rfl:   •  apixaban (Eliquis) 5 MG tablet tablet, Take 1 tablet by mouth 2 (Two) Times a Day. START on Saturday 10/19/19, Disp: 180 tablet, Rfl: 3  •  budesonide-formoterol (SYMBICORT) 160-4.5 MCG/ACT inhaler, Inhale 2 puffs As Needed., Disp: , Rfl:   •  guaiFENesin (MUCINEX) 600 MG 12 hr tablet, Take 1,200 mg by mouth 2 (Two) Times a Day., Disp: , Rfl:   •  losartan (COZAAR) 50 MG tablet, Take 1 tablet by mouth Daily., Disp: 90 tablet, Rfl: 3  •  metoprolol succinate XL (TOPROL-XL) 25 MG 24 hr tablet, Take 1 tablet by mouth Daily., Disp: 90 tablet, Rfl: 3  •  pravastatin (PRAVACHOL) 20 MG tablet, Take 1 tablet by mouth Every Night., Disp: 90 tablet, Rfl: 3          ROS  ROS      SOCIAL HX  Social History     Socioeconomic History   • Marital status:    Tobacco Use   • Smoking status: Former Smoker     Packs/day: 1.50     Years: 20.00     Pack years: 30.00     Types: Cigarettes     Quit date: 1986     Years since quittin.6   • Smokeless tobacco: Never Used   Vaping Use   • Vaping Use: Never used   Substance and Sexual Activity   • Alcohol use: Yes     Alcohol/week: 3.0 - 4.0 standard drinks     Types: 3 - 4 Cans of beer per week     Comment: 3-4 beers every evening   • Drug use: No   • Sexual activity: Defer       FAMILY HX  Family History   Problem Relation Age of Onset   • Multiple sclerosis Mother              Amos Crook III, MD, FACC      "

## 2022-01-14 DIAGNOSIS — I25.10 ATHEROSCLEROSIS OF NATIVE CORONARY ARTERY OF NATIVE HEART WITHOUT ANGINA PECTORIS: ICD-10-CM

## 2022-01-14 DIAGNOSIS — E78.5 HYPERLIPIDEMIA LDL GOAL <70: ICD-10-CM

## 2022-01-14 DIAGNOSIS — I48.91 ATRIAL FIBRILLATION WITH RVR: ICD-10-CM

## 2022-01-14 DIAGNOSIS — Z98.890 S/P MVR (MITRAL VALVE REPAIR): ICD-10-CM

## 2022-01-14 DIAGNOSIS — I10 ESSENTIAL HYPERTENSION: ICD-10-CM

## 2022-01-14 DIAGNOSIS — G47.33 OSA (OBSTRUCTIVE SLEEP APNEA): ICD-10-CM

## 2022-01-14 RX ORDER — PRAVASTATIN SODIUM 20 MG
20 TABLET ORAL NIGHTLY
Qty: 90 TABLET | Refills: 0 | Status: SHIPPED | OUTPATIENT
Start: 2022-01-14 | End: 2022-05-05

## 2022-01-14 RX ORDER — LOSARTAN POTASSIUM 50 MG/1
50 TABLET ORAL DAILY
Qty: 90 TABLET | Refills: 0 | Status: SHIPPED | OUTPATIENT
Start: 2022-01-14 | End: 2022-05-05

## 2022-01-14 RX ORDER — METOPROLOL SUCCINATE 25 MG/1
25 TABLET, EXTENDED RELEASE ORAL
Qty: 90 TABLET | Refills: 0 | Status: SHIPPED | OUTPATIENT
Start: 2022-01-14 | End: 2022-05-05

## 2022-05-05 DIAGNOSIS — Z98.890 S/P MVR (MITRAL VALVE REPAIR): ICD-10-CM

## 2022-05-05 DIAGNOSIS — I25.10 ATHEROSCLEROSIS OF NATIVE CORONARY ARTERY OF NATIVE HEART WITHOUT ANGINA PECTORIS: ICD-10-CM

## 2022-05-05 DIAGNOSIS — I10 ESSENTIAL HYPERTENSION: ICD-10-CM

## 2022-05-05 DIAGNOSIS — E78.5 HYPERLIPIDEMIA LDL GOAL <70: ICD-10-CM

## 2022-05-05 DIAGNOSIS — G47.33 OSA (OBSTRUCTIVE SLEEP APNEA): ICD-10-CM

## 2022-05-05 DIAGNOSIS — I48.91 ATRIAL FIBRILLATION WITH RVR: ICD-10-CM

## 2022-05-05 RX ORDER — METOPROLOL SUCCINATE 25 MG/1
25 TABLET, EXTENDED RELEASE ORAL
Qty: 90 TABLET | Refills: 0 | Status: SHIPPED | OUTPATIENT
Start: 2022-05-05 | End: 2022-11-30 | Stop reason: SDUPTHER

## 2022-05-05 RX ORDER — LOSARTAN POTASSIUM 50 MG/1
50 TABLET ORAL DAILY
Qty: 90 TABLET | Refills: 0 | Status: SHIPPED | OUTPATIENT
Start: 2022-05-05 | End: 2022-11-28 | Stop reason: SDUPTHER

## 2022-05-05 RX ORDER — PRAVASTATIN SODIUM 20 MG
20 TABLET ORAL NIGHTLY
Qty: 90 TABLET | Refills: 0 | Status: SHIPPED | OUTPATIENT
Start: 2022-05-05 | End: 2022-07-11 | Stop reason: ALTCHOICE

## 2022-05-12 ENCOUNTER — TRANSCRIBE ORDERS (OUTPATIENT)
Dept: LAB | Facility: HOSPITAL | Age: 77
End: 2022-05-12

## 2022-05-12 ENCOUNTER — HOSPITAL ENCOUNTER (OUTPATIENT)
Dept: GENERAL RADIOLOGY | Facility: HOSPITAL | Age: 77
Discharge: HOME | End: 2022-05-12
Admitting: NURSE PRACTITIONER

## 2022-05-12 DIAGNOSIS — R10.9 RT FLANK PAIN: Primary | ICD-10-CM

## 2022-05-12 DIAGNOSIS — R10.9 RT FLANK PAIN: ICD-10-CM

## 2022-05-12 PROCEDURE — 74018 RADEX ABDOMEN 1 VIEW: CPT

## 2022-05-12 PROCEDURE — 74018 RADEX ABDOMEN 1 VIEW: CPT | Performed by: RADIOLOGY

## 2022-06-23 NOTE — PLAN OF CARE
Problem: Patient Care Overview  Goal: Plan of Care Review   10/05/18 0905   Coping/Psychosocial   Plan of Care Reviewed With patient   Plan of Care Review   Progress improving          Received fax from [address] Attached is the approval for the Testing Strips. Approved from 5/23/2022-6/22/2023. Called [facility]. Talked to [facility]. States \"pt has not picked up these test strips since July 1 2021. The pt has been picking up the Accucheck brand from our pharmacy. \"     Routed to provider for new prescription for the Accucheck testing strips.

## 2022-07-11 ENCOUNTER — OFFICE VISIT (OUTPATIENT)
Dept: CARDIOLOGY | Facility: CLINIC | Age: 77
End: 2022-07-11

## 2022-07-11 VITALS
SYSTOLIC BLOOD PRESSURE: 132 MMHG | HEART RATE: 80 BPM | HEIGHT: 67 IN | OXYGEN SATURATION: 97 % | BODY MASS INDEX: 44.57 KG/M2 | WEIGHT: 284 LBS | DIASTOLIC BLOOD PRESSURE: 78 MMHG

## 2022-07-11 DIAGNOSIS — Z98.890 S/P MVR (MITRAL VALVE REPAIR): ICD-10-CM

## 2022-07-11 DIAGNOSIS — I25.10 ATHEROSCLEROSIS OF NATIVE CORONARY ARTERY OF NATIVE HEART WITHOUT ANGINA PECTORIS: ICD-10-CM

## 2022-07-11 DIAGNOSIS — I48.91 ATRIAL FIBRILLATION WITH RVR: Primary | ICD-10-CM

## 2022-07-11 DIAGNOSIS — I10 PRIMARY HYPERTENSION: ICD-10-CM

## 2022-07-11 PROCEDURE — 99214 OFFICE O/P EST MOD 30 MIN: CPT | Performed by: INTERNAL MEDICINE

## 2022-07-11 NOTE — PROGRESS NOTES
"[facility] at [facility]  Office visit  Alok Tobias  1945  [phone]  There is no work phone number on file.    VISIT DATE:  7/11/2022    PCP: Ivet Chau MD  [address] [place] [address]    CC:  Chief Complaint   Patient presents with   • Atrial Fibrillation       Problem List:   1.  Coronary Artery Disease, minor and non-obstructive  A. History of abnormal stress test 07/2016, Dr. Shaver  B. Single episode of severe bilateral arm pain at rest October 2018  C. LHC 10/3/2018:  Normal EF, Minor non obstructive CAD. Mild dilation of aortic root  D. CT Angio Chest 10/4/2018: No important abnormalities  2.  Severe MR  A. PENNY June 2019 with MVP, severe MR (at time onset AF)  B. Mitral valve repair 7/25/2019 [facility] with Medtronic 29 mm annuloplasty band               i. Temporary epicardial pacing wires cut and retained   C. Echo 7/29/2019: LVSF is normal, mild AI, s/p mitral annular ring repair with no evidence of MR  3.  Essential Hypertension  4.  COPD  5.  Asthma  6.  Morbid obesity, BMI 43.85  7.  Atrial Fibrillation with RVR              A. New diagnosis, 6/19/19, CHADS2 Vasc = 3  B. ECV x2 June 2019, successful  C. On Flecainide and Eliquis   D. Post-op MVR, Flecainide   8.   Obstructive Sleep Apnea - declines CPAP  9.   Gout  10. 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 Patten 10/11/2019     ASSESSMENT:   Diagnosis Plan   1. Atrial fibrillation with RVR to 6/19/2019 s/p successful ECV x2.  On home Coumadin post mitral valve repair (CMS/HCC)     2. Atherosclerosis of native coronary artery of native heart without angina pectoris     3. S/P mitral valve repair/annuloplasty for severe MR 7/25/2019 in [place]     4. Primary hypertension         PLAN:  Paroxysmal atrial fibrillation: Currently stable and asymptomatic.  Continue Eliquis 5 mg p.o. twice daily for stroke prophylaxis, developed acute on " "chronic subdural hematoma while on Coumadin.  Continue metoprolol succinate 25 mg p.o. daily.    Status post mitral valve repair with mitral valve prolapse: Asymptomatic, stable on exam today.  Continue annual clinical follow-up with intermittent echocardiographic surveillance.    Hyperlipidemia: Goal LDL less than 100.  Intolerant to statins due to back pain.  Continue dietary modifications.  Currently well controlled.    Hypertension: Goal less than 130/80 mmHg.  Currently well controlled.  Continue current medical therapy.    Subjective  Normal assessment: Stable functional capacity.  Denies chest pain, palpitations or dyspnea on exertion.  Blood pressures running less than 135/80 mmHg.  Not taking pravastatin due to low back pain, back pain significantly improved off medical therapy.      PHYSICAL EXAMINATION:  Vitals:    07/11/22 1520   BP: 132/78   BP Location: Right arm   Patient Position: Sitting   Pulse: 80   SpO2: 97%   Weight: 129 kg (284 lb)   Height: 170.2 cm (67\")     General Appearance:    Alert, cooperative, no distress, appears stated age   Head:    Normocephalic, without obvious abnormality, atraumatic   Eyes:    conjunctiva/corneas clear   Nose:   Nares normal, septum midline, mucosa normal, no drainage   Throat:   Lips, teeth and gums normal   Neck:   Supple, symmetrical, trachea midline, no carotid    bruit or JVD   Lungs:     Clear to auscultation bilaterally, respirations unlabored   Chest Wall:    No tenderness or deformity    Heart:    Regular rate and rhythm, S1 and S2 normal, no murmur, rub   or gallop, normal carotid impulse bilaterally without bruit.   Abdomen:     Soft, non-tender   Extremities:   Extremities normal, atraumatic, no cyanosis or edema   Pulses:   2+ and symmetric all extremities   Skin:   Skin color, texture, turgor normal, no rashes or lesions       Diagnostic Data:  Procedures  Lab Results   Component Value Date    TRIG 153 (H) 03/30/2021    HDL 51 03/30/2021     Lab " "Results   Component Value Date    GLUCOSE 92 2021    BUN 16 2021    CREATININE 0.83 2021     2021    K 4.3 2021     2021    CO2 26.3 2021     Lab Results   Component Value Date    HGBA1C 5.90 (H) 10/03/2018     Lab Results   Component Value Date    WBC 7.40 2019    HGB 11.6 (L) 2019    HCT 39.6 2019     2019       Allergies  Allergies   Allergen Reactions   • Dye Fdc Red [Red Dye] Nausea And Vomiting     All dyes, IV dye, hair dyes   • Penicillins Other (See Comments)     \"CAUSES CHLAMYDIA\"       Current Medications    Current Outpatient Medications:   •  allopurinol (ZYLOPRIM) 300 MG tablet, Take 300 mg by mouth Daily., Disp: , Rfl:   •  budesonide-formoterol (SYMBICORT) 160-4.5 MCG/ACT inhaler, Inhale 2 puffs As Needed., Disp: , Rfl:   •  guaiFENesin (MUCINEX) 600 MG 12 hr tablet, Take 1,200 mg by mouth 2 (Two) Times a Day., Disp: , Rfl:   •  losartan (COZAAR) 50 MG tablet, TAKE 1 TABLET BY MOUTH DAILY. NEED LAB WORK FOR FURTHER REFILLS., Disp: 90 tablet, Rfl: 0  •  apixaban (ELIQUIS) 5 MG tablet tablet, Take 1 tablet by mouth 2 (Two) Times a Day., Disp: 180 tablet, Rfl: 3  •  metoprolol succinate XL (TOPROL-XL) 25 MG 24 hr tablet, TAKE 1 TABLET BY MOUTH DAILY. NEED LAB WORK FOR FURTHER REFILLS, Disp: 90 tablet, Rfl: 0  •  pravastatin (PRAVACHOL) 20 MG tablet, TAKE 1 TABLET BY MOUTH EVERY NIGHT. NEED LAB WORK FOR FURTHER REFILLS., Disp: 90 tablet, Rfl: 0          ROS  ROS      SOCIAL HX  Social History     Socioeconomic History   • Marital status:    Tobacco Use   • Smoking status: Former Smoker     Packs/day: 1.50     Years: 20.00     Pack years: 30.00     Types: Cigarettes     Quit date: 1986     Years since quittin.1   • Smokeless tobacco: Never Used   Vaping Use   • Vaping Use: Never used   Substance and Sexual Activity   • Alcohol use: Yes     Alcohol/week: 3.0 - 4.0 standard drinks     Types: 3 - 4 Cans of " beer per week     Comment: 3-4 beers every evening   • Drug use: No   • Sexual activity: Defer       FAMILY HX  Family History   Problem Relation Age of Onset   • Multiple sclerosis Mother              Amos Crook III, MD, FACC

## 2022-11-04 ENCOUNTER — TRANSCRIBE ORDERS (OUTPATIENT)
Dept: LAB | Facility: HOSPITAL | Age: 77
End: 2022-11-04

## 2022-11-04 ENCOUNTER — LAB (OUTPATIENT)
Dept: LAB | Facility: HOSPITAL | Age: 77
End: 2022-11-04

## 2022-11-04 DIAGNOSIS — I10 ESSENTIAL (PRIMARY) HYPERTENSION: ICD-10-CM

## 2022-11-04 DIAGNOSIS — E78.5 HYPERLIPIDEMIA, UNSPECIFIED HYPERLIPIDEMIA TYPE: ICD-10-CM

## 2022-11-04 DIAGNOSIS — M10.9 GOUT, UNSPECIFIED CAUSE, UNSPECIFIED CHRONICITY, UNSPECIFIED SITE: Primary | ICD-10-CM

## 2022-11-04 DIAGNOSIS — M10.9 GOUT, UNSPECIFIED CAUSE, UNSPECIFIED CHRONICITY, UNSPECIFIED SITE: ICD-10-CM

## 2022-11-04 PROCEDURE — 80053 COMPREHEN METABOLIC PANEL: CPT

## 2022-11-04 PROCEDURE — 84443 ASSAY THYROID STIM HORMONE: CPT

## 2022-11-04 PROCEDURE — 85025 COMPLETE CBC W/AUTO DIFF WBC: CPT

## 2022-11-04 PROCEDURE — 80061 LIPID PANEL: CPT

## 2022-11-04 PROCEDURE — 84550 ASSAY OF BLOOD/URIC ACID: CPT

## 2022-11-04 PROCEDURE — 36415 COLL VENOUS BLD VENIPUNCTURE: CPT

## 2022-11-05 LAB
ALBUMIN SERPL-MCNC: 3.3 G/DL (ref 3.5–5.2)
ALBUMIN/GLOB SERPL: 1.1 G/DL
ALP SERPL-CCNC: 79 U/L (ref 39–117)
ALT SERPL W P-5'-P-CCNC: 20 U/L (ref 1–41)
ANION GAP SERPL CALCULATED.3IONS-SCNC: 9.1 MMOL/L (ref 5–15)
AST SERPL-CCNC: 20 U/L (ref 1–40)
BASOPHILS # BLD AUTO: 0.02 10*3/MM3 (ref 0–0.2)
BASOPHILS NFR BLD AUTO: 0.2 % (ref 0–1.5)
BILIRUB SERPL-MCNC: 0.8 MG/DL (ref 0–1.2)
BUN SERPL-MCNC: 11 MG/DL (ref 8–23)
BUN/CREAT SERPL: 12.2 (ref 7–25)
CALCIUM SPEC-SCNC: 9.1 MG/DL (ref 8.6–10.5)
CHLORIDE SERPL-SCNC: 105 MMOL/L (ref 98–107)
CHOLEST SERPL-MCNC: 162 MG/DL (ref 0–200)
CO2 SERPL-SCNC: 26.9 MMOL/L (ref 22–29)
CREAT SERPL-MCNC: 0.9 MG/DL (ref 0.76–1.27)
DEPRECATED RDW RBC AUTO: 43.6 FL (ref 37–54)
EGFRCR SERPLBLD CKD-EPI 2021: 86.9 ML/MIN/1.73
EOSINOPHIL # BLD AUTO: 0.15 10*3/MM3 (ref 0–0.4)
EOSINOPHIL NFR BLD AUTO: 1.9 % (ref 0.3–6.2)
ERYTHROCYTE [DISTWIDTH] IN BLOOD BY AUTOMATED COUNT: 12.8 % (ref 12.3–15.4)
GLOBULIN UR ELPH-MCNC: 3.1 GM/DL
GLUCOSE SERPL-MCNC: 97 MG/DL (ref 65–99)
HCT VFR BLD AUTO: 46.1 % (ref 37.5–51)
HDLC SERPL-MCNC: 43 MG/DL (ref 40–60)
HGB BLD-MCNC: 15.3 G/DL (ref 13–17.7)
IMM GRANULOCYTES # BLD AUTO: 0.04 10*3/MM3 (ref 0–0.05)
IMM GRANULOCYTES NFR BLD AUTO: 0.5 % (ref 0–0.5)
LDLC SERPL CALC-MCNC: 104 MG/DL (ref 0–100)
LDLC/HDLC SERPL: 2.41 {RATIO}
LYMPHOCYTES # BLD AUTO: 0.72 10*3/MM3 (ref 0.7–3.1)
LYMPHOCYTES NFR BLD AUTO: 8.9 % (ref 19.6–45.3)
MCH RBC QN AUTO: 31.5 PG (ref 26.6–33)
MCHC RBC AUTO-ENTMCNC: 33.2 G/DL (ref 31.5–35.7)
MCV RBC AUTO: 94.9 FL (ref 79–97)
MONOCYTES # BLD AUTO: 0.88 10*3/MM3 (ref 0.1–0.9)
MONOCYTES NFR BLD AUTO: 10.9 % (ref 5–12)
NEUTROPHILS NFR BLD AUTO: 6.29 10*3/MM3 (ref 1.7–7)
NEUTROPHILS NFR BLD AUTO: 77.6 % (ref 42.7–76)
NRBC BLD AUTO-RTO: 0 /100 WBC (ref 0–0.2)
PLATELET # BLD AUTO: 184 10*3/MM3 (ref 140–450)
PMV BLD AUTO: 11.5 FL (ref 6–12)
POTASSIUM SERPL-SCNC: 4.6 MMOL/L (ref 3.5–5.2)
PROT SERPL-MCNC: 6.4 G/DL (ref 6–8.5)
RBC # BLD AUTO: 4.86 10*6/MM3 (ref 4.14–5.8)
SODIUM SERPL-SCNC: 141 MMOL/L (ref 136–145)
TRIGL SERPL-MCNC: 76 MG/DL (ref 0–150)
TSH SERPL DL<=0.05 MIU/L-ACNC: 1.93 UIU/ML (ref 0.27–4.2)
URATE SERPL-MCNC: 4.8 MG/DL (ref 3.4–7)
VLDLC SERPL-MCNC: 15 MG/DL (ref 5–40)
WBC NRBC COR # BLD: 8.1 10*3/MM3 (ref 3.4–10.8)

## 2022-11-28 DIAGNOSIS — I10 ESSENTIAL HYPERTENSION: ICD-10-CM

## 2022-11-28 DIAGNOSIS — G47.33 OSA (OBSTRUCTIVE SLEEP APNEA): ICD-10-CM

## 2022-11-28 DIAGNOSIS — I48.91 ATRIAL FIBRILLATION WITH RVR: ICD-10-CM

## 2022-11-28 DIAGNOSIS — E78.5 HYPERLIPIDEMIA LDL GOAL <70: ICD-10-CM

## 2022-11-28 DIAGNOSIS — I25.10 ATHEROSCLEROSIS OF NATIVE CORONARY ARTERY OF NATIVE HEART WITHOUT ANGINA PECTORIS: ICD-10-CM

## 2022-11-28 DIAGNOSIS — Z98.890 S/P MVR (MITRAL VALVE REPAIR): ICD-10-CM

## 2022-11-28 RX ORDER — LOSARTAN POTASSIUM 50 MG/1
50 TABLET ORAL DAILY
Qty: 30 TABLET | Refills: 0 | Status: SHIPPED | OUTPATIENT
Start: 2022-11-28 | End: 2022-11-30 | Stop reason: SDUPTHER

## 2022-11-30 DIAGNOSIS — E78.5 HYPERLIPIDEMIA LDL GOAL <70: ICD-10-CM

## 2022-11-30 DIAGNOSIS — I10 ESSENTIAL HYPERTENSION: ICD-10-CM

## 2022-11-30 DIAGNOSIS — I25.10 ATHEROSCLEROSIS OF NATIVE CORONARY ARTERY OF NATIVE HEART WITHOUT ANGINA PECTORIS: ICD-10-CM

## 2022-11-30 DIAGNOSIS — Z98.890 S/P MVR (MITRAL VALVE REPAIR): ICD-10-CM

## 2022-11-30 DIAGNOSIS — G47.33 OSA (OBSTRUCTIVE SLEEP APNEA): ICD-10-CM

## 2022-11-30 DIAGNOSIS — I48.91 ATRIAL FIBRILLATION WITH RVR: ICD-10-CM

## 2022-11-30 RX ORDER — LOSARTAN POTASSIUM 50 MG/1
50 TABLET ORAL DAILY
Qty: 90 TABLET | Refills: 0 | Status: SHIPPED | OUTPATIENT
Start: 2022-11-30 | End: 2022-11-30 | Stop reason: SDUPTHER

## 2022-11-30 RX ORDER — METOPROLOL SUCCINATE 25 MG/1
25 TABLET, EXTENDED RELEASE ORAL
Qty: 90 TABLET | Refills: 0 | Status: SHIPPED | OUTPATIENT
Start: 2022-11-30 | End: 2022-11-30 | Stop reason: SDUPTHER

## 2022-11-30 RX ORDER — METOPROLOL SUCCINATE 25 MG/1
25 TABLET, EXTENDED RELEASE ORAL
Qty: 90 TABLET | Refills: 0 | Status: SHIPPED | OUTPATIENT
Start: 2022-11-30 | End: 2023-02-13

## 2022-11-30 RX ORDER — LOSARTAN POTASSIUM 50 MG/1
50 TABLET ORAL DAILY
Qty: 90 TABLET | Refills: 0 | Status: SHIPPED | OUTPATIENT
Start: 2022-11-30 | End: 2023-02-23

## 2023-02-12 DIAGNOSIS — I48.91 ATRIAL FIBRILLATION WITH RVR: ICD-10-CM

## 2023-02-12 DIAGNOSIS — I10 ESSENTIAL HYPERTENSION: ICD-10-CM

## 2023-02-12 DIAGNOSIS — E78.5 HYPERLIPIDEMIA LDL GOAL <70: ICD-10-CM

## 2023-02-12 DIAGNOSIS — I25.10 ATHEROSCLEROSIS OF NATIVE CORONARY ARTERY OF NATIVE HEART WITHOUT ANGINA PECTORIS: ICD-10-CM

## 2023-02-12 DIAGNOSIS — G47.33 OSA (OBSTRUCTIVE SLEEP APNEA): ICD-10-CM

## 2023-02-12 DIAGNOSIS — Z98.890 S/P MVR (MITRAL VALVE REPAIR): ICD-10-CM

## 2023-02-13 RX ORDER — METOPROLOL SUCCINATE 25 MG/1
TABLET, EXTENDED RELEASE ORAL
Qty: 90 TABLET | Refills: 0 | Status: SHIPPED | OUTPATIENT
Start: 2023-02-13

## 2023-02-23 DIAGNOSIS — I25.10 ATHEROSCLEROSIS OF NATIVE CORONARY ARTERY OF NATIVE HEART WITHOUT ANGINA PECTORIS: ICD-10-CM

## 2023-02-23 DIAGNOSIS — E78.5 HYPERLIPIDEMIA LDL GOAL <70: ICD-10-CM

## 2023-02-23 DIAGNOSIS — G47.33 OSA (OBSTRUCTIVE SLEEP APNEA): ICD-10-CM

## 2023-02-23 DIAGNOSIS — Z98.890 S/P MVR (MITRAL VALVE REPAIR): ICD-10-CM

## 2023-02-23 DIAGNOSIS — I10 ESSENTIAL HYPERTENSION: ICD-10-CM

## 2023-02-23 DIAGNOSIS — I48.91 ATRIAL FIBRILLATION WITH RVR: ICD-10-CM

## 2023-02-23 RX ORDER — LOSARTAN POTASSIUM 50 MG/1
TABLET ORAL
Qty: 90 TABLET | Refills: 1 | Status: SHIPPED | OUTPATIENT
Start: 2023-02-23

## 2023-05-17 ENCOUNTER — OFFICE VISIT (OUTPATIENT)
Dept: CARDIOLOGY | Facility: CLINIC | Age: 78
End: 2023-05-17
Payer: MEDICARE

## 2023-05-17 VITALS
SYSTOLIC BLOOD PRESSURE: 110 MMHG | HEART RATE: 46 BPM | HEIGHT: 67 IN | WEIGHT: 278 LBS | OXYGEN SATURATION: 95 % | BODY MASS INDEX: 43.63 KG/M2 | DIASTOLIC BLOOD PRESSURE: 64 MMHG

## 2023-05-17 DIAGNOSIS — I25.10 ATHEROSCLEROSIS OF NATIVE CORONARY ARTERY OF NATIVE HEART WITHOUT ANGINA PECTORIS: ICD-10-CM

## 2023-05-17 DIAGNOSIS — Z98.890 S/P MVR (MITRAL VALVE REPAIR): Primary | ICD-10-CM

## 2023-05-17 DIAGNOSIS — I48.0 PAF (PAROXYSMAL ATRIAL FIBRILLATION): ICD-10-CM

## 2023-05-17 NOTE — PROGRESS NOTES
"[facility] at [facility]  Office visit  Alok Tobias  1945  [phone]  There is no work phone number on file.    VISIT DATE:  5/17/2023    PCP: Ivet Chau MD  [address] [place] [address]    CC:  Chief Complaint   Patient presents with   • Atrial Fibrillation   • diastolic heart failure   • Coronary Artery Disease   • Hypertension       Problem List:   1.  Coronary Artery Disease, minor and non-obstructive  A. History of abnormal stress test 07/2016, Dr. Shaver  B. Single episode of severe bilateral arm pain at rest October 2018  C. LHC 10/3/2018:  Normal EF, Minor non obstructive CAD. Mild dilation of aortic root  D. CT Angio Chest 10/4/2018: No important abnormalities  2.  Severe MR  A. PENNY June 2019 with MVP, severe MR (at time onset AF)  B. Mitral valve repair 7/25/2019 [facility] with Medtronic 29 mm annuloplasty band               i. Temporary epicardial pacing wires cut and retained   C. Echo 7/29/2019: LVSF is normal, mild AI, s/p mitral annular ring repair with no evidence of MR  3.  Essential Hypertension  4.  COPD  5.  Asthma  6.  Morbid obesity, BMI 43.85  7.  Atrial Fibrillation with RVR              A. New diagnosis, 6/19/19, CHADS2 Vasc = 3  B. ECV x2 June 2019, successful  C. On Flecainide and Eliquis   D. Post-op MVR, Flecainide   8.   Obstructive Sleep Apnea - declines CPAP  9.   Gout  10. 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 Patten 10/11/2019     ASSESSMENT:   Diagnosis Plan   1. S/P mitral valve repair/annuloplasty for severe MR 7/25/2019 in [place]        2. PAF (paroxysmal atrial fibrillation)        3. Atherosclerosis of native coronary artery of native heart without angina pectoris            PLAN:  Paroxysmal atrial fibrillation and atrial flutter: Currently stable and asymptomatic.  Continue Eliquis 5 mg p.o. twice daily for stroke prophylaxis, developed acute on chronic " "subdural hematoma while on Coumadin.  Continue metoprolol succinate 25 mg p.o. daily.    Status post mitral valve repair with mitral valve prolapse: Asymptomatic, stable on exam today.  Continue annual clinical follow-up with intermittent echocardiographic surveillance.    Hyperlipidemia: Goal LDL less than 100.  Intolerant to statins due to back pain.  Continue dietary modifications.  Currently well controlled.    Hypertension: Goal less than 130/80 mmHg.  Currently well controlled.  Continue current medical therapy.    Subjective  Normal assessment: Stable functional capacity.  Denies chest pain, palpitations or dyspnea on exertion.  Blood pressures running less than 135/80 mmHg.  Not taking pravastatin due to low back pain, back pain significantly improved off medical therapy.      PHYSICAL EXAMINATION:  Vitals:    05/17/23 1411   BP: 110/64   BP Location: Left arm   Patient Position: Sitting   Pulse: (!) 46   SpO2: 95%   Weight: 126 kg (278 lb)   Height: 170.2 cm (67.01\")     General Appearance:    Alert, cooperative, no distress, appears stated age   Head:    Normocephalic, without obvious abnormality, atraumatic   Eyes:    conjunctiva/corneas clear   Nose:   Nares normal, septum midline, mucosa normal, no drainage   Throat:   Lips, teeth and gums normal   Neck:   Supple, symmetrical, trachea midline, no carotid    bruit or JVD   Lungs:     Clear to auscultation bilaterally, respirations unlabored   Chest Wall:    No tenderness or deformity    Heart:   Bradycardia, S1 and S2 normal, no murmur, rub   or gallop, normal carotid impulse bilaterally without bruit.   Abdomen:     Soft, non-tender   Extremities:   Extremities normal, atraumatic, no cyanosis.  1+ bilateral ankle edema   Pulses:   2+ and symmetric all extremities   Skin:   Skin color, texture, turgor normal, no rashes or lesions       Diagnostic Data:    ECG 12 Lead    Date/Time: 5/17/2023 2:45 PM  Performed by: Amos Crook III, MD  Authorized by: " "Amos Crook III, MD   Comparison: compared with previous ECG from 8/27/2020  Comparison to previous ECG: Atrial flutter has replaced sinus rhythm  Rhythm: atrial flutter  QRS axis: left    Clinical impression: abnormal EKG          Lab Results   Component Value Date    TRIG 76 11/04/2022    HDL 43 11/04/2022     Lab Results   Component Value Date    GLUCOSE 97 11/04/2022    BUN 11 11/04/2022    CREATININE 0.90 11/04/2022     11/04/2022    K 4.6 11/04/2022     11/04/2022    CO2 26.9 11/04/2022     Lab Results   Component Value Date    HGBA1C 5.90 (H) 10/03/2018     Lab Results   Component Value Date    WBC 8.10 11/04/2022    HGB 15.3 11/04/2022    HCT 46.1 11/04/2022     11/04/2022       Allergies  Allergies   Allergen Reactions   • Dye Fdc Red [Red Dye] Nausea And Vomiting     All dyes, IV dye, hair dyes   • Penicillins Other (See Comments)     \"CAUSES CHLAMYDIA\"       Current Medications    Current Outpatient Medications:   •  allopurinol (ZYLOPRIM) 300 MG tablet, Take 1 tablet by mouth Daily., Disp: , Rfl:   •  apixaban (ELIQUIS) 5 MG tablet tablet, Take 1 tablet by mouth 2 (Two) Times a Day., Disp: 180 tablet, Rfl: 0  •  guaiFENesin (MUCINEX) 600 MG 12 hr tablet, Take 2 tablets by mouth 2 (Two) Times a Day., Disp: , Rfl:   •  ipratropium-albuterol (COMBIVENT RESPIMAT)  MCG/ACT inhaler, Inhale 1 puff 4 (Four) Times a Day As Needed for Wheezing., Disp: , Rfl:   •  losartan (COZAAR) 50 MG tablet, TAKE 1 TABLET EVERY DAY, Disp: 90 tablet, Rfl: 1  •  metoprolol succinate XL (TOPROL-XL) 25 MG 24 hr tablet, TAKE 1 TABLET EVERY DAY, Disp: 90 tablet, Rfl: 0  •  budesonide-formoterol (SYMBICORT) 160-4.5 MCG/ACT inhaler, Inhale 2 puffs As Needed., Disp: , Rfl:           ROS  ROS      SOCIAL HX  Social History     Socioeconomic History   • Marital status:    Tobacco Use   • Smoking status: Former     Packs/day: 1.50     Years: 20.00     Pack years: 30.00     Types: Cigarettes     Quit date: " 1986     Years since quittin.0   • Smokeless tobacco: Never   Vaping Use   • Vaping Use: Never used   Substance and Sexual Activity   • Alcohol use: Yes     Alcohol/week: 3.0 - 4.0 standard drinks     Types: 3 - 4 Cans of beer per week     Comment: 3-4 beers every evening   • Drug use: No   • Sexual activity: Defer       FAMILY HX  Family History   Problem Relation Age of Onset   • Multiple sclerosis Mother              Amos Crook III, MD, FACC

## 2023-12-02 NOTE — PLAN OF CARE
Goal Outcome Evaluation:  Plan of Care Reviewed With: patient           Outcome Evaluation: PT eval completed. Pt presents below baseline function d/t generalized weakness, gait instability, and decreased activity tolerance. Pt required CGA for STS and to ambulate 20 ft unsupported. He was mildly unsteady during ambulation and declined FWW use despite encouragement. Pt would benefit from skilled IP PT. Recommend home w/ assist and HH PT at d/c.      Anticipated Discharge Disposition (PT): home with assist, home with home health   Problem: Patient Care Overview  Goal: Plan of Care Review   10/15/19 0451 10/15/19 0458   Coping/Psychosocial   Plan of Care Reviewed With --  patient;daughter   Plan of Care Review   Progress --  improving   OTHER   Outcome Summary VSS. AFEB. A/Ox4 with periods of confusion. Pt exhibited worsening aphasia and BLE weakness between 1900 - 2100. CT ordered, NS initiated, and BMP ordered. NIHSS score of 9 obtained upon assessment. CT showed decrease in midline shift and small repooling of blood. Pt began to improve at 0000, returning to baseline by end of shift. Repeat CT ordered for 0500. K replaced per protocol. Will continue to monitor. --

## 2024-01-17 ENCOUNTER — OFFICE VISIT (OUTPATIENT)
Dept: CARDIOLOGY | Facility: CLINIC | Age: 79
End: 2024-01-17
Payer: MEDICARE

## 2024-01-17 VITALS
BODY MASS INDEX: 43.73 KG/M2 | HEART RATE: 79 BPM | OXYGEN SATURATION: 94 % | DIASTOLIC BLOOD PRESSURE: 70 MMHG | SYSTOLIC BLOOD PRESSURE: 120 MMHG | HEIGHT: 67 IN | WEIGHT: 278.6 LBS

## 2024-01-17 DIAGNOSIS — Z98.890 S/P MVR (MITRAL VALVE REPAIR): ICD-10-CM

## 2024-01-17 DIAGNOSIS — I25.10 ATHEROSCLEROSIS OF NATIVE CORONARY ARTERY OF NATIVE HEART WITHOUT ANGINA PECTORIS: ICD-10-CM

## 2024-01-17 DIAGNOSIS — I48.91 ATRIAL FIBRILLATION WITH RVR: Primary | ICD-10-CM

## 2024-01-17 PROCEDURE — 3074F SYST BP LT 130 MM HG: CPT | Performed by: INTERNAL MEDICINE

## 2024-01-17 PROCEDURE — 3078F DIAST BP <80 MM HG: CPT | Performed by: INTERNAL MEDICINE

## 2024-01-17 PROCEDURE — 99214 OFFICE O/P EST MOD 30 MIN: CPT | Performed by: INTERNAL MEDICINE

## 2024-01-17 RX ORDER — POTASSIUM CHLORIDE 750 MG/1
10 TABLET, FILM COATED, EXTENDED RELEASE ORAL DAILY
COMMUNITY
Start: 2023-12-09 | End: 2024-12-08

## 2024-01-17 RX ORDER — FUROSEMIDE 20 MG/1
20 TABLET ORAL DAILY
COMMUNITY
Start: 2023-12-11 | End: 2024-03-10

## 2024-01-17 NOTE — PROGRESS NOTES
"[facility] at [facility]  Office visit  Alok Tobias  1945  [phone]  There is no work phone number on file.    VISIT DATE:  1/17/2024    PCP: vIet Chau MD  [address] [place] [address]    CC:  Chief Complaint   Patient presents with    Shortness of Breath              Problem List:   1.  Coronary Artery Disease, minor and non-obstructive  A. History of abnormal stress test 07/2016, Dr. Shaver  B. Single episode of severe bilateral arm pain at rest October 2018  C. LHC 10/3/2018:  Normal EF, Minor non obstructive CAD. Mild dilation of aortic root  D. CT Angio Chest 10/4/2018: No important abnormalities  2.  Severe MR  A. PENNY June 2019 with MVP, severe MR (at time onset AF)  B. Mitral valve repair 7/25/2019 [facility] with Medtronic 29 mm annuloplasty band               i. Temporary epicardial pacing wires cut and retained   C. Echo 7/29/2019: LVSF is normal, mild AI, s/p mitral annular ring repair with no evidence of MR  3.  Essential Hypertension  4.  COPD  5.  Asthma  6.  Morbid obesity, BMI 43.85  7.  Atrial Fibrillation with RVR              A. New diagnosis, 6/19/19, CHADS2 Vasc = 3  B. ECV x2 June 2019, successful  C. On Flecainide and Eliquis   D. Post-op MVR, Flecainide   8.   Obstructive Sleep Apnea - declines CPAP  9.   Gout  10. 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 Patten 10/11/2019     ASSESSMENT:   Diagnosis Plan   1. Atrial fibrillation with RVR        2. S/P mitral valve repair/annuloplasty for severe MR 7/25/2019 in [place]        3. Atherosclerosis of native coronary artery of native heart without angina pectoris              PLAN:  Paroxysmal atrial fibrillation and atrial flutter: Currently stable and asymptomatic.  Continue Eliquis 5 mg p.o. twice daily for stroke prophylaxis, developed acute on chronic subdural hematoma while on Coumadin.  Continue metoprolol succinate 25 " "mg p.o. daily.    Status post mitral valve repair with mitral valve prolapse: Asymptomatic, stable on exam today.  Continue annual clinical follow-up with intermittent echocardiographic surveillance.    Hyperlipidemia: Goal LDL less than 100.  Intolerant to statins due to back pain.  Continue dietary modifications.  Currently well controlled.    Hypertension: Goal less than 130/80 mmHg.  Persistent cough.  Weaning off losartan to see if this improves his symptoms.  Continue to keep home blood pressure log.  If blood pressures begin to trend upward will likely transition amlodipine.    Subjective  Normal assessment: Stable functional capacity.  Denies chest pain, palpitations or dyspnea on exertion.  Blood pressures running less than 135/80 mmHg.  Persistent cough over the previous 6 to 8 weeks, nonproductive.      PHYSICAL EXAMINATION:  Vitals:    01/17/24 1504   BP: 120/70   BP Location: Left arm   Patient Position: Sitting   Pulse: 79   SpO2: 94%   Weight: 126 kg (278 lb 9.6 oz)   Height: 170.2 cm (67\")     General Appearance:    Alert, cooperative, no distress, appears stated age   Head:    Normocephalic, without obvious abnormality, atraumatic   Eyes:    conjunctiva/corneas clear   Nose:   Nares normal, septum midline, mucosa normal, no drainage   Throat:   Lips, teeth and gums normal   Neck:   Supple, symmetrical, trachea midline, no carotid    bruit or JVD   Lungs:     Clear to auscultation bilaterally, respirations unlabored   Chest Wall:    No tenderness or deformity    Heart:   Bradycardia, S1 and S2 normal, no murmur, rub   or gallop, normal carotid impulse bilaterally without bruit.   Abdomen:     Soft, non-tender   Extremities:   Extremities normal, atraumatic, no cyanosis.  1+ bilateral ankle edema   Pulses:   2+ and symmetric all extremities   Skin:   Skin color, texture, turgor normal, no rashes or lesions       Diagnostic Data:  Procedures  Lab Results   Component Value Date    TRIG 76 11/04/2022    " "HDL 43 2022     Lab Results   Component Value Date    GLUCOSE 97 2022    BUN 11 2022    CREATININE 0.90 2022     2022    K 4.6 2022     2022    CO2 26.9 2022     Lab Results   Component Value Date    HGBA1C 5.90 (H) 10/03/2018     Lab Results   Component Value Date    WBC 8.10 2022    HGB 15.3 2022    HCT 46.1 2022     2022       Allergies  Allergies   Allergen Reactions    Dye Fdc Red [Red Dye] Nausea And Vomiting     All dyes, IV dye, hair dyes    Penicillins Other (See Comments)     \"CAUSES CHLAMYDIA\"       Current Medications    Current Outpatient Medications:     allopurinol (ZYLOPRIM) 300 MG tablet, Take 1 tablet by mouth Daily., Disp: , Rfl:     apixaban (ELIQUIS) 5 MG tablet tablet, Take 1 tablet by mouth 2 (Two) Times a Day., Disp: 180 tablet, Rfl: 3    budesonide-formoterol (SYMBICORT) 160-4.5 MCG/ACT inhaler, Inhale 2 puffs As Needed., Disp: , Rfl:     furosemide (LASIX) 20 MG tablet, Take 1 tablet by mouth Daily., Disp: , Rfl:     guaiFENesin (MUCINEX) 600 MG 12 hr tablet, Take 2 tablets by mouth 2 (Two) Times a Day., Disp: , Rfl:     ipratropium-albuterol (COMBIVENT RESPIMAT)  MCG/ACT inhaler, Inhale 1 puff 4 (Four) Times a Day As Needed for Wheezing., Disp: , Rfl:     losartan (COZAAR) 50 MG tablet, TAKE 1 TABLET EVERY DAY, Disp: 90 tablet, Rfl: 1    metoprolol succinate XL (TOPROL-XL) 25 MG 24 hr tablet, TAKE 1 TABLET EVERY DAY, Disp: 90 tablet, Rfl: 1    potassium chloride (KLOR-CON) 10 MEQ CR tablet, Take 1 tablet by mouth Daily., Disp: , Rfl:           ROS  ROS      SOCIAL HX  Social History     Socioeconomic History    Marital status:    Tobacco Use    Smoking status: Former     Packs/day: 1.50     Years: 20.00     Additional pack years: 0.00     Total pack years: 30.00     Types: Cigarettes     Quit date: 1986     Years since quittin.6    Smokeless tobacco: Never   Vaping Use    " Vaping Use: Never used   Substance and Sexual Activity    Alcohol use: Not Currently     Alcohol/week: 3.0 - 4.0 standard drinks of alcohol     Types: 3 - 4 Cans of beer per week     Comment: 3-4 beers every evening    Drug use: No    Sexual activity: Defer       FAMILY HX  Family History   Problem Relation Age of Onset    Multiple sclerosis Mother              Amos Crook III, MD, FACC

## 2024-01-24 ENCOUNTER — TELEPHONE (OUTPATIENT)
Dept: CARDIOLOGY | Facility: CLINIC | Age: 79
End: 2024-01-24
Payer: MEDICARE

## 2024-01-24 RX ORDER — VALSARTAN 40 MG/1
40 TABLET ORAL 2 TIMES DAILY
Qty: 60 TABLET | Refills: 3 | Status: SHIPPED | OUTPATIENT
Start: 2024-01-24

## 2024-01-24 NOTE — TELEPHONE ENCOUNTER
Went off his Losartan for one day and his b/p went high. Runs higher in the mornings. Taking 25 mg of Losartan some days and 50 mg other days depending on his b/p. B/P 165/104 this morning. Noon today 143/92. B/P yesterday 146/98. He wants the Losartan changed to something else. He believes that it is making his bronchial asthma worse. Has a lot of phlegm. Also requesting the new b/p medication be twice a day because he takes Eliquis twice a day and that schedule works for him. Please advise.

## 2024-02-09 ENCOUNTER — TELEPHONE (OUTPATIENT)
Dept: CARDIOLOGY | Facility: CLINIC | Age: 79
End: 2024-02-09
Payer: MEDICARE

## 2024-02-09 NOTE — TELEPHONE ENCOUNTER
Spoke with patient. He is very short of breath talking on the phone. Reports he is very SOA just walking around the house. He reports compliance with medications and recently PCP started Lasix 20mg daily.     He's worried about his valve and would like to have an echo.

## 2024-02-09 NOTE — TELEPHONE ENCOUNTER
Caller: Alok Tobias     Relationship: SELF    Best call back number: [phone]    What is your medical concern? PT IS HAVING SOME CONCERNS, HE IS VERY WEAK AND GETS SHORT OF BREATH WHEN HE TRIES TO WALK. HE FEELS THAT THERE IS SOMETHING GOING ON WITH HIS HEART. HE WOULD LIKE TO SPEAK TO THE NURSE ABOUT GETTING AN ECHOCARDIOGRAM. PLEASE REACH OUT TO PT TO ADDRESS THIS.     How long has this issue been going on? THIS HAS BEEN GOING ON FOR ABOUT A YEAR NOW BUT IS GETTING WORSE AND WORSE.

## 2024-02-15 ENCOUNTER — TELEPHONE (OUTPATIENT)
Dept: CARDIOLOGY | Facility: CLINIC | Age: 79
End: 2024-02-15
Payer: MEDICARE

## 2024-02-15 RX ORDER — VALSARTAN 40 MG/1
40 TABLET ORAL 2 TIMES DAILY
Qty: 180 TABLET | Refills: 1 | Status: SHIPPED | OUTPATIENT
Start: 2024-02-15 | End: 2024-02-16 | Stop reason: SDUPTHER

## 2024-02-16 RX ORDER — VALSARTAN 40 MG/1
40 TABLET ORAL 2 TIMES DAILY
Qty: 180 TABLET | Refills: 1 | Status: SHIPPED | OUTPATIENT
Start: 2024-02-16

## 2024-02-26 ENCOUNTER — APPOINTMENT (OUTPATIENT)
Dept: GENERAL RADIOLOGY | Facility: HOSPITAL | Age: 79
End: 2024-02-26
Payer: MEDICARE

## 2024-02-26 ENCOUNTER — HOSPITAL ENCOUNTER (INPATIENT)
Facility: HOSPITAL | Age: 79
LOS: 2 days | Discharge: HOME HEALTH | End: 2024-02-29
Attending: EMERGENCY MEDICINE | Admitting: INTERNAL MEDICINE
Payer: MEDICARE

## 2024-02-26 DIAGNOSIS — R09.02 HYPOXIA: ICD-10-CM

## 2024-02-26 DIAGNOSIS — J44.1 COPD EXACERBATION: ICD-10-CM

## 2024-02-26 DIAGNOSIS — I48.0 PAF (PAROXYSMAL ATRIAL FIBRILLATION): ICD-10-CM

## 2024-02-26 DIAGNOSIS — J96.01 ACUTE RESPIRATORY FAILURE WITH HYPOXIA: Primary | ICD-10-CM

## 2024-02-26 DIAGNOSIS — J44.1 CHRONIC OBSTRUCTIVE PULMONARY DISEASE WITH ACUTE EXACERBATION: Chronic | ICD-10-CM

## 2024-02-26 LAB
ALBUMIN SERPL-MCNC: 3.8 G/DL (ref 3.5–5.2)
ALBUMIN/GLOB SERPL: 1.3 G/DL
ALP SERPL-CCNC: 127 U/L (ref 39–117)
ALT SERPL W P-5'-P-CCNC: 17 U/L (ref 1–41)
ANION GAP SERPL CALCULATED.3IONS-SCNC: 12 MMOL/L (ref 5–15)
AST SERPL-CCNC: 29 U/L (ref 1–40)
BACTERIA UR QL AUTO: NORMAL /HPF
BASOPHILS # BLD AUTO: 0.01 10*3/MM3 (ref 0–0.2)
BASOPHILS NFR BLD AUTO: 0.2 % (ref 0–1.5)
BILIRUB SERPL-MCNC: 1.2 MG/DL (ref 0–1.2)
BILIRUB UR QL STRIP: ABNORMAL
BUN SERPL-MCNC: 11 MG/DL (ref 8–23)
BUN/CREAT SERPL: 13.9 (ref 7–25)
CALCIUM SPEC-SCNC: 8.9 MG/DL (ref 8.6–10.5)
CHLORIDE SERPL-SCNC: 102 MMOL/L (ref 98–107)
CLARITY UR: CLEAR
CO2 SERPL-SCNC: 26 MMOL/L (ref 22–29)
COLOR UR: ABNORMAL
CREAT SERPL-MCNC: 0.79 MG/DL (ref 0.76–1.27)
D-LACTATE SERPL-SCNC: 1.6 MMOL/L (ref 0.5–2)
DEPRECATED RDW RBC AUTO: 53.2 FL (ref 37–54)
EGFRCR SERPLBLD CKD-EPI 2021: 89.8 ML/MIN/1.73
EOSINOPHIL # BLD AUTO: 0.06 10*3/MM3 (ref 0–0.4)
EOSINOPHIL NFR BLD AUTO: 1 % (ref 0.3–6.2)
ERYTHROCYTE [DISTWIDTH] IN BLOOD BY AUTOMATED COUNT: 14.7 % (ref 12.3–15.4)
FLUAV SUBTYP SPEC NAA+PROBE: NOT DETECTED
FLUBV RNA ISLT QL NAA+PROBE: NOT DETECTED
GLOBULIN UR ELPH-MCNC: 2.9 GM/DL
GLUCOSE SERPL-MCNC: 112 MG/DL (ref 65–99)
GLUCOSE UR STRIP-MCNC: NEGATIVE MG/DL
HCT VFR BLD AUTO: 47.2 % (ref 37.5–51)
HGB BLD-MCNC: 15.5 G/DL (ref 13–17.7)
HGB UR QL STRIP.AUTO: NEGATIVE
HOLD SPECIMEN: NORMAL
HYALINE CASTS UR QL AUTO: NORMAL /LPF
IMM GRANULOCYTES # BLD AUTO: 0.03 10*3/MM3 (ref 0–0.05)
IMM GRANULOCYTES NFR BLD AUTO: 0.5 % (ref 0–0.5)
KETONES UR QL STRIP: ABNORMAL
LEUKOCYTE ESTERASE UR QL STRIP.AUTO: ABNORMAL
LYMPHOCYTES # BLD AUTO: 0.67 10*3/MM3 (ref 0.7–3.1)
LYMPHOCYTES NFR BLD AUTO: 10.9 % (ref 19.6–45.3)
MAGNESIUM SERPL-MCNC: 2.2 MG/DL (ref 1.6–2.4)
MCH RBC QN AUTO: 31.8 PG (ref 26.6–33)
MCHC RBC AUTO-ENTMCNC: 32.8 G/DL (ref 31.5–35.7)
MCV RBC AUTO: 96.9 FL (ref 79–97)
MONOCYTES # BLD AUTO: 0.98 10*3/MM3 (ref 0.1–0.9)
MONOCYTES NFR BLD AUTO: 16 % (ref 5–12)
NEUTROPHILS NFR BLD AUTO: 4.39 10*3/MM3 (ref 1.7–7)
NEUTROPHILS NFR BLD AUTO: 71.4 % (ref 42.7–76)
NITRITE UR QL STRIP: NEGATIVE
NRBC BLD AUTO-RTO: 0 /100 WBC (ref 0–0.2)
NT-PROBNP SERPL-MCNC: 1611 PG/ML (ref 0–1800)
PH UR STRIP.AUTO: 5.5 [PH] (ref 5–8)
PLATELET # BLD AUTO: 131 10*3/MM3 (ref 140–450)
PMV BLD AUTO: 11.1 FL (ref 6–12)
POTASSIUM SERPL-SCNC: 4.3 MMOL/L (ref 3.5–5.2)
PROT SERPL-MCNC: 6.7 G/DL (ref 6–8.5)
PROT UR QL STRIP: ABNORMAL
RBC # BLD AUTO: 4.87 10*6/MM3 (ref 4.14–5.8)
RBC # UR STRIP: NORMAL /HPF
REF LAB TEST METHOD: NORMAL
SARS-COV-2 RNA RESP QL NAA+PROBE: NOT DETECTED
SODIUM SERPL-SCNC: 140 MMOL/L (ref 136–145)
SP GR UR STRIP: 1.03 (ref 1–1.03)
SQUAMOUS #/AREA URNS HPF: NORMAL /HPF
TROPONIN T SERPL HS-MCNC: 21 NG/L
TROPONIN T SERPL HS-MCNC: 23 NG/L
UROBILINOGEN UR QL STRIP: ABNORMAL
WBC # UR STRIP: NORMAL /HPF
WBC NRBC COR # BLD AUTO: 6.14 10*3/MM3 (ref 3.4–10.8)
WHOLE BLOOD HOLD COAG: NORMAL
WHOLE BLOOD HOLD SPECIMEN: NORMAL

## 2024-02-26 PROCEDURE — 84484 ASSAY OF TROPONIN QUANT: CPT | Performed by: EMERGENCY MEDICINE

## 2024-02-26 PROCEDURE — 25010000002 METHYLPREDNISOLONE PER 125 MG: Performed by: EMERGENCY MEDICINE

## 2024-02-26 PROCEDURE — 93005 ELECTROCARDIOGRAM TRACING: CPT

## 2024-02-26 PROCEDURE — 81001 URINALYSIS AUTO W/SCOPE: CPT | Performed by: EMERGENCY MEDICINE

## 2024-02-26 PROCEDURE — 83735 ASSAY OF MAGNESIUM: CPT | Performed by: EMERGENCY MEDICINE

## 2024-02-26 PROCEDURE — 80053 COMPREHEN METABOLIC PANEL: CPT | Performed by: EMERGENCY MEDICINE

## 2024-02-26 PROCEDURE — 87636 SARSCOV2 & INF A&B AMP PRB: CPT | Performed by: EMERGENCY MEDICINE

## 2024-02-26 PROCEDURE — 25010000002 FUROSEMIDE PER 20 MG: Performed by: EMERGENCY MEDICINE

## 2024-02-26 PROCEDURE — 99285 EMERGENCY DEPT VISIT HI MDM: CPT

## 2024-02-26 PROCEDURE — 94640 AIRWAY INHALATION TREATMENT: CPT

## 2024-02-26 PROCEDURE — 93005 ELECTROCARDIOGRAM TRACING: CPT | Performed by: EMERGENCY MEDICINE

## 2024-02-26 PROCEDURE — 36415 COLL VENOUS BLD VENIPUNCTURE: CPT

## 2024-02-26 PROCEDURE — 99223 1ST HOSP IP/OBS HIGH 75: CPT | Performed by: NURSE PRACTITIONER

## 2024-02-26 PROCEDURE — 71045 X-RAY EXAM CHEST 1 VIEW: CPT

## 2024-02-26 PROCEDURE — 25010000002 METHYLPREDNISOLONE PER 125 MG: Performed by: NURSE PRACTITIONER

## 2024-02-26 PROCEDURE — 87040 BLOOD CULTURE FOR BACTERIA: CPT | Performed by: NURSE PRACTITIONER

## 2024-02-26 PROCEDURE — G0378 HOSPITAL OBSERVATION PER HR: HCPCS

## 2024-02-26 PROCEDURE — 83880 ASSAY OF NATRIURETIC PEPTIDE: CPT | Performed by: EMERGENCY MEDICINE

## 2024-02-26 PROCEDURE — 85025 COMPLETE CBC W/AUTO DIFF WBC: CPT | Performed by: EMERGENCY MEDICINE

## 2024-02-26 PROCEDURE — 83605 ASSAY OF LACTIC ACID: CPT | Performed by: NURSE PRACTITIONER

## 2024-02-26 PROCEDURE — 25010000002 CEFTRIAXONE PER 250 MG: Performed by: NURSE PRACTITIONER

## 2024-02-26 RX ORDER — POLYETHYLENE GLYCOL 3350 17 G/17G
17 POWDER, FOR SOLUTION ORAL DAILY PRN
Status: DISCONTINUED | OUTPATIENT
Start: 2024-02-26 | End: 2024-02-29 | Stop reason: HOSPADM

## 2024-02-26 RX ORDER — NITROGLYCERIN 0.4 MG/1
0.4 TABLET SUBLINGUAL
Status: DISCONTINUED | OUTPATIENT
Start: 2024-02-26 | End: 2024-02-29 | Stop reason: HOSPADM

## 2024-02-26 RX ORDER — BISACODYL 5 MG/1
5 TABLET, DELAYED RELEASE ORAL DAILY PRN
Status: DISCONTINUED | OUTPATIENT
Start: 2024-02-26 | End: 2024-02-29 | Stop reason: HOSPADM

## 2024-02-26 RX ORDER — METHYLPREDNISOLONE SODIUM SUCCINATE 125 MG/2ML
60 INJECTION, POWDER, LYOPHILIZED, FOR SOLUTION INTRAMUSCULAR; INTRAVENOUS EVERY 8 HOURS
Status: DISCONTINUED | OUTPATIENT
Start: 2024-02-26 | End: 2024-02-28

## 2024-02-26 RX ORDER — GUAIFENESIN 600 MG/1
1200 TABLET, EXTENDED RELEASE ORAL EVERY 12 HOURS SCHEDULED
Status: DISCONTINUED | OUTPATIENT
Start: 2024-02-26 | End: 2024-02-29 | Stop reason: HOSPADM

## 2024-02-26 RX ORDER — IPRATROPIUM BROMIDE AND ALBUTEROL SULFATE 2.5; .5 MG/3ML; MG/3ML
3 SOLUTION RESPIRATORY (INHALATION)
Status: DISCONTINUED | OUTPATIENT
Start: 2024-02-27 | End: 2024-02-29 | Stop reason: HOSPADM

## 2024-02-26 RX ORDER — IPRATROPIUM BROMIDE AND ALBUTEROL SULFATE 2.5; .5 MG/3ML; MG/3ML
3 SOLUTION RESPIRATORY (INHALATION) EVERY 4 HOURS PRN
Status: DISCONTINUED | OUTPATIENT
Start: 2024-02-26 | End: 2024-02-29 | Stop reason: HOSPADM

## 2024-02-26 RX ORDER — VALSARTAN 80 MG/1
40 TABLET ORAL 2 TIMES DAILY
Status: DISCONTINUED | OUTPATIENT
Start: 2024-02-26 | End: 2024-02-29 | Stop reason: HOSPADM

## 2024-02-26 RX ORDER — FUROSEMIDE 10 MG/ML
40 INJECTION INTRAMUSCULAR; INTRAVENOUS ONCE
Status: COMPLETED | OUTPATIENT
Start: 2024-02-27 | End: 2024-02-27

## 2024-02-26 RX ORDER — METOPROLOL SUCCINATE 25 MG/1
25 TABLET, EXTENDED RELEASE ORAL DAILY
Status: DISCONTINUED | OUTPATIENT
Start: 2024-02-27 | End: 2024-02-29 | Stop reason: HOSPADM

## 2024-02-26 RX ORDER — BUDESONIDE AND FORMOTEROL FUMARATE DIHYDRATE 160; 4.5 UG/1; UG/1
2 AEROSOL RESPIRATORY (INHALATION)
Status: DISCONTINUED | OUTPATIENT
Start: 2024-02-26 | End: 2024-02-29 | Stop reason: HOSPADM

## 2024-02-26 RX ORDER — AMOXICILLIN 250 MG
2 CAPSULE ORAL 2 TIMES DAILY PRN
Status: DISCONTINUED | OUTPATIENT
Start: 2024-02-26 | End: 2024-02-29 | Stop reason: HOSPADM

## 2024-02-26 RX ORDER — METHYLPREDNISOLONE SODIUM SUCCINATE 125 MG/2ML
125 INJECTION, POWDER, LYOPHILIZED, FOR SOLUTION INTRAMUSCULAR; INTRAVENOUS ONCE
Status: COMPLETED | OUTPATIENT
Start: 2024-02-26 | End: 2024-02-26

## 2024-02-26 RX ORDER — SODIUM CHLORIDE 9 MG/ML
40 INJECTION, SOLUTION INTRAVENOUS AS NEEDED
Status: DISCONTINUED | OUTPATIENT
Start: 2024-02-26 | End: 2024-02-29 | Stop reason: HOSPADM

## 2024-02-26 RX ORDER — ACETAMINOPHEN 160 MG/5ML
650 SOLUTION ORAL EVERY 4 HOURS PRN
Status: DISCONTINUED | OUTPATIENT
Start: 2024-02-26 | End: 2024-02-29 | Stop reason: HOSPADM

## 2024-02-26 RX ORDER — FUROSEMIDE 10 MG/ML
40 INJECTION INTRAMUSCULAR; INTRAVENOUS ONCE
Status: COMPLETED | OUTPATIENT
Start: 2024-02-26 | End: 2024-02-26

## 2024-02-26 RX ORDER — ACETAMINOPHEN 325 MG/1
650 TABLET ORAL EVERY 4 HOURS PRN
Status: DISCONTINUED | OUTPATIENT
Start: 2024-02-26 | End: 2024-02-29 | Stop reason: HOSPADM

## 2024-02-26 RX ORDER — SODIUM CHLORIDE 0.9 % (FLUSH) 0.9 %
10 SYRINGE (ML) INJECTION AS NEEDED
Status: DISCONTINUED | OUTPATIENT
Start: 2024-02-26 | End: 2024-02-29 | Stop reason: HOSPADM

## 2024-02-26 RX ORDER — CHOLECALCIFEROL (VITAMIN D3) 125 MCG
5 CAPSULE ORAL NIGHTLY PRN
Status: DISCONTINUED | OUTPATIENT
Start: 2024-02-26 | End: 2024-02-29 | Stop reason: HOSPADM

## 2024-02-26 RX ORDER — ALLOPURINOL 300 MG/1
300 TABLET ORAL DAILY
Status: DISCONTINUED | OUTPATIENT
Start: 2024-02-27 | End: 2024-02-29 | Stop reason: HOSPADM

## 2024-02-26 RX ORDER — SODIUM CHLORIDE 0.9 % (FLUSH) 0.9 %
10 SYRINGE (ML) INJECTION EVERY 12 HOURS SCHEDULED
Status: DISCONTINUED | OUTPATIENT
Start: 2024-02-26 | End: 2024-02-29 | Stop reason: HOSPADM

## 2024-02-26 RX ORDER — BISACODYL 10 MG
10 SUPPOSITORY, RECTAL RECTAL DAILY PRN
Status: DISCONTINUED | OUTPATIENT
Start: 2024-02-26 | End: 2024-02-29 | Stop reason: HOSPADM

## 2024-02-26 RX ORDER — ALBUTEROL SULFATE 2.5 MG/3ML
2.5 SOLUTION RESPIRATORY (INHALATION) ONCE
Status: COMPLETED | OUTPATIENT
Start: 2024-02-26 | End: 2024-02-26

## 2024-02-26 RX ORDER — SODIUM CHLORIDE 0.9 % (FLUSH) 0.9 %
10 SYRINGE (ML) INJECTION AS NEEDED
Status: DISCONTINUED | OUTPATIENT
Start: 2024-02-26 | End: 2024-02-27 | Stop reason: SDUPTHER

## 2024-02-26 RX ORDER — ACETAMINOPHEN 650 MG/1
650 SUPPOSITORY RECTAL EVERY 4 HOURS PRN
Status: DISCONTINUED | OUTPATIENT
Start: 2024-02-26 | End: 2024-02-29 | Stop reason: HOSPADM

## 2024-02-26 RX ADMIN — METHYLPREDNISOLONE SODIUM SUCCINATE 60 MG: 125 INJECTION INTRAMUSCULAR; INTRAVENOUS at 22:04

## 2024-02-26 RX ADMIN — FUROSEMIDE 40 MG: 10 INJECTION, SOLUTION INTRAMUSCULAR; INTRAVENOUS at 18:48

## 2024-02-26 RX ADMIN — METHYLPREDNISOLONE SODIUM SUCCINATE 125 MG: 125 INJECTION, POWDER, FOR SOLUTION INTRAMUSCULAR; INTRAVENOUS at 17:35

## 2024-02-26 RX ADMIN — ALBUTEROL SULFATE 2.5 MG: 2.5 SOLUTION RESPIRATORY (INHALATION) at 17:43

## 2024-02-26 RX ADMIN — Medication 10 ML: at 22:06

## 2024-02-26 RX ADMIN — GUAIFENESIN 1200 MG: 600 TABLET, EXTENDED RELEASE ORAL at 22:04

## 2024-02-26 RX ADMIN — APIXABAN 5 MG: 5 TABLET, FILM COATED ORAL at 22:04

## 2024-02-26 RX ADMIN — SODIUM CHLORIDE 1000 MG: 900 INJECTION INTRAVENOUS at 22:43

## 2024-02-26 RX ADMIN — VALSARTAN 40 MG: 80 TABLET, FILM COATED ORAL at 22:43

## 2024-02-26 NOTE — ED PROVIDER NOTES
"Subjective   History of Present Illness  Mr Tobias presents with shortness of breath.  He reports 2 weeks of decline but particularly in the last 2 days has developed a cough and severe dyspnea on exertion.  He is not prescribed oxygen but bought an oxygen concentrator a few years ago and has not had to use it.  He has began using it over the last couple of days and tells me it makes him feel better.  He has had low-grade fevers.  He tells me he feels fine when he is sitting down but with any exertion he becomes very short of breath.  He has history of mitral valve repair.  He has A-fib and is maintained on Eliquis.  He has history of asthma as well.  He has been using an albuterol inhaler.  He denies edema.      Review of Systems    Past Medical History:   Diagnosis Date    Asthma     Atrial fibrillation     Bronchitis     CHF (congestive heart failure)     COPD (chronic obstructive pulmonary disease)     COVID-19     Elevated PSA     Hypertension     Subdural hematoma        Allergies   Allergen Reactions    Dye Fdc Red [Red Dye] Nausea And Vomiting     All dyes, IV dye, hair dyes    Penicillins Other (See Comments)     \"CAUSES CHLAMYDIA\"       Past Surgical History:   Procedure Laterality Date    APPENDECTOMY      CARDIAC CATHETERIZATION N/A 10/03/2018    Procedure: Left Heart Cath;  Surgeon: Twan Harrison MD;  Location:  [facility] CATH INVASIVE LOCATION;  Service: Cardiology    CATARACT EXTRACTION      CRANIOTOMY FOR SUBDURAL HEMATOMA Left 10/11/2019    Procedure: CRANIOTOMY FOR SUBDURAL HEMATOMA;  Surgeon: Reagan Patten MD;  Location: [facility] OR;  Service: Neurosurgery    MITRAL VALVE REPAIR/REPLACEMENT      TONSILLECTOMY         Family History   Problem Relation Age of Onset    Multiple sclerosis Mother        Social History     Socioeconomic History    Marital status:    Tobacco Use    Smoking status: Former     Packs/day: 1.50     Years: 20.00     Additional pack years: 0.00     Total pack " years: 30.00     Types: Cigarettes     Quit date: 1986     Years since quittin.7    Smokeless tobacco: Never   Vaping Use    Vaping Use: Never used   Substance and Sexual Activity    Alcohol use: Not Currently     Alcohol/week: 3.0 - 4.0 standard drinks of alcohol     Types: 3 - 4 Cans of beer per week     Comment: 3-4 beers every evening    Drug use: No    Sexual activity: Defer           Objective   Physical Exam  Vitals and nursing note reviewed.   Constitutional:       General: He is not in acute distress.     Appearance: Normal appearance.   HENT:      Head: Normocephalic and atraumatic.      Nose: Nose normal. No congestion or rhinorrhea.   Eyes:      General: No scleral icterus.     Conjunctiva/sclera: Conjunctivae normal.   Neck:      Comments: No JVD   Cardiovascular:      Rate and Rhythm: Normal rate and regular rhythm.      Heart sounds: No murmur heard.     No friction rub.   Pulmonary:      Effort: Pulmonary effort is normal.      Breath sounds: Wheezing present. No rales.      Comments: He has decreased air movement bilaterally with significant expiratory wheezing  Abdominal:      General: Bowel sounds are normal.      Palpations: Abdomen is soft.      Tenderness: There is no abdominal tenderness. There is no guarding or rebound.   Musculoskeletal:         General: No tenderness.      Cervical back: Normal range of motion and neck supple.      Right lower leg: No edema.      Left lower leg: No edema.   Skin:     General: Skin is warm and dry.      Coloration: Skin is not pale.      Findings: No erythema.   Neurological:      General: No focal deficit present.      Mental Status: He is alert and oriented to person, place, and time.      Motor: No weakness.      Coordination: Coordination normal.   Psychiatric:         Mood and Affect: Mood normal.         Behavior: Behavior normal.         Thought Content: Thought content normal.         Procedures           ED Course  ED Course as of 24  0252   Mon Feb 26, 2024   1644 Have reviewed his records and EKG.  EKG shows atrial fibrillation.  I reviewed his most recent EKG which was during his visit with his cardiologist and at that point he was in atrial fibrillation.  Rate is controlled.  He confirms he has been using his Eliquis. [DT]   1838 Reviewed records, he had echocardiogram 2019 showing normal systolic and diastolic function.  Chest x-ray would suggest CHF.  Will give Lasix.  He has acute hypoxic respiratory failure and will therefore require admission. [DT]   1843 On repeat exam he still is wheezing significantly.  He tells me after the breathing treatment he has been able to bring up some mucus and feels a little bit better.  He is maintaining saturations around 92% on room air at this point. [DT]      ED Course User Index  [DT] Abisai Pfeiffer MD                                             Medical Decision Making  Please see course notes.  I had multiple evaluations and gave multiple medications and nebulized albuterol treatments ordered and interpreted multiple labs.  Reviewed and interpreted prior records    Problems Addressed:  Acute respiratory failure with hypoxia: complicated acute illness or injury that poses a threat to life or bodily functions  COPD exacerbation: complicated acute illness or injury that poses a threat to life or bodily functions    Amount and/or Complexity of Data Reviewed  Labs: ordered. Decision-making details documented in ED Course.  Radiology: ordered. Decision-making details documented in ED Course.  ECG/medicine tests: ordered. Decision-making details documented in ED Course.    Risk  Prescription drug management.  Decision regarding hospitalization.        Final diagnoses:   Acute respiratory failure with hypoxia   COPD exacerbation       ED Disposition  ED Disposition       ED Disposition   Decision to Admit    Condition   --    Comment   Level of Care: Telemetry [5]   Diagnosis: COPD exacerbation [874257]    Admitting Physician: SREEKANTH SIMON [[id number]]   Attending Physician: SREEKANTH SIMON [[id number]]                 No follow-up provider specified.       Medication List      No changes were made to your prescriptions during this visit.            Abisai Pfeiffer MD  02/27/24 0252

## 2024-02-27 LAB
ANION GAP SERPL CALCULATED.3IONS-SCNC: 9 MMOL/L (ref 5–15)
B PARAPERT DNA SPEC QL NAA+PROBE: NOT DETECTED
B PERT DNA SPEC QL NAA+PROBE: NOT DETECTED
BASOPHILS # BLD AUTO: 0 10*3/MM3 (ref 0–0.2)
BASOPHILS NFR BLD AUTO: 0 % (ref 0–1.5)
BUN SERPL-MCNC: 14 MG/DL (ref 8–23)
BUN/CREAT SERPL: 17.5 (ref 7–25)
C PNEUM DNA NPH QL NAA+NON-PROBE: NOT DETECTED
CALCIUM SPEC-SCNC: 8.8 MG/DL (ref 8.6–10.5)
CHLORIDE SERPL-SCNC: 102 MMOL/L (ref 98–107)
CHOLEST SERPL-MCNC: 129 MG/DL (ref 0–200)
CO2 SERPL-SCNC: 28 MMOL/L (ref 22–29)
CREAT SERPL-MCNC: 0.8 MG/DL (ref 0.76–1.27)
DEPRECATED RDW RBC AUTO: 50.8 FL (ref 37–54)
EGFRCR SERPLBLD CKD-EPI 2021: 89.5 ML/MIN/1.73
EOSINOPHIL # BLD AUTO: 0 10*3/MM3 (ref 0–0.4)
EOSINOPHIL NFR BLD AUTO: 0 % (ref 0.3–6.2)
ERYTHROCYTE [DISTWIDTH] IN BLOOD BY AUTOMATED COUNT: 14.6 % (ref 12.3–15.4)
FLUAV SUBTYP SPEC NAA+PROBE: NOT DETECTED
FLUBV RNA ISLT QL NAA+PROBE: NOT DETECTED
GLUCOSE BLDC GLUCOMTR-MCNC: 139 MG/DL (ref 70–130)
GLUCOSE BLDC GLUCOMTR-MCNC: 204 MG/DL (ref 70–130)
GLUCOSE BLDC GLUCOMTR-MCNC: 321 MG/DL (ref 70–130)
GLUCOSE SERPL-MCNC: 165 MG/DL (ref 65–99)
HADV DNA SPEC NAA+PROBE: NOT DETECTED
HCOV 229E RNA SPEC QL NAA+PROBE: NOT DETECTED
HCOV HKU1 RNA SPEC QL NAA+PROBE: NOT DETECTED
HCOV NL63 RNA SPEC QL NAA+PROBE: NOT DETECTED
HCOV OC43 RNA SPEC QL NAA+PROBE: NOT DETECTED
HCT VFR BLD AUTO: 46.2 % (ref 37.5–51)
HDLC SERPL-MCNC: 43 MG/DL (ref 40–60)
HGB BLD-MCNC: 15 G/DL (ref 13–17.7)
HMPV RNA NPH QL NAA+NON-PROBE: NOT DETECTED
HPIV1 RNA ISLT QL NAA+PROBE: NOT DETECTED
HPIV2 RNA SPEC QL NAA+PROBE: NOT DETECTED
HPIV3 RNA NPH QL NAA+PROBE: DETECTED
HPIV4 P GENE NPH QL NAA+PROBE: NOT DETECTED
IMM GRANULOCYTES # BLD AUTO: 0.02 10*3/MM3 (ref 0–0.05)
IMM GRANULOCYTES NFR BLD AUTO: 0.6 % (ref 0–0.5)
LDLC SERPL CALC-MCNC: 76 MG/DL (ref 0–100)
LDLC/HDLC SERPL: 1.8 {RATIO}
LYMPHOCYTES # BLD AUTO: 0.27 10*3/MM3 (ref 0.7–3.1)
LYMPHOCYTES NFR BLD AUTO: 8.5 % (ref 19.6–45.3)
M PNEUMO IGG SER IA-ACNC: NOT DETECTED
MCH RBC QN AUTO: 30.7 PG (ref 26.6–33)
MCHC RBC AUTO-ENTMCNC: 32.5 G/DL (ref 31.5–35.7)
MCV RBC AUTO: 94.5 FL (ref 79–97)
MONOCYTES # BLD AUTO: 0.09 10*3/MM3 (ref 0.1–0.9)
MONOCYTES NFR BLD AUTO: 2.8 % (ref 5–12)
NEUTROPHILS NFR BLD AUTO: 2.81 10*3/MM3 (ref 1.7–7)
NEUTROPHILS NFR BLD AUTO: 88.1 % (ref 42.7–76)
NRBC BLD AUTO-RTO: 0 /100 WBC (ref 0–0.2)
PLATELET # BLD AUTO: 116 10*3/MM3 (ref 140–450)
PMV BLD AUTO: 11.2 FL (ref 6–12)
POTASSIUM SERPL-SCNC: 4.2 MMOL/L (ref 3.5–5.2)
RBC # BLD AUTO: 4.89 10*6/MM3 (ref 4.14–5.8)
RHINOVIRUS RNA SPEC NAA+PROBE: NOT DETECTED
RSV RNA NPH QL NAA+NON-PROBE: NOT DETECTED
SARS-COV-2 RNA NPH QL NAA+NON-PROBE: NOT DETECTED
SODIUM SERPL-SCNC: 139 MMOL/L (ref 136–145)
TRIGL SERPL-MCNC: 44 MG/DL (ref 0–150)
VLDLC SERPL-MCNC: 10 MG/DL (ref 5–40)
WBC NRBC COR # BLD AUTO: 3.19 10*3/MM3 (ref 3.4–10.8)

## 2024-02-27 PROCEDURE — 80061 LIPID PANEL: CPT | Performed by: NURSE PRACTITIONER

## 2024-02-27 PROCEDURE — 82948 REAGENT STRIP/BLOOD GLUCOSE: CPT

## 2024-02-27 PROCEDURE — 85025 COMPLETE CBC W/AUTO DIFF WBC: CPT | Performed by: NURSE PRACTITIONER

## 2024-02-27 PROCEDURE — 25010000002 FUROSEMIDE PER 20 MG: Performed by: NURSE PRACTITIONER

## 2024-02-27 PROCEDURE — 80048 BASIC METABOLIC PNL TOTAL CA: CPT | Performed by: NURSE PRACTITIONER

## 2024-02-27 PROCEDURE — 94799 UNLISTED PULMONARY SVC/PX: CPT

## 2024-02-27 PROCEDURE — 99233 SBSQ HOSP IP/OBS HIGH 50: CPT | Performed by: INTERNAL MEDICINE

## 2024-02-27 PROCEDURE — 97530 THERAPEUTIC ACTIVITIES: CPT

## 2024-02-27 PROCEDURE — 97166 OT EVAL MOD COMPLEX 45 MIN: CPT

## 2024-02-27 PROCEDURE — 94761 N-INVAS EAR/PLS OXIMETRY MLT: CPT

## 2024-02-27 PROCEDURE — 97161 PT EVAL LOW COMPLEX 20 MIN: CPT

## 2024-02-27 PROCEDURE — 63710000001 INSULIN LISPRO (HUMAN) PER 5 UNITS: Performed by: INTERNAL MEDICINE

## 2024-02-27 PROCEDURE — 0202U NFCT DS 22 TRGT SARS-COV-2: CPT | Performed by: INTERNAL MEDICINE

## 2024-02-27 PROCEDURE — 83036 HEMOGLOBIN GLYCOSYLATED A1C: CPT | Performed by: INTERNAL MEDICINE

## 2024-02-27 PROCEDURE — 25010000002 METHYLPREDNISOLONE PER 125 MG: Performed by: NURSE PRACTITIONER

## 2024-02-27 RX ORDER — INSULIN LISPRO 100 [IU]/ML
2-7 INJECTION, SOLUTION INTRAVENOUS; SUBCUTANEOUS
Status: DISCONTINUED | OUTPATIENT
Start: 2024-02-27 | End: 2024-02-29 | Stop reason: HOSPADM

## 2024-02-27 RX ORDER — DEXTROSE MONOHYDRATE 25 G/50ML
25 INJECTION, SOLUTION INTRAVENOUS
Status: DISCONTINUED | OUTPATIENT
Start: 2024-02-27 | End: 2024-02-29 | Stop reason: HOSPADM

## 2024-02-27 RX ORDER — FUROSEMIDE 20 MG/1
20 TABLET ORAL DAILY
Status: DISCONTINUED | OUTPATIENT
Start: 2024-02-28 | End: 2024-02-29 | Stop reason: HOSPADM

## 2024-02-27 RX ORDER — IBUPROFEN 600 MG/1
1 TABLET ORAL
Status: DISCONTINUED | OUTPATIENT
Start: 2024-02-27 | End: 2024-02-29 | Stop reason: HOSPADM

## 2024-02-27 RX ORDER — NICOTINE POLACRILEX 4 MG
15 LOZENGE BUCCAL
Status: DISCONTINUED | OUTPATIENT
Start: 2024-02-27 | End: 2024-02-29 | Stop reason: HOSPADM

## 2024-02-27 RX ADMIN — APIXABAN 5 MG: 5 TABLET, FILM COATED ORAL at 20:28

## 2024-02-27 RX ADMIN — INSULIN LISPRO 3 UNITS: 100 INJECTION, SOLUTION INTRAVENOUS; SUBCUTANEOUS at 20:28

## 2024-02-27 RX ADMIN — Medication 10 ML: at 20:32

## 2024-02-27 RX ADMIN — IPRATROPIUM BROMIDE AND ALBUTEROL SULFATE 3 ML: 2.5; .5 SOLUTION RESPIRATORY (INHALATION) at 07:21

## 2024-02-27 RX ADMIN — BUDESONIDE AND FORMOTEROL FUMARATE DIHYDRATE 2 PUFF: 160; 4.5 AEROSOL RESPIRATORY (INHALATION) at 07:22

## 2024-02-27 RX ADMIN — METHYLPREDNISOLONE SODIUM SUCCINATE 60 MG: 125 INJECTION INTRAMUSCULAR; INTRAVENOUS at 05:48

## 2024-02-27 RX ADMIN — DOXYCYCLINE 100 MG: 100 INJECTION, POWDER, LYOPHILIZED, FOR SOLUTION INTRAVENOUS at 11:05

## 2024-02-27 RX ADMIN — DOXYCYCLINE 100 MG: 100 INJECTION, POWDER, LYOPHILIZED, FOR SOLUTION INTRAVENOUS at 00:10

## 2024-02-27 RX ADMIN — GUAIFENESIN 1200 MG: 600 TABLET, EXTENDED RELEASE ORAL at 09:13

## 2024-02-27 RX ADMIN — VALSARTAN 40 MG: 80 TABLET, FILM COATED ORAL at 20:27

## 2024-02-27 RX ADMIN — ALLOPURINOL 300 MG: 300 TABLET ORAL at 09:13

## 2024-02-27 RX ADMIN — METHYLPREDNISOLONE SODIUM SUCCINATE 60 MG: 125 INJECTION INTRAMUSCULAR; INTRAVENOUS at 20:28

## 2024-02-27 RX ADMIN — APIXABAN 5 MG: 5 TABLET, FILM COATED ORAL at 09:13

## 2024-02-27 RX ADMIN — IPRATROPIUM BROMIDE AND ALBUTEROL SULFATE 3 ML: 2.5; .5 SOLUTION RESPIRATORY (INHALATION) at 14:07

## 2024-02-27 RX ADMIN — FUROSEMIDE 40 MG: 10 INJECTION, SOLUTION INTRAMUSCULAR; INTRAVENOUS at 09:14

## 2024-02-27 RX ADMIN — METHYLPREDNISOLONE SODIUM SUCCINATE 60 MG: 125 INJECTION INTRAMUSCULAR; INTRAVENOUS at 15:48

## 2024-02-27 RX ADMIN — IPRATROPIUM BROMIDE AND ALBUTEROL SULFATE 3 ML: 2.5; .5 SOLUTION RESPIRATORY (INHALATION) at 01:37

## 2024-02-27 RX ADMIN — METOPROLOL SUCCINATE 25 MG: 25 TABLET, EXTENDED RELEASE ORAL at 09:13

## 2024-02-27 RX ADMIN — DOXYCYCLINE 100 MG: 100 INJECTION, POWDER, LYOPHILIZED, FOR SOLUTION INTRAVENOUS at 22:36

## 2024-02-27 RX ADMIN — GUAIFENESIN 1200 MG: 600 TABLET, EXTENDED RELEASE ORAL at 20:28

## 2024-02-27 RX ADMIN — VALSARTAN 40 MG: 80 TABLET, FILM COATED ORAL at 09:14

## 2024-02-27 NOTE — THERAPY EVALUATION
Patient Name: Alok Tobias  : 1945    MRN: [id number]                              Today's Date: 2024       Admit Date: 2024    Visit Dx:     ICD-10-CM ICD-9-CM   1. Acute respiratory failure with hypoxia  J96.01 518.81   2. COPD exacerbation  J44.1 491.21     Patient Active Problem List   Diagnosis    Obesity, Class III, BMI 40-49.9 (morbid obesity)    Hypertension    COPD/asthma    Chest pain    Shortness of breath    COPD (chronic obstructive pulmonary disease)    ANDREA     Renal insufficiency    Alcoholism    Atrial fibrillation with RVR    HCAP (healthcare-associated pneumonia)    Bilateral pleural effusion    Acute diastolic heart failure    Multiple lung nodules on CT    PAF (paroxysmal atrial fibrillation)    S/P mitral valve repair/annuloplasty for severe MR 2019 in [place]    Acute on chronic subdural hematoma with impending herniation s/p left frontotemporal craniotomy 10/11/2019     CAD (coronary atherosclerotic disease)    COPD exacerbation    Hypoxia     Past Medical History:   Diagnosis Date    Asthma     Atrial fibrillation     Bronchitis     CHF (congestive heart failure)     COPD (chronic obstructive pulmonary disease)     COVID-19     Elevated PSA     Hypertension     Subdural hematoma      Past Surgical History:   Procedure Laterality Date    APPENDECTOMY      CARDIAC CATHETERIZATION N/A 10/03/2018    Procedure: Left Heart Cath;  Surgeon: Twan Harrison MD;  Location:  [facility];  Service: Cardiology    CATARACT EXTRACTION      CRANIOTOMY FOR SUBDURAL HEMATOMA Left 10/11/2019    Procedure: CRANIOTOMY FOR SUBDURAL HEMATOMA;  Surgeon: Reagan Patten MD;  Location: [facility] OR;  Service: Neurosurgery    MITRAL VALVE REPAIR/REPLACEMENT      TONSILLECTOMY        General Information       Row Name 24 1036          Physical Therapy Time and Intention    Document Type evaluation  -     Mode of Treatment physical therapy  -       Row Name  02/27/24 1036          General Information    Patient Profile Reviewed yes  -     Prior Level of Function independent:;all household mobility;community mobility;gait;transfer;bed mobility  typically no AD utilizes cane intermittently as needed, no recent falls  -     Existing Precautions/Restrictions fall  -     Barriers to Rehab medically complex  -       Row Name 02/27/24 1036          Living Environment    People in Home alone  -       Row Name 02/27/24 1036          Home Main Entrance    Number of Stairs, Main Entrance none  -       Row Name 02/27/24 1036          Stairs Within Home, Primary    Number of Stairs, Within Home, Primary none  -       Row Name 02/27/24 1036          Cognition    Orientation Status (Cognition) oriented x 3  -HM       Row Name 02/27/24 1036          Safety Issues, Functional Mobility    Safety Issues Affecting Function (Mobility) awareness of need for assistance;insight into deficits/self-awareness;safety precaution awareness;safety precautions follow-through/compliance;sequencing abilities  -     Impairments Affecting Function (Mobility) balance;shortness of breath;endurance/activity tolerance;strength  -               User Key  (r) = Recorded By, (t) = Taken By, (c) = Cosigned By      Initials Name Provider Type     Tiesha Vigil, PT Physical Therapist                   Mobility       Row Name 02/27/24 1140          Bed Mobility    Bed Mobility supine-sit  -     Supine-Sit [place] (Bed Mobility) contact guard;verbal cues  -     Assistive Device (Bed Mobility) bed rails;head of bed elevated  -       Row Name 02/27/24 1140          Sit-Stand Transfer    Sit-Stand [place] (Transfers) contact guard  -       Row Name 02/27/24 1140          Gait/Stairs (Locomotion)    [place] Level (Gait) contact guard  -     Distance in Feet (Gait) 180  -HM     Deviations/Abnormal Patterns (Gait) bilateral deviations;evelyn decreased;stride length  decreased;gait speed decreased;base of support, narrow  -     Bilateral Gait Deviations forward flexed posture  -     Comment, (Gait/Stairs) Pt ambulated with narrow NAIMA, increased lateral sway, decreased stride length, and decreased evelyn. Pt cued for PLB with mobility. SpO2 in 90s after ambulation on room air. Mobility limited d/t dizziness and SOA. Rec trial of FWx for improved energy conservation next PT session.  -               User Key  (r) = Recorded By, (t) = Taken By, (c) = Cosigned By      Initials Name Provider Type     Tiesha Vigil, PT Physical Therapist                   Obj/Interventions       Row Name 02/27/24 1144          Range of Motion Comprehensive    General Range of Motion bilateral lower extremity ROM WFL  -       Row Name 02/27/24 1144          Strength Comprehensive (MMT)    General Manual Muscle Testing (MMT) Assessment lower extremity strength deficits identified  -     Comment, General Manual Muscle Testing (MMT) Assessment BLE grossly 4/5  -       Row Name 02/27/24 1144          Motor Skills    Motor Skills functional endurance  -     Functional Endurance SOA with mobility  -       Row Name 02/27/24 1144          Balance    Balance Assessment sitting static balance;sit to stand dynamic balance;sitting dynamic balance;standing static balance;standing dynamic balance  -     Static Sitting Balance standby assist  -     Dynamic Sitting Balance contact guard  -     Position, Sitting Balance unsupported;sitting edge of bed  -     Sit to Stand Dynamic Balance contact guard  -     Static Standing Balance contact guard  -     Dynamic Standing Balance contact guard  -     Position/Device Used, Standing Balance unsupported  -     Balance Interventions standing;dynamic;occupation based/functional task  -     Comment, Balance mild instability with mobility with one LOB with CGA to recover towards end of ambulation  -       Row Name 02/27/24 1144           Sensory Assessment (Somatosensory)    Sensory Assessment (Somatosensory) LE sensation intact  -HM               User Key  (r) = Recorded By, (t) = Taken By, (c) = Cosigned By      Initials Name Provider Type    Tiesha Egan, PT Physical Therapist                   Goals/Plan       Row Name 02/27/24 1149          Bed Mobility Goal 1 (PT)    Activity/Assistive Device (Bed Mobility Goal 1, PT) bed mobility activities, all  -HM     [place] Level/Cues Needed (Bed Mobility Goal 1, PT) independent  -HM     Time Frame (Bed Mobility Goal 1, PT) long term goal (LTG);10 days  -HM     Progress/Outcomes (Bed Mobility Goal 1, PT) new goal  -       Row Name 02/27/24 1149          Transfer Goal 1 (PT)    Activity/Assistive Device (Transfer Goal 1, PT) sit-to-stand/stand-to-sit;bed-to-chair/chair-to-bed  -HM     [place] Level/Cues Needed (Transfer Goal 1, PT) independent  -HM     Time Frame (Transfer Goal 1, PT) long term goal (LTG);10 days  -HM     Progress/Outcome (Transfer Goal 1, PT) new goal  -       Row Name 02/27/24 1149          Gait Training Goal 1 (PT)    Activity/Assistive Device (Gait Training Goal 1, PT) gait (walking locomotion);increase endurance/gait distance  -HM     [place] Level (Gait Training Goal 1, PT) standby assist  -HM     Distance (Gait Training Goal 1, PT) 450  -HM     Time Frame (Gait Training Goal 1, PT) long term goal (LTG);10 days  -HM     Progress/Outcome (Gait Training Goal 1, PT) new goal  -       Row Name 02/27/24 1149          Patient Education Goal (PT)    Activity (Patient Education Goal, PT) HEP  -HM     [place]/Cues/Accuracy (Memory Goal 2, PT) demonstrates adequately;independent  -HM     Time Frame (Patient Education Goal, PT) long term goal (LTG);10 days  -HM     Progress/Outcome (Patient Education Goal, PT) new goal  -       Row Name 02/27/24 1149          Therapy Assessment/Plan (PT)    Planned Therapy Interventions (PT) balance training;bed mobility  training;gait training;home exercise program;neuromuscular re-education;postural re-education;patient/family education;stair training;strengthening;transfer training  -               User Key  (r) = Recorded By, (t) = Taken By, (c) = Cosigned By      Initials Name Provider Type     Tiesha Vigil, PT Physical Therapist                   Clinical Impression       Row Name 02/27/24 1145          Pain    Pretreatment Pain Rating 0/10 - no pain  -     Posttreatment Pain Rating 0/10 - no pain  -     Pain Intervention(s) Repositioned;Ambulation/increased activity  -       Row Name 02/27/24 1145          Plan of Care Review    Plan of Care Reviewed With patient  -     Progress no change  -     Outcome Evaluation PT eval completed. Pt ambulated 180 feet CGA with no AD with dizziness and SOA limiting further mobility. Pt demonstrated decreased activity tolerance, generalized weakness, and balance deficits warranting IPPT POC. d/c rec home with assist and HHPT.  -       Row Name 02/27/24 1145          Therapy Assessment/Plan (PT)    Rehab Potential (PT) good, to achieve stated therapy goals  -     Criteria for Skilled Interventions Met (PT) yes;meets criteria;skilled treatment is necessary  -     Therapy Frequency (PT) daily  -       Row Name 02/27/24 1145          Vital Signs    Post Systolic BP Rehab 141  -HM     Post Treatment Diastolic   -HM     Pretreatment Heart Rate (beats/min) 73  -HM     Posttreatment Heart Rate (beats/min) 78  -HM     Pre SpO2 (%) 91  -HM     O2 Delivery Pre Treatment room air  -HM     O2 Delivery Intra Treatment room air  -     Post SpO2 (%) 93  -HM     O2 Delivery Post Treatment room air  -     Pre Patient Position Supine  -     Intra Patient Position Standing  -     Post Patient Position Sitting  -       Row Name 02/27/24 1145          Positioning and Restraints    Pre-Treatment Position in bed  -HM     Post Treatment Position chair  -HM     In Chair  notified nsg;call light within reach;encouraged to call for assist;sitting;exit alarm on;waffle cushion  -               User Key  (r) = Recorded By, (t) = Taken By, (c) = Cosigned By      Initials Name Provider Type     Tiesha Vigil PT Physical Therapist                   Outcome Measures       Row Name 02/27/24 1150          How much help from another person do you currently need...    Turning from your back to your side while in flat bed without using bedrails? 4  -HM     Moving from lying on back to sitting on the side of a flat bed without bedrails? 3  -HM     Moving to and from a bed to a chair (including a wheelchair)? 3  -HM     Standing up from a chair using your arms (e.g., wheelchair, bedside chair)? 3  -HM     Climbing 3-5 steps with a railing? 3  -HM     To walk in hospital room? 3  -     AM-PAC 6 Clicks Score (PT) 19  -HM     Highest Level of Mobility Goal 6 --> Walk 10 steps or more  -       Row Name 02/27/24 1150          Functional Assessment    Outcome Measure Options AM-PAC 6 Clicks Basic Mobility (PT)  -               User Key  (r) = Recorded By, (t) = Taken By, (c) = Cosigned By      Initials Name Provider Type     Tiesha Vigil PT Physical Therapist                                 Physical Therapy Education       Title: PT OT SLP Therapies (In Progress)       Topic: Physical Therapy (In Progress)       Point: Mobility training (Done)       Learning Progress Summary             Patient Acceptance, E,TB, VU,NR by  at 2/27/2024 1151                         Point: Home exercise program (Not Started)       Learner Progress:  Not documented in this visit.              Point: Body mechanics (Done)       Learning Progress Summary             Patient Acceptance, E,TB, VU,NR by  at 2/27/2024 1151                         Point: Precautions (Done)       Learning Progress Summary             Patient Acceptance, E,TB, VU,NR by  at 2/27/2024 1151                                          User Key       Initials Effective Dates Name Provider Type Discipline     09/22/22 -  Tiesha Vigil PT Physical Therapist PT                  PT Recommendation and Plan  Planned Therapy Interventions (PT): balance training, bed mobility training, gait training, home exercise program, neuromuscular re-education, postural re-education, patient/family education, stair training, strengthening, transfer training  Plan of Care Reviewed With: patient  Progress: no change  Outcome Evaluation: PT eval completed. Pt ambulated 180 feet CGA with no AD with dizziness and SOA limiting further mobility. Pt demonstrated decreased activity tolerance, generalized weakness, and balance deficits warranting IPPT POC. d/c rec home with assist and HHPT.     Time Calculation:   PT Evaluation Complexity  History, PT Evaluation Complexity: 3 or more personal factors and/or comorbidities  Examination of Body Systems (PT Eval Complexity): total of 3 or more elements  Clinical Presentation (PT Evaluation Complexity): stable  Clinical Decision Making (PT Evaluation Complexity): low complexity  Overall Complexity (PT Evaluation Complexity): low complexity     PT Charges       Row Name 02/27/24 1152             Time Calculation    Start Time 1015  -HM      PT Received On 02/27/24  -HM      PT Goal Re-Cert Due Date 03/08/24  -HM         Timed Charges    66528 - PT Therapeutic Activity Minutes 10  -HM         Untimed Charges    PT Eval/Re-eval Minutes 35  -HM         Total Minutes    Timed Charges Total Minutes 10  -HM      Untimed Charges Total Minutes 35  -HM       Total Minutes 45  -HM                User Key  (r) = Recorded By, (t) = Taken By, (c) = Cosigned By      Initials Name Provider Type     Tiesha Vigil PT Physical Therapist                  Therapy Charges for Today       Code Description Service Date Service Provider Modifiers Qty    05641502088  PT THERAPEUTIC ACT EA 15 MIN 2/27/2024 Tiesha Vigil, PT GP 1     [id number]  PT EVAL LOW COMPLEXITY 3 2/27/2024 Tiesha Vigil, PT GP 1            PT G-Codes  Outcome Measure Options: AM-PAC 6 Clicks Basic Mobility (PT)  AM-PAC 6 Clicks Score (PT): 19  PT Discharge Summary  Anticipated Discharge Disposition (PT): home with assist, home with home health    Tiesha Vigil, PT  2/27/2024

## 2024-02-27 NOTE — THERAPY EVALUATION
Patient Name: Alok Tobias  : 1945    MRN: [id number]                              Today's Date: 2024       Admit Date: 2024    Visit Dx:     ICD-10-CM ICD-9-CM   1. Acute respiratory failure with hypoxia  J96.01 518.81   2. COPD exacerbation  J44.1 491.21     Patient Active Problem List   Diagnosis    Obesity, Class III, BMI 40-49.9 (morbid obesity)    Hypertension    COPD/asthma    Chest pain    Shortness of breath    COPD (chronic obstructive pulmonary disease)    ANDREA     Renal insufficiency    Alcoholism    Atrial fibrillation with RVR    HCAP (healthcare-associated pneumonia)    Bilateral pleural effusion    Acute diastolic heart failure    Multiple lung nodules on CT    PAF (paroxysmal atrial fibrillation)    S/P mitral valve repair/annuloplasty for severe MR 2019 in [place]    Acute on chronic subdural hematoma with impending herniation s/p left frontotemporal craniotomy 10/11/2019     CAD (coronary atherosclerotic disease)    COPD exacerbation    Hypoxia     Past Medical History:   Diagnosis Date    Asthma     Atrial fibrillation     Bronchitis     CHF (congestive heart failure)     COPD (chronic obstructive pulmonary disease)     COVID-19     Elevated PSA     Hypertension     Subdural hematoma      Past Surgical History:   Procedure Laterality Date    APPENDECTOMY      CARDIAC CATHETERIZATION N/A 10/03/2018    Procedure: Left Heart Cath;  Surgeon: Twan Harrison MD;  Location:  [facility];  Service: Cardiology    CATARACT EXTRACTION      CRANIOTOMY FOR SUBDURAL HEMATOMA Left 10/11/2019    Procedure: CRANIOTOMY FOR SUBDURAL HEMATOMA;  Surgeon: Reagan Patten MD;  Location: [facility] OR;  Service: Neurosurgery    MITRAL VALVE REPAIR/REPLACEMENT      TONSILLECTOMY        General Information       Row Name 24 1145          OT Time and Intention    Document Type evaluation  -AN     Mode of Treatment occupational therapy  -AN       Row Name 24  1145          General Information    Patient Profile Reviewed yes  -AN     Prior Level of Function independent:;all household mobility;community mobility;gait;ADL's  typically no AD utilizes cane intermittently as needed, no recent falls  -AN     Existing Precautions/Restrictions fall  -AN     Barriers to Rehab medically complex  -AN       [facility] Name 02/27/24 1145          Living Environment    People in Home alone  -AN       Row Name 02/27/24 1145          Home Main Entrance    Number of Stairs, Main Entrance none  -AN       Row Name 02/27/24 1145          Stairs Within Home, Primary    Number of Stairs, Within Home, Primary none  -AN       Row Name 02/27/24 1145          Cognition    Orientation Status (Cognition) oriented x 3  -AN       Row Name 02/27/24 1145          Safety Issues, Functional Mobility    Safety Issues Affecting Function (Mobility) safety precautions follow-through/compliance;safety precaution awareness;awareness of need for assistance;insight into deficits/self-awareness  -AN     Impairments Affecting Function (Mobility) balance;shortness of breath;endurance/activity tolerance;strength  -AN               User Key  (r) = Recorded By, (t) = Taken By, (c) = Cosigned By      Initials Name Provider Type    AN Catalina Nix OT Occupational Therapist                     Mobility/ADL's       [facility] 02/27/24 1146          Bed Mobility    Bed Mobility supine-sit  -AN     Supine-Sit [place] (Bed Mobility) contact guard;verbal cues  -AN     Assistive Device (Bed Mobility) bed rails;head of bed elevated  -AN       Row Name 02/27/24 1146          Transfers    Transfers sit-stand transfer;stand-sit transfer;bed-chair transfer  -AN       Row Name 02/27/24 1146          Bed-Chair Transfer    Bed-Chair [place] (Transfers) contact guard  -AN       Row Name 02/27/24 1146          Sit-Stand Transfer    Sit-Stand [place] (Transfers) contact guard  -AN       Row Name 02/27/24 1146           Stand-Sit Transfer    Stand-Sit [place] (Transfers) contact guard  -AN       [facility] Name 02/27/24 1146          Functional Mobility    Functional Mobility- Ind. Level not tested  -AN       [facility] Name 02/27/24 1146          Activities of Daily Living    BADL Assessment/Intervention upper body dressing;lower body dressing  -AN       Row Name 02/27/24 1146          Upper Body Dressing Assessment/Training    [place] Level (Upper Body Dressing) don;pajama/robe;set up  -AN     Position (Upper Body Dressing) edge of bed sitting  -AN       Row Name 02/27/24 1146          Lower Body Dressing Assessment/Training    [place] Level (Lower Body Dressing) don;shoes/slippers;minimum assist (75% patient effort)  -AN     Position (Lower Body Dressing) edge of bed sitting  -AN     Comment, (Lower Body Dressing) Pt reports he owns a reacher and sock aid and uses frequently  -AN               User Key  (r) = Recorded By, (t) = Taken By, (c) = Cosigned By      Initials Name Provider Type    AN Catalina Nix OT Occupational Therapist                   Obj/Interventions       Row Name 02/27/24 1147          Sensory Assessment (Somatosensory)    Sensory Assessment (Somatosensory) UE sensation intact  -AN       Row Name 02/27/24 1147          Vision Assessment/Intervention    Visual Impairment/Limitations WFL  -AN       Row Name 02/27/24 1147          Range of Motion Comprehensive    General Range of Motion bilateral upper extremity ROM WFL  -AN       Row Name 02/27/24 1147          Strength Comprehensive (MMT)    General Manual Muscle Testing (MMT) Assessment upper extremity strength deficits identified  -AN     Comment, General Manual Muscle Testing (MMT) Assessment BUE grossly 4/5  -AN       Row Name 02/27/24 1147          Motor Skills    Motor Skills functional endurance  -AN     Functional Endurance SOA with activity requiring cues for PLB  -AN       Row Name 02/27/24 1147          Balance    Balance Assessment sitting  static balance;sitting dynamic balance;sit to stand dynamic balance;standing static balance;standing dynamic balance  -AN     Static Sitting Balance standby assist  -AN     Dynamic Sitting Balance standby assist  -AN     Position, Sitting Balance sitting edge of bed  -AN     Sit to Stand Dynamic Balance contact guard  -AN     Static Standing Balance contact guard  -AN     Dynamic Standing Balance contact guard  -AN     Position/Device Used, Standing Balance unsupported  -AN     Balance Interventions standing;sit to stand;supported;dynamic;minimal challenge;occupation based/functional task  -AN               User Key  (r) = Recorded By, (t) = Taken By, (c) = Cosigned By      Initials Name Provider Type    AN Catalina Nix, OT Occupational Therapist                   Goals/Plan       Row Name 02/27/24 1318          Transfer Goal 1 (OT)    Activity/Assistive Device (Transfer Goal 1, OT) sit-to-stand/stand-to-sit;toilet;walker, rolling  -AN     [place] Level/Cues Needed (Transfer Goal 1, OT) modified independence  -AN     Time Frame (Transfer Goal 1, OT) long term goal (LTG);10 days  -AN       Row Name 02/27/24 1318          Dressing Goal 1 (OT)    Activity/Device (Dressing Goal 1, OT) upper body dressing;lower body dressing  -AN     [place]/Cues Needed (Dressing Goal 1, OT) modified independence  -AN     Time Frame (Dressing Goal 1, OT) long term goal (LTG);10 days  -AN       Row Name 02/27/24 1318          Toileting Goal 1 (OT)    Activity/Device (Toileting Goal 1, OT) adjust/manage clothing;perform perineal hygiene;commode  -AN     [place] Level/Cues Needed (Toileting Goal 1, OT) modified independence  -AN     Time Frame (Toileting Goal 1, OT) long term goal (LTG);10 days  -AN       Row Name 02/27/24 1318          Therapy Assessment/Plan (OT)    Planned Therapy Interventions (OT) activity tolerance training;adaptive equipment training;BADL retraining;functional balance  retraining;occupation/activity based interventions;patient/caregiver education/training;transfer/mobility retraining;strengthening exercise  -AN               User Key  (r) = Recorded By, (t) = Taken By, (c) = Cosigned By      Initials Name Provider Type    Catalina Mcdaniel, OT Occupational Therapist                   Clinical Impression       Row Name 02/27/24 1314          Pain Assessment    Pretreatment Pain Rating 0/10 - no pain  -AN     Posttreatment Pain Rating 0/10 - no pain  -AN     Pre/Posttreatment Pain Comment asymptomatic  -AN     Pain Intervention(s) Repositioned;Ambulation/increased activity  -AN       Row Name 02/27/24 1314          Plan of Care Review    Plan of Care Reviewed With patient  -AN     Progress no change  -AN     Outcome Evaluation Pt presents below his functional baseline with deficits including generalized weakness, impaired balance, decreased activity tolerance, and SOA with activity warranting skilled OT services. Pt performed bed mobility with supervision and performed functional transfers with CGA. Rec home with assist and home health services.  -AN       [facility] Name 02/27/24 1314          Therapy Assessment/Plan (OT)    Patient/Family Therapy Goal Statement (OT) Return to PLOF  -AN     Rehab Potential (OT) good, to achieve stated therapy goals  -AN     Criteria for Skilled Therapeutic Interventions Met (OT) yes;skilled treatment is necessary  -AN     Therapy Frequency (OT) daily  -AN       [facility] Name 02/27/24 1314          Therapy Plan Review/Discharge Plan (OT)    Anticipated Discharge Disposition (OT) home with assist;home with home health  -AN       [facility] Name 02/27/24 1314          Vital Signs    Pre SpO2 (%) 91  -AN     O2 Delivery Pre Treatment room air  -AN     O2 Delivery Intra Treatment room air  -AN     Post SpO2 (%) 93  -AN     O2 Delivery Post Treatment room air  -AN     Pre Patient Position Supine  -AN     Intra Patient Position Standing  -AN     Post Patient Position  Sitting  -AN       Row Name 02/27/24 1314          Positioning and Restraints    Pre-Treatment Position in bed  -AN     Post Treatment Position chair  -AN     In Chair notified nsg;sitting;call light within reach;encouraged to call for assist;exit alarm on;waffle cushion  -AN               User Key  (r) = Recorded By, (t) = Taken By, (c) = Cosigned By      Initials Name Provider Type    Catalina Mcdaniel OT Occupational Therapist                   Outcome Measures       Row Name 02/27/24 1319          How much help from another is currently needed...    Putting on and taking off regular lower body clothing? 3  -AN     Bathing (including washing, rinsing, and drying) 3  -AN     Toileting (which includes using toilet bed pan or urinal) 3  -AN     Putting on and taking off regular upper body clothing 3  -AN     Taking care of personal grooming (such as brushing teeth) 4  -AN     Eating meals 4  -AN     AM-PAC 6 Clicks Score (OT) 20  -AN       Row Name 02/27/24 1150 02/27/24 0800       How much help from another person do you currently need...    Turning from your back to your side while in flat bed without using bedrails? 4  -HM 4  -CN    Moving from lying on back to sitting on the side of a flat bed without bedrails? 3  - 4  -CN    Moving to and from a bed to a chair (including a wheelchair)? 3  - 4  -CN    Standing up from a chair using your arms (e.g., wheelchair, bedside chair)? 3  - 3  -CN    Climbing 3-5 steps with a railing? 3  - 3  -CN    To walk in hospital room? 3  -HM 3  -CN    AM-PAC 6 Clicks Score (PT) 19  -HM 21  -CN    Highest Level of Mobility Goal 6 --> Walk 10 steps or more  -HM 6 --> Walk 10 steps or more  -CN      Row Name 02/27/24 1319 02/27/24 1150       Functional Assessment    Outcome Measure Options AM-PAC 6 Clicks Daily Activity (OT)  -AN AM-PAC 6 Clicks Basic Mobility (PT)  -HM              User Key  (r) = Recorded By, (t) = Taken By, (c) = Cosigned By      Initials Name Provider  Type    CN Portia Pacheco, RN Registered Nurse    Catalina Mcdaniel OT Occupational Therapist    Tiesha Egan, PT Physical Therapist                    Occupational Therapy Education       Title: PT OT SLP Therapies (In Progress)       Topic: Occupational Therapy (In Progress)       Point: ADL training (Done)       Description:   Instruct learner(s) on proper safety adaptation and remediation techniques during self care or transfers.   Instruct in proper use of assistive devices.                  Learning Progress Summary             Patient Acceptance, E, VU by AN at 2/27/2024 1320                         Point: Home exercise program (Not Started)       Description:   Instruct learner(s) on appropriate technique for monitoring, assisting and/or progressing therapeutic exercises/activities.                  Learner Progress:  Not documented in this visit.              Point: Precautions (Done)       Description:   Instruct learner(s) on prescribed precautions during self-care and functional transfers.                  Learning Progress Summary             Patient Acceptance, E, VU by AN at 2/27/2024 1320                         Point: Body mechanics (Done)       Description:   Instruct learner(s) on proper positioning and spine alignment during self-care, functional mobility activities and/or exercises.                  Learning Progress Summary             Patient Acceptance, E, VU by AN at 2/27/2024 1320                                         User Key       Initials Effective Dates Name Provider Type Discipline    COLIN 09/21/21 -  Catalina Nix OT Occupational Therapist OT                  OT Recommendation and Plan  Planned Therapy Interventions (OT): activity tolerance training, adaptive equipment training, BADL retraining, functional balance retraining, occupation/activity based interventions, patient/caregiver education/training, transfer/mobility retraining, strengthening exercise  Therapy  Frequency (OT): daily  Plan of Care Review  Plan of Care Reviewed With: patient  Progress: no change  Outcome Evaluation: Pt presents below his functional baseline with deficits including generalized weakness, impaired balance, decreased activity tolerance, and SOA with activity warranting skilled OT services. Pt performed bed mobility with supervision and performed functional transfers with CGA. Rec home with assist and home health services.     Time Calculation:   Evaluation Complexity (OT)  Review Occupational Profile/Medical/Therapy History Complexity: expanded/moderate complexity  Assessment, Occupational Performance/Identification of Deficit Complexity: 3-5 performance deficits  Clinical Decision Making Complexity (OT): detailed assessment/moderate complexity  Overall Complexity of Evaluation (OT): moderate complexity     Time Calculation- OT       Row Name 02/27/24 1321             Time Calculation- OT    OT Start Time 1000  -AN      OT Received On 02/27/24  -AN      OT Goal Re-Cert Due Date 03/08/24  -AN         Untimed Charges    OT Eval/Re-eval Minutes 46  -AN         Total Minutes    Untimed Charges Total Minutes 46  -AN       Total Minutes 46  -AN                User Key  (r) = Recorded By, (t) = Taken By, (c) = Cosigned By      Initials Name Provider Type    Catalina Mcdaniel OT Occupational Therapist                  Therapy Charges for Today       Code Description Service Date Service Provider Modifiers Qty    [id number] HC OT EVAL MOD COMPLEXITY 4 2/27/2024 Catalina Nix OT GO 1                 Catalina Nix OT  2/27/2024

## 2024-02-27 NOTE — PLAN OF CARE
Goal Outcome Evaluation:  Plan of Care Reviewed With: patient        Progress: no change  Outcome Evaluation: PT eval completed. Pt ambulated 180 feet CGA with no AD with dizziness and SOA limiting further mobility. Pt demonstrated decreased activity tolerance, generalized weakness, and balance deficits warranting IPPT POC. d/c rec home with assist and HHPT.      Anticipated Discharge Disposition (PT): home with assist, home with home health

## 2024-02-27 NOTE — PLAN OF CARE
Goal Outcome Evaluation:  Plan of Care Reviewed With: patient        Progress: improving     VSS, A/Ox4. Currently on RA. Sat in chair on room air. IV Lasik administered. Voiding spontaneously. No complaints at this time, will continue plan of care.   Problem: Adult Inpatient Plan of Care  Goal: Plan of Care Review  Outcome: Ongoing, Progressing  Flowsheets (Taken 2/27/2024 1557)  Progress: improving  Plan of Care Reviewed With: patient  Goal: Patient-Specific Goal (Individualized)  Outcome: Ongoing, Progressing  Goal: Absence of Hospital-Acquired Illness or Injury  Outcome: Ongoing, Progressing  Intervention: Identify and Manage Fall Risk  Recent Flowsheet Documentation  Taken 2/27/2024 0800 by Portia Pacheco, RN  Safety Promotion/Fall Prevention:   activity supervised   assistive device/personal items within reach   clutter free environment maintained   fall prevention program maintained   nonskid shoes/slippers when out of bed   room organization consistent   safety round/check completed   toileting scheduled  Goal: Optimal Comfort and Wellbeing  Outcome: Ongoing, Progressing  Goal: Readiness for Transition of Care  Outcome: Ongoing, Progressing     Problem: Asthma Comorbidity  Goal: Maintenance of Asthma Control  Outcome: Ongoing, Progressing     Problem: COPD (Chronic Obstructive Pulmonary Disease) Comorbidity  Goal: Maintenance of COPD Symptom Control  Outcome: Ongoing, Progressing

## 2024-02-27 NOTE — ED NOTES
Alok Tobias    Nursing Report ED to Floor:  Mental status: alert and oriented   Ambulatory status: independent  Oxygen Therapy:  room air  Cardiac Rhythm: NSR  Admitted from: ED  Safety Concerns:  none noted  Social Issues: none noted  ED Room #:  19    ED Nurse Phone Extension - [phone] or may call [phone].      HPI:   Chief Complaint   Patient presents with    Weakness - Generalized       Past Medical History:  Past Medical History:   Diagnosis Date    Asthma     Atrial fibrillation     Bronchitis     CHF (congestive heart failure)     COPD (chronic obstructive pulmonary disease)     COVID-19     Elevated PSA     Hypertension     Subdural hematoma         Past Surgical History:  Past Surgical History:   Procedure Laterality Date    APPENDECTOMY      CARDIAC CATHETERIZATION N/A 10/03/2018    Procedure: Left Heart Cath;  Surgeon: Twan Harrison MD;  Location:  [facility];  Service: Cardiology    CATARACT EXTRACTION      CRANIOTOMY FOR SUBDURAL HEMATOMA Left 10/11/2019    Procedure: CRANIOTOMY FOR SUBDURAL HEMATOMA;  Surgeon: Reagan Patten MD;  Location:  [facility];  Service: Neurosurgery    MITRAL VALVE REPAIR/REPLACEMENT      TONSILLECTOMY          Admitting Doctor:   Marli Hahn MD    Consulting Provider(s):  Consults       No orders found from 1/28/2024 to 2/27/2024.             Admitting Diagnosis:   The primary encounter diagnosis was Acute respiratory failure with hypoxia. A diagnosis of COPD exacerbation was also pertinent to this visit.    Most Recent Vitals:   Vitals:    02/26/24 1752 02/26/24 1757 02/26/24 1759 02/26/24 1804   BP:   119/66    BP Location:       Patient Position:       Pulse: 68 61 57 67   Resp:       Temp:       TempSrc:       SpO2: 95% 95% 94% 95%   Weight:       Height:           Active LDAs/IV Access:   Lines, Drains & Airways       Active LDAs       Name Placement date Placement time Site Days    Peripheral IV 02/26/24 1618 Left Antecubital  02/26/24  1618  Antecubital  less than 1                    Labs (abnormal labs have a star):   Labs Reviewed   COMPREHENSIVE METABOLIC PANEL - Abnormal; Notable for the following components:       Result Value    Glucose 112 (*)     Alkaline Phosphatase 127 (*)     All other components within normal limits    Narrative:     GFR Normal >60  Chronic Kidney Disease <60  Kidney Failure <15    The GFR formula is only valid for adults with stable renal function between ages 18 and 70.   SINGLE HSTROPONIN T - Abnormal; Notable for the following components:    HS Troponin T 23 (*)     All other components within normal limits    Narrative:     High Sensitive Troponin T Reference Range:  <14.0 ng/L- Negative Female for AMI  <22.0 ng/L- Negative Male for AMI  >=14 - Abnormal Female indicating possible myocardial injury.  >=22 - Abnormal Male indicating possible myocardial injury.   Clinicians would have to utilize clinical acumen, EKG, Troponin, and serial changes to determine if it is an Acute Myocardial Infarction or myocardial injury due to an underlying chronic condition.        URINALYSIS W/ MICROSCOPIC IF INDICATED (NO CULTURE) - Abnormal; Notable for the following components:    Color, UA Dark Yellow (*)     Ketones, UA Trace (*)     Bilirubin, UA Small (1+) (*)     Protein, UA Trace (*)     Leuk Esterase, UA Trace (*)     All other components within normal limits   CBC WITH AUTO DIFFERENTIAL - Abnormal; Notable for the following components:    Platelets 131 (*)     Lymphocyte % 10.9 (*)     Monocyte % 16.0 (*)     Lymphocytes, Absolute 0.67 (*)     Monocytes, Absolute 0.98 (*)     All other components within normal limits   COVID-19 AND FLU A/B, NP SWAB IN TRANSPORT MEDIA 1 HR TAT - Normal    Narrative:     Fact sheet for providers: https://www.fda.gov/media/158478/download    Fact sheet for patients: https://www.fda.gov/media/429584/download    Test performed by PCR.   MAGNESIUM - Normal   BNP (IN-HOUSE) - Normal     Narrative:     This assay is used as an aid in the diagnosis of individuals suspected of having heart failure. It can be used as an aid in the diagnosis of acute decompensated heart failure (ADHF) in patients presenting with signs and symptoms of ADHF to the emergency department (ED). In addition, NT-proBNP of <300 pg/mL indicates ADHF is not likely.    Age Range Result Interpretation  NT-proBNP Concentration (pg/mL:      <50             Positive            >450                   Gray                 300-450                    Negative             <300    50-75           Positive            >900                  Gray                300-900                  Negative            <300      >75             Positive            >1800                  Gray                300-1800                  Negative            <300   SINGLE HSTROPONIN T - Normal    Narrative:     High Sensitive Troponin T Reference Range:  <14.0 ng/L- Negative Female for AMI  <22.0 ng/L- Negative Male for AMI  >=14 - Abnormal Female indicating possible myocardial injury.  >=22 - Abnormal Male indicating possible myocardial injury.   Clinicians would have to utilize clinical acumen, EKG, Troponin, and serial changes to determine if it is an Acute Myocardial Infarction or myocardial injury due to an underlying chronic condition.        COVID PRE-OP / PRE-PROCEDURE SCREENING ORDER (NO ISOLATION)    Narrative:     The following orders were created for panel order COVID PRE-OP / PRE-PROCEDURE SCREENING ORDER (NO ISOLATION) - Swab, Nasopharynx.  Procedure                               Abnormality         Status                     ---------                               -----------         ------                     COVID-19 and FLU A/B PCR...[[id number]]  Normal              Final result                 Please view results for these tests on the individual orders.   URINALYSIS, MICROSCOPIC ONLY   RAINBOW DRAW    Narrative:     The following orders were  created for panel order [place] Draw.  Procedure                               Abnormality         Status                     ---------                               -----------         ------                     Green Top (Gel)[[id number]]                                  Final result               Lavender Top[[id number]]                                     Final result               Gold Top - SST[[id number]]                                   Final result               Gray Top[[id number]]                                         In process                 Light Blue Top[[id number]]                                   Final result                 Please view results for these tests on the individual orders.   CBC AND DIFFERENTIAL    Narrative:     The following orders were created for panel order CBC & Differential.  Procedure                               Abnormality         Status                     ---------                               -----------         ------                     CBC Auto Differential[[id number]]        Abnormal            Final result               Scan Slide[[id number]]                                                                    Please view results for these tests on the individual orders.   GREEN TOP   LAVENDER TOP   GOLD TOP - SST   LIGHT BLUE TOP   GRAY TOP       Meds Given in ED:   Medications   sodium chloride 0.9 % flush 10 mL (has no administration in time range)   methylPREDNISolone sodium succinate (SOLU-Medrol) injection 125 mg (125 mg Intravenous Given 2/26/24 1735)   albuterol (PROVENTIL) nebulizer solution 0.083% 2.5 mg/3mL (2.5 mg Nebulization Given 2/26/24 1743)   furosemide (LASIX) injection 40 mg (40 mg Intravenous Given 2/26/24 1848)

## 2024-02-27 NOTE — PROGRESS NOTES
[facility]  PROGRESS NOTE    Patient Name: Alok Tobias  : 1945  MRN: [id number]    Date of Admission: 2024  Primary Care Physician: Ivet Chau MD    Subjective   Subjective     CC:  COPD exacerbation     HPI:  Doing okay this am, just got a breathing treatment and is wheezing more now.  Complains of cough- states that Mucinex really helps him at home but that it doesn't last 12 hours so he usually takes it TID if he has a flare up.       Objective   Objective     Vital Signs:   Temp:  [97.5 °F (36.4 °C)-97.9 °F (36.6 °C)] 97.9 °F (36.6 °C)  Heart Rate:  [53-87] 66  Resp:  [18-22] 18  BP: (114-156)/(47-98) 156/98  Flow (L/min):  [2-3] 2     Physical Exam:  Constitutional: No acute distress, awake, alert  HENT: NCAT, mucous membranes moist  Respiratory: diffuse wheezing throughout on anterior exam, audible from doorway, on 2L BNC with sats in the mid 90s  Cardiovascular: RRR, no murmurs, rubs, or gallops  Gastrointestinal: Positive bowel sounds, soft, nontender, nondistended  Musculoskeletal: No bilateral ankle edema  Psychiatric: Appropriate affect, cooperative  Neurologic: Oriented x 3, strength symmetric in all extremities, Cranial Nerves grossly intact to confrontation, speech clear  Skin: No rashes     Results Reviewed:  LAB RESULTS:      Lab 24  0522 24  2207 24  1618   WBC 3.19*  --  6.14   HEMOGLOBIN 15.0  --  15.5   HEMATOCRIT 46.2  --  47.2   PLATELETS 116*  --  131*   NEUTROS ABS 2.81  --  4.39   IMMATURE GRANS (ABS) 0.02  --  0.03   LYMPHS ABS 0.27*  --  0.67*   MONOS ABS 0.09*  --  0.98*   EOS ABS 0.00  --  0.06   MCV 94.5  --  96.9   LACTATE  --  1.6  --          Lab 24  0522 24  1618   SODIUM 139 140   POTASSIUM 4.2 4.3   CHLORIDE 102 102   CO2 28.0 26.0   ANION GAP 9.0 12.0   BUN 14 11   CREATININE 0.80 0.79   EGFR 89.5 89.8   GLUCOSE 165* 112*   CALCIUM 8.8 8.9   MAGNESIUM  --  2.2         Lab  02/26/24  1618   TOTAL PROTEIN 6.7   ALBUMIN 3.8   GLOBULIN 2.9   ALT (SGPT) 17   AST (SGOT) 29   BILIRUBIN 1.2   ALK PHOS 127*         Lab 02/26/24  1845 02/26/24  1618   PROBNP  --  1,611.0   HSTROP T 21 23*         Lab 02/27/24  0522   CHOLESTEROL 129   LDL CHOL 76   HDL CHOL 43   TRIGLYCERIDES 44             Brief Urine Lab Results  (Last result in the past 365 days)        Color   Clarity   Blood   Leuk Est   Nitrite   Protein   CREAT   Urine HCG        02/26/24 1718 Dark Yellow   Clear   Negative   Trace   Negative   Trace                   Microbiology Results Abnormal       Procedure Component Value - Date/Time    COVID PRE-OP / PRE-PROCEDURE SCREENING ORDER (NO ISOLATION) - Swab, Nasopharynx [[id number]]  (Normal) Collected: 02/26/24 1708    Lab Status: Final result Specimen: Swab from Nasopharynx Updated: 02/26/24 1742    Narrative:      The following orders were created for panel order COVID PRE-OP / PRE-PROCEDURE SCREENING ORDER (NO ISOLATION) - Swab, Nasopharynx.  Procedure                               Abnormality         Status                     ---------                               -----------         ------                     COVID-19 and FLU A/B PCR...[[id number]]  Normal              Final result                 Please view results for these tests on the individual orders.    COVID-19 and FLU A/B PCR, 1 HR TAT - Swab, Nasopharynx [[id number]]  (Normal) Collected: 02/26/24 1708    Lab Status: Final result Specimen: Swab from Nasopharynx Updated: 02/26/24 1742     COVID19 Not Detected     Influenza A PCR Not Detected     Influenza B PCR Not Detected    Narrative:      Fact sheet for providers: https://www.fda.gov/media/978047/download    Fact sheet for patients: https://www.fda.gov/media/017529/download    Test performed by PCR.            XR Chest 1 View    Result Date: 2/26/2024  XR CHEST 1 VW Date of Exam: 2/26/2024 3:59 PM EST Indication: Weak/Dizzy/AMS triage protocol Comparison: 10/13/2019  Findings: Cardiomediastinal silhouette is enlarged, similar prior examination. There is generalized interstitial prominence. There are small bilateral pleural effusions without dense consolidation. No pneumothorax. No acute osseous abnormality identified.     Impression: Impression: Mild CHF features. Electronically Signed: Abisai Koo MD  2/26/2024 4:36 PM EST  Workstation ID: [id number]     Results for orders placed during the hospital encounter of 10/11/19    Adult Transthoracic Echo Complete W/ Cont if Necessary Per Protocol    Interpretation Summary  · This is a technically limited study.  · Global LV systolic function appears to be normal.  · There is left atrial enlargement. There is moderate concentric LVH.  · There is aortic valve sclerosis with mild aortic insufficiency.  · There is evidence of prior MV repair with no more than mild mitral regurgitation.      Current medications:  Scheduled Meds:allopurinol, 300 mg, Oral, Daily  apixaban, 5 mg, Oral, BID  budesonide-formoterol, 2 puff, Inhalation, BID - RT  cefTRIAXone, 1,000 mg, Intravenous, Q24H  doxycycline, 100 mg, Intravenous, Q12H  furosemide, 40 mg, Intravenous, Once  guaiFENesin, 1,200 mg, Oral, Q12H  ipratropium-albuterol, 3 mL, Nebulization, Q6H - RT  methylPREDNISolone sodium succinate, 60 mg, Intravenous, Q8H  metoprolol succinate XL, 25 mg, Oral, Daily  sodium chloride, 10 mL, Intravenous, Q12H  valsartan, 40 mg, Oral, BID      Continuous Infusions:   PRN Meds:.  acetaminophen **OR** acetaminophen **OR** acetaminophen    senna-docusate sodium **AND** polyethylene glycol **AND** bisacodyl **AND** bisacodyl    ipratropium-albuterol    melatonin    nitroglycerin    sodium chloride    sodium chloride    sodium chloride    Assessment & Plan   Assessment & Plan     Active Hospital Problems    Diagnosis  POA    **COPD exacerbation [J44.1]  Yes    Hypertension [I10]  Yes     Priority: Low    Hypoxia [R09.02]  Unknown    CAD (coronary atherosclerotic  disease) [I25.10]  Yes    PAF (paroxysmal atrial fibrillation) [I48.0]  Yes      Resolved Hospital Problems   No resolved problems to display.        Brief Hospital Course to date:  Alok Tobias is a 78 y.o. male with a history of hypertension, hyperlipidemia, A-fib on Eliquis, COPD, severe MR, CAD, ANDREA, gout, SDH s/p craniotomy, presented to the ED with complaints of shortness of breath.       Assessment and Plan:     Acute COPD exacerbation  Hypoxia  -- Chest x-ray shows mild CHF features  -- COVID-19 and flu A/B not detected  -- Was given 40 mg IV Lasix and 125 mg of IV Solu-Medrol in the ED  -- duonebs scheduled and prn  -- continue solumedrol 60 mg IV every 8 hours  -- check full respiratory viral panel  -- stop rocephin as no PNA noted on CXR and will continue doxycycline for antiinflammatory effects  -- sputum culture  -- COPD nurse navigator  -- pt/ot consulted  -- am labs     Acute decompensated HFpEF  -- Chest xray shows mild CHF  -- was given Lasix 40 mg IV x 1 dose in the ED, give one dose of Lasix 40 mg IV this am as scheduled by admitting provider.  No further diuresis after as he appears nearing euvolemia  -- net negative 1830 for 24 hours  -- patient is prescribed Lasix 20 mg PO daily but had not taken it the 4 days PTA  -- strict I&Os  -- daily weights  -- echo PENDING  -- am labs     CAD  Severe MR   HTN  HLD  --Metoprolol, ARB     A-fib  -- Continue Eliquis  -- Continue metoprolol     Thrombocytopenia  -- plt 131 on admission, down to 116 today. Monitor  -- cbc in the am     ANDREA  -- Declined CPAP    Total time spent: Time Spent: Time Spent: 35 minutes  Time spent includes time reviewing chart, face-to-face time, counseling patient/family/caregiver, ordering medications/tests/procedures, communicating with other health care professionals, documenting clinical information in the electronic health record, and coordination of care.      Expected Discharge Location and Transportation: TBD, PT/OT to  evaluate  Expected Discharge   Expected Discharge Date: 3/1/2024; Expected Discharge Time:      DVT prophylaxis:  Medical DVT prophylaxis orders are present.         AM-PAC 6 Clicks Score (PT): 23 (02/26/24 2025)    CODE STATUS:   Code Status and Medical Interventions:   Ordered at: 02/26/24 2140     Level Of Support Discussed With:    Patient     Code Status (Patient has no pulse and is not breathing):    CPR (Attempt to Resuscitate)     Medical Interventions (Patient has pulse or is breathing):    Full Support       Marline Campbell MD  02/27/24

## 2024-02-27 NOTE — PLAN OF CARE
Goal Outcome Evaluation:  Plan of Care Reviewed With: patient        Progress: no change  Outcome Evaluation: Pt presents below his functional baseline with deficits including generalized weakness, impaired balance, decreased activity tolerance, and SOA with activity warranting skilled OT services. Pt performed bed mobility with supervision and performed functional transfers with CGA. Rec home with assist and home health services.      Anticipated Discharge Disposition (OT): home with assist, home with home health

## 2024-02-27 NOTE — CONSULTS
Referring Provider: MARIPOSA Cardenas  Reason for Consultation: AECOPD    Subjective .   Education: NN spoke with pt at BS.  Pt alert and able to answer questions appropriately.  Pt O2 sat 91% on  RA currently, no home O2 use.  Pt reports the ability to ambulate unclear distance at baseline before experiencing SOB.  Pt states unclear use of rescue medication.  Patient is up to date on flu and PNA vaccines but not COVID vaccine.  Former smoker, quit date 1986.  Pt reports no issues at this time with medications or transportation for appointments.  Pt reports no previous hx of formal COPD teaching, no understanding of action plan, or [place].  Stop light report, instructions for accessing iTGR and list of educational videos given to pt.  1800QUITNOW reference sheet discussed and given to patient at BS.   COPD education completed in the form of explanation, handouts, and videos.  No new concerns or questions voiced at this time.  NN will continue to follow as needed.     Age: 78 y.o.  Sex: male  Smoker Status: former, ~30 pack years  Pulmonologist: PANKAJ  FEV1 (PFT): 45% (6/26/2019)  Home O2: RA    Objective     SpO2 SpO2: (P) 90 % (02/27/24 1200)  Device Device (Oxygen Therapy): (P) nasal cannula (02/27/24 1200)  Flow Flow (L/min): (P) 2 (02/27/24 1200)  Incentive Spirometer    IS Predicted Level (mL)     Number of Repetitions     Level Incentive Spirometer (mL)    Patient Tolerance     Inhaler Treatment Status Respiratory Treatment Status (Inhaler): given, mouth rinsed posttreatment (02/27/24 0722)  Treatment Route Respiratory Treatment Status (Inhaler): given, mouth rinsed posttreatment (02/27/24 0722)      Home Medications:  Medications Prior to Admission   Medication Sig Dispense Refill Last Dose    allopurinol (ZYLOPRIM) 300 MG tablet Take 1 tablet by mouth Daily.   2/26/2024    apixaban (ELIQUIS) 5 MG tablet tablet Take 1 tablet by mouth 2 (Two) Times a Day. 180 tablet 1 2/26/2024    budesonide-formoterol (SYMBICORT)  160-4.5 MCG/ACT inhaler Inhale 2 puffs As Needed.   2/26/2024    furosemide (LASIX) 20 MG tablet Take 1 tablet by mouth Daily.   2/26/2024    guaiFENesin (MUCINEX) 600 MG 12 hr tablet Take 2 tablets by mouth 2 (Two) Times a Day. OTC   2/26/2024    ipratropium-albuterol (COMBIVENT RESPIMAT)  MCG/ACT inhaler Inhale 1 puff 4 (Four) Times a Day As Needed for Wheezing.   Past Week    metoprolol succinate XL (TOPROL-XL) 25 MG 24 hr tablet TAKE 1 TABLET EVERY DAY 90 tablet 1 2/26/2024    potassium chloride (KLOR-CON) 10 MEQ CR tablet Take 1 tablet by mouth Daily. Takes with lasix   2/26/2024    valsartan (DIOVAN) 40 MG tablet Take 1 tablet by mouth 2 (Two) Times a Day. 180 tablet 1 2/26/2024       Discussion: Per current GOLD Standards, please consider: No LAMA in place, LABA/ICS in place (Symbicort), Outpatient PFT, Rehab as appropriate, Palliative Care consult, Annual LDCT per current screening guidelines (age 50-80 years old, smoking history of 20 pack years or more or has quit within past 15 years)           Sadaf Louise RN

## 2024-02-27 NOTE — CASE MANAGEMENT/SOCIAL WORK
"Discharge Planning Assessment  [facility]     Patient Name: Alok Tobias  MRN: [id number]  Today's Date: 2/27/2024    Admit Date: 2/26/2024    Plan: Home   Discharge Needs Assessment       Row Name 02/27/24 1042       Living Environment    People in Home alone    Current Living Arrangements home    Potentially Unsafe Housing Conditions none    Primary Care Provided by self    Provides Primary Care For no one    Family Caregiver if Needed other (see comments)  Pt stated he has a daughter that's an APRN that lives nearby.  He also has a supportive neighbor/friend.  It's unclear how much caregiver support they offer or can offer.    Quality of Family Relationships supportive;involved    Able to Return to Prior Arrangements yes       Resource/Environmental Concerns    Resource/Environmental Concerns none       Transition Planning    Patient/Family Anticipates Transition to home    Patient/Family Anticipated Services at Transition --  TBD       Discharge Needs Assessment    Readmission Within the Last 30 Days no previous admission in last 30 days    Equipment Currently Used at Home cane, straight;nebulizer;rollator;other (see comments)  Pt has a walk-in shower with a bench.  He also has a portable concentrator.  He said it's \"the most popular brand\" that he ordered from [place], but he can't recall the name.  Pt. also stated he prefers a rescue inhaler over a neb machine.    Concerns to be Addressed discharge planning    Concerns Comments Discharge needs are still to be determined.    Equipment Needed After Discharge none    Current Discharge Risk lives alone    Discharge Coordination/Progress Pt lives alone [place] in [place].  He stated he doesn't have any stairs to access the entrance to his home.  His daughter (APRN) lives nearby and is supportive.  He also has a helpful/supportive neighbor.  Pt has DME availble to him.  He didn't use any home health services, but stated he has monthly " "\"wellness calls\" from his PCP's office.  He has transportation available at discharge.  Pt isn't yet sure what his discharge needs will be.                   Discharge Plan       Row Name 02/27/24 1050       Plan    Plan Home    Patient Needs State Guardianship? Yes    Plan Comments SW met with pt at beside.  He was very friendly and talkative.  He has a cough that he says has been bothering him since October.  Pt still has consults pending with rehab services.  They were present when SW left the room and will assess pt this morning.  Pt isn't sure what his discharge needs will be at this time.  He is eager to have his ECHO completed.  Pt and SW discussed home health services briefly, but no definite plans were made. No needs at this time.    Final Discharge Disposition Code 01 - home or self-care                  Continued Care and Services - Admitted Since 2/26/2024    Coordination has not been started for this encounter.       Expected Discharge Date and Time       Expected Discharge Date Expected Discharge Time    Mar 1, 2024            Demographic Summary       Row Name 02/27/24 1037       General Information    Arrived From home    Referral Source physician    Reason for Consult discharge planning    Preferred Language English    General Information Comments PCP is Ivet Chau MD                   Functional Status       Row Name 02/27/24 1038       Functional Status, IADL    IADL Comments Pt stated he is mostly independent at home as he lives alone.  He has a supportive neighbor/friend and they assist each other as needed.       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  When he moved to [place], he bought [employer] homes and owned rental property.  He has insurance through Medicare and has prescriptionm coverage.                   Psychosocial    No documentation.                  Abuse/Neglect    No documentation.                  " Legal    No documentation.                  Substance Abuse    No documentation.                  Patient Forms    No documentation.                     TRISH Calloway

## 2024-02-27 NOTE — H&P
[facility] Medicine Services  HISTORY AND PHYSICAL    Patient Name: Alok Tobias  : 1945  MRN: [id number]  Primary Care Physician: Ivet Chau MD  Date of admission: 2024    Subjective   Subjective     Chief Complaint:  Shortness of air    HPI:  Alok Tobias is a 78 y.o. male with a history of hypertension, hyperlipidemia, A-fib on Eliquis, COPD, severe MR, CAD, ANDREA, gout, SDH s/p craniotomy, presents to the ED with complaints of shortness of breath.  Patient reports that he has not felt well for the past couple of weeks.  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  Since not taking his Lasix he feels that his dyspnea has worsened.  Over the weekend he had a low-grade fever of 99.  He has had a productive cough with thick yellow sputum, chest tightness, wheezing, fatigue, and some lower extremity edema.  His dyspnea is worse with exertion, and he is having difficulty walking around his home due to his worsening shortness of air.  He has a home oxygen concentrator that he had bought a few years ago but has not used it.  Over the last couple days he has been wearing oxygen at home.  No chest pain, nausea, vomiting, abdominal pain, diarrhea, dysuria, headaches, dizziness, or any other complaints at this time.  Chest x-ray shows mild CHF features.  Patient was given 40 mg of IV Lasix and 125 mg of IV Solu-Medrol in the ED.  Patient is being admitted to the hospitalist for further evaluation and management.        Review of Systems   Constitutional:  Positive for fatigue and fever. Negative for chills.   HENT: Negative.     Eyes: Negative.    Respiratory:  Positive for cough, chest tightness, shortness of breath and wheezing.    Cardiovascular:  Positive for leg swelling. Negative for chest pain and palpitations.   Gastrointestinal: Negative.    Endocrine: Negative.    Genitourinary: Negative.   "  Musculoskeletal: Negative.    Skin: Negative.    Allergic/Immunologic: Negative.    Neurological: Negative.    Hematological: Negative.    Psychiatric/Behavioral: Negative.                  Personal History     Past Medical History:   Diagnosis Date    Asthma     Atrial fibrillation     Bronchitis     CHF (congestive heart failure)     COPD (chronic obstructive pulmonary disease)     COVID-19     Elevated PSA     Hypertension     Subdural hematoma              Past Surgical History:   Procedure Laterality Date    APPENDECTOMY      CARDIAC CATHETERIZATION N/A 10/03/2018    Procedure: Left Heart Cath;  Surgeon: Twan Harrison MD;  Location: [facility] CATH INVASIVE LOCATION;  Service: Cardiology    CATARACT EXTRACTION      CRANIOTOMY FOR SUBDURAL HEMATOMA Left 10/11/2019    Procedure: CRANIOTOMY FOR SUBDURAL HEMATOMA;  Surgeon: Reagan Patten MD;  Location:  [facility] OR;  Service: Neurosurgery    MITRAL VALVE REPAIR/REPLACEMENT      TONSILLECTOMY         Family History:  family history includes Multiple sclerosis in his mother.     Social History:  reports that he quit smoking about 37 years ago. His smoking use included cigarettes. He has a 30.00 pack-year smoking history. He has never used smokeless tobacco. He reports that he does not currently use alcohol after a past usage of about 3.0 - 4.0 standard drinks of alcohol per week. He reports that he does not use drugs.  Social History     Social History Narrative    Not on file       Medications:  allopurinol, apixaban, budesonide-formoterol, furosemide, guaiFENesin, ipratropium-albuterol, metoprolol succinate XL, potassium chloride, and valsartan    Allergies   Allergen Reactions    Dye Fdc Red [Red Dye] Nausea And Vomiting     All dyes, IV dye, hair dyes    Penicillins Other (See Comments)     \"CAUSES CHLAMYDIA\"       Objective   Objective     Vital Signs:   Temp:  [97.5 °F (36.4 °C)-97.7 °F (36.5 °C)] 97.7 °F (36.5 °C)  Heart Rate:  [57-87] 79  Resp:  " [22] 22  BP: (119-150)/(66-94) 127/78  Flow (L/min):  [3] 3    Physical Exam   Constitutional: Awake, alert, sitting up on the side of the bed  Eyes: PERRLA, sclerae anicteric, no conjunctival injection  HENT: NCAT, mucous membranes moist  Neck: Supple, no thyromegaly, no lymphadenopathy, trachea midline  Respiratory: wheezing bilaterally with crackles in the bases bilaterally, labored respirations with conversation   Cardiovascular: irregularly irregular, no murmurs, rubs, or gallops, palpable pedal pulses bilaterally  Gastrointestinal: Positive bowel sounds, soft, nontender, nondistended  Musculoskeletal: Mild BLE edema, no clubbing or cyanosis to extremities  Psychiatric: Appropriate affect, cooperative  Neurologic: Oriented x 3, strength symmetric in all extremities, Cranial Nerves grossly intact to confrontation, speech clear  Skin: No rashes       Result Review:  I have personally reviewed the results from the time of this admission to 2/26/2024 21:49 EST and agree with these findings:  [x]  Laboratory list / accordion  [x]  Microbiology  [x]  Radiology  [x]  EKG/Telemetry   []  Cardiology/Vascular   []  Pathology  [x]  Old records  []  Other:  Most notable findings include:     LAB RESULTS:      Lab 02/26/24  1618   WBC 6.14   HEMOGLOBIN 15.5   HEMATOCRIT 47.2   PLATELETS 131*   NEUTROS ABS 4.39   IMMATURE GRANS (ABS) 0.03   LYMPHS ABS 0.67*   MONOS ABS 0.98*   EOS ABS 0.06   MCV 96.9         Lab 02/26/24  1618   SODIUM 140   POTASSIUM 4.3   CHLORIDE 102   CO2 26.0   ANION GAP 12.0   BUN 11   CREATININE 0.79   EGFR 89.8   GLUCOSE 112*   CALCIUM 8.9   MAGNESIUM 2.2         Lab 02/26/24  1618   TOTAL PROTEIN 6.7   ALBUMIN 3.8   GLOBULIN 2.9   ALT (SGPT) 17   AST (SGOT) 29   BILIRUBIN 1.2   ALK PHOS 127*         Lab 02/26/24  1845 02/26/24  1618   PROBNP  --  1,611.0   HSTROP T 21 23*                 Brief Urine Lab Results  (Last result in the past 365 days)        Color   Clarity   Blood   Leuk Est   Nitrite    Protein   CREAT   Urine HCG        02/26/24 1718 Dark Yellow   Clear   Negative   Trace   Negative   Trace                 Microbiology Results (last 10 days)       Procedure Component Value - Date/Time    COVID PRE-OP / PRE-PROCEDURE SCREENING ORDER (NO ISOLATION) - Swab, Nasopharynx [[id number]]  (Normal) Collected: 02/26/24 1708    Lab Status: Final result Specimen: Swab from Nasopharynx Updated: 02/26/24 1742    Narrative:      The following orders were created for panel order COVID PRE-OP / PRE-PROCEDURE SCREENING ORDER (NO ISOLATION) - Swab, Nasopharynx.  Procedure                               Abnormality         Status                     ---------                               -----------         ------                     COVID-19 and FLU A/B PCR...[[id number]]  Normal              Final result                 Please view results for these tests on the individual orders.    COVID-19 and FLU A/B PCR, 1 HR TAT - Swab, Nasopharynx [[id number]]  (Normal) Collected: 02/26/24 1708    Lab Status: Final result Specimen: Swab from Nasopharynx Updated: 02/26/24 1742     COVID19 Not Detected     Influenza A PCR Not Detected     Influenza B PCR Not Detected    Narrative:      Fact sheet for providers: https://www.fda.gov/media/795500/download    Fact sheet for patients: https://www.fda.gov/media/676414/download    Test performed by PCR.            XR Chest 1 View    Result Date: 2/26/2024  XR CHEST 1 VW Date of Exam: 2/26/2024 3:59 PM EST Indication: Weak/Dizzy/AMS triage protocol Comparison: 10/13/2019 Findings: Cardiomediastinal silhouette is enlarged, similar prior examination. There is generalized interstitial prominence. There are small bilateral pleural effusions without dense consolidation. No pneumothorax. No acute osseous abnormality identified.     Impression: Impression: Mild CHF features. Electronically Signed: Abisai Koo MD  2/26/2024 4:36 PM EST  Workstation ID: [id number]     Results for orders placed  during the hospital encounter of 10/11/19    Adult Transthoracic Echo Complete W/ Cont if Necessary Per Protocol    Interpretation Summary  · This is a technically limited study.  · Global LV systolic function appears to be normal.  · There is left atrial enlargement. There is moderate concentric LVH.  · There is aortic valve sclerosis with mild aortic insufficiency.  · There is evidence of prior MV repair with no more than mild mitral regurgitation.      Assessment & Plan   Assessment & Plan       COPD exacerbation    Hypertension    PAF (paroxysmal atrial fibrillation)    CAD (coronary atherosclerotic disease)    Hypoxia    Alok Tobias is a 78 y.o. male with a history of hypertension, hyperlipidemia, A-fib on Eliquis, COPD, severe MR, CAD, ANDREA, gout, SDH s/p craniotomy, presents to the ED with complaints of shortness of breath.      Assessment and Plan:    Acute COPD exacerbation  Hypoxia  -- Chest x-ray shows mild CHF features  -- COVID-19 and flu A/B not detected  -- Was given 40 mg IV Lasix and 125 mg of IV Solu-Medrol in the ED  -- duonebs scheduled and prn  -- solumedrol 60 mg IV every 8 hours  -- rocephin and doxycycline  -- sputum culture  -- COPD nurse navigator  -- pt/ot consult  -- am labs    Acute diastolic CHF  -- Chest xray shows mild CHF  -- was given Lasix 40 mg IV x 1 dose in the am, will give one dose of Lasix 40 mg IV in the am and defer further diuresis to am provider.  -- patient is prescribed Lasix 20 mg PO daily but has not taken it in the past 4 days  -- strict I&O's  -- daily weights  -- echo in the am  -- am labs    CAD  Severe MR   HTN  HLD  --Metoprolol    A-fib  -- Continue Eliquis  -- Continue metoprolol    Thrombocytopenia  -- plt 131  -- cbc in the am    ANDREA  -- Declined CPAP    DVT prophylaxis:  On Eliquis    CODE STATUS:    Level Of Support Discussed With: Patient  Code Status (Patient has no pulse and is not breathing): CPR (Attempt to Resuscitate)  Medical Interventions  (Patient has pulse or is breathing): Full Support      Expected Discharge  TBD  Expected discharge date/ time has not been documented.      This note has been completed as part of a split-shared workflow.     Signature: Electronically signed by MARIPOSA Morales, 02/26/24, 9:44 PM EST.

## 2024-02-28 ENCOUNTER — APPOINTMENT (OUTPATIENT)
Dept: CARDIOLOGY | Facility: HOSPITAL | Age: 79
End: 2024-02-28
Payer: MEDICARE

## 2024-02-28 LAB
ANION GAP SERPL CALCULATED.3IONS-SCNC: 12 MMOL/L (ref 5–15)
ASCENDING AORTA: 4.6 CM
BASOPHILS # BLD AUTO: 0.01 10*3/MM3 (ref 0–0.2)
BASOPHILS NFR BLD AUTO: 0.1 % (ref 0–1.5)
BH CV ECHO MEAS - AI P1/2T: 794.2 MSEC
BH CV ECHO MEAS - AO MAX PG: 9.7 MMHG
BH CV ECHO MEAS - AO MEAN PG: 5 MMHG
BH CV ECHO MEAS - AO ROOT DIAM: 4 CM
BH CV ECHO MEAS - AO V2 MAX: 156 CM/SEC
BH CV ECHO MEAS - AO V2 VTI: 41.5 CM
BH CV ECHO MEAS - AVA(I,D): 2.02 CM2
BH CV ECHO MEAS - EDV(CUBED): 157.5 ML
BH CV ECHO MEAS - EDV(MOD-SP2): 143 ML
BH CV ECHO MEAS - EDV(MOD-SP4): 170 ML
BH CV ECHO MEAS - EF(MOD-BP): 63.4 %
BH CV ECHO MEAS - EF(MOD-SP2): 61.1 %
BH CV ECHO MEAS - EF(MOD-SP4): 64.4 %
BH CV ECHO MEAS - ESV(CUBED): 52.7 ML
BH CV ECHO MEAS - ESV(MOD-SP2): 55.6 ML
BH CV ECHO MEAS - ESV(MOD-SP4): 60.6 ML
BH CV ECHO MEAS - FS: 30.6 %
BH CV ECHO MEAS - IVS/LVPW: 1 CM
BH CV ECHO MEAS - IVSD: 1.05 CM
BH CV ECHO MEAS - LA DIMENSION: 4.7 CM
BH CV ECHO MEAS - LAT PEAK E' VEL: 6.5 CM/SEC
BH CV ECHO MEAS - LV MASS(C)D: 220.6 GRAMS
BH CV ECHO MEAS - LV MAX PG: 7.1 MMHG
BH CV ECHO MEAS - LV MEAN PG: 4 MMHG
BH CV ECHO MEAS - LV V1 MAX: 133 CM/SEC
BH CV ECHO MEAS - LV V1 VTI: 26.7 CM
BH CV ECHO MEAS - LVIDD: 5.4 CM
BH CV ECHO MEAS - LVIDS: 3.8 CM
BH CV ECHO MEAS - LVOT AREA: 3.1 CM2
BH CV ECHO MEAS - LVOT DIAM: 2 CM
BH CV ECHO MEAS - LVPWD: 1.05 CM
BH CV ECHO MEAS - MED PEAK E' VEL: 6.1 CM/SEC
BH CV ECHO MEAS - MV A MAX VEL: 63.9 CM/SEC
BH CV ECHO MEAS - MV DEC SLOPE: 872 CM/SEC2
BH CV ECHO MEAS - MV DEC TIME: 0.19 SEC
BH CV ECHO MEAS - MV E MAX VEL: 134 CM/SEC
BH CV ECHO MEAS - MV E/A: 2.1
BH CV ECHO MEAS - MV MAX PG: 9.8 MMHG
BH CV ECHO MEAS - MV MEAN PG: 4 MMHG
BH CV ECHO MEAS - MV P1/2T: 59.1 MSEC
BH CV ECHO MEAS - MV V2 VTI: 39.8 CM
BH CV ECHO MEAS - MVA(P1/2T): 3.7 CM2
BH CV ECHO MEAS - MVA(VTI): 2.11 CM2
BH CV ECHO MEAS - PA ACC TIME: 0.11 SEC
BH CV ECHO MEAS - PA V2 MAX: 90.5 CM/SEC
BH CV ECHO MEAS - RAP SYSTOLE: 3 MMHG
BH CV ECHO MEAS - RVSP: 15 MMHG
BH CV ECHO MEAS - SV(LVOT): 83.9 ML
BH CV ECHO MEAS - SV(MOD-SP2): 87.4 ML
BH CV ECHO MEAS - SV(MOD-SP4): 109.4 ML
BH CV ECHO MEAS - TAPSE (>1.6): 2.08 CM
BH CV ECHO MEAS - TR MAX PG: 11.8 MMHG
BH CV ECHO MEAS - TR MAX VEL: 172 CM/SEC
BH CV ECHO MEASUREMENTS AVERAGE E/E' RATIO: 21.27
BH CV VAS BP LEFT ARM: NORMAL MMHG
BH CV XLRA - RV BASE: 5.2 CM
BH CV XLRA - RV LENGTH: 8 CM
BH CV XLRA - RV MID: 3.5 CM
BH CV XLRA - TDI S': 9 CM/SEC
BUN SERPL-MCNC: 28 MG/DL (ref 8–23)
BUN/CREAT SERPL: 34.1 (ref 7–25)
CALCIUM SPEC-SCNC: 9.2 MG/DL (ref 8.6–10.5)
CHLORIDE SERPL-SCNC: 99 MMOL/L (ref 98–107)
CO2 SERPL-SCNC: 26 MMOL/L (ref 22–29)
CREAT SERPL-MCNC: 0.82 MG/DL (ref 0.76–1.27)
DEPRECATED RDW RBC AUTO: 50 FL (ref 37–54)
EGFRCR SERPLBLD CKD-EPI 2021: 88.8 ML/MIN/1.73
EOSINOPHIL # BLD AUTO: 0 10*3/MM3 (ref 0–0.4)
EOSINOPHIL NFR BLD AUTO: 0 % (ref 0.3–6.2)
ERYTHROCYTE [DISTWIDTH] IN BLOOD BY AUTOMATED COUNT: 14.4 % (ref 12.3–15.4)
GLUCOSE BLDC GLUCOMTR-MCNC: 128 MG/DL (ref 70–130)
GLUCOSE BLDC GLUCOMTR-MCNC: 162 MG/DL (ref 70–130)
GLUCOSE BLDC GLUCOMTR-MCNC: 346 MG/DL (ref 70–130)
GLUCOSE BLDC GLUCOMTR-MCNC: 85 MG/DL (ref 70–130)
GLUCOSE SERPL-MCNC: 305 MG/DL (ref 65–99)
HBA1C MFR BLD: 5.7 % (ref 4.8–5.6)
HCT VFR BLD AUTO: 46.7 % (ref 37.5–51)
HGB BLD-MCNC: 15.8 G/DL (ref 13–17.7)
IMM GRANULOCYTES # BLD AUTO: 0.07 10*3/MM3 (ref 0–0.05)
IMM GRANULOCYTES NFR BLD AUTO: 0.7 % (ref 0–0.5)
LEFT ATRIUM VOLUME INDEX: 39.3 ML/M2
LYMPHOCYTES # BLD AUTO: 0.29 10*3/MM3 (ref 0.7–3.1)
LYMPHOCYTES NFR BLD AUTO: 3 % (ref 19.6–45.3)
MCH RBC QN AUTO: 32.1 PG (ref 26.6–33)
MCHC RBC AUTO-ENTMCNC: 33.8 G/DL (ref 31.5–35.7)
MCV RBC AUTO: 94.9 FL (ref 79–97)
MONOCYTES # BLD AUTO: 0.18 10*3/MM3 (ref 0.1–0.9)
MONOCYTES NFR BLD AUTO: 1.9 % (ref 5–12)
NEUTROPHILS NFR BLD AUTO: 9.07 10*3/MM3 (ref 1.7–7)
NEUTROPHILS NFR BLD AUTO: 94.3 % (ref 42.7–76)
NRBC BLD AUTO-RTO: 0 /100 WBC (ref 0–0.2)
PLATELET # BLD AUTO: 170 10*3/MM3 (ref 140–450)
PMV BLD AUTO: 11.8 FL (ref 6–12)
POTASSIUM SERPL-SCNC: 4.1 MMOL/L (ref 3.5–5.2)
RBC # BLD AUTO: 4.92 10*6/MM3 (ref 4.14–5.8)
SODIUM SERPL-SCNC: 137 MMOL/L (ref 136–145)
WBC NRBC COR # BLD AUTO: 9.62 10*3/MM3 (ref 3.4–10.8)

## 2024-02-28 PROCEDURE — 85025 COMPLETE CBC W/AUTO DIFF WBC: CPT | Performed by: INTERNAL MEDICINE

## 2024-02-28 PROCEDURE — 93306 TTE W/DOPPLER COMPLETE: CPT

## 2024-02-28 PROCEDURE — 80048 BASIC METABOLIC PNL TOTAL CA: CPT | Performed by: INTERNAL MEDICINE

## 2024-02-28 PROCEDURE — 97530 THERAPEUTIC ACTIVITIES: CPT

## 2024-02-28 PROCEDURE — 94799 UNLISTED PULMONARY SVC/PX: CPT

## 2024-02-28 PROCEDURE — 82948 REAGENT STRIP/BLOOD GLUCOSE: CPT

## 2024-02-28 PROCEDURE — 94761 N-INVAS EAR/PLS OXIMETRY MLT: CPT

## 2024-02-28 PROCEDURE — 93306 TTE W/DOPPLER COMPLETE: CPT | Performed by: INTERNAL MEDICINE

## 2024-02-28 PROCEDURE — 63710000001 INSULIN LISPRO (HUMAN) PER 5 UNITS: Performed by: INTERNAL MEDICINE

## 2024-02-28 PROCEDURE — 94664 DEMO&/EVAL PT USE INHALER: CPT

## 2024-02-28 PROCEDURE — 97116 GAIT TRAINING THERAPY: CPT

## 2024-02-28 PROCEDURE — 99233 SBSQ HOSP IP/OBS HIGH 50: CPT | Performed by: INTERNAL MEDICINE

## 2024-02-28 PROCEDURE — 25010000002 SULFUR HEXAFLUORIDE MICROSPH 60.7-25 MG RECONSTITUTED SUSPENSION: Performed by: NURSE PRACTITIONER

## 2024-02-28 PROCEDURE — 25010000002 METHYLPREDNISOLONE PER 125 MG: Performed by: NURSE PRACTITIONER

## 2024-02-28 PROCEDURE — 97110 THERAPEUTIC EXERCISES: CPT

## 2024-02-28 RX ORDER — PREDNISONE 20 MG/1
40 TABLET ORAL
Qty: 10 TABLET | Refills: 0 | Status: DISCONTINUED | OUTPATIENT
Start: 2024-02-29 | End: 2024-02-29 | Stop reason: HOSPADM

## 2024-02-28 RX ORDER — DOXYCYCLINE 100 MG/1
100 CAPSULE ORAL EVERY 12 HOURS SCHEDULED
Status: DISCONTINUED | OUTPATIENT
Start: 2024-02-28 | End: 2024-02-29 | Stop reason: HOSPADM

## 2024-02-28 RX ADMIN — VALSARTAN 40 MG: 80 TABLET, FILM COATED ORAL at 21:14

## 2024-02-28 RX ADMIN — IPRATROPIUM BROMIDE AND ALBUTEROL SULFATE 3 ML: 2.5; .5 SOLUTION RESPIRATORY (INHALATION) at 01:17

## 2024-02-28 RX ADMIN — DOXYCYCLINE 100 MG: 100 INJECTION, POWDER, LYOPHILIZED, FOR SOLUTION INTRAVENOUS at 09:41

## 2024-02-28 RX ADMIN — Medication 10 ML: at 09:43

## 2024-02-28 RX ADMIN — BUDESONIDE AND FORMOTEROL FUMARATE DIHYDRATE 2 PUFF: 160; 4.5 AEROSOL RESPIRATORY (INHALATION) at 19:06

## 2024-02-28 RX ADMIN — METOPROLOL SUCCINATE 25 MG: 25 TABLET, EXTENDED RELEASE ORAL at 09:42

## 2024-02-28 RX ADMIN — IPRATROPIUM BROMIDE AND ALBUTEROL SULFATE 3 ML: 2.5; .5 SOLUTION RESPIRATORY (INHALATION) at 07:40

## 2024-02-28 RX ADMIN — FUROSEMIDE 20 MG: 20 TABLET ORAL at 09:42

## 2024-02-28 RX ADMIN — IPRATROPIUM BROMIDE AND ALBUTEROL SULFATE 3 ML: 2.5; .5 SOLUTION RESPIRATORY (INHALATION) at 12:53

## 2024-02-28 RX ADMIN — DOXYCYCLINE 100 MG: 100 CAPSULE ORAL at 21:14

## 2024-02-28 RX ADMIN — ALLOPURINOL 300 MG: 300 TABLET ORAL at 09:42

## 2024-02-28 RX ADMIN — METHYLPREDNISOLONE SODIUM SUCCINATE 60 MG: 125 INJECTION INTRAMUSCULAR; INTRAVENOUS at 05:49

## 2024-02-28 RX ADMIN — BUDESONIDE AND FORMOTEROL FUMARATE DIHYDRATE 2 PUFF: 160; 4.5 AEROSOL RESPIRATORY (INHALATION) at 07:40

## 2024-02-28 RX ADMIN — GUAIFENESIN 1200 MG: 600 TABLET, EXTENDED RELEASE ORAL at 21:14

## 2024-02-28 RX ADMIN — INSULIN LISPRO 5 UNITS: 100 INJECTION, SOLUTION INTRAVENOUS; SUBCUTANEOUS at 12:25

## 2024-02-28 RX ADMIN — IPRATROPIUM BROMIDE AND ALBUTEROL SULFATE 3 ML: 2.5; .5 SOLUTION RESPIRATORY (INHALATION) at 19:06

## 2024-02-28 RX ADMIN — Medication 10 ML: at 21:14

## 2024-02-28 RX ADMIN — APIXABAN 5 MG: 5 TABLET, FILM COATED ORAL at 09:42

## 2024-02-28 RX ADMIN — VALSARTAN 40 MG: 80 TABLET, FILM COATED ORAL at 09:42

## 2024-02-28 RX ADMIN — INSULIN LISPRO 2 UNITS: 100 INJECTION, SOLUTION INTRAVENOUS; SUBCUTANEOUS at 21:13

## 2024-02-28 RX ADMIN — APIXABAN 5 MG: 5 TABLET, FILM COATED ORAL at 21:14

## 2024-02-28 RX ADMIN — SULFUR HEXAFLUORIDE 3 ML: KIT at 13:18

## 2024-02-28 RX ADMIN — GUAIFENESIN 1200 MG: 600 TABLET, EXTENDED RELEASE ORAL at 09:42

## 2024-02-28 NOTE — THERAPY TREATMENT NOTE
Patient Name: Alok Tobias  : 1945    MRN: [id number]                              Today's Date: 2024       Admit Date: 2024    Visit Dx:     ICD-10-CM ICD-9-CM   1. Acute respiratory failure with hypoxia  J96.01 518.81   2. COPD exacerbation  J44.1 491.21     Patient Active Problem List   Diagnosis    Obesity, Class III, BMI 40-49.9 (morbid obesity)    Hypertension    COPD/asthma    Chest pain    Shortness of breath    COPD (chronic obstructive pulmonary disease)    ANDREA     Renal insufficiency    Alcoholism    Atrial fibrillation with RVR    HCAP (healthcare-associated pneumonia)    Bilateral pleural effusion    Acute diastolic heart failure    Multiple lung nodules on CT    PAF (paroxysmal atrial fibrillation)    S/P mitral valve repair/annuloplasty for severe MR 2019 in [place]    Acute on chronic subdural hematoma with impending herniation s/p left frontotemporal craniotomy 10/11/2019     CAD (coronary atherosclerotic disease)    COPD exacerbation    Hypoxia     Past Medical History:   Diagnosis Date    Asthma     Atrial fibrillation     Bronchitis     CHF (congestive heart failure)     COPD (chronic obstructive pulmonary disease)     COVID-19     Elevated PSA     Hypertension     Subdural hematoma      Past Surgical History:   Procedure Laterality Date    APPENDECTOMY      CARDIAC CATHETERIZATION N/A 10/03/2018    Procedure: Left Heart Cath;  Surgeon: Twan Harrison MD;  Location:  [facility] CATH INVASIVE LOCATION;  Service: Cardiology    CATARACT EXTRACTION      CRANIOTOMY FOR SUBDURAL HEMATOMA Left 10/11/2019    Procedure: CRANIOTOMY FOR SUBDURAL HEMATOMA;  Surgeon: Reagan Patten MD;  Location: [facility];  Service: Neurosurgery    MITRAL VALVE REPAIR/REPLACEMENT      TONSILLECTOMY        General Information       Row Name 24 1627          Physical Therapy Time and Intention    Document Type therapy note (daily note)  -KG     Mode of Treatment physical therapy  -KG        Row Name 02/28/24 1627          General Information    Patient Profile Reviewed yes  -KG     Existing Precautions/Restrictions fall;oxygen therapy device and L/min  -KG       Row Name 02/28/24 1627          Cognition    Orientation Status (Cognition) oriented x 3  -KG       Row Name 02/28/24 1627          Safety Issues, Functional Mobility    Impairments Affecting Function (Mobility) balance;shortness of breath;endurance/activity tolerance;strength  -KG               User Key  (r) = Recorded By, (t) = Taken By, (c) = Cosigned By      Initials Name Provider Type    Jeanette Forrest Physical Therapist                   Mobility       Row Name 02/28/24 1631          Bed Mobility    Bed Mobility supine-sit  -KG     Supine-Sit [place] (Bed Mobility) contact guard;verbal cues  -KG     Assistive Device (Bed Mobility) bed rails;head of bed elevated  -KG       Row Name 02/28/24 1631          Transfers    Comment, (Transfers) SBA  -KG       Row Name 02/28/24 1631          Bed-Chair Transfer    Bed-Chair [place] (Transfers) contact guard  -KG       Row Name 02/28/24 1631          Sit-Stand Transfer    Sit-Stand [place] (Transfers) standby assist  -KG       Row Name 02/28/24 1631          Gait/Stairs (Locomotion)    [place] Level (Gait) standby assist  -KG     Distance in Feet (Gait) 90+80  -KG     Deviations/Abnormal Patterns (Gait) bilateral deviations;evelyn decreased;stride length decreased;gait speed decreased;base of support, narrow  -KG     Bilateral Gait Deviations forward flexed posture  -KG     Comment, (Gait/Stairs) Pt was able to ambulate on RA 90'+80' and maintain O2 sats in low 90s, recommend pt use his walker, pt has lateral sway with ambulation and needs increased hip strength  -KG               User Key  (r) = Recorded By, (t) = Taken By, (c) = Cosigned By      Initials Name Provider Type    Jeanette Forrest Physical Therapist                   Obj/Interventions       Row Name  02/28/24 1632          Motor Skills    Therapeutic Exercise other (see comments)  STS, LAQ, heel raises, sidestepping  -KG       Row Name 02/28/24 1632          Balance    Dynamic Standing Balance contact guard;standby assist  -KG     Position/Device Used, Standing Balance unsupported  -KG     Balance Interventions standing;sit to stand  -KG               User Key  (r) = Recorded By, (t) = Taken By, (c) = Cosigned By      Initials Name Provider Type    Jeanette Forrest Physical Therapist                   Goals/Plan    No documentation.                  Clinical Impression       Row Name 02/28/24 1633          Pain    Pretreatment Pain Rating 0/10 - no pain  -KG     Posttreatment Pain Rating 0/10 - no pain  -KG       Row Name 02/28/24 1633          Plan of Care Review    Plan of Care Reviewed With patient  -KG     Progress improving  -KG     Outcome Evaluation Pt was able to ambulate on RA 90'+80' and maintain O2 sats in low 90s, recommend pt use his walker, pt has lateral sway with ambulation and needs increased hip strength.  Pt more receptive to HHPT  -KG       Row Name 02/28/24 1633          Vital Signs    Pre Systolic BP Rehab 107  -KG     Pre Treatment Diastolic BP 71  -KG     Posttreatment Heart Rate (beats/min) 57  -KG     Pre SpO2 (%) 94  -KG     O2 Delivery Pre Treatment nasal cannula  -KG     Intra SpO2 (%) 92  -KG     O2 Delivery Intra Treatment room air  -KG     Post SpO2 (%) 94  -KG     O2 Delivery Post Treatment nasal cannula  -KG       Row Name 02/28/24 1633          Positioning and Restraints    Pre-Treatment Position sitting in chair/recliner  -KG     Post Treatment Position chair  -KG     In Chair notified nsg;call light within reach;encouraged to call for assist;exit alarm on;waffle cushion;legs elevated  -KG               User Key  (r) = Recorded By, (t) = Taken By, (c) = Cosigned By      Initials Name Provider Type    Jeanette Forrest Physical Therapist                   Outcome  Measures       Row Name 02/28/24 1634 02/28/24 0800       How much help from another person do you currently need...    Turning from your back to your side while in flat bed without using bedrails? 4  -KG 4  -LC    Moving from lying on back to sitting on the side of a flat bed without bedrails? 3  -KG 3  -LC    Moving to and from a bed to a chair (including a wheelchair)? 3  -KG 3  -LC    Standing up from a chair using your arms (e.g., wheelchair, bedside chair)? 3  -KG 3  -LC    Climbing 3-5 steps with a railing? 2  -KG 2  -LC    To walk in hospital room? 3  -KG 3  -LC    AM-PAC 6 Clicks Score (PT) 18  -KG 18  -LC    Highest Level of Mobility Goal 6 --> Walk 10 steps or more  -KG 6 --> Walk 10 steps or more  -LC      Row Name 02/28/24 1634          Functional Assessment    Outcome Measure Options AM-PAC 6 Clicks Basic Mobility (PT)  -KG               User Key  (r) = Recorded By, (t) = Taken By, (c) = Cosigned By      Initials Name Provider Type     Maria Dolores Mijares, RN Registered Nurse    Jeanette Forrest Physical Therapist                                 Physical Therapy Education       Title: PT OT SLP Therapies (In Progress)       Topic: Physical Therapy (In Progress)       Point: Mobility training (Done)       Learning Progress Summary             Patient Acceptance, E,TB, VU,NR by  at 2/27/2024 1151                         Point: Home exercise program (Not Started)       Learner Progress:  Not documented in this visit.              Point: Body mechanics (Done)       Learning Progress Summary             Patient Acceptance, E,TB, VU,NR by  at 2/27/2024 1151                         Point: Precautions (Done)       Learning Progress Summary             Patient Acceptance, E,TB, VU,NR by  at 2/27/2024 1151                                         User Key       Initials Effective Dates Name Provider Type [facility] 09/22/22 -  Tiesha Vigil, PT Physical Therapist PT                  PT  Recommendation and Plan     Plan of Care Reviewed With: patient  Progress: improving  Outcome Evaluation: Pt was able to ambulate on RA 90'+80' and maintain O2 sats in low 90s, recommend pt use his walker, pt has lateral sway with ambulation and needs increased hip strength.  Pt more receptive to HHPT     Time Calculation:         PT Charges       Row Name 02/28/24 1634             Time Calculation    Start Time 1515  -KG      PT Received On 02/28/24  -KG         Time Calculation- PT    Total Timed Code Minutes- PT 38 minute(s)  -KG         Timed Charges    52755 - PT Therapeutic Exercise Minutes 15  -KG      40098 - Gait Training Minutes  13  -KG      27780 - PT Therapeutic Activity Minutes 10  -KG         Total Minutes    Timed Charges Total Minutes 38  -KG       Total Minutes 38  -KG                User Key  (r) = Recorded By, (t) = Taken By, (c) = Cosigned By      Initials Name Provider Type    Jeanette Forrest Physical Therapist                  Therapy Charges for Today       Code Description Service Date Service Provider Modifiers Qty    56684242939 HC PT THER PROC EA 15 MIN 2/28/2024 Jeanette Cooley GP 1    7981943 HC GAIT TRAINING EA 15 MIN 2/28/2024 Jeanette Cooley GP 1    48282481215 HC PT THERAPEUTIC ACT EA 15 MIN 2/28/2024 Jeanette Cooley GP 1            PT G-Codes  Outcome Measure Options: AM-PAC 6 Clicks Basic Mobility (PT)  AM-PAC 6 Clicks Score (PT): 18  AM-PAC 6 Clicks Score (OT): 20       Jeanette Cooley  2/28/2024

## 2024-02-28 NOTE — PLAN OF CARE
Goal Outcome Evaluation:  Plan of Care Reviewed With: patient        Progress: improving  Outcome Evaluation: Pt was able to ambulate on RA 90'+80' and maintain O2 sats in low 90s, recommend pt use his walker, pt has lateral sway with ambulation and needs increased hip strength.  Pt more receptive to HHPT

## 2024-02-28 NOTE — PROGRESS NOTES
[facility] Medicine Services  PROGRESS NOTE    Patient Name: Alok Tobias  : 1945  MRN: [id number]    Date of Admission: 2024  Primary Care Physician: Ivet Chau MD    Subjective   Subjective     CC:  COPD exacerbation     HPI:  Doing better this am, but states that any time he gets up and walks anywhere, he is significantly SOA.  He also continues to complain of coughing.  Pt is very concerned about his heart/MV as being etiology of SOA and would like to have his TTE done.  Denies any chest pain.       Objective   Objective     Vital Signs:   Temp:  [97.5 °F (36.4 °C)-98.2 °F (36.8 °C)] 97.5 °F (36.4 °C)  Heart Rate:  [] 61  Resp:  [18] 18  BP: (115-143)/(77-95) 139/77  Flow (L/min):  [2-3] 2     Physical Exam:  Constitutional: No acute distress, awake, alert  HENT: NCAT, mucous membranes moist  Respiratory: mild wheezing bilaterally (improved from yesterday), significant upper airway wheezing, on 2L BNC with sats in the mid 90s  Cardiovascular: RRR, no murmurs, rubs, or gallops  Gastrointestinal: Positive bowel sounds, soft, nontender, nondistended  Musculoskeletal: No bilateral ankle edema  Psychiatric: Appropriate affect, cooperative  Neurologic: Oriented x 3, strength symmetric in all extremities, Cranial Nerves grossly intact to confrontation, speech clear  Skin: No rashes     Results Reviewed:  LAB RESULTS:      Lab 24  0522 24  2207 24  1618   WBC 3.19*  --  6.14   HEMOGLOBIN 15.0  --  15.5   HEMATOCRIT 46.2  --  47.2   PLATELETS 116*  --  131*   NEUTROS ABS 2.81  --  4.39   IMMATURE GRANS (ABS) 0.02  --  0.03   LYMPHS ABS 0.27*  --  0.67*   MONOS ABS 0.09*  --  0.98*   EOS ABS 0.00  --  0.06   MCV 94.5  --  96.9   LACTATE  --  1.6  --          Lab 24  0522 24  1618   SODIUM 139 140   POTASSIUM 4.2 4.3   CHLORIDE 102 102   CO2 28.0 26.0   ANION GAP 9.0 12.0   BUN 14 11   CREATININE 0.80 0.79   EGFR 89.5 89.8   GLUCOSE 165*  112*   CALCIUM 8.8 8.9   MAGNESIUM  --  2.2         Lab 02/26/24  1618   TOTAL PROTEIN 6.7   ALBUMIN 3.8   GLOBULIN 2.9   ALT (SGPT) 17   AST (SGOT) 29   BILIRUBIN 1.2   ALK PHOS 127*         Lab 02/26/24  1845 02/26/24  1618   PROBNP  --  1,611.0   HSTROP T 21 23*         Lab 02/27/24  0522   CHOLESTEROL 129   LDL CHOL 76   HDL CHOL 43   TRIGLYCERIDES 44             Brief Urine Lab Results  (Last result in the past 365 days)        Color   Clarity   Blood   Leuk Est   Nitrite   Protein   CREAT   Urine HCG        02/26/24 1718 Dark Yellow   Clear   Negative   Trace   Negative   Trace                   Microbiology Results Abnormal       Procedure Component Value - Date/Time    Blood Culture - Blood, Arm, Right [[id number]]  (Normal) Collected: 02/26/24 2203    Lab Status: Preliminary result Specimen: Blood from Arm, Right Updated: 02/27/24 2246     Blood Culture No growth at 24 hours    Narrative:      Less than seven (7) mL's of blood was collected.  Insufficient quantity may yield false negative results.    Blood Culture - Blood, Arm, Right [[id number]]  (Normal) Collected: 02/26/24 2205    Lab Status: Preliminary result Specimen: Blood from Arm, Right Updated: 02/27/24 2246     Blood Culture No growth at 24 hours    Narrative:      Less than seven (7) mL's of blood was collected.  Insufficient quantity may yield false negative results.    COVID PRE-OP / PRE-PROCEDURE SCREENING ORDER (NO ISOLATION) - Swab, Nasopharynx [[id number]]  (Normal) Collected: 02/26/24 1708    Lab Status: Final result Specimen: Swab from Nasopharynx Updated: 02/26/24 1742    Narrative:      The following orders were created for panel order COVID PRE-OP / PRE-PROCEDURE SCREENING ORDER (NO ISOLATION) - Swab, Nasopharynx.  Procedure                               Abnormality         Status                     ---------                               -----------         ------                     COVID-19 and FLU A/B PCR...[[id number]]  Normal               Final result                 Please view results for these tests on the individual orders.    COVID-19 and FLU A/B PCR, 1 HR TAT - Swab, Nasopharynx [[id number]]  (Normal) Collected: 02/26/24 1708    Lab Status: Final result Specimen: Swab from Nasopharynx Updated: 02/26/24 1742     COVID19 Not Detected     Influenza A PCR Not Detected     Influenza B PCR Not Detected    Narrative:      Fact sheet for providers: https://www.fda.gov/media/210982/download    Fact sheet for patients: https://www.fda.gov/media/175172/download    Test performed by PCR.            XR Chest 1 View    Result Date: 2/26/2024  XR CHEST 1 VW Date of Exam: 2/26/2024 3:59 PM EST Indication: Weak/Dizzy/AMS triage protocol Comparison: 10/13/2019 Findings: Cardiomediastinal silhouette is enlarged, similar prior examination. There is generalized interstitial prominence. There are small bilateral pleural effusions without dense consolidation. No pneumothorax. No acute osseous abnormality identified.     Impression: Impression: Mild CHF features. Electronically Signed: Abisai Koo MD  2/26/2024 4:36 PM EST  Workstation ID: [id number]     Results for orders placed during the hospital encounter of 10/11/19    Adult Transthoracic Echo Complete W/ Cont if Necessary Per Protocol    Interpretation Summary  · This is a technically limited study.  · Global LV systolic function appears to be normal.  · There is left atrial enlargement. There is moderate concentric LVH.  · There is aortic valve sclerosis with mild aortic insufficiency.  · There is evidence of prior MV repair with no more than mild mitral regurgitation.      Current medications:  Scheduled Meds:allopurinol, 300 mg, Oral, Daily  apixaban, 5 mg, Oral, BID  budesonide-formoterol, 2 puff, Inhalation, BID - RT  doxycycline, 100 mg, Intravenous, Q12H  furosemide, 20 mg, Oral, Daily  guaiFENesin, 1,200 mg, Oral, Q12H  insulin lispro, 2-7 Units, Subcutaneous, 4x Daily AC & at  Bedtime  ipratropium-albuterol, 3 mL, Nebulization, Q6H - RT  metoprolol succinate XL, 25 mg, Oral, Daily  [START ON 2/29/2024] predniSONE, 40 mg, Oral, Daily With Breakfast  sodium chloride, 10 mL, Intravenous, Q12H  valsartan, 40 mg, Oral, BID      Continuous Infusions:   PRN Meds:.  acetaminophen **OR** acetaminophen **OR** acetaminophen    senna-docusate sodium **AND** polyethylene glycol **AND** bisacodyl **AND** bisacodyl    dextrose    dextrose    glucagon (human recombinant)    ipratropium-albuterol    melatonin    nitroglycerin    sodium chloride    sodium chloride    Assessment & Plan   Assessment & Plan     Active Hospital Problems    Diagnosis  POA    **COPD exacerbation [J44.1]  Yes    Hypertension [I10]  Yes     Priority: Low    Hypoxia [R09.02]  Unknown    CAD (coronary atherosclerotic disease) [I25.10]  Yes    PAF (paroxysmal atrial fibrillation) [I48.0]  Yes      Resolved Hospital Problems   No resolved problems to display.        Brief Hospital Course to date:  Alok Tobias is a 78 y.o. male with a history of hypertension, hyperlipidemia, A-fib on Eliquis, COPD, severe MR s/p valve replacement, CAD, ANDREA, gout, SDH s/p craniotomy, presented to the ED with complaints of shortness of breath.       Assessment and Plan:     Acute COPD exacerbation  Hypoxia  + Parainfluenza Virus  -- Chest x-ray shows mild CHF features  -- COVID-19 and flu A/B not detected  -- Was given 40 mg IV Lasix and 125 mg of IV Solu-Medrol in the ED  -- duonebs scheduled and prn  -- transition to PO PDS  -- stop rocephin as no PNA noted on CXR and will continue doxycycline for antiinflammatory effects  -- sputum culture  -- COPD nurse navigator  -- pt/ot consulted  -- am labs     Acute decompensated HFpEF  H/o MVR  -- Chest xray shows mild CHF  -- was given Lasix 40 mg IV x 1 dose in the ED, given one dose of Lasix 40 mg IV yesterday am   -- resume home dose Lasix today  -- net negative 580 for 24 hours  -- patient is  prescribed Lasix 20 mg PO daily but had not taken it the 4 days PTA  -- strict I&Os  -- daily weights  -- echo PENDING  -- am labs     CAD  Severe MR   HTN  HLD  --Metoprolol, ARB     A-fib  -- Continue Eliquis  -- Continue metoprolol     Thrombocytopenia  -- plt 131 on admission, down to 116 yesterday. Repeat labs PENDING.  Monitor  -- cbc in the am     ANDREA  -- Declined CPAP    Hyperglycemia  -- no h/o T2DM, but no recent HbA1C on file, will add on  -- likely due to steroids  -- continue with SSI for now    Total time spent: Time Spent: Time Spent: 35 minutes  Time spent includes time reviewing chart, face-to-face time, counseling patient/family/caregiver, ordering medications/tests/procedures, communicating with other health care professionals, documenting clinical information in the electronic health record, and coordination of care.      Expected Discharge Location and Transportation: home per PT/OT recs   Expected Discharge   Expected Discharge Date: 3/1/2024; Expected Discharge Time:      DVT prophylaxis:  Medical DVT prophylaxis orders are present.         AM-PAC 6 Clicks Score (PT): 18 (02/27/24 2000)    CODE STATUS:   Code Status and Medical Interventions:   Ordered at: 02/26/24 2140     Level Of Support Discussed With:    Patient     Code Status (Patient has no pulse and is not breathing):    CPR (Attempt to Resuscitate)     Medical Interventions (Patient has pulse or is breathing):    Full Support       Marline Campbell MD  02/28/24

## 2024-02-28 NOTE — PLAN OF CARE
Goal Outcome Evaluation:  Plan of Care Reviewed With: patient        Progress: improving  Outcome Evaluation: Pleasant gentleman being treated for newly diagnosed COPD. HR sometimes annmarie . A&O x 4, up w standby.

## 2024-02-28 NOTE — PLAN OF CARE
Problem: Adult Inpatient Plan of Care  Goal: Plan of Care Review  Outcome: Ongoing, Progressing  Flowsheets (Taken 2/28/2024 0432)  Outcome Evaluation: Patient A&O x 4. VSS on 2 L via NC. Voiding withoud difficulty. Accu check ACHS with SS coverage. Denies pain this shift. Encouraged to call staff for assistance  Goal: Patient-Specific Goal (Individualized)  Outcome: Ongoing, Progressing  Goal: Absence of Hospital-Acquired Illness or Injury  Outcome: Ongoing, Progressing  Intervention: Identify and Manage Fall Risk  Recent Flowsheet Documentation  Taken 2/28/2024 0400 by Merry Bashir, RN  Safety Promotion/Fall Prevention:   activity supervised   assistive device/personal items within reach   clutter free environment maintained   fall prevention program maintained   safety round/check completed   room organization consistent   nonskid shoes/slippers when out of bed  Taken 2/28/2024 0200 by Merry Bashir, RN  Safety Promotion/Fall Prevention:   activity supervised   assistive device/personal items within reach   clutter free environment maintained   fall prevention program maintained   safety round/check completed   room organization consistent   nonskid shoes/slippers when out of bed  Taken 2/28/2024 0000 by Merry Bashir, RN  Safety Promotion/Fall Prevention:   activity supervised   assistive device/personal items within reach   clutter free environment maintained   fall prevention program maintained   nonskid shoes/slippers when out of bed   room organization consistent   safety round/check completed  Taken 2/27/2024 2200 by Merry Bashir, RN  Safety Promotion/Fall Prevention:   activity supervised   assistive device/personal items within reach   clutter free environment maintained   fall prevention program maintained   nonskid shoes/slippers when out of bed   room organization consistent   safety round/check completed  Taken 2/27/2024 2000 by Merry Bashir, RN  Safety Promotion/Fall Prevention:   activity  supervised   assistive device/personal items within reach   clutter free environment maintained   fall prevention program maintained   nonskid shoes/slippers when out of bed   room organization consistent   safety round/check completed  Intervention: Prevent Skin Injury  Recent Flowsheet Documentation  Taken 2/28/2024 0400 by Merry Bashir RN  Body Position: position changed independently  Skin Protection:   adhesive use limited   tubing/devices free from skin contact   transparent dressing maintained  Taken 2/28/2024 0200 by Merry Bashir RN  Body Position: position changed independently  Skin Protection:   adhesive use limited   tubing/devices free from skin contact   transparent dressing maintained  Taken 2/28/2024 0000 by Merry Bashir RN  Body Position: position changed independently  Skin Protection:   adhesive use limited   tubing/devices free from skin contact   transparent dressing maintained  Taken 2/27/2024 2200 by Merry Bashir RN  Body Position: position changed independently  Skin Protection:   adhesive use limited   tubing/devices free from skin contact   transparent dressing maintained  Taken 2/27/2024 2000 by Merry Bashir RN  Body Position: position changed independently  Skin Protection:   adhesive use limited   tubing/devices free from skin contact   transparent dressing maintained  Intervention: Prevent and Manage VTE (Venous Thromboembolism) Risk  Recent Flowsheet Documentation  Taken 2/28/2024 0400 by Merry Bashir RN  Activity Management: activity encouraged  Taken 2/28/2024 0200 by Merry Bashir RN  Activity Management: activity encouraged  Taken 2/28/2024 0000 by Merry Bashir RN  Activity Management: activity encouraged  Taken 2/27/2024 2200 by Merry Bashir RN  Activity Management: activity encouraged  Taken 2/27/2024 2000 by Merry Bashir RN  Activity Management: activity encouraged  VTE Prevention/Management: (Eliquis) other (see comments)  Range of Motion: active  ROM (range of motion) encouraged  Intervention: Prevent Infection  Recent Flowsheet Documentation  Taken 2/28/2024 0400 by Merry Bashir RN  Infection Prevention:   environmental surveillance performed   hand hygiene promoted   rest/sleep promoted  Taken 2/28/2024 0200 by Merry Bashir RN  Infection Prevention:   environmental surveillance performed   hand hygiene promoted   rest/sleep promoted  Taken 2/28/2024 0000 by Merry Bashir RN  Infection Prevention:   environmental surveillance performed   hand hygiene promoted   rest/sleep promoted  Taken 2/27/2024 2200 by Merry Bashir RN  Infection Prevention:   environmental surveillance performed   hand hygiene promoted   rest/sleep promoted  Taken 2/27/2024 2000 by Merry Bashir RN  Infection Prevention:   hand hygiene promoted   environmental surveillance performed   rest/sleep promoted  Goal: Optimal Comfort and Wellbeing  Outcome: Ongoing, Progressing  Intervention: Provide Person-Centered Care  Recent Flowsheet Documentation  Taken 2/27/2024 2000 by Merry Bashir RN  Trust Relationship/Rapport:   care explained   choices provided   questions answered   questions encouraged  Goal: Readiness for Transition of Care  Outcome: Ongoing, Progressing     Problem: Asthma Comorbidity  Goal: Maintenance of Asthma Control  Outcome: Ongoing, Progressing  Intervention: Maintain Asthma Symptom Control  Recent Flowsheet Documentation  Taken 2/27/2024 2000 by Merry Bashir RN  Medication Review/Management: medications reviewed     Problem: COPD (Chronic Obstructive Pulmonary Disease) Comorbidity  Goal: Maintenance of COPD Symptom Control  Outcome: Ongoing, Progressing  Intervention: Maintain COPD-Symptom Control  Recent Flowsheet Documentation  Taken 2/27/2024 2000 by Merry Bashir RN  Supportive Measures: active listening utilized  Medication Review/Management: medications reviewed     Problem: Fall Injury Risk  Goal: Absence of Fall and Fall-Related  Injury  Outcome: Ongoing, Progressing  Intervention: Identify and Manage Contributors  Recent Flowsheet Documentation  Taken 2/27/2024 2000 by Merry Bashir RN  Medication Review/Management: medications reviewed  Intervention: Promote Injury-Free Environment  Recent Flowsheet Documentation  Taken 2/28/2024 0400 by Merry Bashir RN  Safety Promotion/Fall Prevention:   activity supervised   assistive device/personal items within reach   clutter free environment maintained   fall prevention program maintained   safety round/check completed   room organization consistent   nonskid shoes/slippers when out of bed  Taken 2/28/2024 0200 by Merry Bashir RN  Safety Promotion/Fall Prevention:   activity supervised   assistive device/personal items within reach   clutter free environment maintained   fall prevention program maintained   safety round/check completed   room organization consistent   nonskid shoes/slippers when out of bed  Taken 2/28/2024 0000 by Merry Bashir RN  Safety Promotion/Fall Prevention:   activity supervised   assistive device/personal items within reach   clutter free environment maintained   fall prevention program maintained   nonskid shoes/slippers when out of bed   room organization consistent   safety round/check completed  Taken 2/27/2024 2200 by Merry Bashir RN  Safety Promotion/Fall Prevention:   activity supervised   assistive device/personal items within reach   clutter free environment maintained   fall prevention program maintained   nonskid shoes/slippers when out of bed   room organization consistent   safety round/check completed  Taken 2/27/2024 2000 by Merry Bashir RN  Safety Promotion/Fall Prevention:   activity supervised   assistive device/personal items within reach   clutter free environment maintained   fall prevention program maintained   nonskid shoes/slippers when out of bed   room organization consistent   safety round/check completed   Goal Outcome  Evaluation:              Outcome Evaluation: Patient A&O x 4. VSS on 2 L via NC. Voiding withoud difficulty. Accu check ACHS with SS coverage. Denies pain this shift. Encouraged to call staff for assistance

## 2024-02-28 NOTE — CASE MANAGEMENT/SOCIAL WORK
Continued Stay Note   [place]     Patient Name: Alok Tobias  MRN: [id number]  Today's Date: 2/28/2024    Admit Date: 2/26/2024    Plan: Home   Discharge Plan       Row Name 02/28/24 1332       Plan    Plan Home    Patient/Family in Agreement with Plan yes    Plan Comments Spoke with patient at bedside. Therapy recs HH with pt not agreeable. Pt has a neighbor that use to be a HH nurse and says that they help each other. Made aware that if he changes his mind after discharge he can contact his PCP, verbalizes understanding. CM will cont to follow.    Final Discharge Disposition Code 01 - home or self-care                   Discharge Codes    No documentation.                 Expected Discharge Date and Time       Expected Discharge Date Expected Discharge Time    Mar 1, 2024               Kylee Forte RN

## 2024-02-29 ENCOUNTER — READMISSION MANAGEMENT (OUTPATIENT)
Dept: CALL CENTER | Facility: HOSPITAL | Age: 79
End: 2024-02-29
Payer: MEDICARE

## 2024-02-29 VITALS
TEMPERATURE: 98.4 F | HEART RATE: 68 BPM | RESPIRATION RATE: 16 BRPM | HEIGHT: 67 IN | WEIGHT: 197 LBS | SYSTOLIC BLOOD PRESSURE: 134 MMHG | DIASTOLIC BLOOD PRESSURE: 75 MMHG | BODY MASS INDEX: 30.92 KG/M2 | OXYGEN SATURATION: 91 %

## 2024-02-29 LAB
ANION GAP SERPL CALCULATED.3IONS-SCNC: 11 MMOL/L (ref 5–15)
BASOPHILS # BLD AUTO: 0.01 10*3/MM3 (ref 0–0.2)
BASOPHILS NFR BLD AUTO: 0.1 % (ref 0–1.5)
BUN SERPL-MCNC: 30 MG/DL (ref 8–23)
BUN/CREAT SERPL: 34.1 (ref 7–25)
CALCIUM SPEC-SCNC: 9.2 MG/DL (ref 8.6–10.5)
CHLORIDE SERPL-SCNC: 102 MMOL/L (ref 98–107)
CO2 SERPL-SCNC: 28 MMOL/L (ref 22–29)
CREAT SERPL-MCNC: 0.88 MG/DL (ref 0.76–1.27)
DEPRECATED RDW RBC AUTO: 50 FL (ref 37–54)
EGFRCR SERPLBLD CKD-EPI 2021: 86.9 ML/MIN/1.73
EOSINOPHIL # BLD AUTO: 0 10*3/MM3 (ref 0–0.4)
EOSINOPHIL NFR BLD AUTO: 0 % (ref 0.3–6.2)
ERYTHROCYTE [DISTWIDTH] IN BLOOD BY AUTOMATED COUNT: 14.3 % (ref 12.3–15.4)
GLUCOSE BLDC GLUCOMTR-MCNC: 131 MG/DL (ref 70–130)
GLUCOSE BLDC GLUCOMTR-MCNC: 131 MG/DL (ref 70–130)
GLUCOSE SERPL-MCNC: 130 MG/DL (ref 65–99)
HCT VFR BLD AUTO: 47.8 % (ref 37.5–51)
HGB BLD-MCNC: 15.7 G/DL (ref 13–17.7)
IMM GRANULOCYTES # BLD AUTO: 0.06 10*3/MM3 (ref 0–0.05)
IMM GRANULOCYTES NFR BLD AUTO: 0.5 % (ref 0–0.5)
LYMPHOCYTES # BLD AUTO: 0.56 10*3/MM3 (ref 0.7–3.1)
LYMPHOCYTES NFR BLD AUTO: 4.7 % (ref 19.6–45.3)
MCH RBC QN AUTO: 30.7 PG (ref 26.6–33)
MCHC RBC AUTO-ENTMCNC: 32.8 G/DL (ref 31.5–35.7)
MCV RBC AUTO: 93.5 FL (ref 79–97)
MONOCYTES # BLD AUTO: 0.71 10*3/MM3 (ref 0.1–0.9)
MONOCYTES NFR BLD AUTO: 5.9 % (ref 5–12)
NEUTROPHILS NFR BLD AUTO: 10.61 10*3/MM3 (ref 1.7–7)
NEUTROPHILS NFR BLD AUTO: 88.8 % (ref 42.7–76)
NRBC BLD AUTO-RTO: 0 /100 WBC (ref 0–0.2)
PLATELET # BLD AUTO: 172 10*3/MM3 (ref 140–450)
PMV BLD AUTO: 11.5 FL (ref 6–12)
POTASSIUM SERPL-SCNC: 4.5 MMOL/L (ref 3.5–5.2)
RBC # BLD AUTO: 5.11 10*6/MM3 (ref 4.14–5.8)
SODIUM SERPL-SCNC: 141 MMOL/L (ref 136–145)
WBC NRBC COR # BLD AUTO: 11.95 10*3/MM3 (ref 3.4–10.8)

## 2024-02-29 PROCEDURE — 80048 BASIC METABOLIC PNL TOTAL CA: CPT | Performed by: INTERNAL MEDICINE

## 2024-02-29 PROCEDURE — 82948 REAGENT STRIP/BLOOD GLUCOSE: CPT

## 2024-02-29 PROCEDURE — 94799 UNLISTED PULMONARY SVC/PX: CPT

## 2024-02-29 PROCEDURE — 85025 COMPLETE CBC W/AUTO DIFF WBC: CPT | Performed by: INTERNAL MEDICINE

## 2024-02-29 PROCEDURE — 63710000001 PREDNISONE PER 1 MG: Performed by: INTERNAL MEDICINE

## 2024-02-29 PROCEDURE — 97116 GAIT TRAINING THERAPY: CPT

## 2024-02-29 RX ORDER — PREDNISONE 20 MG/1
40 TABLET ORAL
Qty: 10 TABLET | Refills: 0 | Status: SHIPPED | OUTPATIENT
Start: 2024-03-01 | End: 2024-03-05

## 2024-02-29 RX ORDER — DOXYCYCLINE 100 MG/1
100 CAPSULE ORAL EVERY 12 HOURS SCHEDULED
Qty: 6 CAPSULE | Refills: 0 | Status: SHIPPED | OUTPATIENT
Start: 2024-02-29 | End: 2024-03-03

## 2024-02-29 RX ORDER — IPRATROPIUM BROMIDE AND ALBUTEROL SULFATE 2.5; .5 MG/3ML; MG/3ML
3 SOLUTION RESPIRATORY (INHALATION) EVERY 4 HOURS PRN
Qty: 360 ML | Refills: 0 | Status: SHIPPED | OUTPATIENT
Start: 2024-02-29

## 2024-02-29 RX ORDER — ACETAMINOPHEN 325 MG/1
650 TABLET ORAL EVERY 4 HOURS PRN
Start: 2024-02-29

## 2024-02-29 RX ORDER — BUDESONIDE AND FORMOTEROL FUMARATE DIHYDRATE 160; 4.5 UG/1; UG/1
2 AEROSOL RESPIRATORY (INHALATION) 2 TIMES DAILY
Qty: 10.2 G | Refills: 1 | Status: SHIPPED | OUTPATIENT
Start: 2024-02-29

## 2024-02-29 RX ADMIN — ACETAMINOPHEN 650 MG: 325 TABLET ORAL at 12:35

## 2024-02-29 RX ADMIN — PREDNISONE 40 MG: 20 TABLET ORAL at 08:43

## 2024-02-29 RX ADMIN — DOXYCYCLINE 100 MG: 100 CAPSULE ORAL at 08:44

## 2024-02-29 RX ADMIN — APIXABAN 5 MG: 5 TABLET, FILM COATED ORAL at 08:43

## 2024-02-29 RX ADMIN — METOPROLOL SUCCINATE 25 MG: 25 TABLET, EXTENDED RELEASE ORAL at 08:44

## 2024-02-29 RX ADMIN — FUROSEMIDE 20 MG: 20 TABLET ORAL at 08:44

## 2024-02-29 RX ADMIN — VALSARTAN 40 MG: 80 TABLET, FILM COATED ORAL at 08:49

## 2024-02-29 RX ADMIN — IPRATROPIUM BROMIDE AND ALBUTEROL SULFATE 3 ML: 2.5; .5 SOLUTION RESPIRATORY (INHALATION) at 00:36

## 2024-02-29 RX ADMIN — IPRATROPIUM BROMIDE AND ALBUTEROL SULFATE 3 ML: 2.5; .5 SOLUTION RESPIRATORY (INHALATION) at 07:41

## 2024-02-29 RX ADMIN — IPRATROPIUM BROMIDE AND ALBUTEROL SULFATE 3 ML: 2.5; .5 SOLUTION RESPIRATORY (INHALATION) at 12:31

## 2024-02-29 RX ADMIN — Medication 10 ML: at 08:47

## 2024-02-29 RX ADMIN — ALLOPURINOL 300 MG: 300 TABLET ORAL at 08:43

## 2024-02-29 RX ADMIN — GUAIFENESIN 1200 MG: 600 TABLET, EXTENDED RELEASE ORAL at 08:43

## 2024-02-29 RX ADMIN — BUDESONIDE AND FORMOTEROL FUMARATE DIHYDRATE 2 PUFF: 160; 4.5 AEROSOL RESPIRATORY (INHALATION) at 07:42

## 2024-02-29 NOTE — PROGRESS NOTES
Daily chart review complete.  Patient not available at the time of rounding.  Patient has requested to be referred to [facility] pulmonology office in [place] to see Dr. LISBETH Izquierdo MD.  No other questions or concerns at this time.  NN continues to follow.        Per current GOLD Standards, please consider: No LAMA in place, LABA/ICS in place (Symbicort), Outpatient PFT, Rehab as appropriate, Palliative Care consult, Annual LDCT per current screening guidelines (age 50-80 years old, smoking history of 20 pack years or more or has quit within past 15 years)

## 2024-02-29 NOTE — DISCHARGE PLACEMENT REQUEST
"Alok Tobias (78 y.o. Male)     Kylee Forte -[phone]      Date of Birth   1945    Social Security Number       Address   [address] [place] [id number]    Home Phone   [phone]    MRN   [id number]       Muslim   Caodaism    Marital Status                               Admission Date   2/26/24    Admission Type   Emergency    Admitting Provider   Marline Campbell MD    Attending Provider   Marline Campbell MD    Department, Room/Bed   [address], S228/1       Discharge Date       Discharge Disposition       Discharge Destination                                 Attending Provider: Marline Campbell MD    Allergies: Dye Fdc Red [Red Dye], Penicillins    Isolation: Contact Drop   Infection: Other (02/28/24)   Code Status: CPR    Ht: 170.2 cm (67.01\")   Wt: 89.4 kg (197 lb)    Admission Cmt: None   Principal Problem: COPD exacerbation [J44.1]                   Active Insurance as of 2/26/2024       Primary Coverage       Payor Plan Insurance Group Employer/Plan Group    MEDICARE MEDICARE A & B        Payor Plan Address Payor Plan Phone Number Payor Plan Fax Number Effective Dates    [address] [phone]  7/1/2008 - None Entered    [facility] [id number]         Subscriber Name Subscriber Birth Date Member ID       ALOK TOBIAS 1945 [id number]               Secondary Coverage       Payor Plan Insurance Group Employer/Plan Group    HUMANA HUMANA [facility]              [id number]       Payor Plan Address Payor Plan Phone Number Payor Plan Fax Number Effective Dates    [address] [address]   8/1/2010 - None Entered    [facility] [address]         Subscriber Name Subscriber Birth Date Member ID       ALOK TOBIAS 1945 [id number]                     Emergency Contacts        (Rel.) Home Phone Work Phone Mobile Phone    Maria Dolores Francis (Daughter) -- [phone]                 History & Physical    "     Sharri Cardenas APRN at 24       Attestation signed by Marli Hahn MD at 24 [phone]    The patient was seen independently by the APC.  I was available for any questions or concerns.                        [facility] Medicine Services  HISTORY AND PHYSICAL    Patient Name: Alok Tobias  : 1945  MRN: [id number]  Primary Care Physician: Ivet Chau MD  Date of admission: 2024    Subjective  Subjective     Chief Complaint:  Shortness of air    HPI:  Alok Tobias is a 78 y.o. male with a history of hypertension, hyperlipidemia, A-fib on Eliquis, COPD, severe MR, CAD, ANDREA, gout, SDH s/p craniotomy, presents to the ED with complaints of shortness of breath.  Patient reports that he has not felt well for the past couple of weeks.  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  Since not taking his Lasix he feels that his dyspnea has worsened.  Over the weekend he had a low-grade fever of 99.  He has had a productive cough with thick yellow sputum, chest tightness, wheezing, fatigue, and some lower extremity edema.  His dyspnea is worse with exertion, and he is having difficulty walking around his home due to his worsening shortness of air.  He has a home oxygen concentrator that he had bought a few years ago but has not used it.  Over the last couple days he has been wearing oxygen at home.  No chest pain, nausea, vomiting, abdominal pain, diarrhea, dysuria, headaches, dizziness, or any other complaints at this time.  Chest x-ray shows mild CHF features.  Patient was given 40 mg of IV Lasix and 125 mg of IV Solu-Medrol in the ED.  Patient is being admitted to the hospitalist for further evaluation and management.        Review of Systems   Constitutional:  Positive for fatigue and fever. Negative for chills.   HENT: Negative.     Eyes: Negative.    Respiratory:  Positive for cough,  chest tightness, shortness of breath and wheezing.    Cardiovascular:  Positive for leg swelling. Negative for chest pain and palpitations.   Gastrointestinal: Negative.    Endocrine: Negative.    Genitourinary: Negative.    Musculoskeletal: Negative.    Skin: Negative.    Allergic/Immunologic: Negative.    Neurological: Negative.    Hematological: Negative.    Psychiatric/Behavioral: Negative.                  Personal History     Past Medical History:   Diagnosis Date    Asthma     Atrial fibrillation     Bronchitis     CHF (congestive heart failure)     COPD (chronic obstructive pulmonary disease)     COVID-19     Elevated PSA     Hypertension     Subdural hematoma              Past Surgical History:   Procedure Laterality Date    APPENDECTOMY      CARDIAC CATHETERIZATION N/A 10/03/2018    Procedure: Left Heart Cath;  Surgeon: Twan Harrison MD;  Location:  [facility];  Service: Cardiology    CATARACT EXTRACTION      CRANIOTOMY FOR SUBDURAL HEMATOMA Left 10/11/2019    Procedure: CRANIOTOMY FOR SUBDURAL HEMATOMA;  Surgeon: Reagan Patten MD;  Location: [facility] OR;  Service: Neurosurgery    MITRAL VALVE REPAIR/REPLACEMENT      TONSILLECTOMY         Family History:  family history includes Multiple sclerosis in his mother.     Social History:  reports that he quit smoking about 37 years ago. His smoking use included cigarettes. He has a 30.00 pack-year smoking history. He has never used smokeless tobacco. He reports that he does not currently use alcohol after a past usage of about 3.0 - 4.0 standard drinks of alcohol per week. He reports that he does not use drugs.  Social History     Social History Narrative    Not on file       Medications:  allopurinol, apixaban, budesonide-formoterol, furosemide, guaiFENesin, ipratropium-albuterol, metoprolol succinate XL, potassium chloride, and valsartan    Allergies   Allergen Reactions    Dye Fdc Red [Red Dye] Nausea And Vomiting     All dyes,  "IV dye, hair dyes    Penicillins Other (See Comments)     \"CAUSES CHLAMYDIA\"       Objective  Objective     Vital Signs:   Temp:  [97.5 °F (36.4 °C)-97.7 °F (36.5 °C)] 97.7 °F (36.5 °C)  Heart Rate:  [57-87] 79  Resp:  [22] 22  BP: (119-150)/(66-94) 127/78  Flow (L/min):  [3] 3    Physical Exam   Constitutional: Awake, alert, sitting up on the side of the bed  Eyes: PERRLA, sclerae anicteric, no conjunctival injection  HENT: NCAT, mucous membranes moist  Neck: Supple, no thyromegaly, no lymphadenopathy, trachea midline  Respiratory: wheezing bilaterally with crackles in the bases bilaterally, labored respirations with conversation   Cardiovascular: irregularly irregular, no murmurs, rubs, or gallops, palpable pedal pulses bilaterally  Gastrointestinal: Positive bowel sounds, soft, nontender, nondistended  Musculoskeletal: Mild BLE edema, no clubbing or cyanosis to extremities  Psychiatric: Appropriate affect, cooperative  Neurologic: Oriented x 3, strength symmetric in all extremities, Cranial Nerves grossly intact to confrontation, speech clear  Skin: No rashes       Result Review:  I have personally reviewed the results from the time of this admission to 2/26/2024 21:49 EST and agree with these findings:  [x]  Laboratory list / accordion  [x]  Microbiology  [x]  Radiology  [x]  EKG/Telemetry   []  Cardiology/Vascular   []  Pathology  [x]  Old records  []  Other:  Most notable findings include:     LAB RESULTS:      Lab 02/26/24  1618   WBC 6.14   HEMOGLOBIN 15.5   HEMATOCRIT 47.2   PLATELETS 131*   NEUTROS ABS 4.39   IMMATURE GRANS (ABS) 0.03   LYMPHS ABS 0.67*   MONOS ABS 0.98*   EOS ABS 0.06   MCV 96.9         Lab 02/26/24  1618   SODIUM 140   POTASSIUM 4.3   CHLORIDE 102   CO2 26.0   ANION GAP 12.0   BUN 11   CREATININE 0.79   EGFR 89.8   GLUCOSE 112*   CALCIUM 8.9   MAGNESIUM 2.2         Lab 02/26/24  1618   TOTAL PROTEIN 6.7   ALBUMIN 3.8   GLOBULIN 2.9   ALT (SGPT) 17   AST (SGOT) 29   BILIRUBIN 1.2   ALK " PHOS 127*         Lab 02/26/24  1845 02/26/24  1618   PROBNP  --  1,611.0   HSTROP T 21 23*                 Brief Urine Lab Results  (Last result in the past 365 days)        Color   Clarity   Blood   Leuk Est   Nitrite   Protein   CREAT   Urine HCG        02/26/24 1718 Dark Yellow   Clear   Negative   Trace   Negative   Trace                 Microbiology Results (last 10 days)       Procedure Component Value - Date/Time    COVID PRE-OP / PRE-PROCEDURE SCREENING ORDER (NO ISOLATION) - Swab, Nasopharynx [[id number]]  (Normal) Collected: 02/26/24 1708    Lab Status: Final result Specimen: Swab from Nasopharynx Updated: 02/26/24 1742    Narrative:      The following orders were created for panel order COVID PRE-OP / PRE-PROCEDURE SCREENING ORDER (NO ISOLATION) - Swab, Nasopharynx.  Procedure                               Abnormality         Status                     ---------                               -----------         ------                     COVID-19 and FLU A/B PCR...[[id number]]  Normal              Final result                 Please view results for these tests on the individual orders.    COVID-19 and FLU A/B PCR, 1 HR TAT - Swab, Nasopharynx [[id number]]  (Normal) Collected: 02/26/24 1708    Lab Status: Final result Specimen: Swab from Nasopharynx Updated: 02/26/24 1742     COVID19 Not Detected     Influenza A PCR Not Detected     Influenza B PCR Not Detected    Narrative:      Fact sheet for providers: https://www.fda.gov/media/393283/download    Fact sheet for patients: https://www.fda.gov/media/369772/download    Test performed by PCR.            XR Chest 1 View    Result Date: 2/26/2024  XR CHEST 1 VW Date of Exam: 2/26/2024 3:59 PM EST Indication: Weak/Dizzy/AMS triage protocol Comparison: 10/13/2019 Findings: Cardiomediastinal silhouette is enlarged, similar prior examination. There is generalized interstitial prominence. There are small bilateral pleural effusions without dense consolidation. No  pneumothorax. No acute osseous abnormality identified.     Impression: Impression: Mild CHF features. Electronically Signed: Abisai Koo MD  2/26/2024 4:36 PM EST  Workstation ID: [id number]     Results for orders placed during the hospital encounter of 10/11/19    Adult Transthoracic Echo Complete W/ Cont if Necessary Per Protocol    Interpretation Summary  · This is a technically limited study.  · Global LV systolic function appears to be normal.  · There is left atrial enlargement. There is moderate concentric LVH.  · There is aortic valve sclerosis with mild aortic insufficiency.  · There is evidence of prior MV repair with no more than mild mitral regurgitation.      Assessment & Plan  Assessment & Plan       COPD exacerbation    Hypertension    PAF (paroxysmal atrial fibrillation)    CAD (coronary atherosclerotic disease)    Hypoxia    Alok Tobias is a 78 y.o. male with a history of hypertension, hyperlipidemia, A-fib on Eliquis, COPD, severe MR, CAD, ANDREA, gout, SDH s/p craniotomy, presents to the ED with complaints of shortness of breath.      Assessment and Plan:    Acute COPD exacerbation  Hypoxia  -- Chest x-ray shows mild CHF features  -- COVID-19 and flu A/B not detected  -- Was given 40 mg IV Lasix and 125 mg of IV Solu-Medrol in the ED  -- duonebs scheduled and prn  -- solumedrol 60 mg IV every 8 hours  -- rocephin and doxycycline  -- sputum culture  -- COPD nurse navigator  -- pt/ot consult  -- am labs    Acute diastolic CHF  -- Chest xray shows mild CHF  -- was given Lasix 40 mg IV x 1 dose in the am, will give one dose of Lasix 40 mg IV in the am and defer further diuresis to am provider.  -- patient is prescribed Lasix 20 mg PO daily but has not taken it in the past 4 days  -- strict I&O's  -- daily weights  -- echo in the am  -- am labs    CAD  Severe MR   HTN  HLD  --Metoprolol    A-fib  -- Continue Eliquis  -- Continue metoprolol    Thrombocytopenia  -- plt 131  -- cbc in the am    ANDREA  --  Declined CPAP    DVT prophylaxis:  On Eliquis    CODE STATUS:    Level Of Support Discussed With: Patient  Code Status (Patient has no pulse and is not breathing): CPR (Attempt to Resuscitate)  Medical Interventions (Patient has pulse or is breathing): Full Support      Expected Discharge  TBD  Expected discharge date/ time has not been documented.      This note has been completed as part of a split-shared workflow.     Signature: Electronically signed by MARIPOSA Morales, 24, 9:44 PM EST.                   Electronically signed by Marli Hahn MD at 24 2326          Physical Therapy Notes (most recent note)        Jeanette Cooley at 24 1515  Version 1 of 1         Patient Name: Alok Tobias  : 1945    MRN: [id number]                              Today's Date: 2024       Admit Date: 2024    Visit Dx:     ICD-10-CM ICD-9-CM   1. Acute respiratory failure with hypoxia  J96.01 518.81   2. COPD exacerbation  J44.1 491.21     Patient Active Problem List   Diagnosis    Obesity, Class III, BMI 40-49.9 (morbid obesity)    Hypertension    COPD/asthma    Chest pain    Shortness of breath    COPD (chronic obstructive pulmonary disease)    ANDREA     Renal insufficiency    Alcoholism    Atrial fibrillation with RVR    HCAP (healthcare-associated pneumonia)    Bilateral pleural effusion    Acute diastolic heart failure    Multiple lung nodules on CT    PAF (paroxysmal atrial fibrillation)    S/P mitral valve repair/annuloplasty for severe MR 2019 in [place]    Acute on chronic subdural hematoma with impending herniation s/p left frontotemporal craniotomy 10/11/2019     CAD (coronary atherosclerotic disease)    COPD exacerbation    Hypoxia     Past Medical History:   Diagnosis Date    Asthma     Atrial fibrillation     Bronchitis     CHF (congestive heart failure)     COPD (chronic obstructive pulmonary disease)     COVID-19     Elevated PSA     Hypertension      Subdural hematoma      Past Surgical History:   Procedure Laterality Date    APPENDECTOMY      CARDIAC CATHETERIZATION N/A 10/03/2018    Procedure: Left Heart Cath;  Surgeon: Twan Harrison MD;  Location:  [facility];  Service: Cardiology    CATARACT EXTRACTION      CRANIOTOMY FOR SUBDURAL HEMATOMA Left 10/11/2019    Procedure: CRANIOTOMY FOR SUBDURAL HEMATOMA;  Surgeon: Reagan Patten MD;  Location:  [facility] OR;  Service: Neurosurgery    MITRAL VALVE REPAIR/REPLACEMENT      TONSILLECTOMY        General Information       Row Name 02/28/24 1627          Physical Therapy Time and Intention    Document Type therapy note (daily note)  -KG     Mode of Treatment physical therapy  -KG       Row Name 02/28/24 1627          General Information    Patient Profile Reviewed yes  -KG     Existing Precautions/Restrictions fall;oxygen therapy device and L/min  -KG       Row Name 02/28/24 1627          Cognition    Orientation Status (Cognition) oriented x 3  -KG       Row Name 02/28/24 1627          Safety Issues, Functional Mobility    Impairments Affecting Function (Mobility) balance;shortness of breath;endurance/activity tolerance;strength  -KG               User Key  (r) = Recorded By, (t) = Taken By, (c) = Cosigned By      Initials Name Provider Type    KG Jeanette Cooley Physical Therapist                   Mobility       Row Name 02/28/24 1631          Bed Mobility    Bed Mobility supine-sit  -KG     Supine-Sit Gladwin (Bed Mobility) contact guard;verbal cues  -KG     Assistive Device (Bed Mobility) bed rails;head of bed elevated  -KG       Row Name 02/28/24 1631          Transfers    Comment, (Transfers) SBA  -KG       Row Name 02/28/24 1631          Bed-Chair Transfer    Bed-Chair Gladwin (Transfers) contact guard  -KG       Row Name 02/28/24 1631          Sit-Stand Transfer    Sit-Stand Gladwin (Transfers) standby assist  -KG       Row Name 02/28/24 1631          Gait/Stairs  (Locomotion)    Burt Level (Gait) standby assist  -KG     Distance in Feet (Gait) 90+80  -KG     Deviations/Abnormal Patterns (Gait) bilateral deviations;evelyn decreased;stride length decreased;gait speed decreased;base of support, narrow  -KG     Bilateral Gait Deviations forward flexed posture  -KG     Comment, (Gait/Stairs) Pt was able to ambulate on RA 90'+80' and maintain O2 sats in low 90s, recommend pt use his walker, pt has lateral sway with ambulation and needs increased hip strength  -KG               User Key  (r) = Recorded By, (t) = Taken By, (c) = Cosigned By      Initials Name Provider Type    KG Jeanette Cooley Physical Therapist                   Obj/Interventions       Row Name 02/28/24 1632          Motor Skills    Therapeutic Exercise other (see comments)  STS, LAQ, heel raises, sidestepping  -KG       Row Name 02/28/24 1632          Balance    Dynamic Standing Balance contact guard;standby assist  -KG     Position/Device Used, Standing Balance unsupported  -KG     Balance Interventions standing;sit to stand  -KG               User Key  (r) = Recorded By, (t) = Taken By, (c) = Cosigned By      Initials Name Provider Type    KG Jeanette Cooley Physical Therapist                   Goals/Plan    No documentation.                  Clinical Impression       Row Name 02/28/24 1633          Pain    Pretreatment Pain Rating 0/10 - no pain  -KG     Posttreatment Pain Rating 0/10 - no pain  -KG       Row Name 02/28/24 1633          Plan of Care Review    Plan of Care Reviewed With patient  -KG     Progress improving  -KG     Outcome Evaluation Pt was able to ambulate on RA 90'+80' and maintain O2 sats in low 90s, recommend pt use his walker, pt has lateral sway with ambulation and needs increased hip strength.  Pt more receptive to HHPT  -KG       Row Name 02/28/24 1633          Vital Signs    Pre Systolic BP Rehab 107  -KG     Pre Treatment Diastolic BP 71  -KG     Posttreatment Heart Rate  (beats/min) 57  -KG     Pre SpO2 (%) 94  -KG     O2 Delivery Pre Treatment nasal cannula  -KG     Intra SpO2 (%) 92  -KG     O2 Delivery Intra Treatment room air  -KG     Post SpO2 (%) 94  -KG     O2 Delivery Post Treatment nasal cannula  -KG       Row Name 02/28/24 1633          Positioning and Restraints    Pre-Treatment Position sitting in chair/recliner  -KG     Post Treatment Position chair  -KG     In Chair notified nsg;call light within reach;encouraged to call for assist;exit alarm on;waffle cushion;legs elevated  -KG               User Key  (r) = Recorded By, (t) = Taken By, (c) = Cosigned By      Initials Name Provider Type    Jeanette Forrest Physical Therapist                   Outcome Measures       Row Name 02/28/24 1634 02/28/24 0800       How much help from another person do you currently need...    Turning from your back to your side while in flat bed without using bedrails? 4  -KG 4  -LC    Moving from lying on back to sitting on the side of a flat bed without bedrails? 3  -KG 3  -LC    Moving to and from a bed to a chair (including a wheelchair)? 3  -KG 3  -LC    Standing up from a chair using your arms (e.g., wheelchair, bedside chair)? 3  -KG 3  -LC    Climbing 3-5 steps with a railing? 2  -KG 2  -LC    To walk in hospital room? 3  -KG 3  -LC    AM-PAC 6 Clicks Score (PT) 18  -KG 18  -LC    Highest Level of Mobility Goal 6 --> Walk 10 steps or more  -KG 6 --> Walk 10 steps or more  -LC      Row Name 02/28/24 1634          Functional Assessment    Outcome Measure Options AM-PAC 6 Clicks Basic Mobility (PT)  -KG               User Key  (r) = Recorded By, (t) = Taken By, (c) = Cosigned By      Initials Name Provider Type    Maria Dolores Torres RN Registered Nurse    Jeanette Forrest Physical Therapist                                 Physical Therapy Education       Title: PT OT SLP Therapies (In Progress)       Topic: Physical Therapy (In Progress)       Point: Mobility training (Done)        Learning Progress Summary             Patient Acceptance, E,TB, VU,NR by  at 2024 1151                         Point: Home exercise program (Not Started)       Learner Progress:  Not documented in this visit.              Point: Body mechanics (Done)       Learning Progress Summary             Patient Acceptance, E,TB, VU,NR by  at 2024 1151                         Point: Precautions (Done)       Learning Progress Summary             Patient Acceptance, E,TB, VU,NR by  at 2024 1151                                         User Key       Initials Effective Dates Name Provider Type Discipline     22 -  Tiesha Vigil, PT Physical Therapist PT                   [address]  [facility] [address]  Phone:  [phone]  Fax:  [phone] Date: 2024      Ambulatory Referral to Home Health     Patient:  Alok Tobias MRN:  [id number]   [address] [facility] [address] :  1945  SSN:    Phone: [phone] Sex:  M      INSURANCE PAYOR PLAN GROUP # SUBSCRIBER ID   Primary:  Secondary:    MEDICARE  HUMANA [id number]  [id number]    [id number]  [id number]      Referring Provider Information:  PHONG PRAKASH Phone: [phone] Fax: [phone]       Referral Information:   # Visits:  999 Referral Type: Home Health [42]   Urgency:  Routine Referral Reason: Specialty Services Required   Start Date: 2024 End Date:  To be determined by Insurer   Diagnosis: Hypoxia (R09.02 [ICD-10-CM] 799.02 [ICD-9-CM])  PAF (paroxysmal atrial fibrillation) (I48.0 [ICD-10-CM] 427.31 [ICD-9-CM])      Refer to Dept:   Refer to Provider:   Refer to Provider Phone:   Refer to Facility:       Face to Face Visit Date: 2024  Follow-up provider for Plan of Care? I treated the patient in an acute care facility and will not continue treatment after discharge.  Follow-up provider: ELAINE ELIZONDO [[id number]]  Reason/Clinical  Findings: COPD exacerbation  Describe mobility limitations that make leaving home difficult: impaired functional mobility, gait, balance, and endurance.  Nursing/Therapeutic Services Requested: Physical Therapy  PT orders: Home safety assessment  PT orders: Therapeutic exercise  Frequency: 1 Week 1     This document serves as a request of services and does not constitute Insurance authorization or approval of services.  To determine eligibility, please contact the members Insurance carrier to verify and review coverage.     If you have medical questions regarding this request for services. Please contact [address] at [phone] during normal business hours.        Verbal Order Mode: Telephone with readback   Authorizing Provider: Lala Kim APRN  Authorizing Provider's NPI: [id number]     Order Entered By: Kylee Forte RN 2/29/2024  1:22 PM     Electronically signed by:  Lala Kim APRN     PT Recommendation and Plan     Plan of Care Reviewed With: patient  Progress: improving  Outcome Evaluation: Pt was able to ambulate on RA 90'+80' and maintain O2 sats in low 90s, recommend pt use his walker, pt has lateral sway with ambulation and needs increased hip strength.  Pt more receptive to HHPT     Time Calculation:         PT Charges       Row Name 02/28/24 1634             Time Calculation    Start Time 1515  -KG      PT Received On 02/28/24  -KG         Time Calculation- PT    Total Timed Code Minutes- PT 38 minute(s)  -KG         Timed Charges    54080 - PT Therapeutic Exercise Minutes 15  -KG      45531 - Gait Training Minutes  13  -KG      61136 - PT Therapeutic Activity Minutes 10  -KG         Total Minutes    Timed Charges Total Minutes 38  -KG       Total Minutes 38  -KG                User Key  (r) = Recorded By, (t) = Taken By, (c) = Cosigned By      Initials Name Provider Type    Jeanette Forrest Physical Therapist                  Therapy Charges for Today       Code  Description Service Date Service Provider Modifiers Qty    63932073293 HC PT THER PROC EA 15 MIN 2/28/2024 Jeanette Cooley GP 1    01260859428 HC GAIT TRAINING EA 15 MIN 2/28/2024 Jeanette Cooley GP 1    86733805565 HC PT THERAPEUTIC ACT EA 15 MIN 2/28/2024 Jeanette Cooley GP 1            PT G-Codes  Outcome Measure Options: AM-PAC 6 Clicks Basic Mobility (PT)  AM-PAC 6 Clicks Score (PT): 18  AM-PAC 6 Clicks Score (OT): 20       Jeanette Cooley  2/28/2024      Electronically signed by Jeanette Cooley at 02/28/24 5678

## 2024-02-29 NOTE — PLAN OF CARE
Goal Outcome Evaluation:  Plan of Care Reviewed With: patient        Progress: improving  Outcome Evaluation: Pt able to ambulate 90', RA, FWW due to R hip discomfort and maintain sats in 90s throughout.

## 2024-02-29 NOTE — NURSING NOTE
Patient verbalized had flu vaccine in October and that his pneumonia vaccination is still in effect.  Patient has not had a recent COVID vaccine

## 2024-02-29 NOTE — PLAN OF CARE
Goal Outcome Evaluation:  Plan of Care Reviewed With: patient        Progress: improving  Outcome Evaluation: patient alert and oriented eager foir DC on 2L 02 to keeps 02 at 90%, walks with stand by assist fall precautions in effect, bed alarm on patient agrees to call for assist, home with 02 and HHPT

## 2024-02-29 NOTE — THERAPY TREATMENT NOTE
Patient Name: Alok Tobias  : 1945    MRN: [id number]                              Today's Date: 2024       Admit Date: 2024    Visit Dx:     ICD-10-CM ICD-9-CM   1. Acute respiratory failure with hypoxia  J96.01 518.81   2. COPD exacerbation  J44.1 491.21   3. Chronic obstructive pulmonary disease with acute exacerbation  J44.1 491.21   4. Hypoxia  R09.02 799.02   5. PAF (paroxysmal atrial fibrillation)  I48.0 427.31     Patient Active Problem List   Diagnosis    Obesity, Class III, BMI 40-49.9 (morbid obesity)    Hypertension    COPD/asthma    Chest pain    Shortness of breath    COPD (chronic obstructive pulmonary disease)    ANDREA     Renal insufficiency    Alcoholism    Atrial fibrillation with RVR    HCAP (healthcare-associated pneumonia)    Bilateral pleural effusion    Acute diastolic heart failure    Multiple lung nodules on CT    PAF (paroxysmal atrial fibrillation)    S/P mitral valve repair/annuloplasty for severe MR 2019 in [place]    Acute on chronic subdural hematoma with impending herniation s/p left frontotemporal craniotomy 10/11/2019     CAD (coronary atherosclerotic disease)    COPD exacerbation    Hypoxia     Past Medical History:   Diagnosis Date    Asthma     Atrial fibrillation     Bronchitis     CHF (congestive heart failure)     COPD (chronic obstructive pulmonary disease)     COVID-19     Elevated PSA     Hypertension     Subdural hematoma      Past Surgical History:   Procedure Laterality Date    APPENDECTOMY      CARDIAC CATHETERIZATION N/A 10/03/2018    Procedure: Left Heart Cath;  Surgeon: Twan Harrison MD;  Location:  [facility];  Service: Cardiology    CATARACT EXTRACTION      CRANIOTOMY FOR SUBDURAL HEMATOMA Left 10/11/2019    Procedure: CRANIOTOMY FOR SUBDURAL HEMATOMA;  Surgeon: Reagan Patten MD;  Location:  [facility];  Service: Neurosurgery    MITRAL VALVE REPAIR/REPLACEMENT      TONSILLECTOMY        General Information        Row Name 02/29/24 1328          Physical Therapy Time and Intention    Document Type therapy note (daily note)  -KG     Mode of Treatment physical therapy  -KG       Row Name 02/29/24 1328          General Information    Patient Profile Reviewed yes  -KG     Existing Precautions/Restrictions no known precautions/restrictions  -KG       Row Name 02/29/24 1328          Cognition    Orientation Status (Cognition) oriented x 3  -KG       Row Name 02/29/24 1328          Safety Issues, Functional Mobility    Safety Issues Affecting Function (Mobility) safety precautions follow-through/compliance;insight into deficits/self-awareness  -KG     Impairments Affecting Function (Mobility) balance;shortness of breath;endurance/activity tolerance;strength  -KG               User Key  (r) = Recorded By, (t) = Taken By, (c) = Cosigned By      Initials Name Provider Type    Jeanette Forrest Physical Therapist                   Mobility       Row Name 02/29/24 1329          Bed Mobility    Comment, (Bed Mobility) sitting EOB  -KG       Row Name 02/29/24 1329          Transfers    Comment, (Transfers) supervision  -KG       Row Name 02/29/24 1329          Sit-Stand Transfer    Sit-Stand [place] (Transfers) supervision  -KG     Assistive Device (Sit-Stand Transfers) walker, front-wheeled  -KG       Row Name 02/29/24 1329          Gait/Stairs (Locomotion)    [place] Level (Gait) supervision  -KG     Distance in Feet (Gait) 90  -KG     Deviations/Abnormal Patterns (Gait) bilateral deviations;evelyn decreased;stride length decreased;gait speed decreased;base of support, narrow  -KG     Bilateral Gait Deviations forward flexed posture  -KG     Comment, (Gait/Stairs) Pt able to ambulate 90', RA, FWW due to R hip discomfort and maintain sats in 90s throughout  -KG               User Key  (r) = Recorded By, (t) = Taken By, (c) = Cosigned By      Initials Name Provider Type    Jeanette Forrest Physical Therapist                    Obj/Interventions       Row Name 02/29/24 1335          Strength Comprehensive (MMT)    General Manual Muscle Testing (MMT) Assessment lower extremity strength deficits identified  -KG       Row Name 02/29/24 1335          Balance    Dynamic Standing Balance supervision  -KG     Position/Device Used, Standing Balance supported;walker, rolling  -KG     Balance Interventions standing;sit to stand  -KG               User Key  (r) = Recorded By, (t) = Taken By, (c) = Cosigned By      Initials Name Provider Type    Jeanette Forrest Physical Therapist                   Goals/Plan    No documentation.                  Clinical Impression       Row Name 02/29/24 1336          Pain    Pretreatment Pain Rating 0/10 - no pain  -KG     Posttreatment Pain Rating 0/10 - no pain  -KG       Row Name 02/29/24 1336          Plan of Care Review    Plan of Care Reviewed With patient  -KG     Progress improving  -KG     Outcome Evaluation Pt able to ambulate 90', RA, FWW due to R hip discomfort and maintain sats in 90s throughout.  -KG       [facility] Name 02/29/24 1336          Vital Signs    Pre SpO2 (%) 94  -KG     O2 Delivery Pre Treatment room air  -KG     Intra SpO2 (%) 93  -KG     O2 Delivery Intra Treatment room air  -KG     Post SpO2 (%) 94  -KG     O2 Delivery Post Treatment room air  -KG       [facility] Name 02/29/24 1336          Positioning and Restraints    Pre-Treatment Position in bed  -KG     Post Treatment Position bed  -KG     In Bed sitting EOB;call light within reach;encouraged to call for assist  -KG               User Key  (r) = Recorded By, (t) = Taken By, (c) = Cosigned By      Initials Name Provider Type    Jeanette Forrest Physical Therapist                   Outcome Measures       [facility] 02/29/24 1337 02/29/24 1000       How much help from another person do you currently need...    Turning from your back to your side while in flat bed without using bedrails? 4  -KG 4  -CD    Moving from lying on back  to sitting on the side of a flat bed without bedrails? 4  -KG 4  -CD    Moving to and from a bed to a chair (including a wheelchair)? 3  -KG 4  -CD    Standing up from a chair using your arms (e.g., wheelchair, bedside chair)? 4  -KG 4  -CD    Climbing 3-5 steps with a railing? 3  -KG 3  -CD    To walk in hospital room? 3  -KG 3  -CD    AM-PAC 6 Clicks Score (PT) 21  -KG 22  -CD    Highest Level of Mobility Goal 6 --> Walk 10 steps or more  -KG 7 --> Walk 25 feet or more  -CD      Row Name 02/29/24 0800          How much help from another person do you currently need...    Turning from your back to your side while in flat bed without using bedrails? 4  -CD     Moving from lying on back to sitting on the side of a flat bed without bedrails? 4  -CD     Moving to and from a bed to a chair (including a wheelchair)? 4  -CD     Standing up from a chair using your arms (e.g., wheelchair, bedside chair)? 3  -CD     Climbing 3-5 steps with a railing? 3  -CD     To walk in hospital room? 3  -CD     AM-PAC 6 Clicks Score (PT) 21  -CD     Highest Level of Mobility Goal 6 --> Walk 10 steps or more  -CD       Row Name 02/29/24 1337          Functional Assessment    Outcome Measure Options AM-PAC 6 Clicks Basic Mobility (PT)  -KG               User Key  (r) = Recorded By, (t) = Taken By, (c) = Cosigned By      Initials Name Provider Type     Dian Garcia RN Registered Nurse    Jeanette Forrest Physical Therapist                                 Physical Therapy Education       Title: PT OT SLP Therapies (In Progress)       Topic: Physical Therapy (In Progress)       Point: Mobility training (Done)       Learning Progress Summary             Patient Acceptance, E,TB, VU,NR by  at 2/27/2024 1151                         Point: Home exercise program (Not Started)       Learner Progress:  Not documented in this visit.              Point: Body mechanics (Done)       Learning Progress Summary             Patient Acceptance,  E,TB, VU,NR by  at 2/27/2024 1151                         Point: Precautions (Done)       Learning Progress Summary             Patient Acceptance, E,TB, VU,NR by  at 2/27/2024 1151                                         User Key       Initials Effective Dates Name Provider Type Discipline     09/22/22 -  Tiesha Vigil, PT Physical Therapist PT                  PT Recommendation and Plan     Plan of Care Reviewed With: patient  Progress: improving  Outcome Evaluation: Pt able to ambulate 90', RA, FWW due to R hip discomfort and maintain sats in 90s throughout.     Time Calculation:         PT Charges       Row Name 02/29/24 1338             Time Calculation    Start Time 1300  -KG      PT Received On 02/29/24  -KG         Timed Charges    02316 - Gait Training Minutes  23  -KG         Total Minutes    Timed Charges Total Minutes 23  -KG       Total Minutes 23  -KG                User Key  (r) = Recorded By, (t) = Taken By, (c) = Cosigned By      Initials Name Provider Type    Jeanette Forrest Physical Therapist                  Therapy Charges for Today       Code Description Service Date Service Provider Modifiers Qty    24446712771 HC PT THER PROC EA 15 MIN 2/28/2024 Jeanette Cooley GP 1    03196391693 HC GAIT TRAINING EA 15 MIN 2/28/2024 Jeanette Cooley GP 1    78131208324 HC PT THERAPEUTIC ACT EA 15 MIN 2/28/2024 Jeanette Cooley GP 1    23517936461 HC GAIT TRAINING EA 15 MIN 2/29/2024 Jeanette Cooley GP 2            PT G-Codes  Outcome Measure Options: AM-PAC 6 Clicks Basic Mobility (PT)  AM-PAC 6 Clicks Score (PT): 21  AM-PAC 6 Clicks Score (OT): 20       Jeanette Cooley  2/29/2024

## 2024-02-29 NOTE — PROGRESS NOTES
Patient unable to be scheduled with Dr. Izquierdo in [place] office prior to discharge as Dr. Izquierdo only does lung nodule clinic at that location.  Referral  to call patient to discuss options for follow up care.

## 2024-02-29 NOTE — DISCHARGE SUMMARY
[facility] Medicine Services  DISCHARGE SUMMARY    Patient Name: Alok Tobias  : 1945  MRN: [id number]    Date of Admission: 2024  5:18 PM  Date of Discharge:  24  Primary Care Physician: Ivet Chau MD    Consults       No orders found from 2024 to 2024.            Hospital Course     Presenting Problem: ***    Active Hospital Problems    Diagnosis  POA    **COPD exacerbation [J44.1]  Yes    Hypoxia [R09.02]  Yes    CAD (coronary atherosclerotic disease) [I25.10]  Yes    PAF (paroxysmal atrial fibrillation) [I48.0]  Yes    Hypertension [I10]  Yes      Resolved Hospital Problems   No resolved problems to display.          Hospital Course:  Alok Tobias is a 78 y.o. male ***      Discharge Follow Up Recommendations for outpatient labs/diagnostics:  PCP 1 week  Referral placed in [facility] for pulmonary follow up    Day of Discharge     HPI:   ***    Review of Systems  ***    Vital Signs:   Temp:  [97 °F (36.1 °C)-98.4 °F (36.9 °C)] 98.4 °F (36.9 °C)  Heart Rate:  [51-84] 68  Resp:  [16-18] 16  BP: (107-137)/(66-96) 134/75  Flow (L/min):  [2-3] 3      Physical Exam:  Constitutional: No acute distress, awake, alert  HENT: NCAT, mucous membranes moist  Respiratory: Expiratory wheezing, respiratory effort normal, sats stable on 2L  Cardiovascular: RRR, no murmurs, rubs, or gallops  Gastrointestinal: Positive bowel sounds, soft, nontender, nondistended  Musculoskeletal: No bilateral ankle edema  Psychiatric: Appropriate affect, cooperative  Neurologic: Oriented x 3, DUNCAN, speech clear  Skin: No rashes      Pertinent  and/or Most Recent Results     LAB RESULTS:      Lab 24  0659 24  0957 24  0522 24  2207 24  1618   WBC 11.95* 9.62 3.19*  --  6.14   HEMOGLOBIN 15.7 15.8 15.0  --  15.5   HEMATOCRIT 47.8 46.7 46.2  --  47.2   PLATELETS 172 170 116*  --  131*   NEUTROS ABS 10.61* 9.07* 2.81  --  4.39   IMMATURE GRANS (ABS) 0.06* 0.07*  0.02  --  0.03   LYMPHS ABS 0.56* 0.29* 0.27*  --  0.67*   MONOS ABS 0.71 0.18 0.09*  --  0.98*   EOS ABS 0.00 0.00 0.00  --  0.06   MCV 93.5 94.9 94.5  --  96.9   LACTATE  --   --   --  1.6  --          Lab 02/29/24  0659 02/28/24  0958 02/27/24  0522 02/26/24  1618   SODIUM 141 137 139 140   POTASSIUM 4.5 4.1 4.2 4.3   CHLORIDE 102 99 102 102   CO2 28.0 26.0 28.0 26.0   ANION GAP 11.0 12.0 9.0 12.0   BUN 30* 28* 14 11   CREATININE 0.88 0.82 0.80 0.79   EGFR 86.9 88.8 89.5 89.8   GLUCOSE 130* 305* 165* 112*   CALCIUM 9.2 9.2 8.8 8.9   MAGNESIUM  --   --   --  2.2   HEMOGLOBIN A1C  --   --  5.70*  --          Lab 02/26/24  1618   TOTAL PROTEIN 6.7   ALBUMIN 3.8   GLOBULIN 2.9   ALT (SGPT) 17   AST (SGOT) 29   BILIRUBIN 1.2   ALK PHOS 127*         Lab 02/26/24  1845 02/26/24  1618   PROBNP  --  1,611.0   HSTROP T 21 23*         Lab 02/27/24  0522   CHOLESTEROL 129   LDL CHOL 76   HDL CHOL 43   TRIGLYCERIDES 44             Brief Urine Lab Results  (Last result in the past 365 days)        Color   Clarity   Blood   Leuk Est   Nitrite   Protein   CREAT   Urine HCG        02/26/24 1718 Dark Yellow   Clear   Negative   Trace   Negative   Trace                 Microbiology Results (last 10 days)       Procedure Component Value - Date/Time    Respiratory Panel PCR w/COVID-19(SARS-CoV-2) [facility] In-House, NP Swab in UTM/VTM, 2 HR TAT - Swab, Nasopharynx [[id number]]  (Abnormal) Collected: 02/27/24 1557    Lab Status: Final result Specimen: Swab from Nasopharynx Updated: 02/27/24 1803     ADENOVIRUS, PCR Not Detected     Coronavirus 229E Not Detected     Coronavirus HKU1 Not Detected     Coronavirus NL63 Not Detected     Coronavirus OC43 Not Detected     COVID19 Not Detected     Human Metapneumovirus Not Detected     Human Rhinovirus/Enterovirus Not Detected     Influenza A PCR Not Detected     Influenza B PCR Not Detected     Parainfluenza Virus 1 Not Detected     Parainfluenza Virus 2 Not Detected      Parainfluenza Virus 3 Detected     Parainfluenza Virus 4 Not Detected     RSV, PCR Not Detected     Bordetella pertussis pcr Not Detected     Bordetella parapertussis PCR Not Detected     Chlamydophila pneumoniae PCR Not Detected     Mycoplasma pneumo by PCR Not Detected    Narrative:      In the setting of a positive respiratory panel with a viral infection PLUS a negative procalcitonin without other underlying concern for bacterial infection, consider observing off antibiotics or discontinuation of antibiotics and continue supportive care. If the respiratory panel is positive for atypical bacterial infection (Bordetella pertussis, Chlamydophila pneumoniae, or Mycoplasma pneumoniae), consider antibiotic de-escalation to target atypical bacterial infection.    Blood Culture - Blood, Arm, Right [[id number]]  (Normal) Collected: 02/26/24 2205    Lab Status: Preliminary result Specimen: Blood from Arm, Right Updated: 02/28/24 2246     Blood Culture No growth at 2 days    Narrative:      Less than seven (7) mL's of blood was collected.  Insufficient quantity may yield false negative results.    Blood Culture - Blood, Arm, Right [[id number]]  (Normal) Collected: 02/26/24 2203    Lab Status: Preliminary result Specimen: Blood from Arm, Right Updated: 02/28/24 2246     Blood Culture No growth at 2 days    Narrative:      Less than seven (7) mL's of blood was collected.  Insufficient quantity may yield false negative results.    COVID PRE-OP / PRE-PROCEDURE SCREENING ORDER (NO ISOLATION) - Swab, Nasopharynx [[id number]]  (Normal) Collected: 02/26/24 1708    Lab Status: Final result Specimen: Swab from Nasopharynx Updated: 02/26/24 1742    Narrative:      The following orders were created for panel order COVID PRE-OP / PRE-PROCEDURE SCREENING ORDER (NO ISOLATION) - Swab, Nasopharynx.  Procedure                               Abnormality         Status                     ---------                               -----------          ------                     COVID-19 and FLU A/B PCR...[[id number]]  Normal              Final result                 Please view results for these tests on the individual orders.    COVID-19 and FLU A/B PCR, 1 HR TAT - Swab, Nasopharynx [[id number]]  (Normal) Collected: 02/26/24 1708    Lab Status: Final result Specimen: Swab from Nasopharynx Updated: 02/26/24 174     COVID19 Not Detected     Influenza A PCR Not Detected     Influenza B PCR Not Detected    Narrative:      Fact sheet for providers: https://www.fda.gov/media/271909/download    Fact sheet for patients: https://www.fda.gov/media/089980/download    Test performed by PCR.            Adult Transthoracic Echo Complete W/ Cont if Necessary Per Protocol    Result Date: 2/28/2024    Left ventricular systolic function is normal. Calculated left ventricular EF = 63.4% Left ventricular ejection fraction appears to be 66 - 70%.   Left ventricular wall thickness is consistent with mild concentric hypertrophy.   The right ventricular cavity is mildly dilated.   The left atrial cavity is mildly dilated.   The right atrial cavity is moderately  dilated.   Status post mitral valve repair, there is a mitral valve ring present.  Transvalvular velocities within acceptable limits.   Estimated right ventricular systolic pressure from tricuspid regurgitation is normal (<35 mmHg). Calculated right ventricular systolic pressure from tricuspid regurgitation is 15 mmHg.   Moderate dilation of the ascending aorta is present.  4.6 cm.     XR Chest 1 View    Result Date: 2/26/2024  XR CHEST 1 VW Date of Exam: 2/26/2024 3:59 PM EST Indication: Weak/Dizzy/AMS triage protocol Comparison: 10/13/2019 Findings: Cardiomediastinal silhouette is enlarged, similar prior examination. There is generalized interstitial prominence. There are small bilateral pleural effusions without dense consolidation. No pneumothorax. No acute osseous abnormality identified.     Impression: Mild CHF features.  Electronically Signed: Abisai Koo MD  2/26/2024 4:36 PM EST  Workstation ID: [id number]             Results for orders placed during the hospital encounter of 02/26/24    Adult Transthoracic Echo Complete W/ Cont if Necessary Per Protocol    Interpretation Summary    Left ventricular systolic function is normal. Calculated left ventricular EF = 63.4% Left ventricular ejection fraction appears to be 66 - 70%.    Left ventricular wall thickness is consistent with mild concentric hypertrophy.    The right ventricular cavity is mildly dilated.    The left atrial cavity is mildly dilated.    The right atrial cavity is moderately  dilated.    Status post mitral valve repair, there is a mitral valve ring present.  Transvalvular velocities within acceptable limits.    Estimated right ventricular systolic pressure from tricuspid regurgitation is normal (<35 mmHg). Calculated right ventricular systolic pressure from tricuspid regurgitation is 15 mmHg.    Moderate dilation of the ascending aorta is present.  4.6 cm.      Pending Labs       Order Current Status    Blood Culture - Blood, Arm, Right Preliminary result    Blood Culture - Blood, Arm, Right Preliminary result          Discharge Details        Discharge Medications        New Medications        Instructions Start Date   acetaminophen 325 MG tablet  Commonly known as: TYLENOL   650 mg, Oral, Every 4 Hours PRN      doxycycline 100 MG capsule  Commonly known as: MONODOX   100 mg, Oral, Every 12 Hours Scheduled      PHARMACY MEDS TO BED CONSULT   Does not apply, Daily      predniSONE 20 MG tablet  Commonly known as: DELTASONE   40 mg, Oral, Daily With Breakfast   Start Date: March 1, 2024            Changes to Medications        Instructions Start Date   budesonide-formoterol 160-4.5 MCG/ACT inhaler  Commonly known as: SYMBICORT  What changed:   when to take this  reasons to take this   2 puffs, Inhalation, 2 Times Daily      ipratropium-albuterol  MCG/ACT  "inhaler  Commonly known as: COMBIVENT RESPIMAT  What changed: Another medication with the same name was added. Make sure you understand how and when to take each.   1 puff, Inhalation, 4 Times Daily PRN      ipratropium-albuterol 0.5-2.5 mg/3 ml nebulizer  Commonly known as: DUO-NEB  What changed: You were already taking a medication with the same name, and this prescription was added. Make sure you understand how and when to take each.   3 mL, Nebulization, Every 4 Hours PRN             Continue These Medications        Instructions Start Date   allopurinol 300 MG tablet  Commonly known as: ZYLOPRIM   300 mg, Oral, Daily      apixaban 5 MG tablet tablet  Commonly known as: ELIQUIS   5 mg, Oral, 2 Times Daily      furosemide 20 MG tablet  Commonly known as: LASIX   20 mg, Oral, Daily      guaiFENesin 600 MG 12 hr tablet  Commonly known as: MUCINEX   1,200 mg, Oral, 2 Times Daily, OTC      KLOR-CON 10 MEQ CR tablet  Generic drug: potassium chloride   10 mEq, Oral, Daily, Takes with lasix      metoprolol succinate XL 25 MG 24 hr tablet  Commonly known as: TOPROL-XL   TAKE 1 TABLET EVERY DAY      valsartan 40 MG tablet  Commonly known as: DIOVAN   40 mg, Oral, 2 Times Daily               Allergies   Allergen Reactions    Dye Fdc Red [Red Dye] Nausea And Vomiting     All dyes, IV dye, hair dyes    Penicillins Other (See Comments)     \"CAUSES CHLAMYDIA\"         Discharge Disposition:  [place] [place] [facility]    Diet:  Hospital:  Diet Order   Procedures    Diet: Cardiac Diets; Healthy Heart (2-3 Na+); Texture: Regular Texture (IDDSI 7); Fluid Consistency: Thin (IDDSI 0)       Diet Instructions       Diet: Cardiac Diets; Healthy Heart (2-3 Na+); Regular Texture (IDDSI 7); Thin (IDDSI 0)      Discharge Diet: Cardiac Diets    Cardiac Diet: Healthy Heart (2-3 Na+)    Texture: Regular Texture (IDDSI 7)    Fluid Consistency: Thin (IDDSI 0)             Activity:  Activity Instructions       Activity as Tolerated                 "   CODE STATUS:    Code Status and Medical Interventions:   Ordered at: 02/26/24 2140     Level Of Support Discussed With:    Patient     Code Status (Patient has no pulse and is not breathing):    CPR (Attempt to Resuscitate)     Medical Interventions (Patient has pulse or is breathing):    Full Support       Future Appointments   Date Time Provider Department Center   9/9/2024  3:15 PM Amos Crook III, MD [facility]       Additional Instructions for the Follow-ups that You Need to Schedule       Ambulatory Referral to Home Health   As directed      Face to Face Visit Date: 2/29/2024   Follow-up provider for Plan of Care?: I treated the patient in an acute care facility and will not continue treatment after discharge.   Follow-up provider: IVET CHAU [[id number]]   Reason/Clinical Findings: COPD exacerbation   Describe mobility limitations that make leaving home difficult: impaired functional mobility, gait, balance, and endurance.   Nursing/Therapeutic Services Requested: Physical Therapy   PT orders: Home safety assessment Therapeutic exercise   Frequency: 1 Week 1        Discharge Follow-up with PCP   As directed       Currently Documented PCP:    Ivet Chau MD    PCP Phone Number:    [phone]     Follow Up Details: 1 week        Discharge Follow-up with Specified Provider: Pulmonary   As directed      To: Pulmonary   Follow Up Details: referral placed in [facility], they will call with appointment                  MARIPOSA Villasenor  02/29/24      Time Spent on Discharge:  I spent  25  minutes on this discharge activity which included: face-to-face encounter with the patient, reviewing the data in the system, coordination of the care with the nursing staff as well as consultants, documentation, and entering orders.

## 2024-02-29 NOTE — CASE MANAGEMENT/SOCIAL WORK
Continued Stay Note   [facility]     Patient Name: Alok Tobias  MRN: [id number]  Today's Date: 2/29/2024    Admit Date: 2/26/2024    Plan:    Discharge Plan       Row Name 02/29/24 1329       Plan    Plan     Patient/Family in Agreement with Plan yes    Plan Comments Spoke with patient at bedside. Follow up conversation about HH with patient agreeable at this time. Referral given to [facility] per pt request. Also discussed the need for floor O2 concentrator with patient agreeable. Has inogen portable. Reminded him to have daughter bring when sheWill contact [employer] once required documentation in.    Final Discharge Disposition Code 06 - home with home health care                   Discharge Codes    No documentation.                 Expected Discharge Date and Time       Expected Discharge Date Expected Discharge Time    Mar 1, 2024               Kylee Forte RN

## 2024-03-01 NOTE — OUTREACH NOTE
Prep Survey      Flowsheet Row Responses   [facility] facility patient discharged from? [facility]   Is LACE score < 7 ? No   Eligibility Readm Mgmt   Discharge diagnosis COPD exacerbation   Does the patient have one of the following disease processes/diagnoses(primary or secondary)? COPD   Does the patient have Home health ordered? No   Is there a DME ordered? Yes   What DME was ordered? O2 concentrator  [facility]   Prep survey completed? Yes            PARISH MAZA - Registered Nurse

## 2024-03-02 LAB
BACTERIA SPEC AEROBE CULT: NORMAL
BACTERIA SPEC AEROBE CULT: NORMAL

## 2024-03-03 LAB
QT INTERVAL: 336 MS
QTC INTERVAL: 346 MS

## 2024-03-04 ENCOUNTER — READMISSION MANAGEMENT (OUTPATIENT)
Dept: CALL CENTER | Facility: HOSPITAL | Age: 79
End: 2024-03-04
Payer: MEDICARE

## 2024-03-04 NOTE — OUTREACH NOTE
"COPD/PN Week 1 Survey      Flowsheet Row Responses   [facility] patient discharged from? [facility]   Does the patient have one of the following disease processes/diagnoses(primary or secondary)? COPD   Week 1 attempt successful? Yes   Call start time 1318   Call end time 1331   Discharge diagnosis COPD exacerbation   Meds reviewed with patient/caregiver? Yes   Is the patient having any side effects they believe may be caused by any medication additions or changes? No   Does the patient have all medications ordered at discharge? Yes   Is the patient taking all medications as directed (includes completed medication regime)? Yes   Does the patient have a primary care provider?  Yes   Does the patient have an appointment with their PCP or specialist within 7 days of discharge? Yes   What is the Home health agency?  Pt has Home health he reports   DME comments pt reports sleeping with O2 @ 2L in place.   Pulse Ox monitoring Intermittent   Pulse Ox device source Patient   O2 Sat: education provided Sat levels   Psychosocial comments Dtr is APRN   Comments Pt reports that his cough remains and feels as though he has phlegm to cough up but he is unable to retrieve it r/t too thick. Pt reports SOA improving but does remain, overall he has seen improvement since DC, pt reports.   Did the patient receive a copy of their discharge instructions? Yes   Nursing interventions Reviewed instructions with patient   What is the patient's perception of their health status since discharge? Improving   Nursing Interventions Nurse provided patient education   If the patient is a current smoker, are they able to teach back resources for cessation? Not a smoker   Is the patient/caregiver able to teach back the hierarchy of who to call/visit for symptoms/problems? PCP, Specialist, Home health nurse, Urgent Care, ED, 911 Yes   Patient reports what zone on this call? Yellow Zone   Yellow Zone I feel like I have a \"chest cold\", Increased " or thicker phlegm/mucus   Yellow interventions Continue taking daily medications, Use oxygen as prescribed, Start an oral corticosteroid if ordered, Start antibiotic if ordered   Week 1 call completed? Yes   Revoked No further contact(revokes)-requires comment   Call end time 1323            Hillary ADAME - Registered Nurse

## 2024-03-21 ENCOUNTER — OFFICE VISIT (OUTPATIENT)
Dept: PULMONOLOGY | Facility: CLINIC | Age: 79
End: 2024-03-21
Payer: MEDICARE

## 2024-03-21 VITALS
HEIGHT: 67 IN | SYSTOLIC BLOOD PRESSURE: 124 MMHG | DIASTOLIC BLOOD PRESSURE: 76 MMHG | WEIGHT: 197 LBS | BODY MASS INDEX: 30.92 KG/M2 | OXYGEN SATURATION: 93 % | HEART RATE: 89 BPM | TEMPERATURE: 96.9 F

## 2024-03-21 DIAGNOSIS — J44.1 COPD EXACERBATION: ICD-10-CM

## 2024-03-21 DIAGNOSIS — J44.9 CHRONIC OBSTRUCTIVE PULMONARY DISEASE, UNSPECIFIED COPD TYPE: Primary | Chronic | ICD-10-CM

## 2024-03-21 DIAGNOSIS — R09.02 HYPOXIA: ICD-10-CM

## 2024-03-21 DIAGNOSIS — G47.33 OSA (OBSTRUCTIVE SLEEP APNEA): ICD-10-CM

## 2024-03-21 DIAGNOSIS — J45.40 MODERATE PERSISTENT ASTHMA WITHOUT COMPLICATION: ICD-10-CM

## 2024-03-21 RX ORDER — BUDESONIDE, GLYCOPYRROLATE, AND FORMOTEROL FUMARATE 160; 9; 4.8 UG/1; UG/1; UG/1
2 AEROSOL, METERED RESPIRATORY (INHALATION) EVERY 12 HOURS
Qty: 32.1 G | Refills: 4 | Status: SHIPPED | OUTPATIENT
Start: 2024-03-21

## 2024-03-21 RX ORDER — AZITHROMYCIN 250 MG/1
TABLET, FILM COATED ORAL
Qty: 6 TABLET | Refills: 0 | Status: SHIPPED | OUTPATIENT
Start: 2024-03-21

## 2024-03-21 RX ORDER — PREDNISONE 20 MG/1
40 TABLET ORAL DAILY
Qty: 10 TABLET | Refills: 0 | Status: SHIPPED | OUTPATIENT
Start: 2024-03-21

## 2024-03-21 RX ORDER — POTASSIUM CHLORIDE 750 MG/1
TABLET, EXTENDED RELEASE ORAL
COMMUNITY
Start: 2024-02-20

## 2024-03-21 NOTE — PROGRESS NOTES
Chief Complaint  COPD  Worsening cough and shortness of breath since November 2023.      Alok Tobias is a 78 y.o. male who presents today to [facility] for COPD.    HPI:   Patient is an 78-year-old male who is referred to me for further evaluation and management of COPD/asthma.  Found out that he also has history of obstructive sleep apnea.  He reported that since November 2023 he has been having persistent cough associated with sputum production and shortness of breath.  He reported unrefreshing sleep.  He had a hospitalization back in February where he was treated with antibiotics and systemic steroid.  He was started on nocturnal oxygen.  Apparently he is also supposed to be on oxygen on walking but he is noncompliant with it.    Patient reported unrefreshing sleep and morning headaches, loud snoring.  Wife had witnessed apneic event.    Patient denies chest pain, orthopnea, PND, leg edema, nausea, vomiting, diarrhea, hemoptysis, hematemesis, melena, bright red blood per rectum    Previous History:   Past Medical History:   Diagnosis Date    Asthma     Atrial fibrillation     Bronchitis     CHF (congestive heart failure)     COPD (chronic obstructive pulmonary disease)     COVID-19     Elevated PSA     Hypertension     Subdural hematoma       Past Surgical History:   Procedure Laterality Date    APPENDECTOMY      CARDIAC CATHETERIZATION N/A 10/03/2018    Procedure: Left Heart Cath;  Surgeon: Twan Harrison MD;  Location:  [facility];  Service: Cardiology    CATARACT EXTRACTION      CRANIOTOMY FOR SUBDURAL HEMATOMA Left 10/11/2019    Procedure: CRANIOTOMY FOR SUBDURAL HEMATOMA;  Surgeon: Reagan Patten MD;  Location: [facility] OR;  Service: Neurosurgery    MITRAL VALVE REPAIR/REPLACEMENT      TONSILLECTOMY        Social History     Socioeconomic History    Marital status:    Tobacco Use    Smoking status: Former      Current packs/day: 0.00     Average packs/day: 1.5 packs/day for 20.0 years (30.0 ttl pk-yrs)     Types: Cigarettes     Start date: 1966     Quit date: 1986     Years since quittin.8    Smokeless tobacco: Never   Vaping Use    Vaping status: Never Used   Substance and Sexual Activity    Alcohol use: Not Currently     Alcohol/week: 3.0 - 4.0 standard drinks of alcohol     Types: 3 - 4 Cans of beer per week     Comment: 3-4 beers every evening    Drug use: No    Sexual activity: Defer        Current Medications:  Current Outpatient Medications   Medication Sig Dispense Refill    acetaminophen (TYLENOL) 325 MG tablet Take 2 tablets by mouth Every 4 (Four) Hours As Needed for Mild Pain.      allopurinol (ZYLOPRIM) 300 MG tablet Take 1 tablet by mouth Daily.      apixaban (ELIQUIS) 5 MG tablet tablet Take 1 tablet by mouth 2 (Two) Times a Day. 180 tablet 1    guaiFENesin (MUCINEX) 600 MG 12 hr tablet Take 2 tablets by mouth 2 (Two) Times a Day. OTC      ipratropium-albuterol (COMBIVENT RESPIMAT)  MCG/ACT inhaler Inhale 1 puff 4 (Four) Times a Day As Needed for Wheezing.      ipratropium-albuterol (DUO-NEB) 0.5-2.5 mg/3 ml nebulizer Take 3 mL by nebulization Every 4 (Four) Hours As Needed for Shortness of Air. 360 mL 0    metoprolol succinate XL (TOPROL-XL) 25 MG 24 hr tablet TAKE 1 TABLET EVERY DAY 90 tablet 1    PHARMACY MEDS TO BED CONSULT Use Daily.      potassium chloride (KLOR-CON M10) 10 MEQ CR tablet       potassium chloride (KLOR-CON) 10 MEQ CR tablet Take 1 tablet by mouth Daily. Takes with lasix      valsartan (DIOVAN) 40 MG tablet Take 1 tablet by mouth 2 (Two) Times a Day. 180 tablet 1    azithromycin (ZITHROMAX) 250 MG tablet Take 2 by mouth today then 1 daily for 4 days 6 tablet 0    Budeson-Glycopyrrol-Formoterol (Breztri Aerosphere) 160-9-4.8 MCG/ACT aerosol inhaler Inhale 2 puffs Every 12 (Twelve) Hours. Provide spacer device 32.1 g 4    predniSONE (DELTASONE) 20 MG tablet Take 2  "tablets by mouth Daily. 10 tablet 0     No current facility-administered medications for this visit.       Allergies:   Allergies   Allergen Reactions    Dye Fdc Red [Red Dye] Nausea And Vomiting     All dyes, IV dye, hair dyes    Penicillins Other (See Comments)     \"CAUSES CHLAMYDIA\"       Vitals:   /76   Pulse 89   Temp 96.9 °F (36.1 °C)   Ht 170.2 cm (67.01\")   Wt 89.4 kg (197 lb)   SpO2 93%   BMI 30.85 kg/m²     Estimated body mass index is 30.85 kg/m² as calculated from the following:    Height as of this encounter: 170.2 cm (67.01\").    Weight as of this encounter: 89.4 kg (197 lb).           Physical Exam:   HEENT: Moderate oropharyngeal crowding.    Short obese neck.    Lungs: Diffuse wheezing with prolonged expiratory phase.  No rales.  CVS: Regular rate and rhythm, no murmur, rub, gallop.    Abdomen: Benign.    Extremities: No clubbing and edema.    Neuro: Nonfocal.             STOP-Bang Score:     Huntington Sleepiness Scale: Huntington Sleepiness Scale  Sitting and reading: Moderate chance of dozing  Watching TV: Moderate chance of dozing  Sitting, inactive in a public place (e.g. a theatre or a meeting): Would never doze  As a passenger in a car for an hour without a break: Would never doze  Lying down to rest in the afternoon when circumstances permit: High chance of dozing  Sitting and talking to someone: Would never doze  Sitting quietly after a lunch without alcohol: Would never doze  In a car, while stopped for a few minutes in traffic: Would never doze  Total score: 7     Lab Results:   No results displayed because visit has over 200 results.          Lab Results (last 72 hours)       ** No results found for the last 72 hours. **          WBC No results found for: \"WBC\"   HGB No results found for: \"HGB\"   HCT No results found for: \"HCT\"   Platlets No results found for: \"LABPLAT\"     PT/INR:  No results found for: \"PROTIME\"/No results found for: \"INR\"    Sodium No results found for: \"NA\" " "  Potassium No results found for: \"K\"   Chloride No results found for: \"CL\"   Bicarbonate No results found for: \"PLASMABICARB\"   BUN No results found for: \"BUN\"   Creatinine No results found for: \"CREATININE\"   Calcium No results found for: \"CALCIUM\"   Magnesium No results found for: \"MG\"     pH No results found for: \"PHART\"   pO2 No results found for: \"PO2ART\"   pCO2 No results found for: \"EWJ2WCF\"   HCO3 No results found for: \"UHM1YBX\"           Radiology Review:   Results for orders placed during the hospital encounter of 02/26/24    XR Chest 1 View    Narrative  XR CHEST 1 VW    Date of Exam: 2/26/2024 3:59 PM EST    Indication: Weak/Dizzy/AMS triage protocol    Comparison: 10/13/2019    Findings:  Cardiomediastinal silhouette is enlarged, similar prior examination. There is generalized interstitial prominence. There are small bilateral pleural effusions without dense consolidation. No pneumothorax. No acute osseous abnormality identified.    Impression  Impression:  Mild CHF features.      Electronically Signed: Abisai Koo MD  2/26/2024 4:36 PM EST  Workstation ID: [id number]     No results found for this or any previous visit.    No results found for this or any previous visit.    Results for orders placed during the hospital encounter of 10/08/19    CT Chest Without Contrast    Narrative  EXAMINATION: CT CHEST WO CONTRAST-10/08/2019:    INDICATION: 3 MONTH FOLLOW-UP CT; R91.8-Other nonspecific abnormal  finding of lung field, 3 month abnormal follow-up chest.    TECHNIQUE: Multiple axial CT imaging was obtained of the chest without  the administration of intravenous contrast.    The radiation dose reduction device was turned on for each scan per the  ALARA (As Low as Reasonably Achievable) protocol.    COMPARISON: 06/19/2019.    FINDINGS: Interval development of a small-to-moderate sized pericardial  effusion. The pleural effusions have resolved in the interval. The  ascending thoracic aorta largest AP " dimension is 4.4 cm. Atherosclerotic  disease seen within the thoracic aorta. The thyroid is homogeneous.  There is calcification seen within the thoracic aorta. Calcification  also seen within the mitral valve. Calcification seen within the right  hilar region and several lymph nodes in the mediastinum suggesting old  healed granulomatous disease. The lung parenchyma reveals some scarring  identified anteriorly within the left upper lobe. Minimal scarring  identified within the lingula and right middle lobe. There is no  parenchymal consolidation, pulmonary mass or new nodule identified. The  visualized upper abdomen is unremarkable in appearance. Diverticulosis  of the visualized colon.    Impression  Resolution of the previously noted pleural effusions with  development of a small-to-moderate sized pericardial effusion. Ascending  thoracic aorta largest AP dimension is 4.4 cm. Minimal scarring  anteriorly within the right middle lobe, left upper lobe and lingula.  The remainder of the chest is grossly unremarkable.    D:  10/09/2019  E:  10/09/2019    This report was finalized on 10/9/2019 5:10 PM by Dr. Jeanette Sharma MD.    No results found for this or any previous visit.    No results found for this or any previous visit.     Results for orders placed during the hospital encounter of 06/18/19    CT Angiogram Chest With Contrast    Narrative  CTA Chest    INDICATION:  Shortness of air    TECHNIQUE:  CT angiogram of the chest with contrast. 3-D postprocessing was performed and reviewed.   Radiation dose reduction techniques included automated exposure control or exposure modulation based on body size. Count of known CT and cardiac nuc med studies  performed in previous 12 months: 1.    COMPARISON:  Chest CT angiogram October    FINDINGS:  Bolus timing is excellent and there is no evidence of pulmonary embolism. The thoracic aorta is normal in caliber. There is no mediastinal mass or adenopathy. Images of  the upper abdomen show no acute abnormality. There are moderate bilateral pleural  effusions.    There are coarse left greater than right bilateral upper lobe infiltrates, and there are somewhat spiculated appearing nodular densities in both lower lobes, new since the CT exam of October 2018.    Impression  1. Excellent bolus timing and no evidence of pulmonary embolism.    2. Coarse left greater than right upper lobe infiltrates and somewhat spiculated nodular densities in both lower lobes, all new since the CT of October 2018, along with moderate pleural effusions. Suspect infectious or inflammatory etiology of the  nodular densities.    3. Normal caliber thoracic aorta.    Signer Name: Andrei Dukes MD  Signed: 6/19/2019 1:11 AM  Workstation Name: [email or web address]    No results found for this or any previous visit.    No results found for this or any previous visit.                  Results Review: I reviewed the patient's new clinical results.    Assessment and Plan    Visit Diagnosis:     ICD-10-CM ICD-9-CM   1. Chronic obstructive pulmonary disease, unspecified COPD type  J44.9 496   2. Moderate persistent asthma without complication  J45.40 493.90   3. ANDREA (obstructive sleep apnea)  G47.33 327.23   4. COPD exacerbation  J44.1 491.21   5. Hypoxia  R09.02 799.02      Significant wheezing and use of rescue inhaler.  He is on DuoNeb/Combivent to be used as needed.  He is also on Symbicort which I am going to discontinue and switch him with breztri with a spacer device 2 puffs twice a day.    I will give him a short burst of prednisone at 40 mg daily for 5 days, azithromycin for 5 days.    I will schedule pulmonary function test, in lab sleep study.  I will proceed with home oxygen evaluation to see if he drops his oxygen saturation on exertion.    Consequences of untreated sleep apnea including hypertension, increased risk of coronary artery disease, arrhythmias, congestive heart failure, depression, dementia,  anxiety, stroke, erectile dysfunction, increased risk of traffic accidents discussed in detail.  I advised him to avoid any sedatives or hypnotics.  I advised him to avoid driving when he feels sleepy.    Return to clinic in 1 month, earlier as needed.    If his cough persist I may consider discontinuing losartan  New Medications:   New Medications Ordered This Visit   Medications    Budeson-Glycopyrrol-Formoterol (Breztri Aerosphere) 160-9-4.8 MCG/ACT aerosol inhaler     Sig: Inhale 2 puffs Every 12 (Twelve) Hours. Provide spacer device     Dispense:  32.1 g     Refill:  4    predniSONE (DELTASONE) 20 MG tablet     Sig: Take 2 tablets by mouth Daily.     Dispense:  10 tablet     Refill:  0    azithromycin (ZITHROMAX) 250 MG tablet     Sig: Take 2 by mouth today then 1 daily for 4 days     Dispense:  6 tablet     Refill:  0       Discontinued Medications:   Medications Discontinued During This Encounter   Medication Reason    budesonide-formoterol (SYMBICORT) 160-4.5 MCG/ACT inhaler             Follow Up:       Patient was given instructions and counseling regarding his condition. Please see specific information pulled into the AVS if appropriate.       This document has been electronically signed by Alex Zhang MD   March 21, 2024 14:50 EDT    Dictated Utilizing Dragon Dictation: Part of this note may be an electronic transcription/translation of spoken language to printed text using the Dragon Dictation System.

## 2024-04-18 ENCOUNTER — OFFICE VISIT (OUTPATIENT)
Dept: PULMONOLOGY | Facility: CLINIC | Age: 79
End: 2024-04-18
Payer: MEDICARE

## 2024-04-18 ENCOUNTER — TELEPHONE (OUTPATIENT)
Dept: PULMONOLOGY | Facility: CLINIC | Age: 79
End: 2024-04-18

## 2024-04-18 VITALS
HEART RATE: 71 BPM | SYSTOLIC BLOOD PRESSURE: 104 MMHG | OXYGEN SATURATION: 94 % | DIASTOLIC BLOOD PRESSURE: 70 MMHG | HEIGHT: 67 IN | TEMPERATURE: 96.9 F | BODY MASS INDEX: 42.69 KG/M2 | WEIGHT: 272 LBS

## 2024-04-18 DIAGNOSIS — R09.02 HYPOXIA: ICD-10-CM

## 2024-04-18 DIAGNOSIS — J44.9 CHRONIC OBSTRUCTIVE PULMONARY DISEASE, UNSPECIFIED COPD TYPE: Primary | ICD-10-CM

## 2024-04-18 DIAGNOSIS — G47.33 OSA (OBSTRUCTIVE SLEEP APNEA): ICD-10-CM

## 2024-04-18 DIAGNOSIS — J45.40 MODERATE PERSISTENT ASTHMA WITHOUT COMPLICATION: ICD-10-CM
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
## 2024-04-18 NOTE — PROGRESS NOTES
Chief Complaint  COPD  Shortness of breath on minimal exertion.  Alok Tobias is a 78 y.o. male who presents today to [facility] for COPD.    HPI:      Patient is an 78-year-old male is here for a follow-up on COPD/asthma.  On previous visit I did a home oxygen evaluation.  Found out that he required oxygen at 2 L on exertion and he is supposed to be on 2 L during sleep.  Apparently, patient is not very compliant with the daytime oxygen.    I also wanted to do an in lab sleep study as I had a high clinical suspicion of sleep apnea.  In lab sleep study is still pending.    PFTs are still pending.    On previous visit I had to give him a short burst of steroid and Zithromax for COPD exacerbation.  Patient reported feeling better compared to last visit.    He is not compliant with oxygen.    He reported slight worsening in leg edema.    He is compliant with LABA LAMA ICS combination.    He had a hospitalization back in February where he was treated with antibiotics and systemic steroid.    Patient reported unrefreshing sleep and morning headaches, loud snoring.  Wife had witnessed apneic event.     Patient denies chest pain, orthopnea, PND, leg edema, nausea, vomiting, diarrhea, hemoptysis, hematemesis, melena, bright red blood per rectum  Previous History:   Past Medical History:   Diagnosis Date    Asthma     Asthma, extrinsic birth    Atrial fibrillation     Bronchitis     CHF (congestive heart failure)     Chronic bronchitis teens    COPD (chronic obstructive pulmonary disease)     Coronary artery disease 2019    COVID-19     Dermatomyositis teens    Elevated PSA     Hypertension     Pulmonary arterial hypertension 2016    Sleep apnea, obstructive 2019    Subdural hematoma       Past Surgical History:   Procedure Laterality Date    APPENDECTOMY      CARDIAC CATHETERIZATION N/A 10/03/2018    Procedure: Left Heart Cath;  Surgeon: Twan Harrison MD;   Location:  [facility];  Service: Cardiology    CATARACT EXTRACTION      CRANIOTOMY FOR SUBDURAL HEMATOMA Left 10/11/2019    Procedure: CRANIOTOMY FOR SUBDURAL HEMATOMA;  Surgeon: Reagan Patten MD;  Location: [facility] OR;  Service: Neurosurgery    MITRAL VALVE REPAIR/REPLACEMENT      TONSILLECTOMY        Social History     Socioeconomic History    Marital status:    Tobacco Use    Smoking status: Former     Current packs/day: 0.00     Average packs/day: 1.5 packs/day for 20.0 years (30.0 ttl pk-yrs)     Types: Cigarettes     Start date: 1966     Quit date: 1986     Years since quittin.9     Passive exposure: Past    Smokeless tobacco: Never   Vaping Use    Vaping status: Never Used   Substance and Sexual Activity    Alcohol use: Not Currently     Alcohol/week: 3.0 - 4.0 standard drinks of alcohol     Types: 3 - 4 Cans of beer per week     Comment: 3-4 beers every evening    Drug use: No    Sexual activity: Defer        Current Medications:  Current Outpatient Medications   Medication Sig Dispense Refill    acetaminophen (TYLENOL) 325 MG tablet Take 2 tablets by mouth Every 4 (Four) Hours As Needed for Mild Pain.      allopurinol (ZYLOPRIM) 300 MG tablet Take 1 tablet by mouth Daily.      apixaban (ELIQUIS) 5 MG tablet tablet Take 1 tablet by mouth 2 (Two) Times a Day. 180 tablet 1    Budeson-Glycopyrrol-Formoterol (Breztri Aerosphere) 160-9-4.8 MCG/ACT aerosol inhaler Inhale 2 puffs Every 12 (Twelve) Hours. Provide spacer device 32.1 g 4    guaiFENesin (MUCINEX) 600 MG 12 hr tablet Take 2 tablets by mouth 2 (Two) Times a Day. OTC      ipratropium-albuterol (COMBIVENT RESPIMAT)  MCG/ACT inhaler Inhale 1 puff 4 (Four) Times a Day As Needed for Wheezing.      ipratropium-albuterol (DUO-NEB) 0.5-2.5 mg/3 ml nebulizer Take 3 mL by nebulization Every 4 (Four) Hours As Needed for Shortness of Air. 360 mL 0    metoprolol succinate XL (TOPROL-XL) 25 MG 24 hr tablet TAKE  "1 TABLET EVERY DAY 90 tablet 1    PHARMACY MEDS TO BED CONSULT Use Daily.      potassium chloride (KLOR-CON M10) 10 MEQ CR tablet       valsartan (DIOVAN) 40 MG tablet Take 1 tablet by mouth 2 (Two) Times a Day. 180 tablet 1     No current facility-administered medications for this visit.       Allergies:   Allergies   Allergen Reactions    Dye Fdc Red [Red Dye] Nausea And Vomiting     All dyes, IV dye, hair dyes    Penicillins Other (See Comments)     \"CAUSES CHLAMYDIA\"       Vitals:   /70   Pulse 71   Temp 96.9 °F (36.1 °C)   Ht 170.2 cm (67\")   Wt 123 kg (272 lb)   SpO2 94%   BMI 42.60 kg/m²     Estimated body mass index is 42.6 kg/m² as calculated from the following:    Height as of this encounter: 170.2 cm (67\").    Weight as of this encounter: 123 kg (272 lb).         Physical Exam:   HEENT: Moderate oropharyngeal crowding.    Lungs: Bilateral air entry, occasional wheezing only on forced exhalation otherwise clear to auscultation.    CVS: Regular rate and rhythm, no murmur, rub, gallop.    Abdomen: Obese.    Extremities: 1+ bipedal edema, no calf tenderness.    Neuro: Nonfocal.             STOP-Bang Score:     [place] Sleepiness Scale:       Lab Results:   No results displayed because visit has over 200 results.          Lab Results (last 72 hours)       ** No results found for the last 72 hours. **          WBC No results found for: \"WBC\"   HGB No results found for: \"HGB\"   HCT No results found for: \"HCT\"   Platlets No results found for: \"LABPLAT\"     PT/INR:  No results found for: \"PROTIME\"/No results found for: \"INR\"    Sodium No results found for: \"NA\"   Potassium No results found for: \"K\"   Chloride No results found for: \"CL\"   Bicarbonate No results found for: \"PLASMABICARB\"   BUN No results found for: \"BUN\"   Creatinine No results found for: \"CREATININE\"   Calcium No results found for: \"CALCIUM\"   Magnesium No results found for: \"MG\"     pH No results found for: \"PHART\"   pO2 No results " "found for: \"PO2ART\"   pCO2 No results found for: \"RWN7COK\"   HCO3 No results found for: \"QLJ3PYY\"           Radiology Review:   Results for orders placed during the hospital encounter of 02/26/24    XR Chest 1 View    Narrative  XR CHEST 1 VW    Date of Exam: 2/26/2024 3:59 PM EST    Indication: Weak/Dizzy/AMS triage protocol    Comparison: 10/13/2019    Findings:  Cardiomediastinal silhouette is enlarged, similar prior examination. There is generalized interstitial prominence. There are small bilateral pleural effusions without dense consolidation. No pneumothorax. No acute osseous abnormality identified.    Impression  Impression:  Mild CHF features.      Electronically Signed: Abisai Koo MD  2/26/2024 4:36 PM EST  Workstation ID: [id number]     No results found for this or any previous visit.    No results found for this or any previous visit.    Results for orders placed during the hospital encounter of 10/08/19    CT Chest Without Contrast    Narrative  EXAMINATION: CT CHEST WO CONTRAST-10/08/2019:    INDICATION: 3 MONTH FOLLOW-UP CT; R91.8-Other nonspecific abnormal  finding of lung field, 3 month abnormal follow-up chest.    TECHNIQUE: Multiple axial CT imaging was obtained of the chest without  the administration of intravenous contrast.    The radiation dose reduction device was turned on for each scan per the  ALARA (As Low as Reasonably Achievable) protocol.    COMPARISON: 06/19/2019.    FINDINGS: Interval development of a small-to-moderate sized pericardial  effusion. The pleural effusions have resolved in the interval. The  ascending thoracic aorta largest AP dimension is 4.4 cm. Atherosclerotic  disease seen within the thoracic aorta. The thyroid is homogeneous.  There is calcification seen within the thoracic aorta. Calcification  also seen within the mitral valve. Calcification seen within the right  hilar region and several lymph nodes in the mediastinum suggesting old  healed granulomatous " disease. The lung parenchyma reveals some scarring  identified anteriorly within the left upper lobe. Minimal scarring  identified within the lingula and right middle lobe. There is no  parenchymal consolidation, pulmonary mass or new nodule identified. The  visualized upper abdomen is unremarkable in appearance. Diverticulosis  of the visualized colon.    Impression  Resolution of the previously noted pleural effusions with  development of a small-to-moderate sized pericardial effusion. Ascending  thoracic aorta largest AP dimension is 4.4 cm. Minimal scarring  anteriorly within the right middle lobe, left upper lobe and lingula.  The remainder of the chest is grossly unremarkable.    D:  10/09/2019  E:  10/09/2019    This report was finalized on 10/9/2019 5:10 PM by Dr. Jeanette Sharma MD.    No results found for this or any previous visit.    No results found for this or any previous visit.     Results for orders placed during the hospital encounter of 06/18/19    CT Angiogram Chest With Contrast    Narrative  CTA Chest    INDICATION:  Shortness of air    TECHNIQUE:  CT angiogram of the chest with contrast. 3-D postprocessing was performed and reviewed.   Radiation dose reduction techniques included automated exposure control or exposure modulation based on body size. Count of known CT and cardiac nuc med studies  performed in previous 12 months: 1.    COMPARISON:  Chest CT angiogram October    FINDINGS:  Bolus timing is excellent and there is no evidence of pulmonary embolism. The thoracic aorta is normal in caliber. There is no mediastinal mass or adenopathy. Images of the upper abdomen show no acute abnormality. There are moderate bilateral pleural  effusions.    There are coarse left greater than right bilateral upper lobe infiltrates, and there are somewhat spiculated appearing nodular densities in both lower lobes, new since the CT exam of October 2018.    Impression  1. Excellent bolus timing and no  evidence of pulmonary embolism.    2. Coarse left greater than right upper lobe infiltrates and somewhat spiculated nodular densities in both lower lobes, all new since the CT of October 2018, along with moderate pleural effusions. Suspect infectious or inflammatory etiology of the  nodular densities.    3. Normal caliber thoracic aorta.    Signer Name: Andrei Dukes MD  Signed: 6/19/2019 1:11 AM  Workstation Name: [id number]    No results found for this or any previous visit.    No results found for this or any previous visit.                  Results Review: I reviewed the patient's new clinical results.    Assessment and Plan    Visit Diagnosis:     ICD-10-CM ICD-9-CM   1. Chronic obstructive pulmonary disease, unspecified COPD type  J44.9 496   2. Moderate persistent asthma without complication  J45.40 493.90   3. ANDREA (obstructive sleep apnea)  G47.33 327.23   4. Hypoxia  R09.02 799.02   Suspect severe COPD with features of asthma.  Doing reasonably well since inhaler changed to Breztri 2 puffs twice a day.    Continue Combivent/DuoNeb as rescue nebulizer/breathing treatment.    Will reschedule for sleep study.  He is supposed to have a PFTs.    He should be on 2 L oxygen on exertion and during sleep.    Patient was reeducated on the consequences of untreated sleep apnea and chronic hypoxia.  Patient is willing to use oxygen.    Reevaluate in 3 months, early as needed, post sleep study    New Medications:   No orders of the defined types were placed in this encounter.      Discontinued Medications:   Medications Discontinued During This Encounter   Medication Reason    potassium chloride (KLOR-CON) 10 MEQ CR tablet *Re-Entry    azithromycin (ZITHROMAX) 250 MG tablet *Therapy completed    predniSONE (DELTASONE) 20 MG tablet *Therapy completed            Follow Up:       Patient was given instructions and counseling regarding his condition. Please see specific information pulled into the AVS if appropriate.        This document has been electronically signed by Alex Zhang MD   April 18, 2024 14:00 EDT    Dictated Utilizing Dragon Dictation: Part of this note may be an electronic transcription/translation of spoken language to printed text using the Dragon Dictation System.

## 2024-04-19 ENCOUNTER — HOSPITAL ENCOUNTER (OUTPATIENT)
Dept: RESPIRATORY THERAPY | Facility: HOSPITAL | Age: 79
Discharge: HOME | End: 2024-04-19
Payer: MEDICARE

## 2024-04-19 VITALS — RESPIRATION RATE: 16 BRPM | OXYGEN SATURATION: 95 % | HEART RATE: 91 BPM

## 2024-04-19 DIAGNOSIS — J44.9 CHRONIC OBSTRUCTIVE PULMONARY DISEASE, UNSPECIFIED COPD TYPE: Chronic | ICD-10-CM

## 2024-04-19 PROCEDURE — 94760 N-INVAS EAR/PLS OXIMETRY 1: CPT

## 2024-04-19 PROCEDURE — 94060 EVALUATION OF WHEEZING: CPT

## 2024-04-19 PROCEDURE — 94729 DIFFUSING CAPACITY: CPT

## 2024-04-19 PROCEDURE — 94799 UNLISTED PULMONARY SVC/PX: CPT

## 2024-04-19 PROCEDURE — 94640 AIRWAY INHALATION TREATMENT: CPT

## 2024-04-19 PROCEDURE — 94726 PLETHYSMOGRAPHY LUNG VOLUMES: CPT

## 2024-04-19 RX ORDER — ALBUTEROL SULFATE 2.5 MG/3ML
2.5 SOLUTION RESPIRATORY (INHALATION) ONCE
Status: COMPLETED | OUTPATIENT
Start: 2024-04-19 | End: 2024-04-19

## 2024-04-19 RX ADMIN — ALBUTEROL SULFATE 2.5 MG: 2.5 SOLUTION RESPIRATORY (INHALATION) at 13:23

## 2024-04-22 ENCOUNTER — TELEPHONE (OUTPATIENT)
Dept: PULMONOLOGY | Facility: CLINIC | Age: 79
End: 2024-04-22
Payer: MEDICARE

## 2024-04-22 NOTE — TELEPHONE ENCOUNTER
Order had previously been forwarded to [facility], so I re-faxed after speaking with patient and he advised they still had not received it.

## 2024-04-22 NOTE — TELEPHONE ENCOUNTER
Caller: Alok Tobias    Relationship: Self    Best call back number: [phone]    What is the best time to reach you: ANYTIME    Who are you requesting to speak with (clinical staff, provider,  specific staff member): CLINICAL      What was the call regarding: PATIENT IS CHECKING ON THE STATUS FOR THE RX FOR HIS PORTABLE CONCENTRATOR. [facility] STILL HASN'T RECEIVED THE RX.

## 2024-07-01 RX ORDER — APIXABAN 5 MG/1
5 TABLET, FILM COATED ORAL 2 TIMES DAILY
Qty: 180 TABLET | Refills: 1 | Status: SHIPPED | OUTPATIENT
Start: 2024-07-01

## 2024-07-10 ENCOUNTER — OFFICE VISIT (OUTPATIENT)
Dept: PULMONOLOGY | Facility: CLINIC | Age: 79
End: 2024-07-10
Payer: MEDICARE

## 2024-07-10 VITALS
SYSTOLIC BLOOD PRESSURE: 102 MMHG | WEIGHT: 288.8 LBS | HEIGHT: 67 IN | TEMPERATURE: 97.2 F | HEART RATE: 60 BPM | DIASTOLIC BLOOD PRESSURE: 60 MMHG | BODY MASS INDEX: 45.33 KG/M2 | OXYGEN SATURATION: 95 %

## 2024-07-10 DIAGNOSIS — G47.33 OSA (OBSTRUCTIVE SLEEP APNEA): ICD-10-CM

## 2024-07-10 DIAGNOSIS — J44.9 CHRONIC OBSTRUCTIVE PULMONARY DISEASE, UNSPECIFIED COPD TYPE: Primary | ICD-10-CM

## 2024-07-10 PROCEDURE — 99214 OFFICE O/P EST MOD 30 MIN: CPT | Performed by: INTERNAL MEDICINE

## 2024-07-10 PROCEDURE — 3074F SYST BP LT 130 MM HG: CPT | Performed by: INTERNAL MEDICINE

## 2024-07-10 PROCEDURE — 3078F DIAST BP <80 MM HG: CPT | Performed by: INTERNAL MEDICINE

## 2024-07-10 RX ORDER — ALBUTEROL SULFATE 90 UG/1
2 AEROSOL, METERED RESPIRATORY (INHALATION) EVERY 4 HOURS PRN
Qty: 17 G | Refills: 5 | Status: SHIPPED | OUTPATIENT
Start: 2024-07-10

## 2024-07-10 RX ORDER — BUDESONIDE, GLYCOPYRROLATE, AND FORMOTEROL FUMARATE 160; 9; 4.8 UG/1; UG/1; UG/1
2 AEROSOL, METERED RESPIRATORY (INHALATION) EVERY 12 HOURS
Qty: 3 EACH | Refills: 3 | Status: SHIPPED | OUTPATIENT
Start: 2024-07-10 | End: 2024-07-10 | Stop reason: SDUPTHER

## 2024-07-10 RX ORDER — GUAIFENESIN 600 MG/1
1200 TABLET, EXTENDED RELEASE ORAL 2 TIMES DAILY
Qty: 60 TABLET | Refills: 5 | Status: SHIPPED | OUTPATIENT
Start: 2024-07-10

## 2024-07-10 RX ORDER — ALBUTEROL SULFATE 90 UG/1
2 AEROSOL, METERED RESPIRATORY (INHALATION) EVERY 4 HOURS PRN
COMMUNITY
End: 2024-07-10 | Stop reason: SDUPTHER

## 2024-07-10 RX ORDER — BUDESONIDE, GLYCOPYRROLATE, AND FORMOTEROL FUMARATE 160; 9; 4.8 UG/1; UG/1; UG/1
2 AEROSOL, METERED RESPIRATORY (INHALATION) EVERY 12 HOURS
Qty: 3 EACH | Refills: 3 | Status: SHIPPED | OUTPATIENT
Start: 2024-07-10

## 2024-07-10 NOTE — PROGRESS NOTES
Chief Complaint  COPD (Does not want to complete sleep study in lab, is willing to try a home sleep study. Needs a paper script for Tyrell, ordered to print. )    Alok Tobias is a 78 y.o. male who presents today to [facility] for COPD (Does not want to complete sleep study in lab, is willing to try a home sleep study. Needs a paper script for Tyrell, ordered to print. ).    HPI:      Patient is an 78-year-old male is here for a follow-up on COPD/asthma, hypoxia, on 2 L oxygen during sleep and on exertion.  I also had a high clinical suspicion of sleep apnea.    He refused to have an in lab sleep study.      He reported feeling better since he was started on a combination of LABA LAMA and ICS.    No significant use of rescue inhaler.  He reported shortness of breath on exertion.    But overall his quality of sleep and exercise capacity has improved since he was started on oxygen.    PFTs showed moderate obstructive pattern without response to bronchodilator and reduced diffusion capacity.  Mild restrictive component.    Patient has severe truncal obesity.    He refused to have further scanning to rule out any pulmonary fibrosis.     .     He had a hospitalization back in February where he was treated with antibiotics and systemic steroid.    Patient reported unrefreshing sleep and morning headaches, loud snoring.  Wife had witnessed apneic event.     Patient denies chest pain, orthopnea, PND, leg edema, nausea, vomiting, diarrhea, hemoptysis, hematemesis, melena, bright red blood per rectum  Previous History:   Past Medical History:   Diagnosis Date    Asthma     Asthma, extrinsic birth    Atrial fibrillation     Bronchitis     CHF (congestive heart failure)     Chronic bronchitis teens    COPD (chronic obstructive pulmonary disease)     Coronary artery disease 2019    COVID-19     Dermatomyositis teens    Elevated PSA     Hypertension     Pulmonary arterial  hypertension 2016    Sleep apnea, obstructive 2019    Subdural hematoma       Past Surgical History:   Procedure Laterality Date    APPENDECTOMY      CARDIAC CATHETERIZATION N/A 10/03/2018    Procedure: Left Heart Cath;  Surgeon: Twan Harrison MD;  Location:  [facility];  Service: Cardiology    CATARACT EXTRACTION      CRANIOTOMY FOR SUBDURAL HEMATOMA Left 10/11/2019    Procedure: CRANIOTOMY FOR SUBDURAL HEMATOMA;  Surgeon: Reagan Patten MD;  Location:  [facility];  Service: Neurosurgery    MITRAL VALVE REPAIR/REPLACEMENT      TONSILLECTOMY        Social History     Socioeconomic History    Marital status:    Tobacco Use    Smoking status: Former     Current packs/day: 0.00     Average packs/day: 1.5 packs/day for 20.0 years (30.0 ttl pk-yrs)     Types: Cigarettes     Start date: 1966     Quit date: 1986     Years since quittin.1     Passive exposure: Past    Smokeless tobacco: Never   Vaping Use    Vaping status: Never Used   Substance and Sexual Activity    Alcohol use: Not Currently     Alcohol/week: 3.0 - 4.0 standard drinks of alcohol     Types: 3 - 4 Cans of beer per week     Comment: 3-4 beers every evening    Drug use: No    Sexual activity: Defer        Current Medications:  Current Outpatient Medications   Medication Sig Dispense Refill    acetaminophen (TYLENOL) 325 MG tablet Take 2 tablets by mouth Every 4 (Four) Hours As Needed for Mild Pain.      albuterol sulfate  (90 Base) MCG/ACT inhaler Inhale 2 puffs Every 4 (Four) Hours As Needed for Wheezing. 17 g 5    allopurinol (ZYLOPRIM) 300 MG tablet Take 1 tablet by mouth Daily.      apixaban (Eliquis) 5 MG tablet tablet TAKE 1 TABLET TWICE DAILY 180 tablet 1    Budeson-Glycopyrrol-Formoterol (Breztri Aerosphere) 160-9-4.8 MCG/ACT aerosol inhaler Inhale 2 puffs Every 12 (Twelve) Hours. Provide spacer device 3 each 3    guaiFENesin (MUCINEX) 600 MG 12 hr tablet Take 2 tablets by mouth 2 (Two)  "Times a Day. OTC 60 tablet 5    metoprolol succinate XL (TOPROL-XL) 25 MG 24 hr tablet TAKE 1 TABLET EVERY DAY 90 tablet 1    PHARMACY MEDS TO BED CONSULT Use Daily.      potassium chloride (KLOR-CON M10) 10 MEQ CR tablet       valsartan (DIOVAN) 40 MG tablet Take 1 tablet by mouth 2 (Two) Times a Day. 180 tablet 1     No current facility-administered medications for this visit.       Allergies:   Allergies   Allergen Reactions    Dye Fdc Red [Red Dye] Nausea And Vomiting     All dyes, IV dye, hair dyes    Penicillins Other (See Comments)     \"CAUSES CHLAMYDIA\"       Vitals:   /60   Pulse 60   Temp 97.2 °F (36.2 °C)   Ht 170.2 cm (67\")   Wt 131 kg (288 lb 12.8 oz)   SpO2 95%   BMI 45.23 kg/m²     Estimated body mass index is 45.23 kg/m² as calculated from the following:    Height as of this encounter: 170.2 cm (67\").    Weight as of this encounter: 131 kg (288 lb 12.8 oz).           Physical Exam:   Physical Exam  Vitals reviewed.   Constitutional:       Appearance: Normal appearance. He is obese.      Interventions: He is not intubated.  HENT:      Head: Normocephalic.      Nose: Nose normal.      Mouth/Throat:      Mouth: Mucous membranes are moist.   Eyes:      Extraocular Movements: Extraocular movements intact.      Conjunctiva/sclera: Conjunctivae normal.      Pupils: Pupils are equal, round, and reactive to light.   Cardiovascular:      Rate and Rhythm: Normal rate.      Heart sounds: No murmur heard.     No friction rub. No gallop.   Pulmonary:      Effort: Pulmonary effort is normal. No tachypnea, bradypnea, accessory muscle usage, prolonged expiration, respiratory distress or retractions. He is not intubated.      Breath sounds: No stridor, decreased air movement or transmitted upper airway sounds. Wheezing (Occasional wheezing on forced exhalation otherwise clear to auscultation) present.   Abdominal:      General: There is no distension.      Palpations: Abdomen is soft. There is no mass.     " " Tenderness: There is no abdominal tenderness. There is no right CVA tenderness, left CVA tenderness, guarding or rebound.      Hernia: No hernia is present.   Skin:     Coloration: Skin is not jaundiced.      Findings: No erythema.   Neurological:      General: No focal deficit present.      Mental Status: He is alert and oriented to person, place, and time.   Psychiatric:         Mood and Affect: Mood normal.          Procedures     STOP-Bang Score:     [place] Sleepiness Scale:       Lab Results:   No visits with results within 3 Month(s) from this visit.   Latest known visit with results is:   No results displayed because visit has over 200 results.          Lab Results (last 72 hours)       ** No results found for the last 72 hours. **          WBC No results found for: \"WBC\"   HGB No results found for: \"HGB\"   HCT No results found for: \"HCT\"   Platlets No results found for: \"LABPLAT\"     PT/INR:  No results found for: \"PROTIME\"/No results found for: \"INR\"    Sodium No results found for: \"NA\"   Potassium No results found for: \"K\"   Chloride No results found for: \"CL\"   Bicarbonate No results found for: \"PLASMABICARB\"   BUN No results found for: \"BUN\"   Creatinine No results found for: \"CREATININE\"   Calcium No results found for: \"CALCIUM\"   Magnesium No results found for: \"MG\"     pH No results found for: \"PHART\"   pO2 No results found for: \"PO2ART\"   pCO2 No results found for: \"UYP1CMX\"   HCO3 No results found for: \"MJO5WXK\"           Radiology Review:   Results for orders placed during the hospital encounter of 02/26/24    XR Chest 1 View    Narrative  XR CHEST 1 VW    Date of Exam: 2/26/2024 3:59 PM EST    Indication: Weak/Dizzy/AMS triage protocol    Comparison: 10/13/2019    Findings:  Cardiomediastinal silhouette is enlarged, similar prior examination. There is generalized interstitial prominence. There are small bilateral pleural effusions without dense consolidation. No pneumothorax. No acute osseous " abnormality identified.    Impression  Impression:  Mild CHF features.      Electronically Signed: Abisai Koo MD  2/26/2024 4:36 PM EST  Workstation ID: [id number]     No results found for this or any previous visit.    No results found for this or any previous visit.    Results for orders placed during the hospital encounter of 10/08/19    CT Chest Without Contrast    Narrative  EXAMINATION: CT CHEST WO CONTRAST-10/08/2019:    INDICATION: 3 MONTH FOLLOW-UP CT; R91.8-Other nonspecific abnormal  finding of lung field, 3 month abnormal follow-up chest.    TECHNIQUE: Multiple axial CT imaging was obtained of the chest without  the administration of intravenous contrast.    The radiation dose reduction device was turned on for each scan per the  ALARA (As Low as Reasonably Achievable) protocol.    COMPARISON: 06/19/2019.    FINDINGS: Interval development of a small-to-moderate sized pericardial  effusion. The pleural effusions have resolved in the interval. The  ascending thoracic aorta largest AP dimension is 4.4 cm. Atherosclerotic  disease seen within the thoracic aorta. The thyroid is homogeneous.  There is calcification seen within the thoracic aorta. Calcification  also seen within the mitral valve. Calcification seen within the right  hilar region and several lymph nodes in the mediastinum suggesting old  healed granulomatous disease. The lung parenchyma reveals some scarring  identified anteriorly within the left upper lobe. Minimal scarring  identified within the lingula and right middle lobe. There is no  parenchymal consolidation, pulmonary mass or new nodule identified. The  visualized upper abdomen is unremarkable in appearance. Diverticulosis  of the visualized colon.    Impression  Resolution of the previously noted pleural effusions with  development of a small-to-moderate sized pericardial effusion. Ascending  thoracic aorta largest AP dimension is 4.4 cm. Minimal scarring  anteriorly within the right  middle lobe, left upper lobe and lingula.  The remainder of the chest is grossly unremarkable.    D:  10/09/2019  E:  10/09/2019    This report was finalized on 10/9/2019 5:10 PM by Dr. Jeanette Sharma MD.    No results found for this or any previous visit.    No results found for this or any previous visit.     Results for orders placed during the hospital encounter of 06/18/19    CT Angiogram Chest With Contrast    Narrative  CTA Chest    INDICATION:  Shortness of air    TECHNIQUE:  CT angiogram of the chest with contrast. 3-D postprocessing was performed and reviewed.   Radiation dose reduction techniques included automated exposure control or exposure modulation based on body size. Count of known CT and cardiac nuc med studies  performed in previous 12 months: 1.    COMPARISON:  Chest CT angiogram October    FINDINGS:  Bolus timing is excellent and there is no evidence of pulmonary embolism. The thoracic aorta is normal in caliber. There is no mediastinal mass or adenopathy. Images of the upper abdomen show no acute abnormality. There are moderate bilateral pleural  effusions.    There are coarse left greater than right bilateral upper lobe infiltrates, and there are somewhat spiculated appearing nodular densities in both lower lobes, new since the CT exam of October 2018.    Impression  1. Excellent bolus timing and no evidence of pulmonary embolism.    2. Coarse left greater than right upper lobe infiltrates and somewhat spiculated nodular densities in both lower lobes, all new since the CT of October 2018, along with moderate pleural effusions. Suspect infectious or inflammatory etiology of the  nodular densities.    3. Normal caliber thoracic aorta.    Signer Name: Andrei Dukes MD  Signed: 6/19/2019 1:11 AM  Workstation Name: [email or web address]    No results found for this or any previous visit.    No results found for this or any previous visit.                  Results Review: I reviewed the patient's  new clinical results.    Assessment and Plan    Visit Diagnosis:     ICD-10-CM ICD-9-CM   1. Chronic obstructive pulmonary disease, unspecified COPD type  J44.9 496   2. ANDREA (obstructive sleep apnea)  G47.33 327.23   Moderate COPD.  Doing well on triple regimen.  Continue albuterol as rescue inhaler.    Refused CT chest and in lab sleep study.        New Medications:   New Medications Ordered This Visit   Medications    albuterol sulfate  (90 Base) MCG/ACT inhaler     Sig: Inhale 2 puffs Every 4 (Four) Hours As Needed for Wheezing.     Dispense:  17 g     Refill:  5    guaiFENesin (MUCINEX) 600 MG 12 hr tablet     Sig: Take 2 tablets by mouth 2 (Two) Times a Day. OTC     Dispense:  60 tablet     Refill:  5    Budeson-Glycopyrrol-Formoterol (Breztri Aerosphere) 160-9-4.8 MCG/ACT aerosol inhaler     Sig: Inhale 2 puffs Every 12 (Twelve) Hours. Provide spacer device     Dispense:  3 each     Refill:  3     90 day supply       Discontinued Medications:   Medications Discontinued During This Encounter   Medication Reason    Budeson-Glycopyrrol-Formoterol (Breztri Aerosphere) 160-9-4.8 MCG/ACT aerosol inhaler Reorder    ipratropium-albuterol (COMBIVENT RESPIMAT)  MCG/ACT inhaler     ipratropium-albuterol (DUO-NEB) 0.5-2.5 mg/3 ml nebulizer     guaiFENesin (MUCINEX) 600 MG 12 hr tablet Reorder    albuterol sulfate  (90 Base) MCG/ACT inhaler Reorder    Budeson-Glycopyrrol-Formoterol (Breztri Aerosphere) 160-9-4.8 MCG/ACT aerosol inhaler Reorder            Follow Up:     Return to clinic in 6 months, early as needed  Patient was given instructions and counseling regarding his condition. Please see specific information pulled into the AVS if appropriate.       This document has been electronically signed by Alex Zhang MD   July 10, 2024 14:31 EDT    Dictated Utilizing Dragon Dictation: Part of this note may be an electronic transcription/translation of spoken language to printed text using the Dragon  Dictation System.

## 2024-07-11 RX ORDER — VALSARTAN 40 MG/1
40 TABLET ORAL 2 TIMES DAILY
Qty: 180 TABLET | Refills: 1 | Status: SHIPPED | OUTPATIENT
Start: 2024-07-11

## 2024-09-09 ENCOUNTER — OFFICE VISIT (OUTPATIENT)
Dept: CARDIOLOGY | Facility: CLINIC | Age: 79
End: 2024-09-09
Payer: MEDICARE

## 2024-09-09 VITALS
SYSTOLIC BLOOD PRESSURE: 112 MMHG | DIASTOLIC BLOOD PRESSURE: 62 MMHG | HEIGHT: 67 IN | WEIGHT: 282.8 LBS | BODY MASS INDEX: 44.39 KG/M2 | HEART RATE: 53 BPM | OXYGEN SATURATION: 94 %

## 2024-09-09 DIAGNOSIS — I10 PRIMARY HYPERTENSION: ICD-10-CM

## 2024-09-09 DIAGNOSIS — I48.0 PAF (PAROXYSMAL ATRIAL FIBRILLATION): Primary | ICD-10-CM

## 2024-09-09 DIAGNOSIS — I25.10 ATHEROSCLEROSIS OF NATIVE CORONARY ARTERY OF NATIVE HEART WITHOUT ANGINA PECTORIS: ICD-10-CM

## 2024-09-09 DIAGNOSIS — I50.32 CHRONIC HEART FAILURE WITH PRESERVED EJECTION FRACTION (HFPEF): ICD-10-CM

## 2024-09-09 DIAGNOSIS — Z98.890 S/P MVR (MITRAL VALVE REPAIR): ICD-10-CM

## 2024-09-09 DIAGNOSIS — I48.91 ATRIAL FIBRILLATION WITH RVR: ICD-10-CM

## 2024-09-09 PROCEDURE — 99214 OFFICE O/P EST MOD 30 MIN: CPT | Performed by: INTERNAL MEDICINE

## 2024-09-09 PROCEDURE — G2211 COMPLEX E/M VISIT ADD ON: HCPCS | Performed by: INTERNAL MEDICINE

## 2024-09-09 PROCEDURE — 3078F DIAST BP <80 MM HG: CPT | Performed by: INTERNAL MEDICINE

## 2024-09-09 PROCEDURE — 3074F SYST BP LT 130 MM HG: CPT | Performed by: INTERNAL MEDICINE

## 2024-09-09 RX ORDER — FUROSEMIDE 20 MG/1
10 TABLET ORAL DAILY
COMMUNITY

## 2024-09-09 RX ORDER — TAMSULOSIN HYDROCHLORIDE 0.4 MG/1
1 CAPSULE ORAL DAILY
Qty: 30 CAPSULE | Refills: 3 | Status: SHIPPED | OUTPATIENT
Start: 2024-09-09

## 2024-09-09 NOTE — PROGRESS NOTES
"[facility] at [facility]  Office visit  Alok Tobias  1945  [phone]  There is no work phone number on file.    VISIT DATE:  9/9/2024    PCP: Ivet Chau MD  [address] [place] [address]    CC:  Chief Complaint   Patient presents with    Atrial fibrillation with RVR       Problem List:   1.  Coronary Artery Disease, minor and non-obstructive  A. History of abnormal stress test 07/2016, Dr. Shaver  B. Single episode of severe bilateral arm pain at rest October 2018  C. LHC 10/3/2018:  Normal EF, Minor non obstructive CAD. Mild dilation of aortic root  D. CT Angio Chest 10/4/2018: No important abnormalities  2.  Severe MR  A. PENNY June 2019 with MVP, severe MR (at time onset AF)  B. Mitral valve repair 7/25/2019 [facility] with [employer] 29 mm annuloplasty band               i. Temporary epicardial pacing wires cut and retained   C. Echo 7/29/2019: LVSF is normal, mild AI, s/p mitral annular ring repair with no evidence of MR  3.  Essential Hypertension  4.  COPD  5.  Asthma  6.  Morbid obesity, BMI 43.85  7.  Atrial Fibrillation with RVR              A. New diagnosis, 6/19/19, CHADS2 Vasc = 3  B. ECV x2 June 2019, successful  C. On Flecainide and Eliquis   D. Post-op MVR, Flecainide   8.   Obstructive Sleep Apnea - declines CPAP  9.   Gout  10. 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 Patten 10/11/2019     February 2024 TTE    Left ventricular systolic function is normal. Calculated left ventricular EF = 63.4% Left ventricular ejection fraction appears to be 66 - 70%.    Left ventricular wall thickness is consistent with mild concentric hypertrophy.    The right ventricular cavity is mildly dilated.    The left atrial cavity is mildly dilated.    The right atrial cavity is moderately  dilated.    Status post mitral valve repair, there is a mitral valve ring present.  Transvalvular velocities within " acceptable limits.    Estimated right ventricular systolic pressure from tricuspid regurgitation is normal (<35 mmHg). Calculated right ventricular systolic pressure from tricuspid regurgitation is 15 mmHg.    Moderate dilation of the ascending aorta is present.  4.6 cm.     ASSESSMENT:   Diagnosis Plan   1. PAF (paroxysmal atrial fibrillation)  Holter Monitor - 72 Hour Up To 15 Days      2. Primary hypertension        3. Atherosclerosis of native coronary artery of native heart without angina pectoris        4. Atrial fibrillation with RVR        5. S/P mitral valve repair/annuloplasty for severe MR 7/25/2019 in [place]        6. Chronic heart failure with preserved ejection fraction (HFpEF)  Basic Metabolic Panel            PLAN:  Paroxysmal atrial fibrillation and atrial flutter: Currently stable and asymptomatic.  Continue Eliquis 5 mg p.o. twice daily for stroke prophylaxis, developed acute on chronic subdural hematoma while on Coumadin.  Continue metoprolol succinate 25 mg p.o. daily.    Status post mitral valve repair with mitral valve prolapse: Asymptomatic, stable on exam today.  Continue annual clinical follow-up with intermittent echocardiographic surveillance.    Hyperlipidemia: Goal LDL less than 100.  Intolerant to statins due to back pain.  Continue dietary modifications.  Currently well controlled.    Hypertension: Goal less than 130/80 mmHg.  Well-controlled, continue current medical therapy.    Heart failure with preserved ejection fraction, chronic: Starting Jardiance 10 mg p.o. daily.  Repeat BMP in 2 weeks.  Continue low-dose loop diuretic.  Afterload well-controlled.    Subjective  Normal assessment: Stable functional capacity.  Denies chest pain, palpitations or dyspnea on exertion.  Blood pressures running less than 135/80 mmHg.  Persistent cough, nonproductive.  Worsening bilateral lower extremity edema.  Also reporting slow urinary stream.      PHYSICAL EXAMINATION:  Vitals:    09/09/24  "1455   BP: 112/62   BP Location: Right arm   Patient Position: Sitting   Pulse: 53   SpO2: 94%   Weight: 128 kg (282 lb 12.8 oz)   Height: 170.2 cm (67\")       General Appearance:    Alert, cooperative, no distress, appears stated age   Head:    Normocephalic, without obvious abnormality, atraumatic   Eyes:    conjunctiva/corneas clear   Nose:   Nares normal, septum midline, mucosa normal, no drainage   Throat:   Lips, teeth and gums normal   Neck:   Supple, symmetrical, trachea midline, no carotid    bruit or JVD   Lungs:     Clear to auscultation bilaterally, respirations unlabored   Chest Wall:    No tenderness or deformity    Heart:   Bradycardia, S1 and S2 normal, no murmur, rub   or gallop, normal carotid impulse bilaterally without bruit.   Abdomen:     Soft, non-tender   Extremities:   Extremities normal, atraumatic, no cyanosis.  2-3+ bilateral ankle edema, fades out of mid shin   Pulses:   2+ and symmetric all extremities   Skin:   Skin color, texture, turgor normal, no rashes or lesions       Diagnostic Data:  Procedures  Lab Results   Component Value Date    TRIG 44 02/27/2024    HDL 43 02/27/2024     Lab Results   Component Value Date    GLUCOSE 130 (H) 02/29/2024    BUN 30 (H) 02/29/2024    CREATININE 0.88 02/29/2024     02/29/2024    K 4.5 02/29/2024     02/29/2024    CO2 28.0 02/29/2024     Lab Results   Component Value Date    HGBA1C 5.70 (H) 02/27/2024     Lab Results   Component Value Date    WBC 11.95 (H) 02/29/2024    HGB 15.7 02/29/2024    HCT 47.8 02/29/2024     02/29/2024       Allergies  Allergies   Allergen Reactions    Dye Fdc Red [Red Dye #40 (Allura Red)] Nausea And Vomiting     All dyes, IV dye, hair dyes    Penicillins Other (See Comments)     \"CAUSES CHLAMYDIA\"       Current Medications    Current Outpatient Medications:     acetaminophen (TYLENOL) 325 MG tablet, Take 2 tablets by mouth Every 4 (Four) Hours As Needed for Mild Pain., Disp: , Rfl:     albuterol " sulfate  (90 Base) MCG/ACT inhaler, Inhale 2 puffs Every 4 (Four) Hours As Needed for Wheezing., Disp: 17 g, Rfl: 5    allopurinol (ZYLOPRIM) 300 MG tablet, Take 1 tablet by mouth Daily., Disp: , Rfl:     apixaban (Eliquis) 5 MG tablet tablet, TAKE 1 TABLET TWICE DAILY, Disp: 180 tablet, Rfl: 1    Budeson-Glycopyrrol-Formoterol (Breztri Aerosphere) 160-9-4.8 MCG/ACT aerosol inhaler, Inhale 2 puffs Every 12 (Twelve) Hours. Provide spacer device, Disp: 3 each, Rfl: 3    furosemide (LASIX) 10 MG half tablet, Take 1 half tablet by mouth Daily., Disp: , Rfl:     guaiFENesin (MUCINEX) 600 MG 12 hr tablet, Take 2 tablets by mouth 2 (Two) Times a Day. OTC, Disp: 60 tablet, Rfl: 5    metoprolol succinate XL (TOPROL-XL) 25 MG 24 hr tablet, TAKE 1 TABLET EVERY DAY, Disp: 90 tablet, Rfl: 1    O2 (OXYGEN), Inhale 2 L/min Every Night., Disp: , Rfl:     PHARMACY MEDS TO BED CONSULT, Use Daily., Disp: , Rfl:     potassium chloride (KLOR-CON M10) 10 MEQ CR tablet, , Disp: , Rfl:     POTASSIUM PO, Take 1 tablet by mouth Daily., Disp: , Rfl:     valsartan (DIOVAN) 40 MG tablet, TAKE 1 TABLET TWICE DAILY, Disp: 180 tablet, Rfl: 1    empagliflozin (Jardiance) 10 MG tablet tablet, Take 1 tablet by mouth Daily., Disp: 90 tablet, Rfl: 3          ROS  ROS      SOCIAL HX  Social History     Socioeconomic History    Marital status:    Tobacco Use    Smoking status: Former     Current packs/day: 0.00     Average packs/day: 1.5 packs/day for 20.0 years (30.0 ttl pk-yrs)     Types: Cigarettes     Start date: 1966     Quit date: 1986     Years since quittin.3     Passive exposure: Past    Smokeless tobacco: Never   Vaping Use    Vaping status: Never Used   Substance and Sexual Activity    Alcohol use: Not Currently     Alcohol/week: 3.0 - 4.0 standard drinks of alcohol     Types: 3 - 4 Cans of beer per week     Comment: 3-4 beers every evening    Drug use: No    Sexual activity: Defer       FAMILY HX  Family History    Problem Relation Age of Onset    Multiple sclerosis Mother              Amos Crook III, MD, FACC

## 2024-09-24 ENCOUNTER — TRANSCRIBE ORDERS (OUTPATIENT)
Dept: LAB | Facility: HOSPITAL | Age: 79
End: 2024-09-24
Payer: MEDICARE

## 2024-09-24 ENCOUNTER — LAB (OUTPATIENT)
Dept: LAB | Facility: HOSPITAL | Age: 79
End: 2024-09-24
Payer: MEDICARE

## 2024-09-24 DIAGNOSIS — E78.5 HYPERLIPIDEMIA, UNSPECIFIED HYPERLIPIDEMIA TYPE: Primary | ICD-10-CM

## 2024-09-24 DIAGNOSIS — E78.5 HYPERLIPIDEMIA, UNSPECIFIED HYPERLIPIDEMIA TYPE: ICD-10-CM

## 2024-09-24 DIAGNOSIS — I50.32 CHRONIC HEART FAILURE WITH PRESERVED EJECTION FRACTION (HFPEF): ICD-10-CM

## 2024-09-24 DIAGNOSIS — M10.9 GOUT, ARTHRITIS: ICD-10-CM

## 2024-09-24 DIAGNOSIS — M10.9 GOUT, ARTHRITIS: Primary | ICD-10-CM

## 2024-09-24 DIAGNOSIS — E55.9 VITAMIN D DEFICIENCY: ICD-10-CM

## 2024-09-24 PROCEDURE — 80048 BASIC METABOLIC PNL TOTAL CA: CPT

## 2024-09-24 PROCEDURE — 82306 VITAMIN D 25 HYDROXY: CPT

## 2024-09-24 PROCEDURE — 36415 COLL VENOUS BLD VENIPUNCTURE: CPT

## 2024-09-24 PROCEDURE — 84550 ASSAY OF BLOOD/URIC ACID: CPT

## 2024-09-25 LAB
25(OH)D3 SERPL-MCNC: 26.8 NG/ML (ref 30–100)
ANION GAP SERPL CALCULATED.3IONS-SCNC: 9.3 MMOL/L (ref 5–15)
BUN SERPL-MCNC: 16 MG/DL (ref 8–23)
BUN/CREAT SERPL: 16.5 (ref 7–25)
CALCIUM SPEC-SCNC: 9.1 MG/DL (ref 8.6–10.5)
CHLORIDE SERPL-SCNC: 105 MMOL/L (ref 98–107)
CO2 SERPL-SCNC: 25.7 MMOL/L (ref 22–29)
CREAT SERPL-MCNC: 0.97 MG/DL (ref 0.76–1.27)
EGFRCR SERPLBLD CKD-EPI 2021: 78.4 ML/MIN/1.73
GLUCOSE SERPL-MCNC: 104 MG/DL (ref 65–99)
POTASSIUM SERPL-SCNC: 4.3 MMOL/L (ref 3.5–5.2)
SODIUM SERPL-SCNC: 140 MMOL/L (ref 136–145)
URATE SERPL-MCNC: 4.4 MG/DL (ref 3.4–7)

## 2024-10-02 ENCOUNTER — TELEPHONE (OUTPATIENT)
Dept: CARDIOLOGY | Facility: CLINIC | Age: 79
End: 2024-10-02
Payer: MEDICARE

## 2024-10-04 ENCOUNTER — APPOINTMENT (OUTPATIENT)
Dept: GENERAL RADIOLOGY | Facility: HOSPITAL | Age: 79
End: 2024-10-04
Payer: MEDICARE

## 2024-10-04 ENCOUNTER — HOSPITAL ENCOUNTER (EMERGENCY)
Facility: HOSPITAL | Age: 79
Discharge: HOME | End: 2024-10-04
Attending: EMERGENCY MEDICINE
Payer: MEDICARE

## 2024-10-04 VITALS
RESPIRATION RATE: 20 BRPM | HEIGHT: 67 IN | OXYGEN SATURATION: 96 % | SYSTOLIC BLOOD PRESSURE: 93 MMHG | HEART RATE: 51 BPM | DIASTOLIC BLOOD PRESSURE: 68 MMHG | WEIGHT: 280 LBS | BODY MASS INDEX: 43.95 KG/M2 | TEMPERATURE: 97.8 F

## 2024-10-04 DIAGNOSIS — S90.32XA CONTUSION OF LEFT FOOT, INITIAL ENCOUNTER: Primary | ICD-10-CM

## 2024-10-04 PROCEDURE — 73630 X-RAY EXAM OF FOOT: CPT

## 2024-10-04 PROCEDURE — 99283 EMERGENCY DEPT VISIT LOW MDM: CPT

## 2024-10-04 NOTE — ED PROVIDER NOTES
"Subjective   History of Present Illness  Patient presents for evaluation of discoloration of the left foot at the dorsal foot and proximal toes.  No known injury.  It is not painful.  He is not having numbness or weakness.  He has been present for about 1 to 2 days    History provided by:  Patient      Review of Systems    Past Medical History:   Diagnosis Date    Abnormal ECG     Asthma     Asthma, extrinsic birth    Atrial fibrillation     Bronchitis     CHF (congestive heart failure)     Chronic bronchitis teens    COPD (chronic obstructive pulmonary disease)     Coronary artery disease 2019    COVID-19     Dermatomyositis teens    Elevated PSA     Heart valve disease 2019    Hypertension     Pulmonary arterial hypertension 2016    Sleep apnea, obstructive 2019    Subdural hematoma        Allergies   Allergen Reactions    Dye Fdc Red [Red Dye #40 (Allura Red)] Nausea And Vomiting     All dyes, IV dye, hair dyes    Penicillins Other (See Comments)     \"CAUSES CHLAMYDIA\"       Past Surgical History:   Procedure Laterality Date    AORTIC VALVE SURGERY  2019    APPENDECTOMY      CARDIAC CATHETERIZATION N/A 10/03/2018    Procedure: Left Heart Cath;  Surgeon: Twan Harrison MD;  Location:  [facility];  Service: Cardiology    CATARACT EXTRACTION      CRANIOTOMY FOR SUBDURAL HEMATOMA Left 10/11/2019    Procedure: CRANIOTOMY FOR SUBDURAL HEMATOMA;  Surgeon: Reagan Patten MD;  Location: [facility];  Service: Neurosurgery    MITRAL VALVE REPAIR/REPLACEMENT      TONSILLECTOMY         Family History   Problem Relation Age of Onset    Multiple sclerosis Mother        Social History     Socioeconomic History    Marital status:    Tobacco Use    Smoking status: Former     Current packs/day: 0.00     Average packs/day: 1.5 packs/day for 20.0 years (30.0 ttl pk-yrs)     Types: Cigarettes     Start date: 1966     Quit date: 1986     Years since quittin.3     Passive " exposure: Past    Smokeless tobacco: Never   Vaping Use    Vaping status: Never Used   Substance and Sexual Activity    Alcohol use: Not Currently     Alcohol/week: 3.0 - 4.0 standard drinks of alcohol     Types: 3 - 4 Cans of beer per week     Comment: 3-4 beers every evening    Drug use: No    Sexual activity: Defer           Objective   Physical Exam  Constitutional:       General: He is not in acute distress.     Appearance: He is obese.   HENT:      Head: Normocephalic and atraumatic.   Eyes:      Conjunctiva/sclera: Conjunctivae normal.      Pupils: Pupils are equal, round, and reactive to light.   Cardiovascular:      Rate and Rhythm: Normal rate and regular rhythm.      Pulses: Normal pulses.   Pulmonary:      Effort: Pulmonary effort is normal. No respiratory distress.   Abdominal:      General: Abdomen is flat. There is no distension.   Musculoskeletal:         General: Normal range of motion.      Comments: There is bruising on the distal dorsal aspect of the left foot extending into the proximal toes.  Capillary refill is less than 2 seconds, the extremity is warm dry and well-perfused.  There is a palpable dorsal pedal and posterior tibial pulse.  There is normal range of motion of the foot and ankle.  There is no tenderness.  There are prominent varicose veins   Skin:     General: Skin is warm and dry.      Capillary Refill: Capillary refill takes less than 2 seconds.   Neurological:      General: No focal deficit present.      Mental Status: He is alert and oriented to person, place, and time.   Psychiatric:         Mood and Affect: Mood normal.         Behavior: Behavior normal.         Procedures           ED Course  ED Course as of 10/04/24 0640   Fri Oct 04, 2024   0540 Radiographs of the left foot independently interpreted by myself demonstrates no acute bony fracture dislocation [KB]      ED Course User Index  [KB] Checo Zavala MD                                             Medical Decision  Making  At this time I believe patient likely had a small rupture of a varicose vein that caused some bruising.  I do not believe patient has DVT with his therapeutic anticoagulation.  He does not have any signs of an ischemic limb.  He does not have infectious signs or symptoms. Will x-ray to evaluate for signs of traumatic injury.    Radiographs negative.  Patient discharged from the ER in good condition    Problems Addressed:  Contusion of left foot, initial encounter: complicated acute illness or injury    Amount and/or Complexity of Data Reviewed  External Data Reviewed: notes.     Details: 9/30/2024 reviewed most recent primary care visit documenting patient's chronic medical conditions and treatment plan  Radiology: ordered and independent interpretation performed. Decision-making details documented in ED Course.    Risk  OTC drugs.  Prescription drug management.  Decision regarding hospitalization.        Final diagnoses:   Contusion of left foot, initial encounter       ED Disposition  ED Disposition       ED Disposition   Discharge    Condition   Stable    Comment   --           No results found for this or any previous visit (from the past 24 hour(s)).  Note: In addition to lab results from this visit, the labs listed above may include labs taken at another facility or during a different encounter within the last 24 hours. Please correlate lab times with ED admission and discharge times for further clarification of the services performed during this visit.    XR Foot 3+ View Left   Final Result   Impression:   Mild osteoarthritis and generalized soft tissue swelling.            Electronically Signed: Andrea Turk MD     10/4/2024 5:23 AM EDT     Workstation ID: [id number]        Vitals:    10/04/24 0410 10/04/24 0500 10/04/24 0530 10/04/24 0533   BP: 172/96 106/58 93/68    BP Location: Left arm      Patient Position: Sitting      Pulse: 60 51 50 51   Resp: 20      Temp: 97.8 °F (36.6 °C)      TempSrc:  "Oral      SpO2: 94% 96% 91% 96%   Weight: 127 kg (280 lb)      Height: 170.2 cm (67\")        Medications - No data to display  ECG/EMG Results (last 24 hours)       ** No results found for the last 24 hours. **          No orders to display           No follow-up provider specified.       Medication List      No changes were made to your prescriptions during this visit.            Checo Zavala MD  10/04/24 0640    "

## 2024-10-04 NOTE — DISCHARGE INSTRUCTIONS
Follow-up with your primary doctor as needed.  Return to the ER as needed for new or worsening symptoms.    You can try taking double your normal daily dose of lasix to help with some mild increase in swelling in your legs which is likely related to your congestive heart failure.  You should call your primary doctor to schedule a followup appointment.    Return to the ER as needed for new or worsening symptoms.

## 2024-10-07 RX ORDER — TAMSULOSIN HYDROCHLORIDE 0.4 MG/1
1 CAPSULE ORAL DAILY
Qty: 90 CAPSULE | Refills: 0 | Status: SHIPPED | OUTPATIENT
Start: 2024-10-07

## 2024-10-10 ENCOUNTER — TELEPHONE (OUTPATIENT)
Dept: CARDIOLOGY | Facility: CLINIC | Age: 79
End: 2024-10-10
Payer: MEDICARE

## 2024-10-10 NOTE — TELEPHONE ENCOUNTER
Spoke with patient. Patient reports that leg swelling and bruising is better than it was when patient went to ED on 10/4. Patient had multiple questions and concerns. LOV was 9/9. Will see about setting patient up appointment with [facility] to have further questions and concerns addressed.     Patient would like to know about results on Holter monitor from 10/1/2024. Please advise.

## 2024-10-10 NOTE — TELEPHONE ENCOUNTER
Caller: Alok Tobias    Relationship to patient: Self    Best call back number: [phone]    Chief Complaint: BLACK, PURPLE, RED FOOT. FATIGUED    Type of visit: FOLLOW UP     Requested date: ASAP       Additional notes: PT WENT  TO THE ER FOR  A ISSUE WITH HIS FOOT, HIS LEFT FOOT IS BLACK ALL THE WAY UP TO HIS KNEE. HE HAS BEEN FEELING FATIGUED. HE HAS BEEN FEELING FATIGUED FOR TWO MONTHS. PT IS ALSO WANTING A WRITTEN PRESCRIPTION FOR JARDIANCE 10 MG.     PT WANTS TO DISCUSS HEART MONITOR RESULTS AND BLOOD WORK RESULTS.

## 2024-10-17 ENCOUNTER — OFFICE VISIT (OUTPATIENT)
Dept: CARDIOLOGY | Facility: CLINIC | Age: 79
End: 2024-10-17
Payer: MEDICARE

## 2024-10-17 VITALS
DIASTOLIC BLOOD PRESSURE: 72 MMHG | OXYGEN SATURATION: 94 % | WEIGHT: 280 LBS | HEART RATE: 49 BPM | SYSTOLIC BLOOD PRESSURE: 112 MMHG | HEIGHT: 67 IN | BODY MASS INDEX: 43.95 KG/M2

## 2024-10-17 DIAGNOSIS — I48.91 ATRIAL FIBRILLATION WITH RVR: ICD-10-CM

## 2024-10-17 DIAGNOSIS — I50.31 ACUTE DIASTOLIC HEART FAILURE: Primary | Chronic | ICD-10-CM

## 2024-10-17 DIAGNOSIS — Z98.890 S/P MVR (MITRAL VALVE REPAIR): ICD-10-CM

## 2024-10-17 DIAGNOSIS — I25.10 ATHEROSCLEROSIS OF NATIVE CORONARY ARTERY OF NATIVE HEART WITHOUT ANGINA PECTORIS: ICD-10-CM

## 2024-10-17 DIAGNOSIS — I48.0 PAF (PAROXYSMAL ATRIAL FIBRILLATION): ICD-10-CM

## 2024-10-17 RX ORDER — FUROSEMIDE 40 MG
40 TABLET ORAL DAILY
Qty: 30 TABLET | Refills: 11 | Status: SHIPPED | OUTPATIENT
Start: 2024-10-17

## 2024-10-17 RX ORDER — POTASSIUM CHLORIDE 1500 MG/1
20 TABLET, EXTENDED RELEASE ORAL DAILY
Qty: 30 TABLET | Refills: 6 | Status: SHIPPED | OUTPATIENT
Start: 2024-10-17

## 2024-10-17 NOTE — PROGRESS NOTES
"[facility] at [facility]   OFFICE NOTE      Alok Tobias  1945  PCP: Ivet Chau MD    SUBJECTIVE:   Alok Tobias is a 79 y.o. male seen for a follow up visit regarding the following:     CC: CHF    HPI:   Pleasant 79-year-old gentleman presents today with some worsening edema and shortness of breath.  He is try to titrate Lasix with little improvement.  Dr. Crook has started Jardiance therapy which she is only been on for about a week.  He is not having chest pain palpitations near syncope or syncope    Cardiac PMH: (Old records have been reviewed and summarized below)  1.  Coronary Artery Disease, minor and non-obstructive  A. History of abnormal stress test 07/2016, Dr. Shaver  B. Single episode of severe bilateral arm pain at rest October 2018  C. LHC 10/3/2018:  Normal EF, Minor non obstructive CAD. Mild dilation of aortic root  D. CT Angio Chest 10/4/2018: No important abnormalities  2.  Severe MR  A. PENNY June 2019 with MVP, severe MR (at time onset AF)  B. Mitral valve repair 7/25/2019 [facility] with Medtronic 29 mm annuloplasty band               i. Temporary epicardial pacing wires cut and retained   C. Echo 7/29/2019: LVSF is normal, mild AI, s/p mitral annular ring repair with no evidence of MR  3.  Essential Hypertension  4.  COPD  5.  Asthma  6.  Morbid obesity, BMI 43.85  7.  Atrial Fibrillation with RVR              A. New diagnosis, 6/19/19, CHADS2 Vasc = 3  B. ECV x2 June 2019, successful  C. On Flecainide and Eliquis   D. Post-op MVR, Flecainide   8.   Obstructive Sleep Apnea - declines CPAP  9.   Gout  10. 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 Patten 10/11/2019     Past Medical History, Past Surgical History, Family history, Social History, and Medications were all reviewed with the patient today and updated as necessary.       Current Outpatient Medications:     acetaminophen (TYLENOL) " "325 MG tablet, Take 2 tablets by mouth Every 4 (Four) Hours As Needed for Mild Pain., Disp: , Rfl:     albuterol sulfate  (90 Base) MCG/ACT inhaler, Inhale 2 puffs Every 4 (Four) Hours As Needed for Wheezing., Disp: 17 g, Rfl: 5    allopurinol (ZYLOPRIM) 300 MG tablet, Take 1 tablet by mouth Daily., Disp: , Rfl:     apixaban (Eliquis) 5 MG tablet tablet, TAKE 1 TABLET TWICE DAILY, Disp: 180 tablet, Rfl: 1    Budeson-Glycopyrrol-Formoterol (Breztri Aerosphere) 160-9-4.8 MCG/ACT aerosol inhaler, Inhale 2 puffs Every 12 (Twelve) Hours. Provide spacer device, Disp: 3 each, Rfl: 3    empagliflozin (Jardiance) 10 MG tablet tablet, Take 1 tablet by mouth Daily., Disp: 30 tablet, Rfl: 5    furosemide (LASIX) 10 MG half tablet, Take 1 half tablet by mouth Daily., Disp: , Rfl:     guaiFENesin (MUCINEX) 600 MG 12 hr tablet, Take 2 tablets by mouth 2 (Two) Times a Day. OTC, Disp: 60 tablet, Rfl: 5    metoprolol succinate XL (TOPROL-XL) 25 MG 24 hr tablet, TAKE 1 TABLET EVERY DAY, Disp: 90 tablet, Rfl: 1    O2 (OXYGEN), Inhale 2 L/min Every Night., Disp: , Rfl:     PHARMACY MEDS TO BED CONSULT, Use Daily., Disp: , Rfl:     potassium chloride (KLOR-CON M10) 10 MEQ CR tablet, , Disp: , Rfl:     POTASSIUM PO, Take 1 tablet by mouth Daily., Disp: , Rfl:     tamsulosin (FLOMAX) 0.4 MG capsule 24 hr capsule, Take 1 capsule by mouth Daily. Further refills per PCP., Disp: 90 capsule, Rfl: 0    valsartan (DIOVAN) 40 MG tablet, TAKE 1 TABLET TWICE DAILY, Disp: 180 tablet, Rfl: 1      Allergies   Allergen Reactions    Dye Fdc Red [Red Dye #40 (Allura Red)] Nausea And Vomiting     All dyes, IV dye, hair dyes    Penicillins Other (See Comments)     \"CAUSES CHLAMYDIA\"         PHYSICAL EXAM:    /72 (BP Location: Right arm, Patient Position: Sitting, Cuff Size: Large Adult)   Pulse (!) 49   Ht 170.2 cm (67\")   Wt 127 kg (280 lb)   SpO2 94%   BMI 43.85 kg/m²        Wt Readings from Last 5 Encounters:   10/17/24 127 kg (280 lb) "   10/04/24 127 kg (280 lb)   09/09/24 128 kg (282 lb 12.8 oz)   07/10/24 131 kg (288 lb 12.8 oz)   04/18/24 123 kg (272 lb)       BP Readings from Last 5 Encounters:   10/17/24 112/72   10/04/24 93/68   09/09/24 112/62   07/10/24 102/60   04/18/24 104/70       General appearance - Alert, well appearing, and in no distress   Mental status - Affect appropriate to mood.  Eyes - Sclerae anicteric,  ENMT - Hearing grossly normal bilaterally, Dental hygiene good.  Neck - Carotids upstroke normal bilaterally, no bruits, no JVD.  Resp - Clear to auscultation, no wheezes, rales or rhonchi, symmetric air entry.  Heart - Normal rate, regular rhythm, normal S1, S2, no murmurs, rubs, clicks or gallops.  GI - Soft, nontender, nondistended, no masses or organomegaly.  Neurological - Grossly intact - normal speech, no focal findings  Musculoskeletal - No joint tenderness, deformity or swelling, no muscular tenderness noted.  Extremities - Peripheral pulses normal, no pedal edema, no clubbing or cyanosis.  Skin - Normal coloration and turgor.  Psych -  oriented to person, place, and time.    Medical problems and test results were reviewed with the patient today.     Recent Results (from the past 4 weeks)   Basic Metabolic Panel    Collection Time: 09/24/24  2:52 PM    Specimen: Blood   Result Value Ref Range    Glucose 104 (H) 65 - 99 mg/dL    BUN 16 8 - 23 mg/dL    Creatinine 0.97 0.76 - 1.27 mg/dL    Sodium 140 136 - 145 mmol/L    Potassium 4.3 3.5 - 5.2 mmol/L    Chloride 105 98 - 107 mmol/L    CO2 25.7 22.0 - 29.0 mmol/L    Calcium 9.1 8.6 - 10.5 mg/dL    BUN/Creatinine Ratio 16.5 7.0 - 25.0    Anion Gap 9.3 5.0 - 15.0 mmol/L    eGFR 78.4 >60.0 mL/min/1.73   Vitamin D 25 Hydroxy    Collection Time: 09/24/24  2:52 PM    Specimen: Blood   Result Value Ref Range    25 Hydroxy, Vitamin D 26.8 (L) 30.0 - 100.0 ng/ml   Uric Acid    Collection Time: 09/24/24  2:52 PM    Specimen: Blood   Result Value Ref Range    Uric Acid 4.4 3.4 -  7.0 mg/dL       .    ASSESSMENT   1.long-term persistent A-fib: Asymptomatic. Zio Monitor rate controlled heart rate 57, 100% A-fib burden September 2024    2. VHD: Remote MVR-[facility] 2019 mitral valve stable on echocardiogram February    3. HTN: Controlled.     4. CHF:   Last echocardiogram February 26, 2024 EF 63% mitral valve repair RVSP 15 mmHg moderate dilation ascending aorta 4.6 cm    PLAN  Continue Jardiance 10 mg as prescribed a week ago, increase Lasix to 40 mg every morning 20mg every afternoon for 1 week and see if improvement in lower extremity edema, BMP today.  Short-term follow-up 2 weeks.            10/17/2024  13:28 EDT  Electronically signed by GARY Talbert, 10/17/24, 1:19 PM EDT.

## 2024-10-25 ENCOUNTER — LAB (OUTPATIENT)
Dept: LAB | Facility: HOSPITAL | Age: 79
End: 2024-10-25
Payer: MEDICARE

## 2024-10-25 DIAGNOSIS — I25.10 ATHEROSCLEROSIS OF NATIVE CORONARY ARTERY OF NATIVE HEART WITHOUT ANGINA PECTORIS: ICD-10-CM

## 2024-10-25 DIAGNOSIS — I48.0 PAF (PAROXYSMAL ATRIAL FIBRILLATION): ICD-10-CM

## 2024-10-25 DIAGNOSIS — I50.31 ACUTE DIASTOLIC HEART FAILURE: Chronic | ICD-10-CM

## 2024-10-25 DIAGNOSIS — I48.91 ATRIAL FIBRILLATION WITH RVR: ICD-10-CM

## 2024-10-25 DIAGNOSIS — Z98.890 S/P MVR (MITRAL VALVE REPAIR): ICD-10-CM

## 2024-10-25 PROCEDURE — 36415 COLL VENOUS BLD VENIPUNCTURE: CPT

## 2024-10-25 PROCEDURE — 80048 BASIC METABOLIC PNL TOTAL CA: CPT

## 2024-10-26 LAB
ANION GAP SERPL CALCULATED.3IONS-SCNC: 9.6 MMOL/L (ref 5–15)
BUN SERPL-MCNC: 22 MG/DL (ref 8–23)
BUN/CREAT SERPL: 20.4 (ref 7–25)
CALCIUM SPEC-SCNC: 9.3 MG/DL (ref 8.6–10.5)
CHLORIDE SERPL-SCNC: 104 MMOL/L (ref 98–107)
CO2 SERPL-SCNC: 29.4 MMOL/L (ref 22–29)
CREAT SERPL-MCNC: 1.08 MG/DL (ref 0.76–1.27)
EGFRCR SERPLBLD CKD-EPI 2021: 68.9 ML/MIN/1.73
GLUCOSE SERPL-MCNC: 95 MG/DL (ref 65–99)
POTASSIUM SERPL-SCNC: 4.3 MMOL/L (ref 3.5–5.2)
SODIUM SERPL-SCNC: 143 MMOL/L (ref 136–145)

## 2024-10-31 ENCOUNTER — OFFICE VISIT (OUTPATIENT)
Dept: CARDIOLOGY | Facility: CLINIC | Age: 79
End: 2024-10-31
Payer: MEDICARE

## 2024-10-31 VITALS
HEIGHT: 67 IN | HEART RATE: 70 BPM | WEIGHT: 282.2 LBS | OXYGEN SATURATION: 93 % | SYSTOLIC BLOOD PRESSURE: 128 MMHG | BODY MASS INDEX: 44.29 KG/M2 | DIASTOLIC BLOOD PRESSURE: 64 MMHG

## 2024-10-31 DIAGNOSIS — G47.30 SLEEP APNEA, UNSPECIFIED TYPE: Primary | ICD-10-CM

## 2024-10-31 PROCEDURE — 3078F DIAST BP <80 MM HG: CPT | Performed by: PHYSICIAN ASSISTANT

## 2024-10-31 PROCEDURE — 99214 OFFICE O/P EST MOD 30 MIN: CPT | Performed by: PHYSICIAN ASSISTANT

## 2024-10-31 PROCEDURE — 3074F SYST BP LT 130 MM HG: CPT | Performed by: PHYSICIAN ASSISTANT

## 2024-10-31 NOTE — PROGRESS NOTES
"[facility] at [facility]   OFFICE NOTE      Alok Tobias  1945  PCP: Ivet Chau MD    SUBJECTIVE:   Alok Tobias is a 79 y.o. male seen for a follow up visit regarding the following:     CC: CHF    HPI:   Pleasant 79-year-old gentleman presents today for short-term follow-up regarding congestive heart failure symptoms.  He increased his Lasix therapy the past 2 weeks some improvement lower extremities.  The lower extremity coloration has improved as well.  He still has low energy suffers from portable sleep apnea but has been noncompliant with the CPAP.  He is not having chest pain he is not having orthopnea.  Cardiac PMH: (Old records have been reviewed and summarized below)  1.  Coronary Artery Disease, minor and non-obstructive  A. History of abnormal stress test 07/2016, Dr. Shaver  B. Single episode of severe bilateral arm pain at rest October 2018  C. LHC 10/3/2018:  Normal EF, Minor non obstructive CAD. Mild dilation of aortic root  D. CT Angio Chest 10/4/2018: No important abnormalities  2.  Severe MR  A. PENNY June 2019 with MVP, severe MR (at time onset AF)  B. Mitral valve repair 7/25/2019 [facility] with Medtronic 29 mm annuloplasty band               i. Temporary epicardial pacing wires cut and retained   C. Echo 7/29/2019: LVSF is normal, mild AI, s/p mitral annular ring repair with no evidence of MR  3.  Essential Hypertension  4.  COPD  5.  Asthma  6.  Morbid obesity, BMI 43.85  7.  Atrial Fibrillation with RVR              A. New diagnosis, 6/19/19, CHADS2 Vasc = 3  B. ECV x2 June 2019, successful  C. On Flecainide and Eliquis   D. Post-op MVR, Flecainide   8.   Obstructive Sleep Apnea - declines CPAP  9.   Gout  10. 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 Patten 10/11/2019     Past Medical History, Past Surgical History, Family history, Social History, and Medications were all reviewed with " "the patient today and updated as necessary.       Current Outpatient Medications:     acetaminophen (TYLENOL) 325 MG tablet, Take 2 tablets by mouth Every 4 (Four) Hours As Needed for Mild Pain., Disp: , Rfl:     albuterol sulfate  (90 Base) MCG/ACT inhaler, Inhale 2 puffs Every 4 (Four) Hours As Needed for Wheezing., Disp: 17 g, Rfl: 5    ALLOPURINOL PO, Take 150 mg by mouth Daily., Disp: , Rfl:     apixaban (Eliquis) 5 MG tablet tablet, TAKE 1 TABLET TWICE DAILY, Disp: 180 tablet, Rfl: 1    Budeson-Glycopyrrol-Formoterol (Breztri Aerosphere) 160-9-4.8 MCG/ACT aerosol inhaler, Inhale 2 puffs Every 12 (Twelve) Hours. Provide spacer device, Disp: 3 each, Rfl: 3    empagliflozin (Jardiance) 10 MG tablet tablet, Take 1 tablet by mouth Daily., Disp: 30 tablet, Rfl: 5    furosemide (LASIX) 40 MG tablet, Take 1 tablet by mouth Daily., Disp: 30 tablet, Rfl: 11    guaiFENesin (MUCINEX) 600 MG 12 hr tablet, Take 2 tablets by mouth 2 (Two) Times a Day. OTC, Disp: 60 tablet, Rfl: 5    metoprolol succinate XL (TOPROL-XL) 25 MG 24 hr tablet, TAKE 1 TABLET EVERY DAY, Disp: 90 tablet, Rfl: 1    O2 (OXYGEN), Inhale 2 L/min Every Night., Disp: , Rfl:     PHARMACY MEDS TO BED CONSULT, Use Daily., Disp: , Rfl:     potassium chloride (KLOR-CON M20) 20 MEQ CR tablet, Take 1 tablet by mouth Daily., Disp: 30 tablet, Rfl: 6    POTASSIUM PO, Take 1 tablet by mouth Daily., Disp: , Rfl:     tamsulosin (FLOMAX) 0.4 MG capsule 24 hr capsule, Take 1 capsule by mouth Daily. Further refills per PCP., Disp: 90 capsule, Rfl: 0    valsartan (DIOVAN) 40 MG tablet, TAKE 1 TABLET TWICE DAILY, Disp: 180 tablet, Rfl: 1      Allergies   Allergen Reactions    Dye Fdc Red [Red Dye #40 (Allura Red)] Nausea And Vomiting     All dyes, IV dye, hair dyes    Penicillins Other (See Comments)     \"CAUSES CHLAMYDIA\"         PHYSICAL EXAM:    /64 (BP Location: Left arm, Patient Position: Sitting)   Pulse 70   Ht 170.2 cm (67\")   Wt 128 kg (282 lb 3.2 " oz)   SpO2 93%   BMI 44.20 kg/m²        Wt Readings from Last 5 Encounters:   10/31/24 128 kg (282 lb 3.2 oz)   10/17/24 127 kg (280 lb)   10/04/24 127 kg (280 lb)   09/09/24 128 kg (282 lb 12.8 oz)   07/10/24 131 kg (288 lb 12.8 oz)       BP Readings from Last 5 Encounters:   10/31/24 128/64   10/17/24 112/72   10/04/24 93/68   09/09/24 112/62   07/10/24 102/60       General appearance - Alert, well appearing, and in no distress   Mental status - Affect appropriate to mood.  Eyes - Sclerae anicteric,  ENMT - Hearing grossly normal bilaterally, Dental hygiene good.  Neck - Carotids upstroke normal bilaterally, no bruits, no JVD.  Resp -coarse breath sounds few expiratory wheezes  Heart - Normal rate, regular rhythm, normal S1, S2, no murmurs, rubs, clicks or gallops.  GI - Soft, nontender, nondistended, no masses or organomegaly.  Neurological - Grossly intact - normal speech, no focal findings  Musculoskeletal - No joint tenderness, deformity or swelling, no muscular tenderness noted.  Extremities - Peripheral pulses normal, no pedal edema, no clubbing or cyanosis.  Skin - Normal coloration and turgor.  Psych -  oriented to person, place, and time.    Medical problems and test results were reviewed with the patient today.     Recent Results (from the past 4 weeks)   Basic Metabolic Panel    Collection Time: 10/25/24  2:11 PM    Specimen: Blood   Result Value Ref Range    Glucose 95 65 - 99 mg/dL    BUN 22 8 - 23 mg/dL    Creatinine 1.08 0.76 - 1.27 mg/dL    Sodium 143 136 - 145 mmol/L    Potassium 4.3 3.5 - 5.2 mmol/L    Chloride 104 98 - 107 mmol/L    CO2 29.4 (H) 22.0 - 29.0 mmol/L    Calcium 9.3 8.6 - 10.5 mg/dL    BUN/Creatinine Ratio 20.4 7.0 - 25.0    Anion Gap 9.6 5.0 - 15.0 mmol/L    eGFR 68.9 >60.0 mL/min/1.73       .    ASSESSMENT   1.long-term persistent A-fib: Asymptomatic. Zio Monitor rate controlled heart rate 57, 100% A-fib burden September 2024    2. VHD: Remote MVR-[facility] 2019 mitral  valve stable on echocardiogram February    3. HTN: Controlled.     4. CHF:   Last echocardiogram February 26, 2024 EF 63% mitral valve repair RVSP 15 mmHg moderate dilation ascending aorta 4.6 cm    5. COPD: Followed by [facility] Dr. Zhang.     6. LE edema, some better on higher dose lasix , Jardiance.       PLAN  Improved lower extremity edema with Jardiance Lasix.  Recommend sleep study to pursue sleep study as he is suffering quite a bit with lack of sleep fatigue.  Return to follow-up or office as scheduled.        10/31/2024  13:46 EDT  Electronically signed by GARY Talbert, 10/17/24, 1:19 PM EDT.

## 2024-11-06 RX ORDER — TAMSULOSIN HYDROCHLORIDE 0.4 MG/1
1 CAPSULE ORAL DAILY
Qty: 90 CAPSULE | Refills: 0 | Status: SHIPPED | OUTPATIENT
Start: 2024-11-06

## 2024-12-06 RX ORDER — VALSARTAN 40 MG/1
40 TABLET ORAL 2 TIMES DAILY
Qty: 180 TABLET | Refills: 1 | Status: SHIPPED | OUTPATIENT
Start: 2024-12-06

## 2024-12-23 RX ORDER — TAMSULOSIN HYDROCHLORIDE 0.4 MG/1
CAPSULE ORAL
Qty: 30 CAPSULE | Refills: 0 | Status: SHIPPED | OUTPATIENT
Start: 2024-12-23

## 2025-01-02 ENCOUNTER — OFFICE VISIT (OUTPATIENT)
Dept: PULMONOLOGY | Facility: CLINIC | Age: 80
End: 2025-01-02
Payer: MEDICARE

## 2025-01-02 VITALS
HEIGHT: 67 IN | DIASTOLIC BLOOD PRESSURE: 60 MMHG | SYSTOLIC BLOOD PRESSURE: 102 MMHG | TEMPERATURE: 97.2 F | BODY MASS INDEX: 46.21 KG/M2 | HEART RATE: 84 BPM | WEIGHT: 294.4 LBS | OXYGEN SATURATION: 91 %

## 2025-01-02 DIAGNOSIS — J44.9 CHRONIC OBSTRUCTIVE PULMONARY DISEASE, UNSPECIFIED COPD TYPE: Primary | ICD-10-CM

## 2025-01-02 DIAGNOSIS — G47.33 OSA (OBSTRUCTIVE SLEEP APNEA): ICD-10-CM

## 2025-01-02 PROCEDURE — 1160F RVW MEDS BY RX/DR IN RCRD: CPT | Performed by: INTERNAL MEDICINE

## 2025-01-02 PROCEDURE — 3078F DIAST BP <80 MM HG: CPT | Performed by: INTERNAL MEDICINE

## 2025-01-02 PROCEDURE — 1159F MED LIST DOCD IN RCRD: CPT | Performed by: INTERNAL MEDICINE

## 2025-01-02 PROCEDURE — 99214 OFFICE O/P EST MOD 30 MIN: CPT | Performed by: INTERNAL MEDICINE

## 2025-01-02 PROCEDURE — 3074F SYST BP LT 130 MM HG: CPT | Performed by: INTERNAL MEDICINE

## 2025-01-02 RX ORDER — GUAIFENESIN 600 MG/1
1200 TABLET, EXTENDED RELEASE ORAL 2 TIMES DAILY
Qty: 60 TABLET | Refills: 5 | Status: SHIPPED | OUTPATIENT
Start: 2025-01-02

## 2025-01-02 RX ORDER — LORATADINE 10 MG/1
10 TABLET ORAL DAILY
Status: SHIPPED | OUTPATIENT
Start: 2025-01-02

## 2025-01-02 RX ORDER — GUAIFENESIN 600 MG/1
1200 TABLET, EXTENDED RELEASE ORAL 2 TIMES DAILY
Qty: 60 TABLET | Refills: 5 | Status: SHIPPED | OUTPATIENT
Start: 2025-01-02 | End: 2025-01-02

## 2025-01-02 RX ORDER — PREDNISONE 10 MG/1
20 TABLET ORAL DAILY
Qty: 6 TABLET | Refills: 0 | Status: SHIPPED | OUTPATIENT
Start: 2025-01-02 | End: 2025-01-05

## 2025-01-02 RX ORDER — ALLOPURINOL 100 MG/1
TABLET ORAL
COMMUNITY
Start: 2024-12-13

## 2025-01-02 RX ORDER — BUDESONIDE, GLYCOPYRROLATE, AND FORMOTEROL FUMARATE 160; 9; 4.8 UG/1; UG/1; UG/1
2 AEROSOL, METERED RESPIRATORY (INHALATION) EVERY 12 HOURS
Qty: 3 EACH | Refills: 3 | Status: SHIPPED | OUTPATIENT
Start: 2025-01-02 | End: 2025-01-02

## 2025-01-02 RX ORDER — MONTELUKAST SODIUM 10 MG/1
10 TABLET ORAL NIGHTLY
Qty: 30 TABLET | Refills: 5 | Status: SHIPPED | OUTPATIENT
Start: 2025-01-02

## 2025-01-02 RX ORDER — PREDNISONE 10 MG/1
20 TABLET ORAL DAILY
Qty: 6 TABLET | Refills: 0 | Status: SHIPPED | OUTPATIENT
Start: 2025-01-02 | End: 2025-01-02

## 2025-01-02 RX ORDER — BUDESONIDE, GLYCOPYRROLATE, AND FORMOTEROL FUMARATE 160; 9; 4.8 UG/1; UG/1; UG/1
2 AEROSOL, METERED RESPIRATORY (INHALATION) EVERY 12 HOURS
Qty: 3 EACH | Refills: 3 | Status: SHIPPED | OUTPATIENT
Start: 2025-01-02

## 2025-01-02 RX ORDER — MONTELUKAST SODIUM 10 MG/1
10 TABLET ORAL NIGHTLY
Qty: 30 TABLET | Refills: 5 | Status: SHIPPED | OUTPATIENT
Start: 2025-01-02 | End: 2025-01-02

## 2025-01-02 RX ORDER — ALBUTEROL SULFATE 90 UG/1
2 INHALANT RESPIRATORY (INHALATION) EVERY 4 HOURS PRN
Qty: 17 G | Refills: 5 | Status: SHIPPED | OUTPATIENT
Start: 2025-01-02

## 2025-01-02 RX ORDER — ALBUTEROL SULFATE 0.83 MG/ML
2.5 SOLUTION RESPIRATORY (INHALATION) 4 TIMES DAILY PRN
Qty: 90 EACH | Refills: 5 | Status: SHIPPED | OUTPATIENT
Start: 2025-01-02

## 2025-01-02 NOTE — PROGRESS NOTES
Chief Complaint  COPD, Shortness of Breath (Worsening ), and Cough (Sometimes productive )    Alok Tobias is a 79 y.o. male who presents today to [facility] for COPD, Shortness of Breath (Worsening ), and Cough (Sometimes productive ).    HPI:      Patient is an 79-year-old male is here for a follow-up on COPD/asthma, hypoxia, on 2 L oxygen during sleep and on exertion.  He also has history of obstructive sleep apnea but he could not tolerate CPAP in the past.  Last sleep study was done more than a decade ago.    He comes in with complaint of slight worsening in cough and wheezing over last 1 week.  He denies sputum production.  He reported seeing seasonal allergies.  He is allergic to dust mites.  Quires rescue inhaler once or twice a day.    Ported quality of sleep is better since he is on oxygen.  However he still feels unrefreshed when he wakes up in the morning.  Reported loud snoring and fragmented sleep.        PFTs in the past showed moderate  obstructive pattern without response to bronchodilator and reduced diffusion capacity.  Mild restrictive component.     Patient has severe truncal obesity.     He refused to have further scanning to rule out any pulmonary fibrosis.      .     He had a hospitalization back in February where he was treated with antibiotics and systemic steroid.      Patient denies chest pain, orthopnea, PND, leg edema, nausea, vomiting, diarrhea, hemoptysis, hematemesis, melena, bright red blood per rectum  Previous History:   Past Medical History:   Diagnosis Date    Abnormal ECG 2019    Asthma     Asthma, extrinsic birth    Atrial fibrillation     Bronchitis     CHF (congestive heart failure)     Chronic bronchitis teens    COPD (chronic obstructive pulmonary disease)     Coronary artery disease 2019    COVID-19     Dermatomyositis teens    Elevated PSA     Heart valve disease 2019    Hypertension     Pulmonary arterial  hypertension 2016    Sleep apnea, obstructive 2019    Subdural hematoma       Past Surgical History:   Procedure Laterality Date    AORTIC VALVE SURGERY  2019    APPENDECTOMY      CARDIAC CATHETERIZATION N/A 10/03/2018    Procedure: Left Heart Cath;  Surgeon: Twan Harrison MD;  Location:  [facility];  Service: Cardiology    CATARACT EXTRACTION      CRANIOTOMY FOR SUBDURAL HEMATOMA Left 10/11/2019    Procedure: CRANIOTOMY FOR SUBDURAL HEMATOMA;  Surgeon: Reagan Patten MD;  Location:  [facility];  Service: Neurosurgery    MITRAL VALVE REPAIR/REPLACEMENT      TONSILLECTOMY        Social History     Socioeconomic History    Marital status:    Tobacco Use    Smoking status: Former     Current packs/day: 0.00     Average packs/day: 1.5 packs/day for 20.0 years (30.0 ttl pk-yrs)     Types: Cigarettes     Start date: 1966     Quit date: 1986     Years since quittin.6     Passive exposure: Past    Smokeless tobacco: Never   Vaping Use    Vaping status: Never Used   Substance and Sexual Activity    Alcohol use: Not Currently     Alcohol/week: 8.0 standard drinks of alcohol     Types: 8 Cans of beer per week    Drug use: No    Sexual activity: Defer        Current Medications:  Current Outpatient Medications   Medication Sig Dispense Refill    acetaminophen (TYLENOL) 325 MG tablet Take 2 tablets by mouth Every 4 (Four) Hours As Needed for Mild Pain.      albuterol sulfate  (90 Base) MCG/ACT inhaler Inhale 2 puffs Every 4 (Four) Hours As Needed for Wheezing. 17 g 5    allopurinol (ZYLOPRIM) 100 MG tablet       apixaban (Eliquis) 5 MG tablet tablet TAKE 1 TABLET TWICE DAILY 180 tablet 1    Budeson-Glycopyrrol-Formoterol (Breztri Aerosphere) 160-9-4.8 MCG/ACT aerosol inhaler Inhale 2 puffs Every 12 (Twelve) Hours. Provide spacer device 3 each 3    empagliflozin (Jardiance) 10 MG tablet tablet Take 1 tablet by mouth Daily. 30 tablet 5    furosemide (LASIX) 40 MG tablet  "Take 1 tablet by mouth Daily. 30 tablet 11    guaiFENesin (MUCINEX) 600 MG 12 hr tablet Take 2 tablets by mouth 2 (Two) Times a Day. OTC 60 tablet 5    metoprolol succinate XL (TOPROL-XL) 25 MG 24 hr tablet TAKE 1 TABLET EVERY DAY 90 tablet 1    O2 (OXYGEN) Inhale 2 L/min Every Night.      potassium chloride (KLOR-CON M20) 20 MEQ CR tablet Take 1 tablet by mouth Daily. 30 tablet 6    POTASSIUM PO Take 1 tablet by mouth Daily.      tamsulosin (FLOMAX) 0.4 MG capsule 24 hr capsule TAKE 1 CAPSULE EVERY DAY. FURTHER REFILLS PER PCP 30 capsule 0    valsartan (DIOVAN) 40 MG tablet TAKE 1 TABLET TWICE DAILY 180 tablet 1    albuterol (PROVENTIL) (2.5 MG/3ML) 0.083% nebulizer solution Take 2.5 mg by nebulization 4 (Four) Times a Day As Needed for Wheezing. 90 each 5    montelukast (SINGULAIR) 10 MG tablet Take 1 tablet by mouth Every Night. 30 tablet 5    predniSONE (DELTASONE) 10 MG tablet Take 2 tablets by mouth Daily for 3 days. Take 4 tabs daily x 3 days, then take 3 tabs daily x 3 days, then take 2 tabs daily x 3 days, then take 1 tab daily x 3 days 6 tablet 0     Current Facility-Administered Medications   Medication Dose Route Frequency Provider Last Rate Last Admin    loratadine (CLARITIN) tablet 10 mg  10 mg Oral Daily Alex Zhang MD           Allergies:   Allergies   Allergen Reactions    Dye Fdc Red [Red Dye #40 (Allura Red)] Nausea And Vomiting     All dyes, IV dye, hair dyes    Penicillins Other (See Comments)     \"CAUSES CHLAMYDIA\"       Vitals:   /60   Pulse 84   Temp 97.2 °F (36.2 °C)   Ht 170.2 cm (67\")   Wt 134 kg (294 lb 6.4 oz)   SpO2 91%   BMI 46.11 kg/m²     Estimated body mass index is 46.11 kg/m² as calculated from the following:    Height as of this encounter: 170.2 cm (67\").    Weight as of this encounter: 134 kg (294 lb 6.4 oz).    Alok Tobias  reports that he quit smoking about 38 years ago. His smoking use included cigarettes. He started smoking about 58 years ago. He has a 30 " pack-year smoking history. He has been exposed to tobacco smoke. He has never used smokeless tobacco.          Physical Exam:   Physical Exam  Vitals reviewed.   Constitutional:       Appearance: Normal appearance. He is obese.      Interventions: He is not intubated.  HENT:      Head: Normocephalic.      Nose: Nose normal.      Mouth/Throat:      Mouth: Mucous membranes are moist.   Eyes:      Extraocular Movements: Extraocular movements intact.      Conjunctiva/sclera: Conjunctivae normal.      Pupils: Pupils are equal, round, and reactive to light.   Cardiovascular:      Rate and Rhythm: Normal rate.      Heart sounds: No murmur heard.     No friction rub. No gallop.   Pulmonary:      Effort: Pulmonary effort is normal. No tachypnea, bradypnea, accessory muscle usage, prolonged expiration, respiratory distress or retractions. He is not intubated.      Breath sounds: No stridor, decreased air movement or transmitted upper airway sounds. Wheezing (Bilateral wheezing with prolonged expiratory phase) present. No rales.   Abdominal:      General: There is no distension.      Palpations: Abdomen is soft. There is no mass.      Tenderness: There is no abdominal tenderness. There is no right CVA tenderness, left CVA tenderness, guarding or rebound.      Hernia: No hernia is present.   Skin:     Coloration: Skin is not jaundiced.      Findings: No erythema.   Neurological:      General: No focal deficit present.      Mental Status: He is alert and oriented to person, place, and time.   Psychiatric:         Mood and Affect: Mood normal.          Procedures     STOP-Bang Score:     [place] Sleepiness Scale:       Lab Results:   Lab on 10/25/2024   Component Date Value Ref Range Status    Glucose 10/25/2024 95  65 - 99 mg/dL Final    BUN 10/25/2024 22  8 - 23 mg/dL Final    Creatinine 10/25/2024 1.08  0.76 - 1.27 mg/dL Final    Sodium 10/25/2024 143  136 - 145 mmol/L Final    Potassium 10/25/2024 4.3  3.5 - 5.2 mmol/L Final  "   Chloride 10/25/2024 104  98 - 107 mmol/L Final    CO2 10/25/2024 29.4 (H)  22.0 - 29.0 mmol/L Final    Calcium 10/25/2024 9.3  8.6 - 10.5 mg/dL Final    BUN/Creatinine Ratio 10/25/2024 20.4  7.0 - 25.0 Final    Anion Gap 10/25/2024 9.6  5.0 - 15.0 mmol/L Final    eGFR 10/25/2024 68.9  >60.0 mL/min/1.73 Final       Lab Results (last 72 hours)       ** No results found for the last 72 hours. **          WBC No results found for: \"WBC\"   HGB No results found for: \"HGB\"   HCT No results found for: \"HCT\"   Platlets No results found for: \"LABPLAT\"     PT/INR:  No results found for: \"PROTIME\"/No results found for: \"INR\"    Sodium No results found for: \"NA\"   Potassium No results found for: \"K\"   Chloride No results found for: \"CL\"   Bicarbonate No results found for: \"PLASMABICARB\"   BUN No results found for: \"BUN\"   Creatinine No results found for: \"CREATININE\"   Calcium No results found for: \"CALCIUM\"   Magnesium No results found for: \"MG\"     pH No results found for: \"PHART\"   pO2 No results found for: \"PO2ART\"   pCO2 No results found for: \"OCH7TKD\"   HCO3 No results found for: \"ONC4INT\"           Radiology Review:   Results for orders placed during the hospital encounter of 02/26/24    XR Chest 1 View    Narrative  XR CHEST 1 VW    Date of Exam: 2/26/2024 3:59 PM EST    Indication: Weak/Dizzy/AMS triage protocol    Comparison: 10/13/2019    Findings:  Cardiomediastinal silhouette is enlarged, similar prior examination. There is generalized interstitial prominence. There are small bilateral pleural effusions without dense consolidation. No pneumothorax. No acute osseous abnormality identified.    Impression  Impression:  Mild CHF features.      Electronically Signed: Abisai Koo MD  2/26/2024 4:36 PM EST  Workstation ID: [id number]     No results found for this or any previous visit.    No results found for this or any previous visit.    Results for orders placed during the hospital encounter of 10/08/19    CT Chest " Without Contrast    Narrative  EXAMINATION: CT CHEST WO CONTRAST-10/08/2019:    INDICATION: 3 MONTH FOLLOW-UP CT; R91.8-Other nonspecific abnormal  finding of lung field, 3 month abnormal follow-up chest.    TECHNIQUE: Multiple axial CT imaging was obtained of the chest without  the administration of intravenous contrast.    The radiation dose reduction device was turned on for each scan per the  ALARA (As Low as Reasonably Achievable) protocol.    COMPARISON: 06/19/2019.    FINDINGS: Interval development of a small-to-moderate sized pericardial  effusion. The pleural effusions have resolved in the interval. The  ascending thoracic aorta largest AP dimension is 4.4 cm. Atherosclerotic  disease seen within the thoracic aorta. The thyroid is homogeneous.  There is calcification seen within the thoracic aorta. Calcification  also seen within the mitral valve. Calcification seen within the right  hilar region and several lymph nodes in the mediastinum suggesting old  healed granulomatous disease. The lung parenchyma reveals some scarring  identified anteriorly within the left upper lobe. Minimal scarring  identified within the lingula and right middle lobe. There is no  parenchymal consolidation, pulmonary mass or new nodule identified. The  visualized upper abdomen is unremarkable in appearance. Diverticulosis  of the visualized colon.    Impression  Resolution of the previously noted pleural effusions with  development of a small-to-moderate sized pericardial effusion. Ascending  thoracic aorta largest AP dimension is 4.4 cm. Minimal scarring  anteriorly within the right middle lobe, left upper lobe and lingula.  The remainder of the chest is grossly unremarkable.    D:  10/09/2019  E:  10/09/2019    This report was finalized on 10/9/2019 5:10 PM by Dr. Jeanette Sharma MD.    No results found for this or any previous visit.    No results found for this or any previous visit.     Results for orders placed during  the hospital encounter of 06/18/19    CT Angiogram Chest With Contrast    Narrative  CTA Chest    INDICATION:  Shortness of air    TECHNIQUE:  CT angiogram of the chest with contrast. 3-D postprocessing was performed and reviewed.   Radiation dose reduction techniques included automated exposure control or exposure modulation based on body size. Count of known CT and cardiac nuc med studies  performed in previous 12 months: 1.    COMPARISON:  Chest CT angiogram October    FINDINGS:  Bolus timing is excellent and there is no evidence of pulmonary embolism. The thoracic aorta is normal in caliber. There is no mediastinal mass or adenopathy. Images of the upper abdomen show no acute abnormality. There are moderate bilateral pleural  effusions.    There are coarse left greater than right bilateral upper lobe infiltrates, and there are somewhat spiculated appearing nodular densities in both lower lobes, new since the CT exam of October 2018.    Impression  1. Excellent bolus timing and no evidence of pulmonary embolism.    2. Coarse left greater than right upper lobe infiltrates and somewhat spiculated nodular densities in both lower lobes, all new since the CT of October 2018, along with moderate pleural effusions. Suspect infectious or inflammatory etiology of the  nodular densities.    3. Normal caliber thoracic aorta.    Signer Name: Andrei Dukes MD  Signed: 6/19/2019 1:11 AM  Workstation Name: [id number]    No results found for this or any previous visit.    No results found for this or any previous visit.                  Results Review: I reviewed the patient's new clinical results.    Assessment and Plan    Visit Diagnosis:     ICD-10-CM ICD-9-CM   1. Chronic obstructive pulmonary disease, unspecified COPD type  J44.9 496   2. ANDREA (obstructive sleep apnea)  G47.33 327.23   Worsening cough and wheezing.    Significant seasonal allergies and dust mites allergies.    I would like to add Claritin and  Singulair.    Will give him a 3-day course of 20 mg of prednisone.  Also ordered spacer device to be used with Breztri twice a day.  Continue albuterol nebulizer/inhaler as a rescue treatment.    Patient finally agrees for in lab sleep study.  I scheduled it.    Return to clinic after sleep study    New Medications:   New Medications Ordered This Visit   Medications    Budeson-Glycopyrrol-Formoterol (Breztri Aerosphere) 160-9-4.8 MCG/ACT aerosol inhaler     Sig: Inhale 2 puffs Every 12 (Twelve) Hours. Provide spacer device     Dispense:  3 each     Refill:  3     90 day supply    albuterol sulfate  (90 Base) MCG/ACT inhaler     Sig: Inhale 2 puffs Every 4 (Four) Hours As Needed for Wheezing.     Dispense:  17 g     Refill:  5    guaiFENesin (MUCINEX) 600 MG 12 hr tablet     Sig: Take 2 tablets by mouth 2 (Two) Times a Day. OTC     Dispense:  60 tablet     Refill:  5    loratadine (CLARITIN) tablet 10 mg    montelukast (SINGULAIR) 10 MG tablet     Sig: Take 1 tablet by mouth Every Night.     Dispense:  30 tablet     Refill:  5    albuterol (PROVENTIL) (2.5 MG/3ML) 0.083% nebulizer solution     Sig: Take 2.5 mg by nebulization 4 (Four) Times a Day As Needed for Wheezing.     Dispense:  90 each     Refill:  5    predniSONE (DELTASONE) 10 MG tablet     Sig: Take 2 tablets by mouth Daily for 3 days. Take 4 tabs daily x 3 days, then take 3 tabs daily x 3 days, then take 2 tabs daily x 3 days, then take 1 tab daily x 3 days     Dispense:  6 tablet     Refill:  0       Discontinued Medications:   Medications Discontinued During This Encounter   Medication Reason    ALLOPURINOL PO Duplicate order    PHARMACY MEDS TO BED CONSULT *Therapy completed    albuterol sulfate  (90 Base) MCG/ACT inhaler Reorder    guaiFENesin (MUCINEX) 600 MG 12 hr tablet Reorder    Budeson-Glycopyrrol-Formoterol (Breztri Aerosphere) 160-9-4.8 MCG/ACT aerosol inhaler Reorder            Follow Up:       Patient was given instructions and  counseling regarding his condition. Please see specific information pulled into the AVS if appropriate.       This document has been electronically signed by Alex Zhang MD   January 2, 2025 14:23 EST    Dictated Utilizing Dragon Dictation: Part of this note may be an electronic transcription/translation of spoken language to printed text using the Dragon Dictation System.

## 2025-01-30 ENCOUNTER — OFFICE VISIT (OUTPATIENT)
Dept: PULMONOLOGY | Facility: CLINIC | Age: 80
End: 2025-01-30
Payer: MEDICARE

## 2025-01-30 VITALS
WEIGHT: 277 LBS | DIASTOLIC BLOOD PRESSURE: 76 MMHG | HEART RATE: 78 BPM | OXYGEN SATURATION: 95 % | HEIGHT: 67 IN | SYSTOLIC BLOOD PRESSURE: 138 MMHG | BODY MASS INDEX: 43.47 KG/M2 | TEMPERATURE: 97.8 F

## 2025-01-30 DIAGNOSIS — G47.33 OSA (OBSTRUCTIVE SLEEP APNEA): ICD-10-CM

## 2025-01-30 DIAGNOSIS — R09.02 HYPOXIA: ICD-10-CM

## 2025-01-30 DIAGNOSIS — J44.9 CHRONIC OBSTRUCTIVE PULMONARY DISEASE, UNSPECIFIED COPD TYPE: Primary | ICD-10-CM

## 2025-01-30 DIAGNOSIS — J45.50 SEVERE PERSISTENT ASTHMA WITHOUT COMPLICATION: ICD-10-CM

## 2025-01-30 PROCEDURE — 3075F SYST BP GE 130 - 139MM HG: CPT | Performed by: NURSE PRACTITIONER

## 2025-01-30 PROCEDURE — G2211 COMPLEX E/M VISIT ADD ON: HCPCS | Performed by: NURSE PRACTITIONER

## 2025-01-30 PROCEDURE — 1160F RVW MEDS BY RX/DR IN RCRD: CPT | Performed by: NURSE PRACTITIONER

## 2025-01-30 PROCEDURE — 99214 OFFICE O/P EST MOD 30 MIN: CPT | Performed by: NURSE PRACTITIONER

## 2025-01-30 PROCEDURE — 1159F MED LIST DOCD IN RCRD: CPT | Performed by: NURSE PRACTITIONER

## 2025-01-30 PROCEDURE — 3078F DIAST BP <80 MM HG: CPT | Performed by: NURSE PRACTITIONER

## 2025-01-30 RX ORDER — BENZONATATE 200 MG/1
200 CAPSULE ORAL 3 TIMES DAILY PRN
Qty: 42 CAPSULE | Refills: 3 | Status: SHIPPED | OUTPATIENT
Start: 2025-01-30

## 2025-01-30 RX ORDER — LEVALBUTEROL TARTRATE 45 UG/1
2 AEROSOL, METERED ORAL 4 TIMES DAILY PRN
Qty: 15 G | Refills: 5 | Status: SHIPPED | OUTPATIENT
Start: 2025-01-30

## 2025-01-30 RX ORDER — ACETYLCYSTEINE 200 MG/ML
4 SOLUTION ORAL; RESPIRATORY (INHALATION) 2 TIMES DAILY PRN
Qty: 30 ML | Refills: 0 | Status: SHIPPED | OUTPATIENT
Start: 2025-01-30 | End: 2025-02-10

## 2025-01-30 RX ORDER — ALBUTEROL SULFATE 0.83 MG/ML
2.5 SOLUTION RESPIRATORY (INHALATION) 4 TIMES DAILY PRN
Qty: 360 EACH | Refills: 5 | Status: SHIPPED | OUTPATIENT
Start: 2025-01-30

## 2025-01-30 NOTE — PROGRESS NOTES
"Chief Complaint  Chronic obstructive pulmonary disease, unspecified COPD type    Subjective          Alok Tobias presents to [facility] for   History of Present Illness      Mr. Tobias is an 79 year old male with a medical history significant for asthma, atrial fibrillation, CHF, COPD, CAD, hypertension, pulmonary hypertension, and sleep apnea.    He presents today for follow up on COPD.    He reports that he has a worsening cough and wheezing.  He states that he does have significant allergies to dust mites and seasonal allergies. He is currently taking Claritin and Singulair.  He is also using Breztri BID and an albuterol inhaler and neb treatments as needed.  He reports that at his last visit he was given prednisone but that he sill wheezing and that it did not seem to help much.  He reports that he is very fatigued and sleeps a lot.  He also noted brain fog that impedes his daily life.  His daughter accompanies him to his appointment today.  She reports that he does use his supplemental oxygen at night but does not use it much during the day.  He tells me that he does seem to have a productive cough bu feel that he has trouble coughing up his secretions.  He has been using mucinex.  He was suppose to get set up with an in lab sleep study but it has not been scheduled yet.      Objective   Vital Signs:   /76   Pulse 78   Temp 97.8 °F (36.6 °C)   Ht 170.2 cm (67.01\")   Wt 126 kg (277 lb)   SpO2 95%   BMI 43.37 kg/m²         Physical Exam    GENERAL APPEARANCE: Well developed, well nourished, alert and cooperative, and appears to be in no acute distress.    HEAD: normocephalic. Atraumatic.    EYES: PERRL, EOMI. Vision is grossly intact.    THROAT: Oral cavity and pharynx normal. No inflammation, swelling, exudate, or lesions.     NECK: Neck supple.  No thyromegaly.    CARDIAC: Normal S1 and S2. No S3, S4 or murmurs. Rhythm is regular. " "    RESPIRATORY:Bilateral air entry positive. Bilateral wheezing.    GI: Positive bowel sounds. Soft, nondistended, nontender.     MUSCULOSKELETAL: No significant deformity or joint abnormality. No edema. Peripheral pulses intact. No varicosities.    NEUROLOGICAL: Strength and sensation symmetric and intact throughout.     PSYCHIATRIC: The mental examination revealed the patient was oriented to person, place, and time.       Estimated body mass index is 43.37 kg/m² as calculated from the following:    Height as of this encounter: 170.2 cm (67.01\").    Weight as of this encounter: 126 kg (277 lb).        Result Review :   The following data was reviewed by: MARIPOSA Benton on 01/30/2025:  Common labs          9/24/2024    14:52 10/25/2024    14:11 1/31/2025    13:54   Common Labs   Glucose 104  95     BUN 16  22     Creatinine 0.97  1.08     Sodium 140  143     Potassium 4.3  4.3     Chloride 105  104     Calcium 9.1  9.3     WBC   5.84    Hemoglobin   13.3    Hematocrit   42.8    Platelets   157    Uric Acid 4.4   5.4           PFT:4/19/24  Spirometry  Spirometry demonstrates moderate airway obstruction.  Postbronchodilator FEV1 is 60% predicted, (1.56 L).  Post Bronchodilator  Following the inhalation of a bronchodilator, there is no significant change in airway mechanics.     MVV  N/A     Lung Volume  Lung volumes are consistent with mild restrictive lung disease.     Diffusion  Diffusion capacity is mildly reduced at 61% predicted     Study Comparison  N/A     Further Work-Up  N/A     Impression: This pulmonary function test showed moderate obstructive pattern with a common component of mild restrictive pattern and reduced diffusion capacity.  Overall findings are suggestive of gold stage II COPD.  Restrictive component could be due to patient's body habitus on underlying pulmonary fibrosis.  Clinical correlation is required.  Consider high-resolution CAT scan if interstitial lung disease is  " "suspected     Study date: 4/19/24      Low dose lung cancer screening:NA    Previous chest imaging:    Chest xray 2/26/24    Findings:  Cardiomediastinal silhouette is enlarged, similar prior examination. There is generalized interstitial prominence. There are small bilateral pleural effusions without dense consolidation. No pneumothorax. No acute osseous abnormality identified.     IMPRESSION:  Impression:  Mild CHF features.        Electronically Signed: Abisai Koo MD    2/26/2024 4:36 PM EST    Workstation ID: [id number]    Alpha-1 antitrypsin screening:NA    STOP-Bang Score:   NA  [place] Sleepiness Scale:   Na      ABG:    pH No results found for: \"PHART\"   pO2 No results found for: \"PO2ART\"   pCO2 No results found for: \"[id number]\"   HCO3 No results found for: \"[id number]\"                      Assessment and Plan    Problem List Items Addressed This Visit          Pulmonary and Pneumonias    COPD (chronic obstructive pulmonary disease) - Primary (Chronic)    Relevant Medications    levalbuterol (XOPENEX HFA) 45 MCG/ACT inhaler    acetylcysteine (MUCOMYST) 20 % nebulizer solution    benzonatate (TESSALON) 200 MG capsule    albuterol (PROVENTIL) (2.5 MG/3ML) 0.083% nebulizer solution    Other Relevant Orders    CBC & Differential (Completed)    Олег-Barr Virus Nuclear Antigen Antibody, IgG (Completed)    Uric Acid (Completed)    COPD/asthma    Relevant Medications    levalbuterol (XOPENEX HFA) 45 MCG/ACT inhaler    albuterol (PROVENTIL) (2.5 MG/3ML) 0.083% nebulizer solution    Other Relevant Orders    IgE Level    Hypoxia       Sleep    ANDREA  (Chronic)     Alok Tobias  reports that he quit smoking about 38 years ago. His smoking use included cigarettes. He started smoking about 58 years ago. He has a 30 pack-year smoking history. He has been exposed to tobacco smoke. He has never used smokeless tobacco.     Previous PFT was reviewed.    I feel that most of his breathing issues and fatigue is related to " untreated sleep apnea.    Ordered lab work up for asthma and allergies.  Will start him on mucomyst nebs   Continue duonebs as needed. Sent refills.  Continue Breztri BID.  Sent in xopenex inhaler.    Sent in Tessalon pearls.      Will check on his sleep study to see if we can expedite this appointment.         Follow Up   Return in about 2 months (around 3/30/2025).  Patient was given instructions and counseling regarding his condition or for health maintenance advice. Please see specific information pulled into the AVS if appropriate.

## 2025-01-31 ENCOUNTER — LAB (OUTPATIENT)
Dept: LAB | Facility: HOSPITAL | Age: 80
End: 2025-01-31
Payer: MEDICARE

## 2025-01-31 DIAGNOSIS — J45.50 SEVERE PERSISTENT ASTHMA WITHOUT COMPLICATION: ICD-10-CM

## 2025-01-31 DIAGNOSIS — J44.9 CHRONIC OBSTRUCTIVE PULMONARY DISEASE, UNSPECIFIED COPD TYPE: ICD-10-CM

## 2025-01-31 LAB
BASOPHILS # BLD AUTO: 0.03 10*3/MM3 (ref 0–0.2)
BASOPHILS NFR BLD AUTO: 0.5 % (ref 0–1.5)
DEPRECATED RDW RBC AUTO: 55.8 FL (ref 37–54)
EOSINOPHIL # BLD AUTO: 0.23 10*3/MM3 (ref 0–0.4)
EOSINOPHIL NFR BLD AUTO: 3.9 % (ref 0.3–6.2)
ERYTHROCYTE [DISTWIDTH] IN BLOOD BY AUTOMATED COUNT: 16.1 % (ref 12.3–15.4)
HCT VFR BLD AUTO: 42.8 % (ref 37.5–51)
HGB BLD-MCNC: 13.3 G/DL (ref 13–17.7)
IMM GRANULOCYTES # BLD AUTO: 0.03 10*3/MM3 (ref 0–0.05)
IMM GRANULOCYTES NFR BLD AUTO: 0.5 % (ref 0–0.5)
LYMPHOCYTES # BLD AUTO: 0.7 10*3/MM3 (ref 0.7–3.1)
LYMPHOCYTES NFR BLD AUTO: 12 % (ref 19.6–45.3)
MCH RBC QN AUTO: 29.1 PG (ref 26.6–33)
MCHC RBC AUTO-ENTMCNC: 31.1 G/DL (ref 31.5–35.7)
MCV RBC AUTO: 93.7 FL (ref 79–97)
MONOCYTES # BLD AUTO: 0.86 10*3/MM3 (ref 0.1–0.9)
MONOCYTES NFR BLD AUTO: 14.7 % (ref 5–12)
NEUTROPHILS NFR BLD AUTO: 3.99 10*3/MM3 (ref 1.7–7)
NEUTROPHILS NFR BLD AUTO: 68.4 % (ref 42.7–76)
NRBC BLD AUTO-RTO: 0 /100 WBC (ref 0–0.2)
PLATELET # BLD AUTO: 157 10*3/MM3 (ref 140–450)
PMV BLD AUTO: 11 FL (ref 6–12)
RBC # BLD AUTO: 4.57 10*6/MM3 (ref 4.14–5.8)
WBC NRBC COR # BLD AUTO: 5.84 10*3/MM3 (ref 3.4–10.8)

## 2025-01-31 PROCEDURE — 85025 COMPLETE CBC W/AUTO DIFF WBC: CPT

## 2025-01-31 PROCEDURE — 36415 COLL VENOUS BLD VENIPUNCTURE: CPT

## 2025-01-31 PROCEDURE — 84550 ASSAY OF BLOOD/URIC ACID: CPT

## 2025-01-31 PROCEDURE — 86664 EPSTEIN-BARR NUCLEAR ANTIGEN: CPT

## 2025-01-31 PROCEDURE — 82785 ASSAY OF IGE: CPT

## 2025-02-01 LAB — URATE SERPL-MCNC: 5.4 MG/DL (ref 3.4–7)

## 2025-02-03 LAB — EBV NA IGG SER IA-ACNC: >600 U/ML (ref 0–17.9)

## 2025-02-05 ENCOUNTER — DOCUMENTATION (OUTPATIENT)
Dept: PULMONOLOGY | Facility: CLINIC | Age: 80
End: 2025-02-05
Payer: MEDICARE

## 2025-02-05 LAB — IGE SERPL-ACNC: 75 IU/ML (ref 6–495)

## 2025-02-10 ENCOUNTER — OFFICE VISIT (OUTPATIENT)
Dept: CARDIOLOGY | Facility: CLINIC | Age: 80
End: 2025-02-10
Payer: MEDICARE

## 2025-02-10 VITALS
OXYGEN SATURATION: 93 % | SYSTOLIC BLOOD PRESSURE: 128 MMHG | WEIGHT: 280 LBS | DIASTOLIC BLOOD PRESSURE: 70 MMHG | HEIGHT: 67 IN | HEART RATE: 76 BPM | BODY MASS INDEX: 43.95 KG/M2

## 2025-02-10 DIAGNOSIS — I48.0 PAF (PAROXYSMAL ATRIAL FIBRILLATION): ICD-10-CM

## 2025-02-10 DIAGNOSIS — Z98.890 S/P MVR (MITRAL VALVE REPAIR): Primary | ICD-10-CM

## 2025-02-10 DIAGNOSIS — I25.10 ATHEROSCLEROSIS OF NATIVE CORONARY ARTERY OF NATIVE HEART WITHOUT ANGINA PECTORIS: ICD-10-CM

## 2025-02-10 DIAGNOSIS — I10 PRIMARY HYPERTENSION: ICD-10-CM

## 2025-02-10 PROCEDURE — G2211 COMPLEX E/M VISIT ADD ON: HCPCS | Performed by: INTERNAL MEDICINE

## 2025-02-10 PROCEDURE — 3074F SYST BP LT 130 MM HG: CPT | Performed by: INTERNAL MEDICINE

## 2025-02-10 PROCEDURE — 3078F DIAST BP <80 MM HG: CPT | Performed by: INTERNAL MEDICINE

## 2025-02-10 PROCEDURE — 99214 OFFICE O/P EST MOD 30 MIN: CPT | Performed by: INTERNAL MEDICINE

## 2025-02-10 RX ORDER — BUMETANIDE 2 MG/1
2 TABLET ORAL DAILY
Qty: 90 TABLET | Refills: 1 | Status: SHIPPED | OUTPATIENT
Start: 2025-02-10

## 2025-02-10 RX ORDER — SPIRONOLACTONE 25 MG/1
25 TABLET ORAL DAILY
Qty: 30 TABLET | Refills: 11 | Status: SHIPPED | OUTPATIENT
Start: 2025-02-10

## 2025-02-10 NOTE — PROGRESS NOTES
"[facility] at [facility]  Office visit  Alok Tobias  1945  [phone]  There is no work phone number on file.    VISIT DATE:  2/10/2025    PCP: Ivet Chau MD  [facility] [address] [place] [address]    CC:  Chief Complaint   Patient presents with    PAF (paroxysmal atrial fibrillation)       Problem List:   1.  Coronary Artery Disease, minor and non-obstructive  A. History of abnormal stress test 07/2016, Dr. Shaver  B. Single episode of severe bilateral arm pain at rest October 2018  C. LHC 10/3/2018:  Normal EF, Minor non obstructive CAD. Mild dilation of aortic root  D. CT Angio Chest 10/4/2018: No important abnormalities  2.  Severe MR  A. PENNY June 2019 with MVP, severe MR (at time onset AF)  B. Mitral valve repair 7/25/2019 [facility] with Medtronic 29 mm annuloplasty band               i. Temporary epicardial pacing wires cut and retained   C. Echo 7/29/2019: LVSF is normal, mild AI, s/p mitral annular ring repair with no evidence of MR  3.  Essential Hypertension  4.  COPD  5.  Asthma  6.  Morbid obesity, BMI 43.85  7.  Atrial Fibrillation with RVR              A. New diagnosis, 6/19/19, CHADS2 Vasc = 3  B. ECV x2 June 2019, successful  C. On Flecainide and Eliquis   D. Post-op MVR, Flecainide   8.   Obstructive Sleep Apnea - declines CPAP  9.   Gout  10. 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 Patten 10/11/2019     February 2024 TTE    Left ventricular systolic function is normal. Calculated left ventricular EF = 63.4% Left ventricular ejection fraction appears to be 66 - 70%.    Left ventricular wall thickness is consistent with mild concentric hypertrophy.    The right ventricular cavity is mildly dilated.    The left atrial cavity is mildly dilated.    The right atrial cavity is moderately  dilated.    Status post mitral valve repair, there is a mitral valve ring present.  Transvalvular velocities " within acceptable limits.    Estimated right ventricular systolic pressure from tricuspid regurgitation is normal (<35 mmHg). Calculated right ventricular systolic pressure from tricuspid regurgitation is 15 mmHg.    Moderate dilation of the ascending aorta is present.  4.6 cm.     ASSESSMENT:   Diagnosis Plan   1. S/P mitral valve repair/annuloplasty for severe MR 7/25/2019 in [place]        2. PAF (paroxysmal atrial fibrillation)        3. Primary hypertension  Basic Metabolic Panel      4. Atherosclerosis of native coronary artery of native heart without angina pectoris              PLAN:  Paroxysmal atrial fibrillation and atrial flutter: Currently stable and asymptomatic.  Continue Eliquis 5 mg p.o. twice daily for stroke prophylaxis, developed acute on chronic subdural hematoma while on Coumadin.  Continue metoprolol succinate 25 mg p.o. daily.    Status post mitral valve repair with mitral valve prolapse: Asymptomatic, stable on exam today.  Continue annual clinical follow-up with intermittent echocardiographic surveillance.    Hyperlipidemia: Goal LDL less than 100.  Intolerant to statins due to back pain.  Continue dietary modifications.  Currently well controlled.    Hypertension: Goal less than 130/80 mmHg.  Well-controlled, continue current medical therapy.    Heart failure with preserved ejection fraction, chronic: Continue Jardiance 10 mg p.o. daily.  Switching Lasix to Bumex 2 mg p.o. daily.  Adding spironolactone 25 mg p.o. daily.  Repeat BMP in 2 weeks.  Continue low-dose loop diuretic.  Afterload well-controlled.    Subjective  Prominent daytime somnolence, sleep study pending.  Denies chest pain, palpitations or dyspnea on exertion.  Worsening bilateral lower extremity edema, compliant with compression stockings.  Blood pressures running less than 135/80 mmHg.       PHYSICAL EXAMINATION:  Vitals:    02/10/25 1519   BP: 128/70   BP Location: Right arm   Patient Position: Sitting   Pulse: 76  "  SpO2: 93%   Weight: 127 kg (280 lb)   Height: 170.2 cm (67\")       General Appearance:    Alert, cooperative, no distress, appears stated age   Head:    Normocephalic, without obvious abnormality, atraumatic   Eyes:    conjunctiva/corneas clear   Nose:   Nares normal, septum midline, mucosa normal, no drainage   Throat:   Lips, teeth and gums normal   Neck:   Supple, symmetrical, trachea midline, no carotid    bruit or JVD   Lungs:     Clear to auscultation bilaterally, respirations unlabored   Chest Wall:    No tenderness or deformity    Heart:  Irregular regular, S1 and S2 normal, no murmur, rub   or gallop, normal carotid impulse bilaterally without bruit.   Abdomen:     Soft, non-tender   Extremities:   Extremities normal, atraumatic, no cyanosis.  2-3+ bilateral ankle edema, fades out of mid shin   Pulses:   2+ and symmetric all extremities   Skin:   Skin color, texture, turgor normal, no rashes or lesions       Diagnostic Data:  Procedures  Lab Results   Component Value Date    TRIG 44 02/27/2024    HDL 43 02/27/2024     Lab Results   Component Value Date    GLUCOSE 95 10/25/2024    BUN 22 10/25/2024    CREATININE 1.08 10/25/2024     10/25/2024    K 4.3 10/25/2024     10/25/2024    CO2 29.4 (H) 10/25/2024     Lab Results   Component Value Date    HGBA1C 5.70 (H) 02/27/2024     Lab Results   Component Value Date    WBC 5.84 01/31/2025    HGB 13.3 01/31/2025    HCT 42.8 01/31/2025     01/31/2025       Allergies  Allergies   Allergen Reactions    Dye Fdc Red [Red Dye #40 (Allura Red)] Nausea And Vomiting     All dyes, IV dye, hair dyes    Penicillins Other (See Comments)     \"CAUSES CHLAMYDIA\"       Current Medications    Current Outpatient Medications:     acetaminophen (TYLENOL) 325 MG tablet, Take 2 tablets by mouth Every 4 (Four) Hours As Needed for Mild Pain., Disp: , Rfl:     albuterol (PROVENTIL) (2.5 MG/3ML) 0.083% nebulizer solution, Take 2.5 mg by nebulization 4 (Four) Times a Day " As Needed for Wheezing., Disp: 360 each, Rfl: 5    allopurinol (ZYLOPRIM) 100 MG tablet, , Disp: , Rfl:     apixaban (Eliquis) 5 MG tablet tablet, TAKE 1 TABLET TWICE DAILY, Disp: 180 tablet, Rfl: 1    benzonatate (TESSALON) 200 MG capsule, Take 1 capsule by mouth 3 (Three) Times a Day As Needed for Cough., Disp: 42 capsule, Rfl: 3    Budeson-Glycopyrrol-Formoterol (Breztri Aerosphere) 160-9-4.8 MCG/ACT aerosol inhaler, Inhale 2 puffs Every 12 (Twelve) Hours. Provide spacer device, Disp: 3 each, Rfl: 3    empagliflozin (Jardiance) 10 MG tablet tablet, Take 1 tablet by mouth Daily., Disp: 30 tablet, Rfl: 5    guaiFENesin (MUCINEX) 600 MG 12 hr tablet, Take 2 tablets by mouth 2 (Two) Times a Day. OTC, Disp: 60 tablet, Rfl: 5    levalbuterol (XOPENEX HFA) 45 MCG/ACT inhaler, Inhale 2 puffs 4 (Four) Times a Day As Needed for Wheezing., Disp: 15 g, Rfl: 5    metoprolol succinate XL (TOPROL-XL) 25 MG 24 hr tablet, TAKE 1 TABLET EVERY DAY, Disp: 90 tablet, Rfl: 1    montelukast (SINGULAIR) 10 MG tablet, Take 1 tablet by mouth Every Night., Disp: 30 tablet, Rfl: 5    O2 (OXYGEN), Inhale 2 L/min Every Night., Disp: , Rfl:     potassium chloride (KLOR-CON M20) 20 MEQ CR tablet, Take 1 tablet by mouth Daily., Disp: 30 tablet, Rfl: 6    tamsulosin (FLOMAX) 0.4 MG capsule 24 hr capsule, TAKE 1 CAPSULE EVERY DAY. FURTHER REFILLS PER PCP, Disp: 30 capsule, Rfl: 0    valsartan (DIOVAN) 40 MG tablet, TAKE 1 TABLET TWICE DAILY, Disp: 180 tablet, Rfl: 1    bumetanide (BUMEX) 2 MG tablet, Take 1 tablet by mouth Daily., Disp: 90 tablet, Rfl: 1    spironolactone (ALDACTONE) 25 MG tablet, Take 1 tablet by mouth Daily., Disp: 30 tablet, Rfl: 11    Current Facility-Administered Medications:     loratadine (CLARITIN) tablet 10 mg, 10 mg, Oral, Daily, Alex Zhang MD          ROS  ROS      SOCIAL HX  Social History     Socioeconomic History    Marital status:    Tobacco Use    Smoking status: Former     Current packs/day: 0.00      Average packs/day: 1.5 packs/day for 20.0 years (30.0 ttl pk-yrs)     Types: Cigarettes     Start date: 1966     Quit date: 1986     Years since quittin.7     Passive exposure: Past    Smokeless tobacco: Never   Vaping Use    Vaping status: Never Used   Substance and Sexual Activity    Alcohol use: Not Currently     Alcohol/week: 8.0 standard drinks of alcohol     Types: 8 Cans of beer per week    Drug use: No    Sexual activity: Defer       FAMILY HX  Family History   Problem Relation Age of Onset    Multiple sclerosis Mother              Amos Crook III, MD, FACC

## 2025-03-03 ENCOUNTER — TELEPHONE (OUTPATIENT)
Dept: CARDIOLOGY | Facility: CLINIC | Age: 80
End: 2025-03-03
Payer: MEDICARE

## 2025-03-03 ENCOUNTER — TELEPHONE (OUTPATIENT)
Dept: PULMONOLOGY | Facility: CLINIC | Age: 80
End: 2025-03-03
Payer: MEDICARE

## 2025-03-03 DIAGNOSIS — G47.33 OSA (OBSTRUCTIVE SLEEP APNEA): Primary | ICD-10-CM

## 2025-03-03 DIAGNOSIS — I48.0 PAF (PAROXYSMAL ATRIAL FIBRILLATION): ICD-10-CM

## 2025-03-03 DIAGNOSIS — I10 ESSENTIAL HYPERTENSION: Primary | ICD-10-CM

## 2025-03-03 NOTE — TELEPHONE ENCOUNTER
Notified to get lab work done this week(bmp) that  ordered at last office visit. Has refills on his Spironolactone at [facility] in [facility]. Notified that we will sent refills to [facility] when we get his lab work. Verbalized understanding.Stated would get his lab work this week at 's office.

## 2025-03-03 NOTE — PROGRESS NOTES
Sleep study was notable for severe sleep apnea and he was note to do the best with a cpap of 11.  Order placed.  His lab work also showed elevated eosinophils.  This could be why he continues to have trouble with his asthma.  Starting paperwork for Tezspire injections.

## 2025-03-03 NOTE — TELEPHONE ENCOUNTER
Called and spoke to patient to report results and let him know he has an appointment scheduled in April.    WDL

## 2025-03-03 NOTE — TELEPHONE ENCOUNTER
Caller: Alok Tobias    Relationship: Self    Best call back number: [phone]    What is the best time to reach you: ANYTIME        What was the call regarding: PT IS NOT SURE. HE HAD A MISSED CALL FROM THE OFFICE. NO MESSAGE. PLEASE REACH OUT TO THE PT TO ADDRESS THIS.

## 2025-03-05 ENCOUNTER — LAB (OUTPATIENT)
Dept: LAB | Facility: HOSPITAL | Age: 80
End: 2025-03-05
Payer: MEDICARE

## 2025-03-05 DIAGNOSIS — I10 PRIMARY HYPERTENSION: ICD-10-CM

## 2025-03-05 PROCEDURE — 80048 BASIC METABOLIC PNL TOTAL CA: CPT

## 2025-03-05 PROCEDURE — 36415 COLL VENOUS BLD VENIPUNCTURE: CPT

## 2025-03-06 LAB
ANION GAP SERPL CALCULATED.3IONS-SCNC: 12.2 MMOL/L (ref 5–15)
BUN SERPL-MCNC: 50 MG/DL (ref 8–23)
BUN/CREAT SERPL: 34.5 (ref 7–25)
CALCIUM SPEC-SCNC: 9.5 MG/DL (ref 8.6–10.5)
CHLORIDE SERPL-SCNC: 97 MMOL/L (ref 98–107)
CO2 SERPL-SCNC: 26.8 MMOL/L (ref 22–29)
CREAT SERPL-MCNC: 1.45 MG/DL (ref 0.76–1.27)
EGFRCR SERPLBLD CKD-EPI 2021: 48.4 ML/MIN/1.73
GLUCOSE SERPL-MCNC: 95 MG/DL (ref 65–99)
POTASSIUM SERPL-SCNC: 5.7 MMOL/L (ref 3.5–5.2)
SODIUM SERPL-SCNC: 136 MMOL/L (ref 136–145)

## 2025-03-06 NOTE — TELEPHONE ENCOUNTER
Notified of message from . Verbalized understanding. Also called his local pharmacy([facility]) and notified the Pharmacist that his Spironolactone 25 mg daily has been discontinued.

## 2025-03-11 RX ORDER — POTASSIUM CHLORIDE 1500 MG/1
20 TABLET, EXTENDED RELEASE ORAL DAILY
Qty: 90 TABLET | Refills: 1 | Status: SHIPPED | OUTPATIENT
Start: 2025-03-11

## 2025-03-25 ENCOUNTER — LAB (OUTPATIENT)
Dept: LAB | Facility: HOSPITAL | Age: 80
End: 2025-03-25
Payer: MEDICARE

## 2025-03-25 DIAGNOSIS — I48.0 PAF (PAROXYSMAL ATRIAL FIBRILLATION): ICD-10-CM

## 2025-03-25 DIAGNOSIS — I10 ESSENTIAL HYPERTENSION: ICD-10-CM

## 2025-03-25 PROCEDURE — 80048 BASIC METABOLIC PNL TOTAL CA: CPT

## 2025-03-26 LAB
ANION GAP SERPL CALCULATED.3IONS-SCNC: 12.3 MMOL/L (ref 5–15)
BUN SERPL-MCNC: 27 MG/DL (ref 8–23)
BUN/CREAT SERPL: 23.7 (ref 7–25)
CALCIUM SPEC-SCNC: 9.3 MG/DL (ref 8.6–10.5)
CHLORIDE SERPL-SCNC: 102 MMOL/L (ref 98–107)
CO2 SERPL-SCNC: 26.7 MMOL/L (ref 22–29)
CREAT SERPL-MCNC: 1.14 MG/DL (ref 0.76–1.27)
EGFRCR SERPLBLD CKD-EPI 2021: 64.6 ML/MIN/1.73
GLUCOSE SERPL-MCNC: 158 MG/DL (ref 65–99)
POTASSIUM SERPL-SCNC: 4.1 MMOL/L (ref 3.5–5.2)
SODIUM SERPL-SCNC: 141 MMOL/L (ref 136–145)

## 2025-04-03 ENCOUNTER — OFFICE VISIT (OUTPATIENT)
Dept: PULMONOLOGY | Facility: CLINIC | Age: 80
End: 2025-04-03
Payer: MEDICARE

## 2025-04-03 VITALS
OXYGEN SATURATION: 95 % | DIASTOLIC BLOOD PRESSURE: 64 MMHG | SYSTOLIC BLOOD PRESSURE: 102 MMHG | HEIGHT: 67 IN | HEART RATE: 78 BPM | TEMPERATURE: 97.1 F | BODY MASS INDEX: 39.21 KG/M2 | WEIGHT: 249.8 LBS

## 2025-04-03 DIAGNOSIS — J45.50 SEVERE PERSISTENT ASTHMA WITHOUT COMPLICATION: ICD-10-CM

## 2025-04-03 DIAGNOSIS — G47.33 OSA (OBSTRUCTIVE SLEEP APNEA): ICD-10-CM

## 2025-04-03 DIAGNOSIS — J44.9 CHRONIC OBSTRUCTIVE PULMONARY DISEASE, UNSPECIFIED COPD TYPE: Primary | ICD-10-CM

## 2025-04-03 DIAGNOSIS — R09.02 HYPOXIA: ICD-10-CM

## 2025-04-03 NOTE — PROGRESS NOTES
"Chief Complaint  COPD and Sleep Apnea (Sleep study in care everywhere on 2/11/25)    Subjective          Alok Tobias presents to [facility] for   History of Present Illness    Mr. Tobias is an 79 year old male with a medical history significant for asthma, atrial fibrillation, CHF, COPD, hypertension and sleep apnea.    He presents today for follow up on COPD, asthma and sleep apnea.  He reports that he has been doing well since his last visit. He states that his breathing has been at baseline. He does continue to complain of frequent asthma exacerbation.  He is currently taking Trelegy once daily, xopenex inhaler and duonebs as needed.  He is also taking Claritin and Singulair nightly.  He reports that he has not been set up with his cpap yet.    Objective   Vital Signs:   /64   Pulse 78   Temp 97.1 °F (36.2 °C)   Ht 170.2 cm (67\")   Wt 113 kg (249 lb 12.8 oz)   SpO2 95%   BMI 39.12 kg/m²         Physical Exam    GENERAL APPEARANCE: Well developed, well nourished, alert and cooperative, and appears to be in no acute distress.    HEAD: normocephalic. Atraumatic.    EYES: PERRL, EOMI. Vision is grossly intact.    THROAT: Oral cavity and pharynx normal. No inflammation, swelling, exudate, or lesions.     NECK: Neck supple.  No thyromegaly.    CARDIAC: Normal S1 and S2. No S3, S4 or murmurs. Rhythm is regular.     RESPIRATORY:Bilateral air entry positive.  No wheezing, crackles or rhonchi noted.    GI: Positive bowel sounds. Soft, nondistended, nontender.     MUSCULOSKELETAL: No significant deformity or joint abnormality. No edema. Peripheral pulses intact. No varicosities.    NEUROLOGICAL: Strength and sensation symmetric and intact throughout.     PSYCHIATRIC: The mental examination revealed the patient was oriented to person, place, and time.       Estimated body mass index is 39.12 kg/m² as calculated from the following:    Height as of this " "encounter: 170.2 cm (67\").    Weight as of this encounter: 113 kg (249 lb 12.8 oz).        Result Review :   The following data was reviewed by: MARIPOSA Benton on 04/03/2025:  Common labs          1/31/2025    13:54 3/5/2025    14:31 3/26/2025    11:16   Common Labs   Glucose  95  158    BUN  50  27    Creatinine  1.45  1.14    Sodium  136  141    Potassium  5.7  4.1    Chloride  97  102    Calcium  9.5  9.3    WBC 5.84      Hemoglobin 13.3      Hematocrit 42.8      Platelets 157      Uric Acid 5.4             PFT:4/19/24  Spirometry  Spirometry demonstrates moderate airway obstruction.  Postbronchodilator FEV1 is 60% predicted, (1.56 L).  Post Bronchodilator  Following the inhalation of a bronchodilator, there is no significant change in airway mechanics.     MVV  N/A     Lung Volume  Lung volumes are consistent with mild restrictive lung disease.     Diffusion  Diffusion capacity is mildly reduced at 61% predicted     Study Comparison  N/A     Further Work-Up  N/A     Impression: This pulmonary function test showed moderate obstructive pattern with a common component of mild restrictive pattern and reduced diffusion capacity.  Overall findings are suggestive of gold stage II COPD.  Restrictive component could be due to patient's body habitus on underlying pulmonary fibrosis.  Clinical correlation is required.  Consider high-resolution CAT scan if interstitial lung disease is  suspected     Study date: 4/19/24        Low dose lung cancer screening:NA     Previous chest imaging:     Chest xray 2/26/24     Findings:  Cardiomediastinal silhouette is enlarged, similar prior examination. There is generalized interstitial prominence. There are small bilateral pleural effusions without dense consolidation. No pneumothorax. No acute osseous abnormality identified.     IMPRESSION:  Impression:  Mild CHF features.        Electronically Signed: Abisai Koo MD    2/26/2024 4:36 PM EST    Workstation ID: " "[id number]     Alpha-1 antitrypsin screening:NA     STOP-Bang Score:   NA  Rand Sleepiness Scale:   Na      ABG:    pH No results found for: \"PHART\"   pO2 No results found for: \"PO2ART\"   pCO2 No results found for: \"AWZ8PWW\"   HCO3 No results found for: \"AMM5XVX\"                      Assessment and Plan    Problem List Items Addressed This Visit          Pulmonary and Pneumonias    COPD (chronic obstructive pulmonary disease) - Primary (Chronic)    COPD/asthma    Hypoxia       Sleep    ANDREA  (Chronic)       Alok Tobias  reports that he quit smoking about 38 years ago. His smoking use included cigarettes. He started smoking about 58 years ago. He has a 30 pack-year smoking history. He has been exposed to tobacco smoke. He has never used smokeless tobacco.    He tells me that his cardiologist changed his diuretics and this has helped his breathing.    Continue Breztri BID.  Continue Xopenex inhaler as needed.  Continue neb treatments as needed.    Continue Singulair and Claritin.    Blood work was reviewed. He was noted to have elevated send he feels.  Given his continued history of frequent eczema exacerbations requiring oral steroids I think that he would benefit from Tezspire injections.    Sleep study showed severe sleep apnea.  Order has been placed for cpap set up. He reports that he has not been set up yet.  He is currently using supplemental oxygen at night.  Will call and check on this.     Patient was educated on positive airway pressure treatment.  As per CMS guidelines, more than 4 hours on 70% of observed nights is considered adherence. Patient was strongly encouraged to use CPAP as much as possible during sleep as more CPAP use is equal to more benefit. Use of heated humidification in positive airway pressure treatment to improve the adherence to the device.  In case of claustrophobia, we will provide the patient cognitive behavioral therapy and desensitization. Oral appliances use will be discussed " with the patient in case of mild to moderate sleep apnea or if the patient with severe disease fail positive airway pressure treatment.       The patient was extensively educated on the consequences of untreated obstructive sleep apnea namely cardiovascular/metabolic disorder, neurocognitive deficit, daytime sleepiness, motor vehicle accidents, depression, mood disorders and reduced quality of life.  At the end of conversation, the patient voices understanding of the disease process and treatment modality.  Patient also understands the risk of untreated obstructive sleep apnea and benefit benefits of the treatment.    Counseling time was greater than 10 minutes.      We also discussed inspire device but will try cpap first and then revisit this.        Follow Up   Return in about 3 months (around 7/3/2025).  Patient was given instructions and counseling regarding his condition or for health maintenance advice. Please see specific information pulled into the AVS if appropriate.

## 2025-04-08 RX ORDER — TAMSULOSIN HYDROCHLORIDE 0.4 MG/1
CAPSULE ORAL
Qty: 30 CAPSULE | Refills: 11 | OUTPATIENT
Start: 2025-04-08

## 2025-04-14 ENCOUNTER — HOSPITAL ENCOUNTER (OUTPATIENT)
Dept: RESPIRATORY THERAPY | Facility: HOSPITAL | Age: 80
Discharge: HOME | End: 2025-04-14
Admitting: INTERNAL MEDICINE
Payer: MEDICARE

## 2025-04-14 VITALS — HEART RATE: 52 BPM | OXYGEN SATURATION: 97 % | RESPIRATION RATE: 18 BRPM

## 2025-04-14 DIAGNOSIS — J44.9 CHRONIC OBSTRUCTIVE PULMONARY DISEASE, UNSPECIFIED COPD TYPE: ICD-10-CM

## 2025-04-14 PROCEDURE — 94060 EVALUATION OF WHEEZING: CPT

## 2025-04-14 PROCEDURE — 94799 UNLISTED PULMONARY SVC/PX: CPT

## 2025-04-14 PROCEDURE — 94726 PLETHYSMOGRAPHY LUNG VOLUMES: CPT

## 2025-04-14 PROCEDURE — 94640 AIRWAY INHALATION TREATMENT: CPT

## 2025-04-14 PROCEDURE — 94729 DIFFUSING CAPACITY: CPT

## 2025-04-14 RX ORDER — ALBUTEROL SULFATE 0.83 MG/ML
2.5 SOLUTION RESPIRATORY (INHALATION) ONCE
Status: COMPLETED | OUTPATIENT
Start: 2025-04-14 | End: 2025-04-14

## 2025-04-14 RX ADMIN — ALBUTEROL SULFATE 2.5 MG: 2.5 SOLUTION RESPIRATORY (INHALATION) at 14:54

## 2025-04-15 PROCEDURE — 94060 EVALUATION OF WHEEZING: CPT | Performed by: INTERNAL MEDICINE

## 2025-04-15 PROCEDURE — 94729 DIFFUSING CAPACITY: CPT | Performed by: INTERNAL MEDICINE

## 2025-04-15 PROCEDURE — 94726 PLETHYSMOGRAPHY LUNG VOLUMES: CPT | Performed by: INTERNAL MEDICINE

## 2025-04-28 RX ORDER — VALSARTAN 40 MG/1
40 TABLET ORAL 2 TIMES DAILY
Qty: 180 TABLET | Refills: 0 | Status: SHIPPED | OUTPATIENT
Start: 2025-04-28

## 2025-05-12 ENCOUNTER — OFFICE VISIT (OUTPATIENT)
Dept: CARDIOLOGY | Facility: CLINIC | Age: 80
End: 2025-05-12
Payer: MEDICARE

## 2025-05-12 VITALS
HEIGHT: 67 IN | OXYGEN SATURATION: 94 % | DIASTOLIC BLOOD PRESSURE: 68 MMHG | HEART RATE: 63 BPM | WEIGHT: 248.4 LBS | BODY MASS INDEX: 38.99 KG/M2 | SYSTOLIC BLOOD PRESSURE: 112 MMHG

## 2025-05-12 DIAGNOSIS — Z98.890 S/P MVR (MITRAL VALVE REPAIR): ICD-10-CM

## 2025-05-12 DIAGNOSIS — I25.10 ATHEROSCLEROSIS OF NATIVE CORONARY ARTERY OF NATIVE HEART WITHOUT ANGINA PECTORIS: ICD-10-CM

## 2025-05-12 DIAGNOSIS — I48.0 PAF (PAROXYSMAL ATRIAL FIBRILLATION): ICD-10-CM

## 2025-05-12 DIAGNOSIS — I48.91 ATRIAL FIBRILLATION WITH RVR: Primary | ICD-10-CM

## 2025-05-12 PROCEDURE — 3078F DIAST BP <80 MM HG: CPT | Performed by: INTERNAL MEDICINE

## 2025-05-12 PROCEDURE — 3074F SYST BP LT 130 MM HG: CPT | Performed by: INTERNAL MEDICINE

## 2025-05-12 PROCEDURE — 99214 OFFICE O/P EST MOD 30 MIN: CPT | Performed by: INTERNAL MEDICINE

## 2025-05-12 PROCEDURE — G2211 COMPLEX E/M VISIT ADD ON: HCPCS | Performed by: INTERNAL MEDICINE

## 2025-05-12 RX ORDER — TAMSULOSIN HYDROCHLORIDE 0.4 MG/1
1 CAPSULE ORAL DAILY
Qty: 90 CAPSULE | Refills: 3 | Status: SHIPPED | OUTPATIENT
Start: 2025-05-12 | End: 2025-05-12 | Stop reason: SDUPTHER

## 2025-05-12 RX ORDER — TAMSULOSIN HYDROCHLORIDE 0.4 MG/1
1 CAPSULE ORAL DAILY
Qty: 90 CAPSULE | Refills: 3 | Status: SHIPPED | OUTPATIENT
Start: 2025-05-12

## 2025-05-12 NOTE — PROGRESS NOTES
"[facility] at [facility]  Office visit  Alok Tobias  1945  [phone]  There is no work phone number on file.    VISIT DATE:  5/12/2025    PCP: Ivet Chau MD  [facility] [address] [place] [address]    CC:  Chief Complaint   Patient presents with    PAF (paroxysmal atrial fibrillation)       Problem List:   1.  Coronary Artery Disease, minor and non-obstructive  A. History of abnormal stress test 07/2016, Dr. Shaver  B. Single episode of severe bilateral arm pain at rest October 2018  C. LHC 10/3/2018:  Normal EF, Minor non obstructive CAD. Mild dilation of aortic root  D. CT Angio Chest 10/4/2018: No important abnormalities  2.  Severe MR  A. PENNY June 2019 with MVP, severe MR (at time onset AF)  B. Mitral valve repair 7/25/2019 [facility] with [employer]tr[employer] 29 mm annuloplasty band               i. Temporary epicardial pacing wires cut and retained   C. Echo 7/29/2019: LVSF is normal, mild AI, s/p mitral annular ring repair with no evidence of MR  3.  Essential Hypertension  4.  COPD  5.  Asthma  6.  Morbid obesity, BMI 43.85  7.  Atrial Fibrillation with RVR              A. New diagnosis, 6/19/19, CHADS2 Vasc = 3  B. ECV x2 June 2019, successful  C. On Flecainide and Eliquis   D. Post-op MVR, Flecainide   8.   Obstructive Sleep Apnea - declines CPAP  9.   Gout  10. 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 Patten 10/11/2019     February 2024 TTE    Left ventricular systolic function is normal. Calculated left ventricular EF = 63.4% Left ventricular ejection fraction appears to be 66 - 70%.    Left ventricular wall thickness is consistent with mild concentric hypertrophy.    The right ventricular cavity is mildly dilated.    The left atrial cavity is mildly dilated.    The right atrial cavity is moderately  dilated.    Status post mitral valve repair, there is a mitral valve ring present.  Transvalvular velocities " "within acceptable limits.    Estimated right ventricular systolic pressure from tricuspid regurgitation is normal (<35 mmHg). Calculated right ventricular systolic pressure from tricuspid regurgitation is 15 mmHg.    Moderate dilation of the ascending aorta is present.  4.6 cm.     ASSESSMENT:   Diagnosis Plan   1. Atrial fibrillation with RVR        2. Atherosclerosis of native coronary artery of native heart without angina pectoris        3. PAF (paroxysmal atrial fibrillation)        4. S/P mitral valve repair/annuloplasty for severe MR 7/25/2019 in [place]              PLAN:  Paroxysmal atrial fibrillation and atrial flutter: Currently stable and asymptomatic.  Continue Eliquis 5 mg p.o. twice daily for stroke prophylaxis, developed acute on chronic subdural hematoma while on Coumadin.  Continue metoprolol succinate 25 mg p.o. daily.    Status post mitral valve repair with mitral valve prolapse: Asymptomatic, stable on exam today.  Continue annual clinical follow-up with intermittent echocardiographic surveillance.    Hyperlipidemia: Goal LDL less than 100.  Intolerant to statins due to back pain.  Continue dietary modifications.  Currently well controlled.    Hypertension: Goal less than 130/80 mmHg.  Well-controlled, continue current medical therapy.    Heart failure with preserved ejection fraction, chronic: Currently euvolemic and compensated.  Continue current medical therapy.    Subjective  Down 30 to 40 pounds of water weight following previous adjustment of diuretic therapy.  Feels much better.  Blood pressures running less than 135/80 mmHg.       PHYSICAL EXAMINATION:  Vitals:    05/12/25 1409   BP: 112/68   BP Location: Right arm   Patient Position: Sitting   Cuff Size: Adult   Pulse: 63   SpO2: 94%   Weight: 113 kg (248 lb 6.4 oz)   Height: 170.2 cm (67\")       General Appearance:    Alert, cooperative, no distress, appears stated age   Head:    Normocephalic, without obvious abnormality, atraumatic " "  Eyes:    conjunctiva/corneas clear   Nose:   Nares normal, septum midline, mucosa normal, no drainage   Throat:   Lips, teeth and gums normal   Neck:   Supple, symmetrical, trachea midline, no carotid    bruit or JVD   Lungs:     Clear to auscultation bilaterally, respirations unlabored   Chest Wall:    No tenderness or deformity    Heart:  Irregular regular, S1 and S2 normal, no murmur, rub   or gallop, normal carotid impulse bilaterally without bruit.   Abdomen:     Soft, non-tender   Extremities:   Extremities normal, atraumatic, no cyanosis.  1+ left ankle  edema, fades out of mid shin   Pulses:   2+ and symmetric all extremities   Skin:   Skin color, texture, turgor normal, no rashes or lesions       Diagnostic Data:  Procedures  Lab Results   Component Value Date    TRIG 44 02/27/2024    HDL 43 02/27/2024     Lab Results   Component Value Date    GLUCOSE 158 (H) 03/26/2025    BUN 27 (H) 03/26/2025    CREATININE 1.14 03/26/2025     03/26/2025    K 4.1 03/26/2025     03/26/2025    CO2 26.7 03/26/2025     Lab Results   Component Value Date    HGBA1C 5.70 (H) 02/27/2024     Lab Results   Component Value Date    WBC 5.84 01/31/2025    HGB 13.3 01/31/2025    HCT 42.8 01/31/2025     01/31/2025       Allergies  Allergies   Allergen Reactions    Dye Fdc Red [Red Dye #40 (Allura Red)] Nausea And Vomiting     All dyes, IV dye, hair dyes    Penicillins Other (See Comments)     \"CAUSES CHLAMYDIA\"       Current Medications    Current Outpatient Medications:     acetaminophen (TYLENOL) 325 MG tablet, Take 2 tablets by mouth Every 4 (Four) Hours As Needed for Mild Pain., Disp: , Rfl:     albuterol (PROVENTIL) (2.5 MG/3ML) 0.083% nebulizer solution, Take 2.5 mg by nebulization 4 (Four) Times a Day As Needed for Wheezing., Disp: 360 each, Rfl: 5    allopurinol (ZYLOPRIM) 100 MG tablet, , Disp: , Rfl:     apixaban (Eliquis) 5 MG tablet tablet, TAKE 1 TABLET TWICE DAILY, Disp: 180 tablet, Rfl: 1    " benzonatate (TESSALON) 200 MG capsule, Take 1 capsule by mouth 3 (Three) Times a Day As Needed for Cough., Disp: 42 capsule, Rfl: 3    Budeson-Glycopyrrol-Formoterol (Breztri Aerosphere) 160-9-4.8 MCG/ACT aerosol inhaler, Inhale 2 puffs Every 12 (Twelve) Hours. Provide spacer device, Disp: 3 each, Rfl: 3    bumetanide (BUMEX) 2 MG tablet, Take 1 tablet by mouth Daily., Disp: 90 tablet, Rfl: 1    empagliflozin (Jardiance) 10 MG tablet tablet, Take 1 tablet by mouth Daily., Disp: 30 tablet, Rfl: 5    guaiFENesin (MUCINEX) 600 MG 12 hr tablet, Take 2 tablets by mouth 2 (Two) Times a Day. OTC, Disp: 60 tablet, Rfl: 5    levalbuterol (XOPENEX HFA) 45 MCG/ACT inhaler, Inhale 2 puffs 4 (Four) Times a Day As Needed for Wheezing., Disp: 15 g, Rfl: 5    metoprolol succinate XL (TOPROL-XL) 25 MG 24 hr tablet, TAKE 1 TABLET EVERY DAY, Disp: 90 tablet, Rfl: 1    montelukast (SINGULAIR) 10 MG tablet, Take 1 tablet by mouth Every Night., Disp: 30 tablet, Rfl: 5    O2 (OXYGEN), Inhale 2 L/min Every Night., Disp: , Rfl:     tamsulosin (FLOMAX) 0.4 MG capsule 24 hr capsule, TAKE 1 CAPSULE EVERY DAY. FURTHER REFILLS PER PCP, Disp: 30 capsule, Rfl: 0    valsartan (DIOVAN) 40 MG tablet, TAKE 1 TABLET TWICE DAILY, Disp: 180 tablet, Rfl: 0    potassium chloride (KLOR-CON M20) 20 MEQ CR tablet, Take 1 tablet by mouth Daily. (Patient not taking: Reported on 2025), Disp: 90 tablet, Rfl: 1    Current Facility-Administered Medications:     loratadine (CLARITIN) tablet 10 mg, 10 mg, Oral, Daily, Alex Zhang MD          ROS  ROS      SOCIAL HX  Social History     Socioeconomic History    Marital status:    Tobacco Use    Smoking status: Former     Current packs/day: 0.00     Average packs/day: 1.5 packs/day for 20.0 years (30.0 ttl pk-yrs)     Types: Cigarettes     Start date: 1966     Quit date: 1986     Years since quittin.9     Passive exposure: Past    Smokeless tobacco: Never   Vaping Use    Vaping status:  Never Used   Substance and Sexual Activity    Alcohol use: Not Currently     Alcohol/week: 8.0 standard drinks of alcohol     Types: 8 Cans of beer per week    Drug use: No    Sexual activity: Defer       FAMILY HX  Family History   Problem Relation Age of Onset    Multiple sclerosis Mother              Amos Crook III, MD, FACC

## 2025-06-23 RX ORDER — MONTELUKAST SODIUM 10 MG/1
10 TABLET ORAL NIGHTLY
Qty: 90 TABLET | Refills: 3 | Status: SHIPPED | OUTPATIENT
Start: 2025-06-23

## 2025-07-03 ENCOUNTER — OFFICE VISIT (OUTPATIENT)
Dept: PULMONOLOGY | Facility: CLINIC | Age: 80
End: 2025-07-03
Payer: MEDICARE

## 2025-07-03 VITALS
DIASTOLIC BLOOD PRESSURE: 76 MMHG | BODY MASS INDEX: 38.3 KG/M2 | HEIGHT: 67 IN | SYSTOLIC BLOOD PRESSURE: 124 MMHG | WEIGHT: 244 LBS | HEART RATE: 51 BPM | OXYGEN SATURATION: 94 % | TEMPERATURE: 97.5 F

## 2025-07-03 DIAGNOSIS — J44.9 CHRONIC OBSTRUCTIVE PULMONARY DISEASE, UNSPECIFIED COPD TYPE: Primary | ICD-10-CM

## 2025-07-03 DIAGNOSIS — J45.50 SEVERE PERSISTENT ASTHMA WITHOUT COMPLICATION: ICD-10-CM

## 2025-07-03 DIAGNOSIS — R09.02 HYPOXIA: ICD-10-CM

## 2025-07-03 DIAGNOSIS — G47.33 OSA (OBSTRUCTIVE SLEEP APNEA): ICD-10-CM

## 2025-07-03 RX ORDER — BUDESONIDE, GLYCOPYRROLATE, AND FORMOTEROL FUMARATE 160; 9; 4.8 UG/1; UG/1; UG/1
2 AEROSOL, METERED RESPIRATORY (INHALATION) 2 TIMES DAILY
Qty: 30 G | Refills: 2 | Status: SHIPPED | OUTPATIENT
Start: 2025-07-03

## 2025-07-03 NOTE — PROGRESS NOTES
"Chief Complaint  Chronic obstructive pulmonary disease, unspecified COPD type    Subjective          Alok Tobais presents to [facility] for   History of Present Illness      Mr. Tobias is an 79 year old male with a medical history significant for asthma, atrial fibrillation, COPD, CHF, CAD, hypertension, pulmonary hypertension, and sleep apnea.    He presents today for follow up on COPD.  He reports that overall he has been doing well since his last visit.  He states that he is feeling significantly better than he did a year ago.  He does complain that over the last week he has had some left rib pain and was diagnosed with pleurisy.  He has been taking prednisone for the last few days and reports improvement.  He states that he is using his CPAP nightly and reports that he has more energy and is sleeping much better.  He still using Breztri twice daily, albuterol as needed and neb treatments as needed.  He is also using supplemental oxygen with his CPAP.        Objective   Vital Signs:   /76   Pulse 51   Temp 97.5 °F (36.4 °C)   Ht 170.2 cm (67.01\")   Wt 111 kg (244 lb)   SpO2 94%   BMI 38.21 kg/m²         Physical Exam  GENERAL APPEARANCE: Well developed, well nourished, alert and cooperative, and appears to be in no acute distress.    HEAD: normocephalic. Atraumatic.    EYES: PERRL, EOMI. Vision is grossly intact.    THROAT: Oral cavity and pharynx normal. No inflammation, swelling, exudate, or lesions.     NECK: Neck supple.  No thyromegaly.    CARDIAC: Normal S1 and S2. No S3, S4 or murmurs. Rhythm is regular.     RESPIRATORY:Bilateral air entry positive.  No wheezing, crackles or rhonchi noted.    GI: Positive bowel sounds. Soft, nondistended, nontender.     MUSCULOSKELETAL: No significant deformity or joint abnormality. No edema. Peripheral pulses intact. No varicosities.    NEUROLOGICAL: Strength and sensation symmetric and intact " "throughout.     PSYCHIATRIC: The mental examination revealed the patient was oriented to person, place, and time.       Estimated body mass index is 38.21 kg/m² as calculated from the following:    Height as of this encounter: 170.2 cm (67.01\").    Weight as of this encounter: 111 kg (244 lb).        Result Review :   The following data was reviewed by: MARIPOSA Benton on 07/03/2025:  Common labs          1/31/2025    13:54 3/5/2025    14:31 3/26/2025    11:16   Common Labs   Glucose  95  158    BUN  50  27    Creatinine  1.45  1.14    Sodium  136  141    Potassium  5.7  4.1    Chloride  97  102    Calcium  9.5  9.3    WBC 5.84      Hemoglobin 13.3      Hematocrit 42.8      Platelets 157      Uric Acid 5.4             PFT:4/19/24  Spirometry  Spirometry demonstrates moderate airway obstruction.  Postbronchodilator FEV1 is 60% predicted, (1.56 L).  Post Bronchodilator  Following the inhalation of a bronchodilator, there is no significant change in airway mechanics.     MVV  N/A     Lung Volume  Lung volumes are consistent with mild restrictive lung disease.     Diffusion  Diffusion capacity is mildly reduced at 61% predicted     Study Comparison  N/A     Further Work-Up  N/A     Impression: This pulmonary function test showed moderate obstructive pattern with a common component of mild restrictive pattern and reduced diffusion capacity.  Overall findings are suggestive of gold stage II COPD.  Restrictive component could be due to patient's body habitus on underlying pulmonary fibrosis.  Clinical correlation is required.  Consider high-resolution CAT scan if interstitial lung disease is  suspected     Study date: 4/19/24        Low dose lung cancer screening:NA     Previous chest imaging:     Chest xray 2/26/24     Findings:  Cardiomediastinal silhouette is enlarged, similar prior examination. There is generalized interstitial prominence. There are small bilateral pleural effusions without dense " "consolidation. No pneumothorax. No acute osseous abnormality identified.     IMPRESSION:  Impression:  Mild CHF features.        Electronically Signed: Abisai Koo MD    2/26/2024 4:36 PM EST    Workstation ID: [id number]     Alpha-1 antitrypsin screening:NA     STOP-Bang Score:   NA  Sagamore Sleepiness Scale:   Na      ABG:    pH No results found for: \"PHART\"   pO2 No results found for: \"PO2ART\"   pCO2 No results found for: \"NFR1LUB\"   HCO3 No results found for: \"QHZ8XVD\"                      Assessment and Plan    Problem List Items Addressed This Visit          Pulmonary and Pneumonias    COPD (chronic obstructive pulmonary disease) - Primary (Chronic)    Relevant Medications    Budeson-Glycopyrrol-Formoterol (Breztri Aerosphere) 160-9-4.8 MCG/ACT aerosol inhaler    COPD/asthma    Relevant Medications    Budeson-Glycopyrrol-Formoterol (Breztri Aerosphere) 160-9-4.8 MCG/ACT aerosol inhaler    Hypoxia       Sleep    ANDREA  (Chronic)    Relevant Orders    Miscellaneous DME       Alok Tobias  reports that he quit smoking about 39 years ago. His smoking use included cigarettes. He started smoking about 59 years ago. He has a 30 pack-year smoking history. He has been exposed to tobacco smoke. He has never used smokeless tobacco.     Continue Breztri twice daily.  Continue Xopenex inhaler as needed.  Continue neb treatments as needed.    Continue Singulair and Claritin.    He declined Tezsire injection due to cost.    Sleep study was notable for severe sleep apnea.  He is compliant with use of CPAP and supplemental oxygen nightly.  Compliance report was reviewed.  He is noted to still have an AHI of 8.  Will increase his pressure to 12 and obtain new compliance report in a few months.     Patient was educated on positive airway pressure treatment.  As per CMS guidelines, more than 4 hours on 70% of observed nights is considered adherence. Patient was strongly encouraged to use CPAP as much as possible during sleep as " more CPAP use is equal to more benefit. Use of heated humidification in positive airway pressure treatment to improve the adherence to the device.  In case of claustrophobia, we will provide the patient cognitive behavioral therapy and desensitization. Oral appliances use will be discussed with the patient in case of mild to moderate sleep apnea or if the patient with severe disease fail positive airway pressure treatment.       The patient was extensively educated on the consequences of untreated obstructive sleep apnea namely cardiovascular/metabolic disorder, neurocognitive deficit, daytime sleepiness, motor vehicle accidents, depression, mood disorders and reduced quality of life.  At the end of conversation, the patient voices understanding of the disease process and treatment modality.  Patient also understands the risk of untreated obstructive sleep apnea and benefit benefits of the treatment.    Counseling time was greater than 10 minutes.         Follow Up   Return in about 6 months (around 1/3/2026).  Patient was given instructions and counseling regarding his condition or for health maintenance advice. Please see specific information pulled into the AVS if appropriate.

## 2025-07-10 RX ORDER — VALSARTAN 40 MG/1
40 TABLET ORAL 2 TIMES DAILY
Qty: 180 TABLET | Refills: 1 | Status: SHIPPED | OUTPATIENT
Start: 2025-07-10

## 2025-07-23 RX ORDER — BUMETANIDE 2 MG/1
2 TABLET ORAL DAILY
Qty: 90 TABLET | Refills: 3 | Status: SHIPPED | OUTPATIENT
Start: 2025-07-23

## (undated) DEVICE — HERMETIC™ PLUS HERMETIC PLUS™ EXTERNAL CSF DRAINAGE SYSTEM WITH ANTI-REFLUX VALVE AND NEEDLELESS INJECTION SITES: Brand: HERMETIC PLUS™

## (undated) DEVICE — PROXIMATE RH ROTATING HEAD SKIN STAPLERS (35 WIDE) CONTAINS 35 STAINLESS STEEL STAPLES: Brand: PROXIMATE

## (undated) DEVICE — SUT NUROLON 4/0 TF18 CR8 I8IN C584D

## (undated) DEVICE — ADHESIVE ISLAND DRESSING: Brand: TELFA

## (undated) DEVICE — KT MANIFOLD CATHLAB CUST

## (undated) DEVICE — NDL HYPO ECLPS SFTY 25G 1 1/2IN

## (undated) DEVICE — 2963 MEDIPORE SOFT CLOTH TAPE 3 IN X 10 YD 12 RLS/CS: Brand: 3M™ MEDIPORE™

## (undated) DEVICE — Device

## (undated) DEVICE — GLV SURG PREMIERPRO MIC LTX PF SZ8.5 BRN

## (undated) DEVICE — STERILE LATEX POWDER FREE SURGICAL GLOVES WITH HYDROGEL COATING: Brand: PROTEXIS

## (undated) DEVICE — INTRO SHEATH ENGAGE TR SS/STD .025 6F 12CM

## (undated) DEVICE — CATH DIAG EXPO M/ PK 6FR FL4/FR4 PIG 3PK

## (undated) DEVICE — DEV COMP RAD PRELUDESYNC 24CM

## (undated) DEVICE — ANTIBACTERIAL UNDYED BRAIDED (POLYGLACTIN 910), SYNTHETIC ABSORBABLE SUTURE: Brand: COATED VICRYL

## (undated) DEVICE — CANNULA,ADULT,SOFT-TOUCH,7'TUBE,UC: Brand: PENDING

## (undated) DEVICE — BANDAGE,GAUZE,BULKEE II,4.5"X4.1YD,STRL: Brand: MEDLINE

## (undated) DEVICE — TRAUMACATH™ LARGE STYLE VENTRICULAR CATHETER SET: Brand: TRAUMACATH™

## (undated) DEVICE — SUT VICYL 2/0 CR8 18IN DYED J726D

## (undated) DEVICE — CATH DIAG EXPO .056 FL3.5 6F 100CM

## (undated) DEVICE — HI-TORQUE VERSACORE MODIFIED J GUIDE WIRE SYSTEM 175 CM: Brand: HI-TORQUE VERSACORE

## (undated) DEVICE — TUBING, SUCTION, 1/4" X 10', STRAIGHT: Brand: MEDLINE

## (undated) DEVICE — CATH DIAG EXPO .056 FL5 6F 100CM

## (undated) DEVICE — PK CATH CARD 10

## (undated) DEVICE — SPNG GZ WOVN 4X4IN 12PLY 10/BX STRL